# Patient Record
Sex: MALE | Race: WHITE | Employment: OTHER | URBAN - METROPOLITAN AREA
[De-identification: names, ages, dates, MRNs, and addresses within clinical notes are randomized per-mention and may not be internally consistent; named-entity substitution may affect disease eponyms.]

---

## 2017-01-04 ENCOUNTER — TELEPHONE (OUTPATIENT)
Dept: INTERNAL MEDICINE CLINIC | Facility: CLINIC | Age: 82
End: 2017-01-04

## 2017-01-04 NOTE — TELEPHONE ENCOUNTER
Maria M from [facility] is calling to inform Dr. Josee Jacob that the pt. Endy Fung yesterday, he said he was standing and all of a sudden he felt dizzy and fell backwards. He has some pain in his left rib cage, and he has a 1 inch gash on his left eyebrow.  Pt. Betty Hill

## 2017-01-04 NOTE — TELEPHONE ENCOUNTER
Pt states he is doing ok , he did not hit head, he fell in kitchen on L side, he states it feels like \"when you overdue muscles when playing baseball\", no contusions, no swelling, no dizziness or headache since the fall.  He still has constant arthritic p

## 2017-01-10 ENCOUNTER — APPOINTMENT (OUTPATIENT)
Dept: GENERAL RADIOLOGY | Facility: HOSPITAL | Age: 82
DRG: 480 | End: 2017-01-10
Attending: EMERGENCY MEDICINE
Payer: MEDICARE

## 2017-01-10 ENCOUNTER — APPOINTMENT (OUTPATIENT)
Dept: CT IMAGING | Facility: HOSPITAL | Age: 82
DRG: 480 | End: 2017-01-10
Attending: EMERGENCY MEDICINE
Payer: MEDICARE

## 2017-01-10 ENCOUNTER — TELEPHONE (OUTPATIENT)
Dept: INTERNAL MEDICINE CLINIC | Facility: CLINIC | Age: 82
End: 2017-01-10

## 2017-01-10 ENCOUNTER — HOSPITAL ENCOUNTER (INPATIENT)
Facility: HOSPITAL | Age: 82
LOS: 6 days | Discharge: NURSING, REHAB OR LONG-TERM CARE | DRG: 480 | End: 2017-01-16
Attending: EMERGENCY MEDICINE | Admitting: INTERNAL MEDICINE
Payer: MEDICARE

## 2017-01-10 DIAGNOSIS — I48.20 ATRIAL FIBRILLATION, CHRONIC (HCC): ICD-10-CM

## 2017-01-10 DIAGNOSIS — S72.002A HIP FRACTURE, LEFT, CLOSED, INITIAL ENCOUNTER (HCC): Primary | ICD-10-CM

## 2017-01-10 DIAGNOSIS — E53.8 VITAMIN B12 DEFICIENCY: ICD-10-CM

## 2017-01-10 LAB
ANION GAP SERPL CALC-SCNC: 8 MMOL/L (ref 0–18)
ANTIBODY SCREEN: NEGATIVE
BASOPHILS # BLD: 0 K/UL (ref 0–0.2)
BASOPHILS NFR BLD: 1 %
BILIRUB UR QL: NEGATIVE
BUN SERPL-MCNC: 10 MG/DL (ref 8–20)
BUN/CREAT SERPL: 16.9 (ref 10–20)
CALCIUM SERPL-MCNC: 8.4 MG/DL (ref 8.5–10.5)
CHLORIDE SERPL-SCNC: 103 MMOL/L (ref 95–110)
CLARITY UR: CLEAR
CO2 SERPL-SCNC: 26 MMOL/L (ref 22–32)
COLOR UR: YELLOW
CREAT SERPL-MCNC: 0.59 MG/DL (ref 0.5–1.5)
EOSINOPHIL # BLD: 0.1 K/UL (ref 0–0.7)
EOSINOPHIL NFR BLD: 1 %
ERYTHROCYTE [DISTWIDTH] IN BLOOD BY AUTOMATED COUNT: 16.6 % (ref 11–15)
GLUCOSE SERPL-MCNC: 102 MG/DL (ref 70–99)
GLUCOSE UR-MCNC: NEGATIVE MG/DL
HCT VFR BLD AUTO: 36.4 % (ref 41–52)
HGB BLD-MCNC: 11.6 G/DL (ref 13.5–17.5)
HGB UR QL STRIP.AUTO: NEGATIVE
INR BLD: 2.1 (ref 0.9–1.2)
KETONES UR-MCNC: NEGATIVE MG/DL
LEUKOCYTE ESTERASE UR QL STRIP.AUTO: NEGATIVE
LYMPHOCYTES # BLD: 1.4 K/UL (ref 1–4)
LYMPHOCYTES NFR BLD: 22 %
MCH RBC QN AUTO: 26.9 PG (ref 27–32)
MCHC RBC AUTO-ENTMCNC: 31.7 G/DL (ref 32–37)
MCV RBC AUTO: 84.7 FL (ref 80–100)
MONOCYTES # BLD: 0.6 K/UL (ref 0–1)
MONOCYTES NFR BLD: 10 %
MRSA NASAL: POSITIVE
NEUTROPHILS # BLD AUTO: 4 K/UL (ref 1.8–7.7)
NEUTROPHILS NFR BLD: 65 %
NITRITE UR QL STRIP.AUTO: NEGATIVE
OSMOLALITY UR CALC.SUM OF ELEC: 283 MOSM/KG (ref 275–295)
PH UR: 5 [PH] (ref 5–8)
PLATELET # BLD AUTO: 194 K/UL (ref 140–400)
PMV BLD AUTO: 10.2 FL (ref 7.4–10.3)
POTASSIUM SERPL-SCNC: 3.8 MMOL/L (ref 3.3–5.1)
PROT UR-MCNC: NEGATIVE MG/DL
PROTHROMBIN TIME: 23.7 SECONDS (ref 11.8–14.5)
RBC # BLD AUTO: 4.3 M/UL (ref 4.5–5.9)
RH BLOOD TYPE: POSITIVE
SODIUM SERPL-SCNC: 137 MMOL/L (ref 136–144)
STAPH A BY PCR: POSITIVE
TROPONIN I SERPL-MCNC: 0.01 NG/ML (ref ?–0.03)
UROBILINOGEN UR STRIP-ACNC: 2
VIT C UR-MCNC: NEGATIVE MG/DL
WBC # BLD AUTO: 6.1 K/UL (ref 4–11)

## 2017-01-10 PROCEDURE — 71010 XR CHEST AP PORTABLE  (CPT=71010): CPT

## 2017-01-10 PROCEDURE — 70450 CT HEAD/BRAIN W/O DYE: CPT

## 2017-01-10 PROCEDURE — 71260 CT THORAX DX C+: CPT

## 2017-01-10 PROCEDURE — 73502 X-RAY EXAM HIP UNI 2-3 VIEWS: CPT

## 2017-01-10 RX ORDER — CYANOCOBALAMIN 1000 UG/ML
1000 INJECTION INTRAMUSCULAR; SUBCUTANEOUS
Status: DISCONTINUED | OUTPATIENT
Start: 2017-01-10 | End: 2017-01-16

## 2017-01-10 RX ORDER — FUROSEMIDE 20 MG/1
20 TABLET ORAL DAILY
Status: DISCONTINUED | OUTPATIENT
Start: 2017-01-11 | End: 2017-01-11

## 2017-01-10 RX ORDER — DIAZEPAM 5 MG/ML
2.5 INJECTION, SOLUTION INTRAMUSCULAR; INTRAVENOUS ONCE
Status: COMPLETED | OUTPATIENT
Start: 2017-01-10 | End: 2017-01-10

## 2017-01-10 RX ORDER — HYDROMORPHONE HYDROCHLORIDE 1 MG/ML
0.4 INJECTION, SOLUTION INTRAMUSCULAR; INTRAVENOUS; SUBCUTANEOUS EVERY 2 HOUR PRN
Status: DISCONTINUED | OUTPATIENT
Start: 2017-01-10 | End: 2017-01-11

## 2017-01-10 RX ORDER — SODIUM CHLORIDE 9 MG/ML
INJECTION, SOLUTION INTRAVENOUS CONTINUOUS
Status: DISPENSED | OUTPATIENT
Start: 2017-01-10 | End: 2017-01-10

## 2017-01-10 RX ORDER — SERTRALINE HYDROCHLORIDE 25 MG/1
25 TABLET, FILM COATED ORAL DAILY
Status: DISCONTINUED | OUTPATIENT
Start: 2017-01-11 | End: 2017-01-16

## 2017-01-10 RX ORDER — HYDROMORPHONE HYDROCHLORIDE 1 MG/ML
1 INJECTION, SOLUTION INTRAMUSCULAR; INTRAVENOUS; SUBCUTANEOUS ONCE
Status: DISCONTINUED | OUTPATIENT
Start: 2017-01-10 | End: 2017-01-10

## 2017-01-10 RX ORDER — SODIUM CHLORIDE 0.9 % (FLUSH) 0.9 %
SYRINGE (ML) INJECTION
Status: DISPENSED
Start: 2017-01-10 | End: 2017-01-11

## 2017-01-10 RX ORDER — SODIUM CHLORIDE 9 MG/ML
INJECTION, SOLUTION INTRAVENOUS ONCE
Status: COMPLETED | OUTPATIENT
Start: 2017-01-10 | End: 2017-01-11

## 2017-01-10 RX ORDER — ACETAMINOPHEN 325 MG/1
650 TABLET ORAL EVERY 6 HOURS PRN
Status: DISCONTINUED | OUTPATIENT
Start: 2017-01-10 | End: 2017-01-16

## 2017-01-10 RX ORDER — HYDROMORPHONE HYDROCHLORIDE 1 MG/ML
1 INJECTION, SOLUTION INTRAMUSCULAR; INTRAVENOUS; SUBCUTANEOUS ONCE
Status: COMPLETED | OUTPATIENT
Start: 2017-01-10 | End: 2017-01-10

## 2017-01-10 RX ORDER — CLOTRIMAZOLE 1 %
CREAM (GRAM) TOPICAL 2 TIMES DAILY
Status: DISCONTINUED | OUTPATIENT
Start: 2017-01-10 | End: 2017-01-16

## 2017-01-10 RX ORDER — HYDROMORPHONE HYDROCHLORIDE 1 MG/ML
INJECTION, SOLUTION INTRAMUSCULAR; INTRAVENOUS; SUBCUTANEOUS
Status: COMPLETED
Start: 2017-01-10 | End: 2017-01-10

## 2017-01-10 NOTE — TELEPHONE ENCOUNTER
INR 2.2, 4 mg Coumadin Mon & Thur, 3 mg all other days. Please call Maria M / [facility] [phone] she has a confidential VM.    Tasked to coumadin high

## 2017-01-10 NOTE — HISTORICAL OFFICE NOTE
Nael Kendra  : 10/22/1931  ACCOUNT:  [id number]  [phone]  PCP: Dr. Johana Matias     TODAY'S DATE: 2016  DICTATED BY:  Rajiv Sandoval M.D. ]    CHIEF COMPLAINT: [Followup of .  CAD, of native vessels, Followup of Atrial fibrillation and currently surgery 2002, broken arm 2014 and bilateral cataract surgery    PAST CV HISTORY: atrial fibrillation, CAD, cardioversion 2007 and PTCA 20 Years ago    FAMILY HISTORY: Negative for premature CAD.   SOCIAL HISTORY: SMOKING: Former tobacco use. smokes cigars a with the APN in the next week to reevaluate off these medications. Check an EKG. 2. Coronary disease, history of percutaneous angioplasty 20 years ago. He has no chest pain. ASSESSMENT:  1. . CAD, of native vessels  2.  Atrial fibrillation, currently S

## 2017-01-10 NOTE — ED PROVIDER NOTES
Patient Seen in: [facility] AND [facility] Emergency Department    History   Patient presents with:  Dizziness (neurologic)    Stated Complaint: DIZZY    HPI    70-year-old male with multiple medical problems including history of atrial fibrillation and syncope (four) hours as needed for Shortness of Breath. SOTALOL HCL 80 MG Oral Tab,  TAKE 1 TABLET BY MOUTH TWICE DAILY   furosemide 20 MG Oral Tab,  Take 1 tablet (20 mg total) by mouth daily.    budesonide 0.5 MG/2ML Inhalation Suspension,  Take 2 mL (0.5 mg to Wt 64.411 kg  BMI 17.29 kg/m2  SpO2 98%        Physical Exam    Constitutional: Oriented to person, place, and time. Appears well-developed. No distress but does appear moderately uncomfortable. Daughter bedside. Head: Normocephalic and atraumatic.    E CBC W/ DIFFERENTIAL[[id number]]          Abnormal            Final result                 Please view results for these tests on the individual orders.    PROTHROMBIN TIME (PT)   RAINBOW DRAW BLUE   RAINBOW DRAW GOLD   RAINBOW DRAW LAVENDER (mfk=[id number])    1/10/2017  PROCEDURE: XR HIP W OR WO PELVIS 2 OR 3 VIEWS, LEFT (CPT=73502)  COMPARISON: [facility], [facility]. for [facility], X HIPS BILATERAL AND PELVIS, 11/03/2006, 9:56. INDICATIONS: Left hip pain post fall today.   TECHNIQUE: Three views

## 2017-01-10 NOTE — ED NOTES
Patient presents to ER via medics after becoming dizzy and passing out.  + LOC. Patient has left hip pain with rotation, shortening to left leg. Large abrasion to left elbow - irrigated and wrapped in gauze. Patient is awake and alert.   Denies chest she

## 2017-01-11 ENCOUNTER — TELEPHONE (OUTPATIENT)
Dept: INTERNAL MEDICINE CLINIC | Facility: CLINIC | Age: 82
End: 2017-01-11

## 2017-01-11 ENCOUNTER — ANESTHESIA (OUTPATIENT)
Dept: SURGERY | Facility: HOSPITAL | Age: 82
DRG: 480 | End: 2017-01-11
Payer: MEDICARE

## 2017-01-11 ENCOUNTER — APPOINTMENT (OUTPATIENT)
Dept: CV DIAGNOSTICS | Facility: HOSPITAL | Age: 82
DRG: 480 | End: 2017-01-11
Attending: INTERNAL MEDICINE
Payer: MEDICARE

## 2017-01-11 ENCOUNTER — APPOINTMENT (OUTPATIENT)
Dept: GENERAL RADIOLOGY | Facility: HOSPITAL | Age: 82
DRG: 480 | End: 2017-01-11
Attending: ORTHOPAEDIC SURGERY
Payer: MEDICARE

## 2017-01-11 ENCOUNTER — SURGERY (OUTPATIENT)
Age: 82
End: 2017-01-11

## 2017-01-11 ENCOUNTER — ANESTHESIA EVENT (OUTPATIENT)
Dept: SURGERY | Facility: HOSPITAL | Age: 82
DRG: 480 | End: 2017-01-11
Payer: MEDICARE

## 2017-01-11 PROBLEM — J69.0 ASPIRATION PNEUMONIA (HCC): Status: ACTIVE | Noted: 2017-01-11

## 2017-01-11 PROBLEM — D68.9 COAGULOPATHY (HCC): Status: ACTIVE | Noted: 2017-01-11

## 2017-01-11 LAB
ANION GAP SERPL CALC-SCNC: 9 MMOL/L (ref 0–18)
BASOPHILS # BLD: 0 K/UL (ref 0–0.2)
BASOPHILS NFR BLD: 0 %
BUN SERPL-MCNC: 8 MG/DL (ref 8–20)
BUN/CREAT SERPL: 11.1 (ref 10–20)
CALCIUM SERPL-MCNC: 8.4 MG/DL (ref 8.5–10.5)
CHLORIDE SERPL-SCNC: 105 MMOL/L (ref 95–110)
CO2 SERPL-SCNC: 25 MMOL/L (ref 22–32)
CREAT SERPL-MCNC: 0.72 MG/DL (ref 0.5–1.5)
EOSINOPHIL # BLD: 0 K/UL (ref 0–0.7)
EOSINOPHIL NFR BLD: 0 %
ERYTHROCYTE [DISTWIDTH] IN BLOOD BY AUTOMATED COUNT: 16.7 % (ref 11–15)
GLUCOSE SERPL-MCNC: 115 MG/DL (ref 70–99)
HCT VFR BLD AUTO: 36.2 % (ref 41–52)
HGB BLD-MCNC: 11.6 G/DL (ref 13.5–17.5)
INR BLD: 1.4 (ref 0.9–1.2)
INR BLD: 1.9 (ref 0.9–1.2)
INR BLD: 2.6 (ref 0.9–1.2)
LYMPHOCYTES # BLD: 0.6 K/UL (ref 1–4)
LYMPHOCYTES NFR BLD: 7 %
MCH RBC QN AUTO: 27.4 PG (ref 27–32)
MCHC RBC AUTO-ENTMCNC: 32.1 G/DL (ref 32–37)
MCV RBC AUTO: 85.2 FL (ref 80–100)
MONOCYTES # BLD: 0.7 K/UL (ref 0–1)
MONOCYTES NFR BLD: 8 %
NEUTROPHILS # BLD AUTO: 8 K/UL (ref 1.8–7.7)
NEUTROPHILS NFR BLD: 85 %
OSMOLALITY UR CALC.SUM OF ELEC: 287 MOSM/KG (ref 275–295)
PLATELET # BLD AUTO: 189 K/UL (ref 140–400)
PMV BLD AUTO: 10.2 FL (ref 7.4–10.3)
POTASSIUM SERPL-SCNC: 3.7 MMOL/L (ref 3.3–5.1)
PROTHROMBIN TIME: 17.4 SECONDS (ref 11.8–14.5)
PROTHROMBIN TIME: 21.4 SECONDS (ref 11.8–14.5)
PROTHROMBIN TIME: 28.1 SECONDS (ref 11.8–14.5)
RBC # BLD AUTO: 4.24 M/UL (ref 4.5–5.9)
SODIUM SERPL-SCNC: 139 MMOL/L (ref 136–144)
WBC # BLD AUTO: 9.4 K/UL (ref 4–11)

## 2017-01-11 PROCEDURE — 99223 1ST HOSP IP/OBS HIGH 75: CPT | Performed by: INTERNAL MEDICINE

## 2017-01-11 PROCEDURE — 76001 XR OR HIP (2 VIEWS) >60 C-ARM  (CPT=73502/76001): CPT

## 2017-01-11 PROCEDURE — 0QS736Z REPOSITION LEFT UPPER FEMUR WITH INTRAMEDULLARY INTERNAL FIXATION DEVICE, PERCUTANEOUS APPROACH: ICD-10-PCS | Performed by: ORTHOPAEDIC SURGERY

## 2017-01-11 PROCEDURE — 73502 X-RAY EXAM HIP UNI 2-3 VIEWS: CPT

## 2017-01-11 PROCEDURE — 30233K1 TRANSFUSION OF NONAUTOLOGOUS FROZEN PLASMA INTO PERIPHERAL VEIN, PERCUTANEOUS APPROACH: ICD-10-PCS | Performed by: INTERNAL MEDICINE

## 2017-01-11 DEVICE — SCREW CORT FA 5.0X37.5 Z NAIL: Type: IMPLANTABLE DEVICE | Status: FUNCTIONAL

## 2017-01-11 DEVICE — SCREW CORT FA 5.0X35 Z NAIL: Type: IMPLANTABLE DEVICE | Status: FUNCTIONAL

## 2017-01-11 DEVICE — ZNN CMN NAIL 13MMX21.5CM 125L
Type: IMPLANTABLE DEVICE | Status: FUNCTIONAL
Brand: ZIMMER® NATURAL NAIL® SYSTEM

## 2017-01-11 DEVICE — ZNN CMN LAG SCREW 10.5X110
Type: IMPLANTABLE DEVICE | Status: FUNCTIONAL
Brand: ZIMMER® NATURAL NAIL® SYSTEM

## 2017-01-11 RX ORDER — SODIUM CHLORIDE 9 MG/ML
INJECTION, SOLUTION INTRAVENOUS
Status: DISPENSED
Start: 2017-01-11 | End: 2017-01-12

## 2017-01-11 RX ORDER — SODIUM CHLORIDE, SODIUM LACTATE, POTASSIUM CHLORIDE, CALCIUM CHLORIDE 600; 310; 30; 20 MG/100ML; MG/100ML; MG/100ML; MG/100ML
INJECTION, SOLUTION INTRAVENOUS CONTINUOUS PRN
Status: DISCONTINUED | OUTPATIENT
Start: 2017-01-11 | End: 2017-01-11 | Stop reason: SURG

## 2017-01-11 RX ORDER — NALOXONE HYDROCHLORIDE 0.4 MG/ML
80 INJECTION, SOLUTION INTRAMUSCULAR; INTRAVENOUS; SUBCUTANEOUS AS NEEDED
Status: DISCONTINUED | OUTPATIENT
Start: 2017-01-11 | End: 2017-01-11 | Stop reason: HOSPADM

## 2017-01-11 RX ORDER — SODIUM CHLORIDE 0.9 % (FLUSH) 0.9 %
10 SYRINGE (ML) INJECTION AS NEEDED
Status: DISCONTINUED | OUTPATIENT
Start: 2017-01-11 | End: 2017-01-16

## 2017-01-11 RX ORDER — MORPHINE SULFATE 2 MG/ML
2 INJECTION, SOLUTION INTRAMUSCULAR; INTRAVENOUS EVERY 4 HOURS PRN
Status: DISCONTINUED | OUTPATIENT
Start: 2017-01-11 | End: 2017-01-16

## 2017-01-11 RX ORDER — POLYETHYLENE GLYCOL 3350 17 G/17G
17 POWDER, FOR SOLUTION ORAL DAILY PRN
Status: DISCONTINUED | OUTPATIENT
Start: 2017-01-11 | End: 2017-01-16

## 2017-01-11 RX ORDER — HALOPERIDOL 5 MG/ML
0.25 INJECTION INTRAMUSCULAR ONCE AS NEEDED
Status: DISCONTINUED | OUTPATIENT
Start: 2017-01-11 | End: 2017-01-11 | Stop reason: HOSPADM

## 2017-01-11 RX ORDER — SODIUM CHLORIDE 0.9 % (FLUSH) 0.9 %
SYRINGE (ML) INJECTION
Status: DISPENSED
Start: 2017-01-11 | End: 2017-01-12

## 2017-01-11 RX ORDER — MORPHINE SULFATE 10 MG/ML
6 INJECTION, SOLUTION INTRAMUSCULAR; INTRAVENOUS EVERY 10 MIN PRN
Status: DISCONTINUED | OUTPATIENT
Start: 2017-01-11 | End: 2017-01-11 | Stop reason: HOSPADM

## 2017-01-11 RX ORDER — HYDROCODONE BITARTRATE AND ACETAMINOPHEN 7.5; 325 MG/1; MG/1
1 TABLET ORAL EVERY 4 HOURS PRN
Status: DISCONTINUED | OUTPATIENT
Start: 2017-01-11 | End: 2017-01-16

## 2017-01-11 RX ORDER — SODIUM CHLORIDE, SODIUM LACTATE, POTASSIUM CHLORIDE, CALCIUM CHLORIDE 600; 310; 30; 20 MG/100ML; MG/100ML; MG/100ML; MG/100ML
INJECTION, SOLUTION INTRAVENOUS CONTINUOUS
Status: DISCONTINUED | OUTPATIENT
Start: 2017-01-11 | End: 2017-01-14

## 2017-01-11 RX ORDER — MORPHINE SULFATE 4 MG/ML
4 INJECTION, SOLUTION INTRAMUSCULAR; INTRAVENOUS EVERY 10 MIN PRN
Status: DISCONTINUED | OUTPATIENT
Start: 2017-01-11 | End: 2017-01-11 | Stop reason: HOSPADM

## 2017-01-11 RX ORDER — ONDANSETRON 2 MG/ML
4 INJECTION INTRAMUSCULAR; INTRAVENOUS ONCE AS NEEDED
Status: DISCONTINUED | OUTPATIENT
Start: 2017-01-11 | End: 2017-01-11 | Stop reason: HOSPADM

## 2017-01-11 RX ORDER — MORPHINE SULFATE 2 MG/ML
2 INJECTION, SOLUTION INTRAMUSCULAR; INTRAVENOUS EVERY 10 MIN PRN
Status: DISCONTINUED | OUTPATIENT
Start: 2017-01-11 | End: 2017-01-11 | Stop reason: HOSPADM

## 2017-01-11 RX ORDER — SUCCINYLCHOLINE CHLORIDE 20 MG/ML
INJECTION INTRAMUSCULAR; INTRAVENOUS AS NEEDED
Status: DISCONTINUED | OUTPATIENT
Start: 2017-01-11 | End: 2017-01-11 | Stop reason: SURG

## 2017-01-11 RX ORDER — HYDROMORPHONE HYDROCHLORIDE 1 MG/ML
0.2 INJECTION, SOLUTION INTRAMUSCULAR; INTRAVENOUS; SUBCUTANEOUS EVERY 5 MIN PRN
Status: DISCONTINUED | OUTPATIENT
Start: 2017-01-11 | End: 2017-01-11 | Stop reason: HOSPADM

## 2017-01-11 RX ORDER — BISACODYL 10 MG
10 SUPPOSITORY, RECTAL RECTAL
Status: DISCONTINUED | OUTPATIENT
Start: 2017-01-11 | End: 2017-01-16

## 2017-01-11 RX ORDER — HYDROCODONE BITARTRATE AND ACETAMINOPHEN 5; 325 MG/1; MG/1
2 TABLET ORAL AS NEEDED
Status: DISCONTINUED | OUTPATIENT
Start: 2017-01-11 | End: 2017-01-11 | Stop reason: HOSPADM

## 2017-01-11 RX ORDER — HYDROCODONE BITARTRATE AND ACETAMINOPHEN 5; 325 MG/1; MG/1
1 TABLET ORAL EVERY 4 HOURS PRN
Status: DISCONTINUED | OUTPATIENT
Start: 2017-01-11 | End: 2017-01-16

## 2017-01-11 RX ORDER — HYDROCODONE BITARTRATE AND ACETAMINOPHEN 5; 325 MG/1; MG/1
1 TABLET ORAL AS NEEDED
Status: DISCONTINUED | OUTPATIENT
Start: 2017-01-11 | End: 2017-01-11 | Stop reason: HOSPADM

## 2017-01-11 RX ORDER — DOCUSATE SODIUM 100 MG/1
100 CAPSULE, LIQUID FILLED ORAL 2 TIMES DAILY
Status: DISCONTINUED | OUTPATIENT
Start: 2017-01-11 | End: 2017-01-16

## 2017-01-11 RX ORDER — ENOXAPARIN SODIUM 100 MG/ML
40 INJECTION SUBCUTANEOUS DAILY
Status: DISCONTINUED | OUTPATIENT
Start: 2017-01-12 | End: 2017-01-16

## 2017-01-11 RX ORDER — ONDANSETRON 2 MG/ML
INJECTION INTRAMUSCULAR; INTRAVENOUS AS NEEDED
Status: DISCONTINUED | OUTPATIENT
Start: 2017-01-11 | End: 2017-01-11 | Stop reason: SURG

## 2017-01-11 RX ORDER — SOTALOL HYDROCHLORIDE 80 MG/1
80 TABLET ORAL EVERY 12 HOURS SCHEDULED
Status: DISCONTINUED | OUTPATIENT
Start: 2017-01-11 | End: 2017-01-16

## 2017-01-11 RX ORDER — MAGNESIUM HYDROXIDE 1200 MG/15ML
LIQUID ORAL CONTINUOUS PRN
Status: DISCONTINUED | OUTPATIENT
Start: 2017-01-11 | End: 2017-01-11

## 2017-01-11 RX ORDER — HYDROMORPHONE HYDROCHLORIDE 1 MG/ML
0.6 INJECTION, SOLUTION INTRAMUSCULAR; INTRAVENOUS; SUBCUTANEOUS EVERY 5 MIN PRN
Status: DISCONTINUED | OUTPATIENT
Start: 2017-01-11 | End: 2017-01-11 | Stop reason: HOSPADM

## 2017-01-11 RX ORDER — HYDROMORPHONE HYDROCHLORIDE 1 MG/ML
0.4 INJECTION, SOLUTION INTRAMUSCULAR; INTRAVENOUS; SUBCUTANEOUS EVERY 5 MIN PRN
Status: DISCONTINUED | OUTPATIENT
Start: 2017-01-11 | End: 2017-01-11 | Stop reason: HOSPADM

## 2017-01-11 RX ADMIN — SUCCINYLCHOLINE CHLORIDE 60 MG: 20 INJECTION INTRAMUSCULAR; INTRAVENOUS at 18:10:00

## 2017-01-11 RX ADMIN — SODIUM CHLORIDE, SODIUM LACTATE, POTASSIUM CHLORIDE, CALCIUM CHLORIDE: 600; 310; 30; 20 INJECTION, SOLUTION INTRAVENOUS at 18:10:00

## 2017-01-11 RX ADMIN — SODIUM CHLORIDE, SODIUM LACTATE, POTASSIUM CHLORIDE, CALCIUM CHLORIDE: 600; 310; 30; 20 INJECTION, SOLUTION INTRAVENOUS at 20:19:00

## 2017-01-11 RX ADMIN — SODIUM CHLORIDE, SODIUM LACTATE, POTASSIUM CHLORIDE, CALCIUM CHLORIDE: 600; 310; 30; 20 INJECTION, SOLUTION INTRAVENOUS at 18:50:00

## 2017-01-11 RX ADMIN — ONDANSETRON 4 MG: 2 INJECTION INTRAMUSCULAR; INTRAVENOUS at 18:10:00

## 2017-01-11 NOTE — PROGRESS NOTES
[facility] - [facility]    Cardiology Progress Note    Iker Frazier Patient Status:  Inpatient    10/22/1931 MRN [id number]   Location [facility] 1SW Attending Caleb Posadas MD   Hosp Day # 1 PCP Lidia Monsalve MD         Assessment a CO2 25 01/11/2017   * 01/11/2017   CA 8.4* 01/11/2017   ALB 2.3* 05/23/2016   BILT 0.9 05/23/2016   TP 6.7 05/23/2016   AST 13* 05/23/2016   ALT 11* 05/23/2016   INR 1.9* 01/11/2017   PT 41.8* 06/01/2016   TSH 1.24 02/12/2014   TROP 0.01 01/10/201 Postop changes in the abdomen and pelvis. 5. Atherosclerosis. Ekg 12-lead    1/10/2017  ECG Report  Interpretation  -------------------------- Undetermined rhythm -Old anterior infarct.  - Nonspecific T-abnormality.   Low voltage with rightward P-ax

## 2017-01-11 NOTE — H&P
[facility] Patient Status:  Inpatient    10/22/1931 MRN [id number]   Location [facility] 1SW Attending Vy Foss MD   Hosp Day # 1 PCP Stefanie Adamson MD     Date:  2017  Date History of vitamin B12 deficiency diagnosed in February of 2014 at  which time his vitamin B12 level was 128.  He was started on    monthly B12  injections. 5.  History of osteoarthritis of the hands.   6.  Peptic ulcer disease, status post Billroth kristi (peptic ulcer disease)    • [facility] 2013   • Fracture, radius 2014   • Vitamin B12 deficiency    • Lipid screening 10/04/2010   • Pneumonia      2016   • Coronary atherosclerosis    • Arrhythmia    • Cataract    • Esophageal reflux    • IBS (i smoking about 23 years ago. His smoking use included Cigarettes. He has a 60 pack-year smoking history. His smokeless tobacco use includes Chew. He reports that he drinks about 1.5 oz of alcohol per week. He reports that he does not use illicit drugs.     Erica Merino non-distended  Extremities: +edema; LLE externally rotated; currently in traction  Vascular: no carotid bruits; DP/PT 2/2  Musculoskeletal: Motor 5/5 upper and lower extremities  Neurological: cranial nerves II-XII intact; light touch and proprioception in dysmotility. 8. Generalized atherosclerosis including marked coronary artery calcification. Coronary stents. 10. Chronic mild T12 vertebral body compression fracture.          Xr Hip W Or Wo Pelvis 2 Or 3 Views, Left (cpt=73502)    1/10/2017  CONCLUSION: history of aspiration based on video swallow study from April 2016; swallowing had improved; he had been followed by speech therapist, [facility] [place]; on cardiac diet with thin liquids; he has been non-compliant with using thickener when drinkin plan care; daughter Rd Boyer [phone] is next available contact      Santo Bahena MD  1/11/2017  9:19 AM

## 2017-01-11 NOTE — CONSULTS
[facility] - [facility]    Report of Cardiology Consultation    Chica Aisha Patient Status:  Emergency    10/22/1931 MRN [id number]   Location [address] Attending Emile Dean MD   Hosp Day # 0 PCP Linden Mack 2006    CATARACT  2006    INTRAOCULAR LENS IMPLANT, SECONDARY  2006    OTHER SURGICAL HISTORY  2011    Comment dental implants    CHOLECYSTECTOMY         Family History  Family History   Problem Relation Age of Onset   • old age[other] Vicki Watkins Father 80 01/10/2017   CREATSERUM 0.59 01/10/2017   BUN 10 01/10/2017    01/10/2017   K 3.8 01/10/2017    01/10/2017   CO2 26 01/10/2017   * 01/10/2017   CA 8.4* 01/10/2017   ALB 2.3* 05/23/2016   TP 6.7 05/23/2016   AST 13* 05/23/2016   ALT 11* 0

## 2017-01-11 NOTE — PROGRESS NOTES
PROGRESS NOTE / ORTHO SURG;  CONSULT NOTE DICTATED. ASSESS: CLOSED , 4-PART INTERTROCHANTERIC FRACTURE OF THE LEFT HIP. THIS FRACTURE REQUIRES SURGERY WHICH IS SCHEDULED FOR THE MID-AFTERNOON OF 1/11/17. STANDARD PERIOPERATIVE VTE ORDERS ARE ORDERED.

## 2017-01-11 NOTE — DISCHARGE PLANNING
SUSANNE attempted to meet w/ pt to discuss discharge planning per MD order. Pt's RN was in pt's room at this time - pt had just got to room. SW to follow up when appropriate. 2:45PM: SUSANNE met w/ pt, pt's dtr and pt's son to discuss d/c plans.  Pt lives at home

## 2017-01-11 NOTE — CM/SW NOTE
MICHEAL spoke with the patient's daughter Claire Kemp [phone] who lives in [place] to explain and enroll Robin Devine in the [employer] program under  (L hip fx) s/p fall. Bravo Garcia agreed with phone f/u for 3 months post d/c from [employer].   Bravo Garcia s

## 2017-01-11 NOTE — PROGRESS NOTES
PT STATED TO RN THIS A.M. HIS WISHES ARE TO BE DNR, THIS RN SPOKE WITH DR. WANG ABOUT PATIENTS ADVANCE DIRECTIVES AND INFORMED OF WISHES.

## 2017-01-11 NOTE — CONSULTS
[facility]    PATIENT'S NAME: Jose Antonio Vicente, [de-identified] R   ATTENDING PHYSICIAN: Mile Palma. Tonya Bowen MD   CONSULTING PHYSICIAN: Roro Rubalcava.  MD Maricruz   PATIENT ACCOUNT#:   [de-identified]    LOCATION:  [facility]  MEDICAL RECORD #:   [id number]       DATE OF BIRTH: atherosclerosis with coronary artery calcification was identified. Calcified bilateral lower lobe pulmonary granulomas noted. Mild T12 compression deformity.       X-ray studies of the pelvis and left hip reveal a 4-part intertrochanteric fracture of the only slight tenderness over the mid lateral aspect on the left, with none on the right. Sternocleidomastoid and paraspinal muscles are without spasm or tenderness, and the posterior cervical midline is nontender.   Thoracic and lumbar spine reveal no local hammertoe deformities. The left hindfoot, midfoot, and forefoot are free of localized swelling, deformity, discoloration, or tenderness. Both legs show healed venous stasis ulcers. IMPRESSION:    1.    Acute four-part intertrochanteric fracture of the

## 2017-01-11 NOTE — PROGRESS NOTES
ORTHO SURG: AM LABS: INR = 2.6, WBC = 9,400, H/H = 11.6/ 36.2, PLATELETS = 982,927. PT. D/W DR. Link Sethi. TO RECEIVE 2 UNITS OF FFP THIS AM. TARGET INR FOR OR IS 1.5 OR LESS.

## 2017-01-12 ENCOUNTER — APPOINTMENT (OUTPATIENT)
Dept: CV DIAGNOSTICS | Facility: HOSPITAL | Age: 82
DRG: 480 | End: 2017-01-12
Attending: INTERNAL MEDICINE
Payer: MEDICARE

## 2017-01-12 LAB
ALBUMIN SERPL BCP-MCNC: 2.5 G/DL (ref 3.5–4.8)
ALBUMIN/GLOB SERPL: 0.7 {RATIO} (ref 1–2)
ALP SERPL-CCNC: 66 U/L (ref 32–100)
ALT SERPL-CCNC: 15 U/L (ref 17–63)
ANION GAP SERPL CALC-SCNC: 8 MMOL/L (ref 0–18)
AST SERPL-CCNC: 17 U/L (ref 15–41)
BASOPHILS # BLD: 0 K/UL (ref 0–0.2)
BASOPHILS NFR BLD: 0 %
BILIRUB SERPL-MCNC: 0.9 MG/DL (ref 0.3–1.2)
BLOOD TYPE BARCODE: 5100
BLOOD TYPE BARCODE: 5100
BUN SERPL-MCNC: 9 MG/DL (ref 8–20)
BUN/CREAT SERPL: 10.8 (ref 10–20)
CALCIUM SERPL-MCNC: 8 MG/DL (ref 8.5–10.5)
CHLORIDE SERPL-SCNC: 106 MMOL/L (ref 95–110)
CO2 SERPL-SCNC: 26 MMOL/L (ref 22–32)
CREAT SERPL-MCNC: 0.83 MG/DL (ref 0.5–1.5)
EOSINOPHIL # BLD: 0.1 K/UL (ref 0–0.7)
EOSINOPHIL NFR BLD: 1 %
ERYTHROCYTE [DISTWIDTH] IN BLOOD BY AUTOMATED COUNT: 17.2 % (ref 11–15)
GLOBULIN PLAS-MCNC: 3.4 G/DL (ref 2.5–3.7)
GLUCOSE SERPL-MCNC: 101 MG/DL (ref 70–99)
HCT VFR BLD AUTO: 26.8 % (ref 41–52)
HCT VFR BLD AUTO: 31.7 % (ref 41–52)
HGB BLD-MCNC: 8.6 G/DL (ref 13.5–17.5)
HGB BLD-MCNC: 9.7 G/DL (ref 13.5–17.5)
INR BLD: 1.2 (ref 0.9–1.2)
LYMPHOCYTES # BLD: 0.7 K/UL (ref 1–4)
LYMPHOCYTES NFR BLD: 7 %
MCH RBC QN AUTO: 27.5 PG (ref 27–32)
MCHC RBC AUTO-ENTMCNC: 30.6 G/DL (ref 32–37)
MCV RBC AUTO: 89.7 FL (ref 80–100)
MONOCYTES # BLD: 1.1 K/UL (ref 0–1)
MONOCYTES NFR BLD: 10 %
NEUTROPHILS # BLD AUTO: 8.7 K/UL (ref 1.8–7.7)
NEUTROPHILS NFR BLD: 83 %
OSMOLALITY UR CALC.SUM OF ELEC: 289 MOSM/KG (ref 275–295)
PLATELET # BLD AUTO: 164 K/UL (ref 140–400)
PMV BLD AUTO: 11.3 FL (ref 7.4–10.3)
POTASSIUM SERPL-SCNC: 3.4 MMOL/L (ref 3.3–5.1)
PROT SERPL-MCNC: 5.9 G/DL (ref 5.9–8.4)
PROTHROMBIN TIME: 15.2 SECONDS (ref 11.8–14.5)
RBC # BLD AUTO: 3.53 M/UL (ref 4.5–5.9)
SODIUM SERPL-SCNC: 140 MMOL/L (ref 136–144)
WBC # BLD AUTO: 10.6 K/UL (ref 4–11)

## 2017-01-12 PROCEDURE — 93306 TTE W/DOPPLER COMPLETE: CPT | Performed by: INTERNAL MEDICINE

## 2017-01-12 PROCEDURE — 99232 SBSQ HOSP IP/OBS MODERATE 35: CPT | Performed by: INTERNAL MEDICINE

## 2017-01-12 PROCEDURE — 93306 TTE W/DOPPLER COMPLETE: CPT

## 2017-01-12 RX ORDER — WARFARIN SODIUM 5 MG/1
5 TABLET ORAL
Status: COMPLETED | OUTPATIENT
Start: 2017-01-12 | End: 2017-01-12

## 2017-01-12 RX ORDER — SODIUM CHLORIDE 9 MG/ML
INJECTION, SOLUTION INTRAVENOUS
Status: COMPLETED
Start: 2017-01-12 | End: 2017-01-12

## 2017-01-12 NOTE — CM/SW NOTE
CTL rounds:  Prior to admission pt lived with his wife in his own home. Approx 4 stairs. Pt had been independent with ADL's. Today is POday #1 - per MD to start PT this pm.  Disposition pending clinical course.   Noted in record that pt has a past hx of

## 2017-01-12 NOTE — CM/SW NOTE
Per daughter's request, CTL left the [facility] letter and brochure at the bedside for the family. CTL's phone # left on [facility] letter for family to call if they have questions. Referral has been made to [facility] .   He is current w/ [facility] and will notify [facility] at dis

## 2017-01-12 NOTE — PROGRESS NOTES
[facility]    Cardiology Progress Note    Erika Lewis Patient Status:  Inpatient    10/22/1931 MRN [id number]   Location [facility] 4W/SW/SE Attending Asad Lou MD   Hosp Day # 2 PCP Kathryn Anguiano MD         Assessm BID   • enoxaparin  40 mg Subcutaneous Daily   • clotrimazole   Topical BID   • cyanocobalamin  1,000 mcg Intramuscular Q30 Days   • Sertraline HCl  25 mg Oral Daily   • mupirocin  1 Application Each Nare BID         Results:     Lab Results  Component Alysha right middle lobe. 6. A 12 mm right middle lobe perifissural pulmonary nodule-new relative to previous exam. A post inflammatory etiology is favored. Followup CT scan in 3 months is recommended.  7. Fluid in thoracic esophagus either secondary to reflux or

## 2017-01-12 NOTE — PHYSICAL THERAPY NOTE
PHYSICAL THERAPY HIP EVALUATION - INPATIENT     Room Number: 432/432-A  Evaluation Date: 1/12/2017  Type of Evaluation: Initial  Physician Order: PT Eval and Treat    Presenting Problem: IM nailing L hip  Reason for Therapy: Mobility Dysfunction and Discha prior to admission to the hospital. 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 SUBJECTIVE  \"It hurts so much\"    Patient self-stated goal is to go home.      OBJECTIVE  Precautions:  (IV Conde Heart Monitor)  Fall Risk: High fall risk    WEIGHT BEARING RESTRI End of Session: In bed;Needs met;Call light within reach;RN aware of session/findings; All patient questions and concerns addressed;SCDs in place; Alarm set    ASSESSMENT     Patient is a 80year old male admitted 1/10/2017 for IM nailing L hip.   Patient rec

## 2017-01-12 NOTE — PLAN OF CARE
Patient/Family Goals    • Patient/Family Long Term Goal Progressing    • Patient/Family Short Term Goal Progressing          Patient POD#1 . Nectar thick/mechanical soft diet. Wears upper and lower dentures. Speech consult ordered r/t aspiration risk.  Ritu Patel

## 2017-01-12 NOTE — SLP NOTE
ADULT SWALLOWING EVALUATION    ASSESSMENT & PLAN   ASSESSMENT    Pt alert assessed sitting in bed with HOB reclined dt/ Pt c/o of pain if raised d/t hip fx . Pt presented with pureed, nectar and thin liquid trials.  Bilabial seal adequate with no anterior l fibrillation) ([facility]    • PUD (peptic ulcer disease)    • Thrombocytosis ([facility] 2013   • Fracture, radius 2014   • Vitamin B12 deficiency    • Lipid screening 10/04/2010   • Pneumonia      2016   • Coronary atherosclerosis    • Arrhythmia    • Cataract    • liquids without overt signs or symptoms of aspiration with 100 % accuracy over 2-3 session(s).    Goal #2 The patient/family/caregiver will demonstrate understanding and implementation of aspiration precautions and swallow strategies independently over 2-3

## 2017-01-12 NOTE — ANESTHESIA PREPROCEDURE EVALUATION
Anesthesia PreOp Note    HPI:     Oscar Medina is a 80year old male who presents for preoperative consultation requested by: Martell Mora MD    Date of Surgery: 1/10/2017 - 1/11/2017    Procedure(s):   FEMUR IM NAIL  Indication: Closed intertrochanteri adenoma    • CAD (coronary artery disease)    • AF (atrial fibrillation) (HCC)    • PUD (peptic ulcer disease)    • Thrombocytosis ([facility] [facility] 2013   • Fracture, radius 2014   • Vitamin B12 deficiency    • Lipid screening 10/04/2010   • Pneumonia      2016   • C Ordered in Epic:  [MAR Hold] Normal Saline Flush 0.9 % injection 10 mL 10 mL Intravenous PRN MD [facility] Hold] Sotalol HCl (BETAPACE) tab 80 mg 80 mg Oral 2 times per day Delmy Izquierdo MD    sodium chloride 0.9 % infusion        sodium chl Years of Education: N/A  Number of Children: N/A     Occupational History  None on file     Social History Main Topics   Smoking status: Former Smoker  3.00 Packs/Day  For 20.00 Years     Types: Cigarettes    Quit date: 06/16/1993    Smokeless tobacco: Cur lower dentures    Pulmonary - negative ROS and normal exam   Cardiovascular - normal exam  Exercise tolerance: poor  (+) CAD,     Neuro/Psych      GI/Hepatic/Renal    (+) GERD,     Endo/Other - negative ROS   Abdominal  - normal exam             Anesthesia

## 2017-01-12 NOTE — PROGRESS NOTES
[facility]    Progress Note    Carry Dais Patient Status:  Inpatient    10/22/1931 MRN [id number]   Location [facility] 4W/SW/SE Attending Luna Ortiz MD   Hosp Day # 2 PCP Rubens Yang MD       SUBJECTIVE:  Sara Vick (vsn=[id number])    1/11/2017  CONCLUSION:  1. Internal fixation of left intertrochanteric proximal femur fracture with restoration of anatomic alignment. Xr Chest Ap Portable  (cpt=71010)    1/10/2017  CONCLUSION:  1. Cardiomegaly.  2. Atheroscler infusion  Intravenous Continuous   Normal Saline Flush 0.9 % injection 10 mL 10 mL Intravenous PRN   docusate sodium (COLACE) cap 100 mg 100 mg Oral BID   PEG 3350 (MIRALAX) powder packet 17 g 17 g Oral Daily PRN   magnesium hydroxide (MILK OF MAGNESIA) 40 using thickener when drinking coffee; cont Zosyn 3.375 gm IV q8hrs (day 2);  will need repeat Speech therapy consult and dietary restriction (mechanical soft diet with nectar thick liquids); he has not been using lower dentures as they are ill-fitting; non

## 2017-01-12 NOTE — SLP NOTE
Pt off-unit for tests. Will f/u later to complete BSE.      Terri Rasheed M.S. CCC/SLP  Speech-Language Pathologist  [facility]  #[id number]

## 2017-01-12 NOTE — DIETARY NOTE
ADULT NUTRITION INITIAL ASSESSMENT    Pt is at moderate nutrition risk. Pt meets malnutrition criteria.       CRITERIA FOR MALNUTRITION DIAGNOSIS:  Criteria for severe malnutrition diagnosis: chronic illness related to body fat severe depletion, related to Coronary atherosclerosis    • Arrhythmia    • Cataract    • Esophageal reflux    • IBS (irritable bowel syndrome)    • Depression      ANTHROPOMETRICS:  HT: 193 cm (6' 4\")  WT: 58.333 kg (128 lb 9.6 oz)   BMI: Body mass index is 15.66 kg/(m^2).   BMI CLASS

## 2017-01-12 NOTE — BRIEF OP NOTE
[facility] OPERATING ROOM  Brief Op Note     Negar Peterson Location: OR   CSN [id number] MRN [id number]   Admission Date 1/10/2017 Operation Date 1/11/2017   Attending Physician Jalyn Estevez MD Operating Physician Carlie Bernal MD       Pre-Oper

## 2017-01-12 NOTE — ANESTHESIA POSTPROCEDURE EVALUATION
Patient: Iker Frazier    Procedure Summary     Date Anesthesia Start Anesthesia Stop Room / Location    01/11/17 1806 2020 [facility] OR 06 / [facility] OR       Procedure Diagnosis Surgeon Responsible Provider    FEMUR IM NAIL (Left ) Closed intertrochanter

## 2017-01-12 NOTE — OPERATIVE REPORT
[facility]    PATIENT'S NAME: Piyush Holley ERVIN   ATTENDING PHYSICIAN: Rickey Baptiset. Hero Adams MD   OPERATING PHYSICIAN: Gertrudis Guillaume.  MD Maricruz   PATIENT ACCOUNT#:   [de-identified]    LOCATION:  [facility] [address] [place] [address] #:   [id number]       DATE OF KENNETH timeframe. A standard povidone iodine surgical scrub preparation of the left flank, pelvis, hip, thigh, knee and proximal leg was carried out in standard fashion. The area was then painted with povidone iodine solution.   Lateral draping about the lef The ball-tip guidepin was then placed through the cannulated opening at the top of the handle of the awl and delivered into the intramedullary canal.  The position was then confirmed with AP imaging and subsequently with lateral imaging once the awl was re previously described position on the AP view. The pin was delivered to just short of the subchondral bone. Depth gauge measurement showed the pin between 110 and 115 mm from the lateral cortex.   A two-part cannulated drill was then set to 110 mm, deliver closed the subcutaneous layer of each of the three surgical wounds and running 3-0 nylon closed the skin. There were no specimens obtained. No drains were placed. Estimated blood loss for the procedure was 100 mL.       Sterile petroleum gauze and

## 2017-01-12 NOTE — PROGRESS NOTES
ORTHO SURG:  PO#1  AFEB. AM LABS: INR = 1.2, WBC = 10,600, H/H = 9.7 / 31.7, PLATELETS = 115,333. PATIENT RATES PAIN AT \"5\". PE: ALERT, NAD, PHYSIOLOGIC POSITIONING OF THE LEFT LOWER LIMB. ASSESS: SATISFACTORY PO#1.  PLAN: START P.T. THIS PM, DRESSING KELLEN

## 2017-01-13 LAB
ANION GAP SERPL CALC-SCNC: 3 MMOL/L (ref 0–18)
BASOPHILS # BLD: 0.1 K/UL (ref 0–0.2)
BASOPHILS NFR BLD: 1 %
BUN SERPL-MCNC: 11 MG/DL (ref 8–20)
BUN/CREAT SERPL: 13.8 (ref 10–20)
CALCIUM SERPL-MCNC: 7.9 MG/DL (ref 8.5–10.5)
CHLORIDE SERPL-SCNC: 109 MMOL/L (ref 95–110)
CO2 SERPL-SCNC: 26 MMOL/L (ref 22–32)
CORTIS SERPL-MCNC: 24.2 MCG/DL
CREAT SERPL-MCNC: 0.8 MG/DL (ref 0.5–1.5)
EOSINOPHIL # BLD: 0.3 K/UL (ref 0–0.7)
EOSINOPHIL NFR BLD: 3 %
ERYTHROCYTE [DISTWIDTH] IN BLOOD BY AUTOMATED COUNT: 16.1 % (ref 11–15)
GLUCOSE SERPL-MCNC: 106 MG/DL (ref 70–99)
HCT VFR BLD AUTO: 28.7 % (ref 41–52)
HGB BLD-MCNC: 9.5 G/DL (ref 13.5–17.5)
INR BLD: 1.2 (ref 0.9–1.2)
LYMPHOCYTES # BLD: 1 K/UL (ref 1–4)
LYMPHOCYTES NFR BLD: 10 %
MCH RBC QN AUTO: 27.9 PG (ref 27–32)
MCHC RBC AUTO-ENTMCNC: 33.2 G/DL (ref 32–37)
MCV RBC AUTO: 84.2 FL (ref 80–100)
MONOCYTES # BLD: 1 K/UL (ref 0–1)
MONOCYTES NFR BLD: 9 %
NEUTROPHILS # BLD AUTO: 8.4 K/UL (ref 1.8–7.7)
NEUTROPHILS NFR BLD: 78 %
OSMOLALITY UR CALC.SUM OF ELEC: 286 MOSM/KG (ref 275–295)
PLATELET # BLD AUTO: 146 K/UL (ref 140–400)
PMV BLD AUTO: 10.4 FL (ref 7.4–10.3)
POTASSIUM SERPL-SCNC: 3.8 MMOL/L (ref 3.3–5.1)
PROTHROMBIN TIME: 14.5 SECONDS (ref 11.8–14.5)
RBC # BLD AUTO: 3.4 M/UL (ref 4.5–5.9)
SODIUM SERPL-SCNC: 138 MMOL/L (ref 136–144)
WBC # BLD AUTO: 10.8 K/UL (ref 4–11)

## 2017-01-13 PROCEDURE — 99232 SBSQ HOSP IP/OBS MODERATE 35: CPT | Performed by: INTERNAL MEDICINE

## 2017-01-13 PROCEDURE — 30233N1 TRANSFUSION OF NONAUTOLOGOUS RED BLOOD CELLS INTO PERIPHERAL VEIN, PERCUTANEOUS APPROACH: ICD-10-PCS | Performed by: INTERNAL MEDICINE

## 2017-01-13 PROCEDURE — 99222 1ST HOSP IP/OBS MODERATE 55: CPT | Performed by: INTERNAL MEDICINE

## 2017-01-13 RX ORDER — SODIUM CHLORIDE 0.9 % (FLUSH) 0.9 %
10 SYRINGE (ML) INJECTION AS NEEDED
Status: DISCONTINUED | OUTPATIENT
Start: 2017-01-13 | End: 2017-01-16

## 2017-01-13 RX ORDER — SODIUM CHLORIDE 9 MG/ML
INJECTION, SOLUTION INTRAVENOUS ONCE
Status: COMPLETED | OUTPATIENT
Start: 2017-01-13 | End: 2017-01-13

## 2017-01-13 RX ORDER — SODIUM CHLORIDE 9 MG/ML
INJECTION, SOLUTION INTRAVENOUS
Status: COMPLETED
Start: 2017-01-13 | End: 2017-01-13

## 2017-01-13 RX ORDER — WARFARIN SODIUM 3 MG/1
3 TABLET ORAL NIGHTLY
Status: DISCONTINUED | OUTPATIENT
Start: 2017-01-13 | End: 2017-01-14

## 2017-01-13 RX ORDER — POTASSIUM CHLORIDE 14.9 MG/ML
20 INJECTION INTRAVENOUS ONCE
Status: COMPLETED | OUTPATIENT
Start: 2017-01-13 | End: 2017-01-13

## 2017-01-13 RX ORDER — SODIUM CHLORIDE 0.9 % (FLUSH) 0.9 %
SYRINGE (ML) INJECTION
Status: COMPLETED
Start: 2017-01-13 | End: 2017-01-13

## 2017-01-13 NOTE — OCCUPATIONAL THERAPY NOTE
OCCUPATIONAL THERAPY EVALUATION - INPATIENT      Room Number: 432/432-A  Evaluation Date: 1/13/2017  Type of Evaluation: Initial  Presenting Problem:  (s/p Intramedullary fixation of intertrochanteric fx)    Physician Order: IP Consult to Occupational Ther Vitamin B12 deficiency    • Lipid screening 10/04/2010   • Pneumonia      2016   • Coronary atherosclerosis    • Arrhythmia    • Cataract    • Esophageal reflux    • IBS (irritable bowel syndrome)    • Depression    • White matter disease        Past Surgi does the patient currently need…  -   Putting on and taking off regular lower body clothing?: A Lot  -   Bathing (including washing, rinsing, drying)?: Total  -   Toileting, which includes using toilet, bedpan or urinal? : A Lot  -   Putting on and taking

## 2017-01-13 NOTE — PLAN OF CARE
DISCHARGE PLANNING    • Discharge to home or other facility with appropriate resources Progressing        HEMATOLOGIC - ADULT    • Maintains hematologic stability Progressing        PAIN - ADULT    • Verbalizes/displays adequate comfort level or patient's

## 2017-01-13 NOTE — PROGRESS NOTES
CALLED DR. Mauro Morales REGARDING PATIENT'S LOW BLOOD PRESSURE. PER DR. Mauro Morales, ORDERED STAT H/H AND 500ML BOLUS OF NACL 0.9.

## 2017-01-13 NOTE — CDS QUERY
Potential for Impaired Nutritional Status  DOCUMENTATION CLARIFICATION FORM  Dear Doctor:  Please see RD assessment based on ASPEN guidelines dated 01/12/17 in notes section of medical record. RD nutritional diagnosis is Severe Malnutrition.   For accurate

## 2017-01-13 NOTE — PROGRESS NOTES
[facility]    Progress Note    Candy Young Patient Status:  Inpatient    10/22/1931 MRN [id number]   Location [facility] 4W/SW/SE Attending Jerry Gao MD   Hosp Day # 3 PCP Dionne Fox MD         Assessment and P others).  on sotalol 80mg BID     6.   Pulmonary HTN - seen on ECHO with PA pressure 54 (previous PA pressure 44 inJune 2016); etiology?; EF normal; perhaps due to COPD as lungs hyperinflated on PCXR; will request pulmonary consultation (Dr Sherine Styles 01/13/17 0600   Gross per 24 hour   Intake   1955 ml   Output    650 ml   Net   1305 ml       Wt Readings from Last 3 Encounters:  01/10/17 : 128 lb 9.6 oz (58.333 kg)  11/28/16 : 142 lb (64.411 kg)  08/31/16 : 133 lb 6.4 oz (60.51 kg)        Constitutiona Topical BID   cyanocobalamin (VITAMIN B12) 1000 MCG/ML injection 1,000 mcg 1,000 mcg Intramuscular Q30 Days   Sertraline HCl (ZOLOFT) tab 25 mg 25 mg Oral Daily   acetaminophen (TYLENOL) tab 650 mg 650 mg Oral Q6H PRN   mupirocin (BACTROBAN) 2% nasal ointm 3. Secretions in left lower lobe bronchus suspicious for aspiration. 4. Chronic inspissated secretions and posterior basal right lower lobe with atelectasis pneumonia and/or scar suspicious for recurrent aspiration pneumonia.  5. Small airways inflammation

## 2017-01-13 NOTE — CONSULTS
Pulmonary/Critical Care Consult Note    HPI:   Mao Strauss is a 80year old male with Patient presents with:  Dizziness (neurologic)    Kesha Rush MD    Pt is a 79 yo with MMP including severe PCM, CVA, chronic aspiration, CAD, Afib, CHF, COPD, a HLA B 27 DISEASE ASSOCIATION  2006    CATARACT  2006    INTRAOCULAR LENS IMPLANT, SECONDARY  2006    OTHER SURGICAL HISTORY  2011    Comment dental implants    CHOLECYSTECTOMY           Medications (Active prior to today's visit):    No current outpatient Min PRN   [DISCONTINUED] HYDROmorphone HCl PF (DILAUDID) 1 MG/ML injection 0.2 mg 0.2 mg Intravenous Q5 Min PRN   [DISCONTINUED] HYDROmorphone HCl PF (DILAUDID) 1 MG/ML injection 0.4 mg 0.4 mg Intravenous Q5 Min PRN   [DISCONTINUED] HYDROmorphone HCl PF (D mg Intravenous Once   [COMPLETED] diazepam (VALIUM) injection 2.5 mg 2.5 mg Intravenous Once   [COMPLETED] 0.9%  NaCl infusion  Intravenous Once   [] 0.9%  NaCl infusion  Intravenous Continuous   [COMPLETED] iopamidol (ISOVUE-M) 76 % injection 100 m Problem Relation Age of Onset   • old age[other] [OTHER] Father 80     cause of death   • Other[other] [OTHER] Mother 80     cause of death   • Cancer Paternal Aunt    • Diabetes Paternal Aunt             PHYSICAL EXAM:   BP 94/52 mmHg  Pulse 76  Temp(Sr

## 2017-01-13 NOTE — PROGRESS NOTES
[facility] [place] [facility] Progress Note      Wiley Olivares Patient Status:  Inpatient    10/22/1931 MRN [id number]   Location [facility] 4W/SW/SE Attending Vy Foss MD   [facility] Day # 3 PCP Stefanie Adamson, effort  CV: Heart with regular rate and rhythm, Nl S1,S2 ,no S3 or murmur  Abd: Abdomen soft, nontender, nondistended, no organomegaly, bowel sounds present  Ext:  no clubbing, no cyanosis,no edema, left hip dressing intact  Neuro: no focal deficits  Skin: Electronically signed on 01/12/2017 at 11:34 by DOM Mclean  1/13/2017

## 2017-01-13 NOTE — PHYSICAL THERAPY NOTE
PHYSICAL THERAPY TREATMENT NOTE - INPATIENT    Room Number: 432/432-A       Presenting Problem: IM nailing L hip    Problem List  Principal Problem:    Hip fracture, left, closed, initial encounter  Active Problems:    CAD (coronary artery disease)    Atr blankets)?: A Lot   -   Sitting down on and standing up from a chair with arms (e.g., wheelchair, bedside commode, etc.): A Lot   -   Moving from lying on back to sitting on the side of the bed?: A Lot   How much help from another person does the patient c

## 2017-01-13 NOTE — PROGRESS NOTES
CALLED DR. Antonio Jose REGARDING PATIENT'S BP AND HGB LEVELS. SINCE PATIENT IS HAVING LOW BP COMPARED TO EARLIER, DR. Antonio Jose ORDERED 1 UNIT PRBC.

## 2017-01-14 PROBLEM — E46 MALNUTRITION (HCC): Status: ACTIVE | Noted: 2017-01-14

## 2017-01-14 LAB
ANION GAP SERPL CALC-SCNC: 7 MMOL/L (ref 0–18)
BASOPHILS # BLD: 0 K/UL (ref 0–0.2)
BASOPHILS NFR BLD: 0 %
BLOOD TYPE BARCODE: 5100
BUN SERPL-MCNC: 11 MG/DL (ref 8–20)
BUN/CREAT SERPL: 15.1 (ref 10–20)
CALCIUM SERPL-MCNC: 8 MG/DL (ref 8.5–10.5)
CHLORIDE SERPL-SCNC: 106 MMOL/L (ref 95–110)
CO2 SERPL-SCNC: 25 MMOL/L (ref 22–32)
CREAT SERPL-MCNC: 0.73 MG/DL (ref 0.5–1.5)
EOSINOPHIL # BLD: 0.1 K/UL (ref 0–0.7)
EOSINOPHIL NFR BLD: 2 %
ERYTHROCYTE [DISTWIDTH] IN BLOOD BY AUTOMATED COUNT: 16.2 % (ref 11–15)
GLUCOSE SERPL-MCNC: 97 MG/DL (ref 70–99)
HCT VFR BLD AUTO: 28.9 % (ref 41–52)
HGB BLD-MCNC: 9.4 G/DL (ref 13.5–17.5)
INR BLD: 1.2 (ref 0.9–1.2)
LYMPHOCYTES # BLD: 1.1 K/UL (ref 1–4)
LYMPHOCYTES NFR BLD: 13 %
MCH RBC QN AUTO: 27.5 PG (ref 27–32)
MCHC RBC AUTO-ENTMCNC: 32.5 G/DL (ref 32–37)
MCV RBC AUTO: 84.8 FL (ref 80–100)
MONOCYTES # BLD: 0.7 K/UL (ref 0–1)
MONOCYTES NFR BLD: 9 %
NEUTROPHILS # BLD AUTO: 6.1 K/UL (ref 1.8–7.7)
NEUTROPHILS NFR BLD: 76 %
OSMOLALITY UR CALC.SUM OF ELEC: 285 MOSM/KG (ref 275–295)
PLATELET # BLD AUTO: 158 K/UL (ref 140–400)
PMV BLD AUTO: 10.8 FL (ref 7.4–10.3)
POTASSIUM SERPL-SCNC: 3.7 MMOL/L (ref 3.3–5.1)
POTASSIUM SERPL-SCNC: 3.7 MMOL/L (ref 3.3–5.1)
PROTHROMBIN TIME: 15 SECONDS (ref 11.8–14.5)
RBC # BLD AUTO: 3.4 M/UL (ref 4.5–5.9)
SODIUM SERPL-SCNC: 138 MMOL/L (ref 136–144)
WBC # BLD AUTO: 8.1 K/UL (ref 4–11)

## 2017-01-14 PROCEDURE — 99232 SBSQ HOSP IP/OBS MODERATE 35: CPT | Performed by: INTERNAL MEDICINE

## 2017-01-14 RX ORDER — POTASSIUM CHLORIDE 20 MEQ/1
40 TABLET, EXTENDED RELEASE ORAL ONCE
Status: DISCONTINUED | OUTPATIENT
Start: 2017-01-14 | End: 2017-01-14

## 2017-01-14 RX ORDER — MELATONIN
325 2 TIMES DAILY WITH MEALS
Status: DISCONTINUED | OUTPATIENT
Start: 2017-01-14 | End: 2017-01-16

## 2017-01-14 RX ORDER — MELATONIN
DAILY
Status: DISCONTINUED | OUTPATIENT
Start: 2017-01-14 | End: 2017-01-16

## 2017-01-14 RX ORDER — SODIUM CHLORIDE 9 MG/ML
INJECTION, SOLUTION INTRAVENOUS CONTINUOUS
Status: DISCONTINUED | OUTPATIENT
Start: 2017-01-14 | End: 2017-01-16

## 2017-01-14 RX ORDER — POTASSIUM CHLORIDE 14.9 MG/ML
20 INJECTION INTRAVENOUS ONCE
Status: COMPLETED | OUTPATIENT
Start: 2017-01-14 | End: 2017-01-14

## 2017-01-14 RX ORDER — IPRATROPIUM BROMIDE AND ALBUTEROL SULFATE 2.5; .5 MG/3ML; MG/3ML
3 SOLUTION RESPIRATORY (INHALATION)
Status: DISCONTINUED | OUTPATIENT
Start: 2017-01-14 | End: 2017-01-15

## 2017-01-14 RX ORDER — SODIUM CHLORIDE 9 MG/ML
INJECTION, SOLUTION INTRAVENOUS
Status: COMPLETED
Start: 2017-01-14 | End: 2017-01-14

## 2017-01-14 RX ORDER — WARFARIN SODIUM 6 MG/1
6 TABLET ORAL
Status: COMPLETED | OUTPATIENT
Start: 2017-01-14 | End: 2017-01-14

## 2017-01-14 NOTE — PROGRESS NOTES
ORTHO SURG:  PO#3  Tmax = 98.7. AM LABS: INR = 1.2 WBC = 8,100, H/H 9.4 / 28.9, PLATELETS = 050,986. WALKED TODAY WITH PT ASSIST OF 3. PE: ALERT AT BEDREST, NAD, LEFT HIP AND THIGH DRESSING ARE CLEAN, DRY AND INTACT. ASSESS: SATISFACTORY PO#3.  PLAN: ANNA

## 2017-01-14 NOTE — PROGRESS NOTES
[facility]    Progress Note    Agatha Duong Patient Status:  Inpatient    10/22/1931 MRN [id number]   Location [facility] 4W/SW/SE Attending Maddison Pizano MD   Hosp Day # 4 PCP Arik Santamaria MD       SUBJECTIVE:  Stacy Nguyen E: [id number] Study date: Jan 12 2017 9:35AM                           Room: 432 Accession: [id number] HT:76in WT:127.7lb                                       BP: 92 / 43 -------------------------------------------------------------------------- subcostal acoustic windows. Study completion:  The patient tolerated the procedure well. There were no complications. Transthoracic echocardiography. M-mode, complete 2D, complete spectral Doppler, and color Doppler. Patient YOB: 1931.  P PLAX  47.2 mm     43-52 LV internal dimension, ES, chordal level, PLAX  34.2 mm     23-38 Fractional shortening, chordal level, PLAX       *28 %      >29 LV posterior wall thickness, ED                 7.73 mm     ------- IVS/LVPW ratio, ED (PNEUMOVAX) injection 0.5 mL 0.5 mL Subcutaneous Prior to discharge   Warfarin Sodium (COUMADIN) tab 3 mg 3 mg Oral Nightly   Piperacillin Sod-Tazobactam So (ZOSYN) 3.375 g in dextrose 5 % 100 mL IVPB 3.375 g Intravenous Q8H   Normal Saline Flush 0.9 % inj Lovenox 40 mg SQ qD until INR >2    3   Positive MRSA nares- on contact precautions; on Bactroban oint nares BID    4   Aspiration pneumonia- CT chest on 1/10/17 shows posterior basal left lower lobe pneumonia, secretions in left lower lobe bronchus suspic of 2015; exacerbated by hypoalbuminemia; pt has LE edema; had been on Lasix 20 mg po qD, and also using Tubigrips BLE; due to hypotension and risk of volume depletion, Lasix will need to be stopped  12.  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

## 2017-01-14 NOTE — PROGRESS NOTES
[facility]    Progress Note      Assessment and Plan:   1. Respiratory impairment–the patient has a substantial posterior paramediastinal infiltrate which was demonstrated on CT scan of the chest from January 10.   He clearly has aspiration INR 1.2 01/14/2017     Chest x-ray– mild posterior retrocardiac left basilar infiltrate    CT scan the chest– posterior left paramediastinal infiltrate with mucous plugging and a new 12 mm nodule in the right middle lobe.     Faustina Cockayne, MD  Medical

## 2017-01-14 NOTE — PHYSICAL THERAPY NOTE
PHYSICAL THERAPY TREATMENT NOTE - INPATIENT    Room Number: 432/432-A     Session: PT progress        Presenting Problem: IM nailing L hip    Problem List  Principal Problem:    Hip fracture, left, closed, initial encounter  Active Problems:    CAD (coron Monitor)    WEIGHT BEARING RESTRICTION  Weight Bearing Restriction: L lower extremity           L Lower Extremity: Weight Bearing as Tolerated    PAIN ASSESSMENT   Ratin  Location: L hip/ lower back  Management Techniques: Relaxation;Repositioning;Ivanna reach;RN aware of session/findings; All patient questions and concerns addressed    ASSESSMENT   RN cleared pt for therapy; pt in bed resting feeling fine att he beginning of the session; during therapy pt c/o ongoing pain around L hip and lower back; perfo

## 2017-01-14 NOTE — PROGRESS NOTES
ORTHO SURG:  PO#2  AFEB. AM LABS: INR = 1.2,  WBC = 10,800, H/H = 9.5 / 29.7, PLATELETS = 092,626. INCREASED MOBILITY TODAY. PE: ALERT, NAD, LEFT HIP AND THIGH WOUNDS ARE ALL CLEAN AND DRY WITHOUT ERYTHEMA OR INDURATION. ASSESS: SATISFACTORY PO#2.  PLAN:

## 2017-01-15 LAB
BASOPHILS # BLD: 0 K/UL (ref 0–0.2)
BASOPHILS NFR BLD: 1 %
EOSINOPHIL # BLD: 0.2 K/UL (ref 0–0.7)
EOSINOPHIL NFR BLD: 3 %
ERYTHROCYTE [DISTWIDTH] IN BLOOD BY AUTOMATED COUNT: 16.1 % (ref 11–15)
HCT VFR BLD AUTO: 29.7 % (ref 41–52)
HGB BLD-MCNC: 9.6 G/DL (ref 13.5–17.5)
INR BLD: 1.4 (ref 0.9–1.2)
LYMPHOCYTES # BLD: 1 K/UL (ref 1–4)
LYMPHOCYTES NFR BLD: 17 %
MCH RBC QN AUTO: 27.2 PG (ref 27–32)
MCHC RBC AUTO-ENTMCNC: 32.2 G/DL (ref 32–37)
MCV RBC AUTO: 84.3 FL (ref 80–100)
MONOCYTES # BLD: 0.6 K/UL (ref 0–1)
MONOCYTES NFR BLD: 9 %
NEUTROPHILS # BLD AUTO: 4.6 K/UL (ref 1.8–7.7)
NEUTROPHILS NFR BLD: 72 %
PLATELET # BLD AUTO: 175 K/UL (ref 140–400)
PMV BLD AUTO: 10.2 FL (ref 7.4–10.3)
POTASSIUM SERPL-SCNC: 3.8 MMOL/L (ref 3.3–5.1)
PROTHROMBIN TIME: 16.8 SECONDS (ref 11.8–14.5)
RBC # BLD AUTO: 3.52 M/UL (ref 4.5–5.9)
WBC # BLD AUTO: 6.4 K/UL (ref 4–11)

## 2017-01-15 PROCEDURE — 99232 SBSQ HOSP IP/OBS MODERATE 35: CPT | Performed by: INTERNAL MEDICINE

## 2017-01-15 RX ORDER — IPRATROPIUM BROMIDE AND ALBUTEROL SULFATE 2.5; .5 MG/3ML; MG/3ML
3 SOLUTION RESPIRATORY (INHALATION) EVERY 6 HOURS PRN
Status: DISCONTINUED | OUTPATIENT
Start: 2017-01-15 | End: 2017-01-16

## 2017-01-15 RX ORDER — IPRATROPIUM BROMIDE AND ALBUTEROL SULFATE 2.5; .5 MG/3ML; MG/3ML
3 SOLUTION RESPIRATORY (INHALATION)
Status: DISCONTINUED | OUTPATIENT
Start: 2017-01-15 | End: 2017-01-16

## 2017-01-15 RX ORDER — POTASSIUM CHLORIDE 14.9 MG/ML
20 INJECTION INTRAVENOUS ONCE
Status: COMPLETED | OUTPATIENT
Start: 2017-01-15 | End: 2017-01-16

## 2017-01-15 RX ORDER — WARFARIN SODIUM 5 MG/1
5 TABLET ORAL
Status: COMPLETED | OUTPATIENT
Start: 2017-01-15 | End: 2017-01-15

## 2017-01-15 NOTE — PHYSICAL THERAPY NOTE
PHYSICAL THERAPY TREATMENT NOTE - INPATIENT    Room Number: 432/432-A            Presenting Problem: IM nailing L hip    Problem List  Principal Problem:    Hip fracture, left, closed, initial encounter  Active Problems:    CAD (coronary artery disease) [facility] Heart Monitor)    WEIGHT BEARING RESTRICTION  Weight Bearing Restriction: L lower extremity           L Lower Extremity: Weight Bearing as Tolerated    PAIN ASSESSMENT   Ratin  Location: l hip  Management Techniques:  Activity promotion    BALANCE difficulty shifting weight to stand upright. Pt keeping knees flexed and required set up for proper foot placement prior to trial of transfers. Pt continues to require max A for all mobility. He was able to engage in supine there ex.     DISCHARGE RECOMMEND

## 2017-01-15 NOTE — PROGRESS NOTES
ORTHO SURG:  PO#4  Tmax = 99. AM LABS; INR = 1.4, WBC = 6,400, H/H = 9.6 / 29.7, PLATELETS = 675,796. NO PAIN ISSUES. C/O DIET RESTRICTIONS. NO REHAB VISIT YET TODAY.   PE: ALERT AT BEDREST, NAD, LEFT THIGH DRESSINGS ARE CLEAN, DRY AND INTACT, LEFT HIP DR

## 2017-01-15 NOTE — PROGRESS NOTES
[facility] HOSP - [facility]    Progress Note    Iker Frazier Patient Status:  Inpatient    10/22/1931 MRN [id number]   Location [facility] 4W/SW/SE Attending Caleb Posadas MD   Hosp Day # 5 PCP Lidia Monsalve MD       SUBJECTIVE:  Lucien Garrett ------------------------------------------------------------------------------- Indications:      Dizzy. ------------------------------------------------------------------------------- Ordering MD: Aislinn Matos MD  Interpreting physician:           Ankit Thomas spectral Doppler, and color Doppler. Patient YOB: 1931. Patient is 85yr old. Gender: male. BMI: 15.6kg/m^2. BSA: 1.74m^2. Patient status:  Inpatient. Study date:  Study date: January 12, 2017. Location:  Echo laboratory.  -------------- ED                 7.73 mm     ------- IVS/LVPW ratio, ED                             *1.32        <1.3 Ventricular septum Septal thickness, ED                            10.2 mm     ------- Left atrium Anterior-posterior dimension                      30 ferrous sulfate EC tab 325 mg 325 mg Oral BID with meals   Normal Saline Flush 0.9 % injection 10 mL 10 mL Intravenous PRN   Pneumococcal Vac Polyvalent (PNEUMOVAX) injection 0.5 mL 0.5 mL Subcutaneous Prior to discharge   Piperacillin Sod-Tazobactam So pneumonia-- seen on CT chest 1/10; the patient presented with bibasilar lung infiltrates in June 2016 and has known history of aspiration based on video swallow study from April 2016; swallowing had improved; he had been followed by speech therapist, Nic Deleon advised to see gastroenterologist, Dr. Gordon Mclean, as an outpatient for colonoscopy though he has declined repeat colonoscopy  14.  Anemia, acute blood loss- s/p 1 u PRBC's.  Hgb stable at 9.4.  Start ferrous sulfate 325mg BID  15.  Vitamin B12 deficiency- the

## 2017-01-16 ENCOUNTER — APPOINTMENT (OUTPATIENT)
Dept: PHYSICAL THERAPY | Facility: HOSPITAL | Age: 82
DRG: 480 | End: 2017-01-16
Attending: INTERNAL MEDICINE
Payer: MEDICARE

## 2017-01-16 VITALS
WEIGHT: 128.63 LBS | HEIGHT: 76 IN | HEART RATE: 64 BPM | TEMPERATURE: 99 F | BODY MASS INDEX: 15.66 KG/M2 | RESPIRATION RATE: 18 BRPM | OXYGEN SATURATION: 96 % | SYSTOLIC BLOOD PRESSURE: 107 MMHG | DIASTOLIC BLOOD PRESSURE: 60 MMHG

## 2017-01-16 LAB
BASOPHILS # BLD: 0 K/UL (ref 0–0.2)
BASOPHILS NFR BLD: 0 %
EOSINOPHIL # BLD: 0.2 K/UL (ref 0–0.7)
EOSINOPHIL NFR BLD: 3 %
ERYTHROCYTE [DISTWIDTH] IN BLOOD BY AUTOMATED COUNT: 16.1 % (ref 11–15)
HCT VFR BLD AUTO: 29.5 % (ref 41–52)
HGB BLD-MCNC: 9.5 G/DL (ref 13.5–17.5)
INR BLD: 2.1 (ref 0.9–1.2)
LYMPHOCYTES # BLD: 0.9 K/UL (ref 1–4)
LYMPHOCYTES NFR BLD: 14 %
MCH RBC QN AUTO: 27.3 PG (ref 27–32)
MCHC RBC AUTO-ENTMCNC: 32.3 G/DL (ref 32–37)
MCV RBC AUTO: 84.5 FL (ref 80–100)
MONOCYTES # BLD: 0.6 K/UL (ref 0–1)
MONOCYTES NFR BLD: 9 %
NEUTROPHILS # BLD AUTO: 4.8 K/UL (ref 1.8–7.7)
NEUTROPHILS NFR BLD: 74 %
PLATELET # BLD AUTO: 195 K/UL (ref 140–400)
PMV BLD AUTO: 10.1 FL (ref 7.4–10.3)
POTASSIUM SERPL-SCNC: 3.9 MMOL/L (ref 3.3–5.1)
PROTHROMBIN TIME: 23.3 SECONDS (ref 11.8–14.5)
RBC # BLD AUTO: 3.48 M/UL (ref 4.5–5.9)
WBC # BLD AUTO: 6.4 K/UL (ref 4–11)

## 2017-01-16 PROCEDURE — 99231 SBSQ HOSP IP/OBS SF/LOW 25: CPT | Performed by: INTERNAL MEDICINE

## 2017-01-16 PROCEDURE — 99239 HOSP IP/OBS DSCHRG MGMT >30: CPT | Performed by: INTERNAL MEDICINE

## 2017-01-16 RX ORDER — HYDROCODONE BITARTRATE AND ACETAMINOPHEN 7.5; 325 MG/1; MG/1
1 TABLET ORAL EVERY 4 HOURS PRN
Qty: 90 TABLET | Refills: 0 | Status: SHIPPED | OUTPATIENT
Start: 2017-01-16 | End: 2017-04-12

## 2017-01-16 RX ORDER — AMOXICILLIN AND CLAVULANATE POTASSIUM 875; 125 MG/1; MG/1
1 TABLET, FILM COATED ORAL EVERY 12 HOURS SCHEDULED
Qty: 14 TABLET | Refills: 0 | Status: SHIPPED | OUTPATIENT
Start: 2017-01-16 | End: 2017-04-20

## 2017-01-16 RX ORDER — PSEUDOEPHEDRINE HCL 30 MG
100 TABLET ORAL 2 TIMES DAILY
Qty: 60 CAPSULE | Refills: 1 | Status: SHIPPED | OUTPATIENT
Start: 2017-01-16 | End: 2018-04-05 | Stop reason: ALTCHOICE

## 2017-01-16 RX ORDER — SOTALOL HYDROCHLORIDE 80 MG/1
80 TABLET ORAL EVERY 12 HOURS SCHEDULED
Qty: 60 TABLET | Refills: 5 | Status: SHIPPED | OUTPATIENT
Start: 2017-01-16 | End: 2017-04-05

## 2017-01-16 RX ORDER — WARFARIN SODIUM 3 MG/1
TABLET ORAL
Qty: 30 TABLET | Refills: 5 | Status: SHIPPED | OUTPATIENT
Start: 2017-01-16 | End: 2017-06-01

## 2017-01-16 RX ORDER — WARFARIN SODIUM 4 MG/1
4 TABLET ORAL
Status: DISCONTINUED | OUTPATIENT
Start: 2017-01-16 | End: 2017-01-16

## 2017-01-16 RX ORDER — WARFARIN SODIUM 4 MG/1
TABLET ORAL
Qty: 30 TABLET | Refills: 3 | Status: SHIPPED | OUTPATIENT
Start: 2017-01-16 | End: 2017-10-25

## 2017-01-16 RX ORDER — IPRATROPIUM BROMIDE AND ALBUTEROL SULFATE 2.5; .5 MG/3ML; MG/3ML
3 SOLUTION RESPIRATORY (INHALATION)
Qty: 360 ML | Refills: 0 | Status: SHIPPED | OUTPATIENT
Start: 2017-01-16 | End: 2017-02-15

## 2017-01-16 RX ORDER — POLYETHYLENE GLYCOL 3350 17 G/17G
17 POWDER, FOR SOLUTION ORAL DAILY PRN
Qty: 7 EACH | Refills: 0 | Status: SHIPPED | OUTPATIENT
Start: 2017-01-16 | End: 2017-01-23

## 2017-01-16 RX ORDER — BISACODYL 10 MG
10 SUPPOSITORY, RECTAL RECTAL
Qty: 20 SUPPOSITORY | Refills: 0 | Status: SHIPPED | OUTPATIENT
Start: 2017-01-16 | End: 2017-04-20

## 2017-01-16 RX ORDER — MELATONIN
325 2 TIMES DAILY WITH MEALS
Qty: 60 TABLET | Refills: 5 | Status: SHIPPED | OUTPATIENT
Start: 2017-01-16 | End: 2017-04-20

## 2017-01-16 RX ORDER — WARFARIN SODIUM 3 MG/1
3 TABLET ORAL
Status: DISCONTINUED | OUTPATIENT
Start: 2017-01-17 | End: 2017-01-16

## 2017-01-16 RX ORDER — AMOXICILLIN AND CLAVULANATE POTASSIUM 875; 125 MG/1; MG/1
1 TABLET, FILM COATED ORAL EVERY 12 HOURS SCHEDULED
Status: DISCONTINUED | OUTPATIENT
Start: 2017-01-16 | End: 2017-01-16

## 2017-01-16 RX ORDER — MELATONIN
Qty: 454 G | Refills: 0 | Status: SHIPPED | OUTPATIENT
Start: 2017-01-16 | End: 2017-06-01

## 2017-01-16 NOTE — OCCUPATIONAL THERAPY NOTE
OCCUPATIONAL THERAPY TREATMENT NOTE - INPATIENT     Room Number: 432/432-A         Presenting Problem:  (s/p Intramedullary fixation of intertrochanteric fx)    Problem List  Principal Problem:    Hip fracture, left, closed, initial encounter  Active Probl Putting on and taking off regular lower body clothing?: A Lot  -   Bathing (including washing, rinsing, drying)?: A Lot  -   Toileting, which includes using toilet, bedpan or urinal? : A Lot  -   Putting on and taking off regular upper body clothing?: A Lo

## 2017-01-16 NOTE — DISCHARGE PLANNING
MD order received regarding discharge planning. SUSANNE met with the pt. To confirm his plan for rehab. The pt. Has a hx. Of [facility] and wants to rehab there again. Referral has been made and DON screen has been previously requested.   SUSANNE left a m

## 2017-01-16 NOTE — PROGRESS NOTES
ORTHO SURG: PO#5  Tmax = 99.7. AM LABS:  INR = 2.1, WBC = 6,400, H/H = 9.5/ 29.5, PLATELETS = 429,369. PE: ALERT, NAD, LEFT HIP AND THIGH WOUNDS ARE CLEAN, DRY AND WITHOUT ERYTHEMA OR INDURATION.  TEGADERM DRESSING TO EACH OF THE THREE WOUNDS. ASSESS: OK FO

## 2017-01-16 NOTE — SLP NOTE
SPEECH DAILY NOTE - INPATIENT    Evaluation Date: 01/16/2017    ASSESSMENT & PLAN   ASSESSMENT  Patient is grossly tolerating current diet. Recommend upgrade to mechanical soft.     Diet Recommendations- solids:  Mechanical soft  Diet Recommendations - Liq goal.     FOLLOW UP  Follow Up Needed: Yes  SLP Follow-up Date: 01/17/17  Number of Visits to Meet Established Goals: 3  Session: 1    If you have any questions, please contact Karime Torres MA/GOVIND-SLP  Speech Language Pathologist

## 2017-01-16 NOTE — PROGRESS NOTES
Pulmonary/Critical Care Follow Up Note    HPI:   Negar Peterson is a 80year old male with Patient presents with:  Dizziness (neurologic)      PCP Estuardo Mcqueen MD  Admission Attending Negro Roque 15 Day #6    No change in cough  No so per day  •  docusate sodium (COLACE) cap 100 mg, 100 mg, Oral, BID  •  PEG 3350 (MIRALAX) powder packet 17 g, 17 g, Oral, Daily PRN  •  magnesium hydroxide (MILK OF MAGNESIA) 400 MG/5ML suspension 30 mL, 30 mL, Oral, Daily PRN  •  bisacodyl (DULCOLAX) rect --              ASSESSMENT/PLAN:     Syncope  2/2 bradycardia  better  c/b L hip fracture    Plan tele    L hip Fx  S/p repair  Plan per ortho    PHTN    Worse by ECHO only  multifactorial including COPD, chronic aspiration/lung disease, CHF  All causes ar

## 2017-01-16 NOTE — PROGRESS NOTES
Pulmonary/Critical Care Follow Up Note    HPI:   Anna Fang is a 80year old male with Patient presents with:  Dizziness (neurologic)      PCP Rubens Yang MD  Admission Attending Negro Willis 15 Day #5    No change in cough  Pain (ZOSYN) 3.375 g in dextrose 5 % 100 mL IVPB, 3.375 g, Intravenous, Q8H  •  Normal Saline Flush 0.9 % injection 10 mL, 10 mL, Intravenous, PRN  •  Sotalol HCl (BETAPACE) tab 80 mg, 80 mg, Oral, 2 times per day  •  Normal Saline Flush 0.9 % injection 10 mL, 0601  01/15/17   0532   GLU  106*  97   --    BUN  11  11   --    CREATSERUM  0.80  0.73   --    GFRAA  >60  >60   --    GFRNAA  >60  >60   --    CA  7.9*  8.0*   --    NA  138  138   --    K  3.8  3.7  3.7  3.8   CL  109  106   --    CO2  26  25   --

## 2017-01-16 NOTE — DISCHARGE PLANNING
The pt. Is scheduled to discharge to [facility] today 1/16 at 430p, via ambulation due to maximum assist.  The pt. And his dtr. Are aware and agreeable.       Report [facility], [address] [place] [address]

## 2017-01-16 NOTE — PHYSICAL THERAPY NOTE
Pt is being seen today BID for therapy at 10 am/2:30pm. Pt is on track to dc to rehab once medically cleared.

## 2017-01-16 NOTE — PROGRESS NOTES
[facility]    Progress Note    Candy Young Patient Status:  Inpatient    10/22/1931 MRN [id number]   Location [facility] 4W/SW/SE Attending Jerry Gao MD   Hosp Day # 6 PCP Dionne Fox MD         Assessment and P dressing gel daily; Erasmo mattress in place    9.  Protein calorie malnutrition- advise maximal nutritional support; albumin 2.5; Boost shakes    10.  Coronary artery disease- the patient currently takes aspirin 81 mg daily.   11.  Depression-- on Zoloft 25 m erythema  Neck/Thyroid: neck supple; no thyromegaly  Cardiovascular: RRR, S1, S2, no S3 or murmur  Respiratory: lungs without crackles or wheezes  Abdomen: normoactive bowel sounds, soft, non-tender and non-distended  Extremities: no clubbing, cyanosis or INR 2.1 01/16/2017       Recent Labs   Lab  01/16/17   0548   WBC  6.4   HGB  9.5*   HCT  29.5*   MCV  84.5   MCH  27.3   MCHC  32.3   RDW  16.1*   PLT  195       No results for input(s): Judson Wood, [facility], JOZEF WATTS KETUR, [address] [facility]

## 2017-01-20 ENCOUNTER — SNF VISIT (OUTPATIENT)
Dept: INTERNAL MEDICINE CLINIC | Facility: SKILLED NURSING FACILITY | Age: 82
End: 2017-01-20

## 2017-01-20 DIAGNOSIS — Z87.81 S/P LEFT HIP FRACTURE: Primary | ICD-10-CM

## 2017-01-20 NOTE — PROGRESS NOTES
HPI: Bertrand Grossman  Is an 80year-old male admitted to [address] [place] [address] s/p fall sustaining a left hip fracture. He underwent IM fixation by Dr. Aarti Peña. Hospital course complicated by aspiration pneumonia for which he ws placed on Augmentin.  Pulmonary nodule identified LABS/Imaging: Reviewed     REVIEW OF SYSTEMS: Stable, reports adequate pain control, moved to a different room this am. ST notified me that he has not been compliant with nectar thick liquids and this has occurred in the past. I discussed the importance dose is 4 mg Mon, Thurs and 3 mg Sunday, Tuesday, Wednesday, Friday, Saturday, sotalol 80mg po bid  5. Pulmonary nodule, RLL -- new since CT 5/2015. To have repeat CT chest in 3 months (4/2017).   6. Sacral decubitus ulcer- uses Medihoney wound dressing gel

## 2017-01-25 ENCOUNTER — TELEPHONE (OUTPATIENT)
Dept: INTERNAL MEDICINE CLINIC | Facility: CLINIC | Age: 82
End: 2017-01-25

## 2017-01-27 ENCOUNTER — SNF VISIT (OUTPATIENT)
Dept: INTERNAL MEDICINE CLINIC | Facility: SKILLED NURSING FACILITY | Age: 82
End: 2017-01-27

## 2017-01-27 DIAGNOSIS — R19.7 DIARRHEA, UNSPECIFIED TYPE: Primary | ICD-10-CM

## 2017-01-27 DIAGNOSIS — Z87.81 S/P LEFT HIP FRACTURE: ICD-10-CM

## 2017-01-27 DIAGNOSIS — J69.0 ASPIRATION PNEUMONIA, UNSPECIFIED ASPIRATION PNEUMONIA TYPE, UNSPECIFIED LATERALITY, UNSPECIFIED PART OF LUNG (HCC): ICD-10-CM

## 2017-01-27 NOTE — PROGRESS NOTES
HPI: Bill Francis Is an 80year-old male admitted to [facility] s/p fall sustaining a left hip fracture. He underwent IM fixation by Dr. Abdulaziz Castro. Hospital course complicated by aspiration pneumonia for which he ws placed on Augmentin.  Pulmonary nodule identified 1/23. Culture for cdiff pending. Stool softener changed to prn. Denies fevers/chills. No n/v or abdominal pain. No sob/chest pain/palpitations/cough. No urinary issues.   Left hip sutures removed 1/25    PHYSICAL EXAM:  GENERAL HEALTH: NAD  HEENT: atraumat calorie malnutrition- dietician consult  8. Coronary artery disease, stable, continue present management  9. Diarrhea. s/p recent antibx course for pneumonia.  Now resolved  stool for cdiff pending  increase po hydration, no immodium until negative culture

## 2017-02-01 ENCOUNTER — SNF VISIT (OUTPATIENT)
Dept: INTERNAL MEDICINE CLINIC | Facility: SKILLED NURSING FACILITY | Age: 82
End: 2017-02-01

## 2017-02-01 DIAGNOSIS — S72.002A HIP FRACTURE, LEFT, CLOSED, INITIAL ENCOUNTER (HCC): Primary | ICD-10-CM

## 2017-02-01 NOTE — PROGRESS NOTES
HPI: Benito López Is an 80year-old male admitted to [address] [place] [address] s/p fall sustaining a left hip fracture. He underwent IM fixation by Dr. Jackson Ureña. Hospital course complicated by aspiration pneumonia for which he ws placed on Augmentin.  Pulmonary nodule identified imodium in the mornings and prn. Last BM 1.5 days ago. Discussed holding off on the imodium if he does not have a BM for 2 days or more but otherwise to take it if there are loose stools. Breathing normal, no cough, no sob. No chest pain/palpitations.  F/u anemia: stable. Monitor cbc, hgb 10 1/19  4. Atrial fibrillation. Stable. On coumadin -home dose is 4 mg Mon, Thurs and 3 mg Sunday, Tuesday, Wednesday, Friday, Saturday, cpm pt/inr monitoring. cpm sotalol 80mg po bid  5.  Pulmonary nodule, RLL -- new since

## 2017-02-12 NOTE — DISCHARGE SUMMARY
[facility]    PATIENT'S NAME: Dionne Fraga, [de-identified] R   ATTENDING PHYSICIAN: Tom Mehta.  El Sosa MD   PATIENT ACCOUNT#:   [id number]    LOCATION:  [id number] [address] RECORD #:   [id number]       YOB: 1931  ADMISSION DATE:       01/10/ started on nasal Bactroban b.i.d. for 5 days. The patient had been taking Coumadin, though Coumadin was held for a planned procedure. Patient's INR was 2.1 on presentation. He initially got vitamin K 2.5 mg orally. The INR remained elevated at 2.6.   Ladi Roberts shows interval change, then PET scanning would be advised for further evaluation. The patient was seen by Speech Therapy who recommended continuing pureed diet with nectar-thick liquids.   The patient's antibiotic therapy was transitioned to Augmentin 875

## 2017-02-15 ENCOUNTER — SNF VISIT (OUTPATIENT)
Dept: INTERNAL MEDICINE CLINIC | Facility: SKILLED NURSING FACILITY | Age: 82
End: 2017-02-15

## 2017-02-15 NOTE — PROGRESS NOTES
HPI: Francheska Dorman Is an 80year-old male admitted to [address] [place] [address] s/p fall sustaining a left hip fracture. He underwent IM fixation by Dr. Rosalina Katz. Hospital course complicated by aspiration pneumonia for which he was placed on Augmentin.  Pulmonary nodule identified hiring a live-in caregiver. I encouraged this given his weakness and hx falls. He has a f/u CXR today, seeing the pulmonologist tomorrow. He saw the in house eye doctor this morning, he will need to purchase another pair of readers.  Reports SBP 140s today, coumadin -home dose is 4 mg Mon, Thurs and 3 mg Sunday, Tuesday, Wednesday, Friday, Saturday, cpm pt/inr monitoring. cpm sotalol 80mg po bid  5. Pulmonary nodule, RLL -- new since CT 5/2015. To have repeat CT chest in 3 months (4/2017).   6. Hx Sacral decub

## 2017-02-22 ENCOUNTER — HOSPITAL ENCOUNTER (OUTPATIENT)
Dept: GENERAL RADIOLOGY | Age: 82
Discharge: HOME | End: 2017-02-22
Attending: ORTHOPAEDIC SURGERY
Payer: COMMERCIAL

## 2017-02-22 DIAGNOSIS — Z09 FOLLOW-UP SURGERY CARE: ICD-10-CM

## 2017-02-22 PROCEDURE — 73502 X-RAY EXAM HIP UNI 2-3 VIEWS: CPT

## 2017-02-24 ENCOUNTER — SNF VISIT (OUTPATIENT)
Dept: INTERNAL MEDICINE CLINIC | Facility: SKILLED NURSING FACILITY | Age: 82
End: 2017-02-24

## 2017-02-24 NOTE — PROGRESS NOTES
PI: Puneet Sauceda Is an 80year-old male admitted to [facility] s/p fall sustaining a left hip fracture. He underwent IM fixation by Dr. Clifford Cole. Hospital course complicated by aspiration pneumonia for which he was placed on Augmentin.  Pulmonary nodule identified hips and neck. Relieved with norco. DC extended for 1-2 weeks, his wife is in the facility and they will be discharged together. He will be hiring a 24 hour caregiver for home.  Seen by pulmonology yesterday, started on augmentin again for PNA, suspected as following- cpm nectar thick liquids, repeat swallow evaluation  3 anemia: stable. Monitor cbc   4. Atrial fibrillation. Stable. On coumadin -home dose is 4 mg Mon, Thurs and 3 mg Sunday, Tuesday, Wednesday, Friday, Saturday, cpm pt/inr monitoring.  cpm sota

## 2017-03-09 ENCOUNTER — LAB REQUISITION (OUTPATIENT)
Dept: LAB | Facility: HOSPITAL | Age: 82
End: 2017-03-09
Payer: MEDICARE

## 2017-03-09 DIAGNOSIS — I10 ESSENTIAL (PRIMARY) HYPERTENSION: ICD-10-CM

## 2017-03-09 LAB
ALBUMIN SERPL BCP-MCNC: 2.4 G/DL (ref 3.5–4.8)
ALBUMIN/GLOB SERPL: 0.6 {RATIO} (ref 1–2)
ALP SERPL-CCNC: 95 U/L (ref 32–100)
ALT SERPL-CCNC: 20 U/L (ref 17–63)
ANION GAP SERPL CALC-SCNC: 10 MMOL/L (ref 0–18)
AST SERPL-CCNC: 20 U/L (ref 15–41)
BASOPHILS # BLD: 0 K/UL (ref 0–0.2)
BASOPHILS NFR BLD: 0 %
BILIRUB SERPL-MCNC: 0.7 MG/DL (ref 0.3–1.2)
BUN SERPL-MCNC: 10 MG/DL (ref 8–20)
BUN/CREAT SERPL: 13.9 (ref 10–20)
CALCIUM SERPL-MCNC: 8.5 MG/DL (ref 8.5–10.5)
CHLORIDE SERPL-SCNC: 106 MMOL/L (ref 95–110)
CO2 SERPL-SCNC: 25 MMOL/L (ref 22–32)
CREAT SERPL-MCNC: 0.72 MG/DL (ref 0.5–1.5)
EOSINOPHIL # BLD: 0.1 K/UL (ref 0–0.7)
EOSINOPHIL NFR BLD: 1 %
ERYTHROCYTE [DISTWIDTH] IN BLOOD BY AUTOMATED COUNT: 18.1 % (ref 11–15)
GLOBULIN PLAS-MCNC: 3.8 G/DL (ref 2.5–3.7)
GLUCOSE SERPL-MCNC: 86 MG/DL (ref 70–99)
HCT VFR BLD AUTO: 36 % (ref 41–52)
HGB BLD-MCNC: 11.4 G/DL (ref 13.5–17.5)
LYMPHOCYTES # BLD: 1.5 K/UL (ref 1–4)
LYMPHOCYTES NFR BLD: 22 %
MCH RBC QN AUTO: 26.7 PG (ref 27–32)
MCHC RBC AUTO-ENTMCNC: 31.6 G/DL (ref 32–37)
MCV RBC AUTO: 84.5 FL (ref 80–100)
MONOCYTES # BLD: 0.9 K/UL (ref 0–1)
MONOCYTES NFR BLD: 13 %
NEUTROPHILS # BLD AUTO: 4.5 K/UL (ref 1.8–7.7)
NEUTROPHILS NFR BLD: 64 %
OSMOLALITY UR CALC.SUM OF ELEC: 290 MOSM/KG (ref 275–295)
PLATELET # BLD AUTO: 228 K/UL (ref 140–400)
PMV BLD AUTO: 9.9 FL (ref 7.4–10.3)
POTASSIUM SERPL-SCNC: 3.8 MMOL/L (ref 3.3–5.1)
PROT SERPL-MCNC: 6.2 G/DL (ref 5.9–8.4)
RBC # BLD AUTO: 4.26 M/UL (ref 4.5–5.9)
SODIUM SERPL-SCNC: 141 MMOL/L (ref 136–144)
WBC # BLD AUTO: 7 K/UL (ref 4–11)

## 2017-03-09 PROCEDURE — 80053 COMPREHEN METABOLIC PANEL: CPT | Performed by: INTERNAL MEDICINE

## 2017-03-09 PROCEDURE — 85025 COMPLETE CBC W/AUTO DIFF WBC: CPT | Performed by: INTERNAL MEDICINE

## 2017-03-10 ENCOUNTER — SNF VISIT (OUTPATIENT)
Dept: INTERNAL MEDICINE CLINIC | Facility: SKILLED NURSING FACILITY | Age: 82
End: 2017-03-10

## 2017-03-10 NOTE — PROGRESS NOTES
HPI: Anna Fang Is an 80year-old male admitted to [address] [place] [address] s/p fall sustaining a left hip fracture. He underwent IM fixation by Dr. Sana Castellano. Hospital course complicated by aspiration pneumonia for which he was placed on Augmentin.  Pulmonary nodule identified edema.    REVIEW OF SYSTEMS: He had an espisode of low BP Wednesday afternoon during bowel movement. Orthostatic BPs yesterday did reveal a drop in BP. BP stable today. He admits that he has not been drinking water due to nectar thick restrictions.  However noncompliance with nectar thick, has been educated, s/p levaquin course completed 3/9  cpm nebs, f/u CXR 3-15, in house pulmonologist following, ST following- cpm nectar thick liquids   3 anemia: stable. Monitor cbc   4. Atrial fibrillation. Stable.  cpm co

## 2017-03-17 ENCOUNTER — SNF VISIT (OUTPATIENT)
Dept: INTERNAL MEDICINE CLINIC | Facility: SKILLED NURSING FACILITY | Age: 82
End: 2017-03-17

## 2017-03-17 NOTE — PROGRESS NOTES
HPI: Heide Welsh Is an 80year-old male admitted to [address] [place] [address] s/p fall sustaining a left hip fracture. He underwent IM fixation by Dr. Shantal Fox. Hospital course complicated by aspiration pneumonia for which he was placed on Augmentin.  Pulmonary nodule identified stable, no cough or sob. No n/v/d. No fevers/chills. Vitals stable.       PHYSICAL EXAM:  GENERAL HEALTH: NAD  HEENT: atraumatic/normocephalic, PERRLA, EOMI, sclera anicteric, conjunctiva normal. Neck supple, no JVD  RESPIRATORY diminished lung sounds at ba on CT 1/10/17 -- new. To have repeat CT chest in 3 months (4/2017). The patient was advised for repeat CT scan of the chest at 3-month interval to re-evaluate 12 mm right middle lobe pulmonary nodule.  If repeat CT scan in April 2017 shows interval change,

## 2017-03-21 ENCOUNTER — TELEPHONE (OUTPATIENT)
Dept: INTERNAL MEDICINE CLINIC | Facility: CLINIC | Age: 82
End: 2017-03-21

## 2017-03-21 ENCOUNTER — PATIENT OUTREACH (OUTPATIENT)
Dept: INTERNAL MEDICINE CLINIC | Facility: CLINIC | Age: 82
End: 2017-03-21

## 2017-03-21 RX ORDER — IPRATROPIUM BROMIDE AND ALBUTEROL SULFATE 2.5; .5 MG/3ML; MG/3ML
3 SOLUTION RESPIRATORY (INHALATION) 4 TIMES DAILY
COMMUNITY
End: 2017-04-20

## 2017-03-21 RX ORDER — BUDESONIDE 0.5 MG/2ML
0.5 INHALANT ORAL DAILY
COMMUNITY
End: 2017-04-20

## 2017-03-21 NOTE — TELEPHONE ENCOUNTER
[facility] w/ [facility]  Evaluated today. Upon standing pt had ortho static hypotension.  Pt states he had that before  To clinical

## 2017-03-21 NOTE — TELEPHONE ENCOUNTER
I called pt for his TCM assessment and he is wondering if you want him to have an oral B12 and a Iron prescription as he has neither.  Thanks

## 2017-03-21 NOTE — PROGRESS NOTES
Initial Post Discharge Follow Up   Discharge Date: 1/16/17  Contact Date: 3/21/2017    Consent Verification:  Assessment Completed With: Patient  HIPAA Verified? Yes    1.  Tell me why you were in the hospital?  Broke my left hip requiring surgery, went to clotrimazole 1 % External Cream ATAA legs BID Disp: 60 g Rfl: 2       4. What questions do you have about your medications? Asking if he should take iron and oral B12 as he use to get shots for B12    5. How often do you forget to take your medicine?  No

## 2017-03-22 ENCOUNTER — TELEPHONE (OUTPATIENT)
Dept: INTERNAL MEDICINE CLINIC | Facility: CLINIC | Age: 82
End: 2017-03-22

## 2017-03-22 LAB — INR: 1.8 (ref 0.8–1.2)

## 2017-03-22 NOTE — TELEPHONE ENCOUNTER
FROM RESIDENTIAL    PT IS BACK AT [facility] 115 SIGNING HOME CARE ORDERS?     MARYA'S BLOOD PRESSURE LOWER SIDE   98/56     TOLD TO DRINK A LITTLE MORE FLUIDS PT IS ONLY DRINKING OE BOTTLE A DAY OF WATER     WAS TOLD TO MAKE AN

## 2017-03-22 NOTE — TELEPHONE ENCOUNTER
As FYI to DR. OTTO - called patient and relayed message - he has nadine 4/5 F/U and wants to get first B 12 injection then .  He states they will have a physician that comes to the home then - he will discuss at appointment

## 2017-03-22 NOTE — TELEPHONE ENCOUNTER
Imp- AF, orthostatic hypotension; on sotalol 80mg BID; update noted; no change in medical therapy at this time; BP 90/52 supine, BP 80/60 upright; OT Lesley called; advised increasing oral intake and stay hydrated

## 2017-03-22 NOTE — TELEPHONE ENCOUNTER
OK to hold off on oral Vitamin B12 and oral iron therapy; he still should be receiving Vitamin  B12 injections 1000 mcg IM qMonth; please call pt

## 2017-03-23 ENCOUNTER — TELEPHONE (OUTPATIENT)
Dept: INTERNAL MEDICINE CLINIC | Facility: CLINIC | Age: 82
End: 2017-03-23

## 2017-03-23 NOTE — TELEPHONE ENCOUNTER
Called Myrna to give her numbers for two organizations that provide home physician care. Dr. Shelley Fofana does not know these doctors, so he can't necessarily recommend them. [email or web address]  [phone] and [facility] [phone].  No answer/unable to le

## 2017-03-23 NOTE — TELEPHONE ENCOUNTER
Yes, I will sign home care order; please call Home health; I do not have home physician that I can recommend; for low blood pressure, advise increasing oral intake and stay hydrated

## 2017-03-23 NOTE — TELEPHONE ENCOUNTER
Please call pt daughter Leonie Russell at [phone] regarding home physician visits for father and mother  Asked that Dr Shabnam Leyva please call to advise on this  Tasked to  nursing

## 2017-03-23 NOTE — TELEPHONE ENCOUNTER
[facility] RN notified that Dr. Trent Laws will sign home care order. Also advised increasing oral intake and hydration for low blood pressure.

## 2017-03-29 ENCOUNTER — TELEPHONE (OUTPATIENT)
Dept: INTERNAL MEDICINE CLINIC | Facility: CLINIC | Age: 82
End: 2017-03-29

## 2017-03-29 LAB — INR: 1.8 (ref 0.8–1.2)

## 2017-03-29 NOTE — TELEPHONE ENCOUNTER
Maria M contacted and notified that patient is to take 4mg Coumadin all days except Monday take 2mg and repeat INR in 1 week per . AC template updated.

## 2017-03-29 NOTE — TELEPHONE ENCOUNTER
INR 1.8 - (same reading that he had on 3/22)    on 2 mg coumadin  mon & thurs;  4 mg all other days    Please call Maria M [phone]

## 2017-03-31 ENCOUNTER — HOSPITAL ENCOUNTER (EMERGENCY)
Facility: HOSPITAL | Age: 82
Discharge: HOME | End: 2017-03-31
Attending: EMERGENCY MEDICINE
Payer: MEDICARE

## 2017-03-31 ENCOUNTER — TELEPHONE (OUTPATIENT)
Dept: INTERNAL MEDICINE CLINIC | Facility: CLINIC | Age: 82
End: 2017-03-31

## 2017-03-31 VITALS
TEMPERATURE: 98 F | OXYGEN SATURATION: 99 % | RESPIRATION RATE: 22 BRPM | BODY MASS INDEX: 15.22 KG/M2 | WEIGHT: 125 LBS | DIASTOLIC BLOOD PRESSURE: 88 MMHG | SYSTOLIC BLOOD PRESSURE: 123 MMHG | HEART RATE: 101 BPM | HEIGHT: 76 IN

## 2017-03-31 DIAGNOSIS — I95.89 OTHER SPECIFIED HYPOTENSION: ICD-10-CM

## 2017-03-31 DIAGNOSIS — R55 SYNCOPE, NEAR: Primary | ICD-10-CM

## 2017-03-31 DIAGNOSIS — T50.905A MEDICATION REACTION, INITIAL ENCOUNTER: ICD-10-CM

## 2017-03-31 PROCEDURE — 93005 ELECTROCARDIOGRAM TRACING: CPT

## 2017-03-31 PROCEDURE — 96361 HYDRATE IV INFUSION ADD-ON: CPT

## 2017-03-31 PROCEDURE — 93010 ELECTROCARDIOGRAM REPORT: CPT | Performed by: EMERGENCY MEDICINE

## 2017-03-31 PROCEDURE — 96360 HYDRATION IV INFUSION INIT: CPT

## 2017-03-31 PROCEDURE — 80048 BASIC METABOLIC PNL TOTAL CA: CPT | Performed by: EMERGENCY MEDICINE

## 2017-03-31 PROCEDURE — 99285 EMERGENCY DEPT VISIT HI MDM: CPT

## 2017-03-31 PROCEDURE — 84484 ASSAY OF TROPONIN QUANT: CPT | Performed by: EMERGENCY MEDICINE

## 2017-03-31 PROCEDURE — 85610 PROTHROMBIN TIME: CPT | Performed by: EMERGENCY MEDICINE

## 2017-03-31 PROCEDURE — 80076 HEPATIC FUNCTION PANEL: CPT | Performed by: EMERGENCY MEDICINE

## 2017-03-31 PROCEDURE — 85025 COMPLETE CBC W/AUTO DIFF WBC: CPT | Performed by: EMERGENCY MEDICINE

## 2017-03-31 RX ORDER — SODIUM CHLORIDE 9 MG/ML
INJECTION, SOLUTION INTRAVENOUS ONCE
Status: COMPLETED | OUTPATIENT
Start: 2017-03-31 | End: 2017-03-31

## 2017-03-31 NOTE — TELEPHONE ENCOUNTER
Syed from [facility]. Is calling to inform Dr. Bonnie Hartman that pt.  Passed out twice this morning for half a minute, didn't fall   /82  Pulse 100  Respiration 18  Oxygen in blood 98%   Ph. # [phone]    He is aware that Dr. Bonnie Hartman is off & wanted a call ba

## 2017-03-31 NOTE — ED PROVIDER NOTES
Patient Seen in: [facility] Emergency Department    History   Patient presents with:  Dizziness    Stated Complaint: dizzy, near syncope    HPI    Patient presents to the emergency department after near passing out twice today.   Both times when he B [employer]  2006    CATARACT  2006    INTRAOCULAR LENS IMPLANT, SECONDARY  2006    OTHER SURGICAL HISTORY  2011    Comment dental implants    CHOLECYSTECTOMY         Medications :   ipratropium-albuterol (DUONEB) 0.5-2.5 (3) MG/3ML Inhalation beer per week       Comment: every  night one beer      Review of Systems  Constitutional: no fever, in general feels fine  HEENT: No cough, no congestion  Cardiovascular: no chest pain  Respiratory: no shortness of breath  Gastrointestinal: no abdominal p blood pressure up to 128 without any treatment orthostatic showed no significant change to slight decrease in pressure with slight increase in heart rate.   I did discuss with Dr. Pedro Velazquez we discussed the patient's medications he recommended to stop the sotalo near  (primary encounter diagnosis)  Other specified hypotension  Medication reaction, initial encounter    Disposition:  There is no disposition on file for this visit.     Follow-up:  Lane Ozuna MD  [address] [facility] [address] [place] [id number]

## 2017-03-31 NOTE — ED INITIAL ASSESSMENT (HPI)
Patient brought to er via ems for near syncopal episodes, patient denies chest pain/jian at this time, complains of dizziness, a&ox4

## 2017-03-31 NOTE — HISTORICAL OFFICE NOTE
Tamra Zacarias  : 10/22/1931  ACCOUNT:  [id number]  [phone]  PCP: Dr. Jimenez Meals     TODAY'S DATE: 2016  DICTATED BY:  J Luis Borja M.D. ]    CHIEF COMPLAINT: [Followup of .  CAD, of native vessels, Followup of Atrial fibrillation and currently surgery 2002, broken arm 2014 and bilateral cataract surgery    PAST CV HISTORY: atrial fibrillation, CAD, cardioversion 2007 and PTCA 20 Years ago    FAMILY HISTORY: Negative for premature CAD.   SOCIAL HISTORY: SMOKING: Former tobacco use. smokes cigars a with the APN in the next week to reevaluate off these medications. Check an EKG. 2. Coronary disease, history of percutaneous angioplasty 20 years ago. He has no chest pain. ASSESSMENT:  1. . CAD, of native vessels  2.  Atrial fibrillation, currently S fibrillation, and concerned for syncope, so he wishes to resume the sotalol, which has done been. He has also been cautiously put back on Coumadin. Dr. Kierra Fraga is his primary and will manage the INRs.   We need to continually assess his risk-to-benefit o Tetrofosmin intravenously  @ 12 05 PM, Amara  STRESS DOSE: 25.9 mCi Tc-99m Tetrofosmin intravenously @ 2 00 PM, by LT  Regadenoson Lot #: [id number]    Medications: Aspirin, CeleBREX, Doxycycline Hyclate, Ibuprofen.     Per Ordering Physician, Medications

## 2017-03-31 NOTE — ED NOTES
Pt to ER via EMS with c/o near syncopal episode x2 today. Pt lives at home with a 24 hour caregiver. Pt denies fever or cough at home. Pt is alert and oriented x3. Son at bedside. Pt denies chest pain or sob.  Bilateral lungs diminished at bases 98% on room

## 2017-03-31 NOTE — TELEPHONE ENCOUNTER
Syed with [facility] notified that that patient needs to go to ER for evaluation per . I have also contacted the patient and let him know, verbalized understanding and states he will let his son know to take him to the ER.

## 2017-04-03 ENCOUNTER — TELEPHONE (OUTPATIENT)
Dept: INTERNAL MEDICINE CLINIC | Facility: CLINIC | Age: 82
End: 2017-04-03

## 2017-04-03 NOTE — TELEPHONE ENCOUNTER
Derrick faxed Physician order -Nebulizer/Inhalation Drugs Form which    requires completion for:  Budesonide 0.5MG/2ML Vials 30x2ML 5%  Ipratropi/ALB 0.5/3MG INH SL 60x3ML 3MG  Form placed in purple folder  Tasked to [employer]

## 2017-04-03 NOTE — TELEPHONE ENCOUNTER
Son called stating he had two missed calls from  I don't see any message not sure if its for mom or dad.  Please advise

## 2017-04-03 NOTE — TELEPHONE ENCOUNTER
Pt reported that he is feeling well. Plans to attend follow up appointment with Dr. Tavo Rodriguez on 4/12 as well as follow up appointment with his cardiologist in a few weeks. Nothing further at this time. Routed to Dr. Tavo Rodriguez as Yael Feliciano.

## 2017-04-05 ENCOUNTER — TELEPHONE (OUTPATIENT)
Dept: INTERNAL MEDICINE CLINIC | Facility: CLINIC | Age: 82
End: 2017-04-05

## 2017-04-06 ENCOUNTER — TELEPHONE (OUTPATIENT)
Dept: INTERNAL MEDICINE CLINIC | Facility: CLINIC | Age: 82
End: 2017-04-06

## 2017-04-06 DIAGNOSIS — I48.20 CHRONIC ATRIAL FIBRILLATION (HCC): Primary | ICD-10-CM

## 2017-04-06 NOTE — TELEPHONE ENCOUNTER
Pt has an opening on right buttocks, very superficial  Will order petroleum guaze with foam dressing, will keep an eye on that  Tasked to nursing

## 2017-04-06 NOTE — TELEPHONE ENCOUNTER
Pt seen in ER for near syncope on 4/2/17; ER consulted [facility], and sotalol was discontinued; order FAXed to [facility]

## 2017-04-07 ENCOUNTER — TELEPHONE (OUTPATIENT)
Dept: INTERNAL MEDICINE CLINIC | Facility: CLINIC | Age: 82
End: 2017-04-07

## 2017-04-07 RX ORDER — CASTOR OIL AND BALSAM, PERU 788; 87 MG/G; MG/G
OINTMENT TOPICAL
Qty: 60 G | Refills: 3 | Status: SHIPPED | OUTPATIENT
Start: 2017-04-07 | End: 2018-02-20

## 2017-04-07 NOTE — TELEPHONE ENCOUNTER
Pt is requesting a refill on Venolex   Pt. Is out of meds & wants to get Rx today  Please advise  Ph. # [phone]     Derrick ph.  # [phone]   Routed to Rx

## 2017-04-07 NOTE — TELEPHONE ENCOUNTER
To  to please advise on patients request as I do not see Venelex in med module, Vasolex Rx from March 2016 discontinued when patient was hospitalized

## 2017-04-13 RX ORDER — HYDROCODONE BITARTRATE AND ACETAMINOPHEN 7.5; 325 MG/1; MG/1
1 TABLET ORAL EVERY 4 HOURS PRN
Qty: 90 TABLET | Refills: 0 | Status: SHIPPED | OUTPATIENT
Start: 2017-04-13 | End: 2017-04-20

## 2017-04-14 ENCOUNTER — TELEPHONE (OUTPATIENT)
Dept: INTERNAL MEDICINE CLINIC | Facility: CLINIC | Age: 82
End: 2017-04-14

## 2017-04-14 ENCOUNTER — PRIOR ORIGINAL RECORDS (OUTPATIENT)
Dept: OTHER | Age: 82
End: 2017-04-14

## 2017-04-14 DIAGNOSIS — I48.20 CHRONIC ATRIAL FIBRILLATION (HCC): Primary | ICD-10-CM

## 2017-04-14 NOTE — TELEPHONE ENCOUNTER
Kourtney states that daughter had set up a home visiting physician as stated below. Dr. Eliza Canchola stopped pt's lasix as BP has been running low. Lowest was 80/50. Today BP 98/60. Pt fell last week with physical therapy.  Pt's heart rate 120, suggesting a.fib

## 2017-04-14 NOTE — TELEPHONE ENCOUNTER
Patient wants to come in for an office visit. He has decline in his walking ability. Dr. Naren Peterson saw patient at home (doctor on wheels). Daughter found this doctor. Viraj Marie was taken off Lasix - running 90's / 60's.   Today 98/60    Patient fears

## 2017-04-18 ENCOUNTER — TELEPHONE (OUTPATIENT)
Dept: INTERNAL MEDICINE CLINIC | Facility: CLINIC | Age: 82
End: 2017-04-18

## 2017-04-18 NOTE — TELEPHONE ENCOUNTER
Syed / Parag would like to extend PT & OT,   Week one once daily, Week 2 & 3 Twice a week for 3 weeks for PT starting this week,   Twice a week for 2 Weeks for OT starting the week of 4/23.    Tasked to nursing

## 2017-04-20 ENCOUNTER — OFFICE VISIT (OUTPATIENT)
Dept: INTERNAL MEDICINE CLINIC | Facility: CLINIC | Age: 82
End: 2017-04-20

## 2017-04-20 ENCOUNTER — TELEPHONE (OUTPATIENT)
Dept: INTERNAL MEDICINE CLINIC | Facility: CLINIC | Age: 82
End: 2017-04-20

## 2017-04-20 VITALS
BODY MASS INDEX: 16.04 KG/M2 | HEART RATE: 60 BPM | SYSTOLIC BLOOD PRESSURE: 92 MMHG | TEMPERATURE: 98 F | DIASTOLIC BLOOD PRESSURE: 72 MMHG | HEIGHT: 75 IN | WEIGHT: 129 LBS

## 2017-04-20 DIAGNOSIS — E53.8 VITAMIN B12 DEFICIENCY: ICD-10-CM

## 2017-04-20 DIAGNOSIS — I87.2 VENOUS INSUFFICIENCY: ICD-10-CM

## 2017-04-20 DIAGNOSIS — M15.9 PRIMARY OSTEOARTHRITIS INVOLVING MULTIPLE JOINTS: ICD-10-CM

## 2017-04-20 DIAGNOSIS — E46 PROTEIN CALORIE MALNUTRITION (HCC): ICD-10-CM

## 2017-04-20 DIAGNOSIS — S72.002A HIP FRACTURE, LEFT, CLOSED, INITIAL ENCOUNTER (HCC): ICD-10-CM

## 2017-04-20 DIAGNOSIS — L21.9 SEBORRHEIC DERMATITIS: ICD-10-CM

## 2017-04-20 DIAGNOSIS — I95.9 HYPOTENSION, UNSPECIFIED HYPOTENSION TYPE: Primary | ICD-10-CM

## 2017-04-20 DIAGNOSIS — I25.10 CORONARY ARTERY DISEASE INVOLVING NATIVE CORONARY ARTERY OF NATIVE HEART WITHOUT ANGINA PECTORIS: ICD-10-CM

## 2017-04-20 DIAGNOSIS — I48.20 CHRONIC ATRIAL FIBRILLATION (HCC): ICD-10-CM

## 2017-04-20 PROCEDURE — G0463 HOSPITAL OUTPT CLINIC VISIT: HCPCS | Performed by: INTERNAL MEDICINE

## 2017-04-20 PROCEDURE — 99214 OFFICE O/P EST MOD 30 MIN: CPT | Performed by: INTERNAL MEDICINE

## 2017-04-20 PROCEDURE — 96372 THER/PROPH/DIAG INJ SC/IM: CPT | Performed by: INTERNAL MEDICINE

## 2017-04-20 RX ORDER — CLOBETASOL PROPIONATE 0.46 MG/ML
SOLUTION TOPICAL
Qty: 50 ML | Refills: 5 | Status: SHIPPED | OUTPATIENT
Start: 2017-04-20 | End: 2017-08-22

## 2017-04-20 RX ORDER — HYDROCODONE BITARTRATE AND ACETAMINOPHEN 7.5; 325 MG/1; MG/1
1 TABLET ORAL EVERY 4 HOURS PRN
Qty: 90 TABLET | Refills: 0 | Status: SHIPPED | OUTPATIENT
Start: 2017-05-13 | End: 2017-06-01

## 2017-04-20 RX ORDER — SELENIUM SULFIDE 22.5 MG/ML
1 SHAMPOO TOPICAL DAILY
Qty: 180 ML | Refills: 2 | Status: SHIPPED | OUTPATIENT
Start: 2017-04-20 | End: 2017-08-10

## 2017-04-20 RX ADMIN — CYANOCOBALAMIN 1000 MCG: 1000 INJECTION INTRAMUSCULAR; SUBCUTANEOUS at 10:40:00

## 2017-04-20 NOTE — PROGRESS NOTES
Arya William is a 80year old male. HPI:   Patient presents with:  Hospital F/U: Pt was hospitalized for fractured leg and pneumonia. He spent 6 weeks at [facility]. He has been home for one month with full-time caregiver. His wife is hospice. mmHg.  The pulmonary hypertension was suspected to be due in part to chronic obstructive pulmonary disease as lungs hyperinflated on chest x-ray.  The patient was seen in consultation by pulmonologist, Dr. Trisha Borrego The pulmonary hypertension is susp • CVA (cerebral vascular accident) [facility])    • Malnutrition ([address]      severe    • Chronic pulmonary aspiration      ARF 6/16 BiPAP   • Dysphagia    • Pulmonary hypertension (HCC)    • CHF (congestive heart failure) (HCC)    • COPD (chronic obstructive pu hydrocodone-acetaminophen 7.5-325 MG Oral Tab Take 1 tablet by mouth every 4 (four) hours as needed.  Disp: 90 tablet Rfl: 0   Warfarin Sodium 2 MG Oral Tab TK 1 AND 1/2 TO 2 TS PO D UTD BY COUMADIN CLINIC Disp:  Rfl: 5   balsam peru-castor oil External Oin and atrial fibrillation; clinically stable;  Echo with EF 70%; cortisol level appropriate (24.2).      2.  Left hip fracture-  s/p IM fixation in Jan 2017.  on  Norco 7.5 mg po q4hrs prn    3.  Atrial fibrillation-  ECHO  with LVEF 70% and moderate pulmonar Orders This Visit:  No orders of the defined types were placed in this encounter.        Meds This Visit:    Signed Prescriptions Disp Refills    Clobetasol Propionate 0.05 % External Solution 50 mL 5      Sig: ATAA scalp BID      Selenium Sulf-Pyrithione

## 2017-04-20 NOTE — TELEPHONE ENCOUNTER
[facility] nurse will see pt next week, possibly twice. T: [phone].         Tasked high to [employer]

## 2017-04-21 ENCOUNTER — TELEPHONE (OUTPATIENT)
Dept: INTERNAL MEDICINE CLINIC | Facility: CLINIC | Age: 82
End: 2017-04-21

## 2017-04-21 RX ORDER — DIGOXIN 125 MCG
125 TABLET ORAL DAILY
Qty: 30 TABLET | Refills: 5 | Status: SHIPPED | OUTPATIENT
Start: 2017-04-21 | End: 2017-11-08

## 2017-04-21 NOTE — TELEPHONE ENCOUNTER
[phone] Maria M with Res   Will send a Rn this Saturday to check on pt  Pt was told to track how much liquid he is taking in versus how much he is getting out. No difficulty breathing. Pt is off Lasix.  Pt was instructed to go to ER if he has any shor

## 2017-04-21 NOTE — TELEPHONE ENCOUNTER
Imp- AF with RVR; ; Rec- resume digoxin 0.125 mg po qD; ERx sent; pt called; Tai Lambert notified; d/w pt that leg edema cannot be treated again with Lasix as it caused dehydration in the past

## 2017-04-21 NOTE — TELEPHONE ENCOUNTER
Please advise desirae mcqueen from [facility] PT who states patients BP is low 92/50 but HR is 136 at rest. Patient has problem urinating , maybe not emptying bladder , but has no pressure in lower abdomen - to DR. OTTO

## 2017-04-22 ENCOUNTER — TELEPHONE (OUTPATIENT)
Dept: INTERNAL MEDICINE CLINIC | Facility: CLINIC | Age: 82
End: 2017-04-22

## 2017-04-22 NOTE — TELEPHONE ENCOUNTER
On-call telephone call received from visiting nurse who is seen patient today. At the time of her visit patient appeared stable and had no complaints. Apparently does have back pain when he stands up due to spinal stenosis.   Over the patient had no compl

## 2017-04-28 ENCOUNTER — TELEPHONE (OUTPATIENT)
Dept: INTERNAL MEDICINE CLINIC | Facility: CLINIC | Age: 82
End: 2017-04-28

## 2017-04-28 DIAGNOSIS — I48.20 CHRONIC ATRIAL FIBRILLATION (HCC): Primary | ICD-10-CM

## 2017-04-28 NOTE — TELEPHONE ENCOUNTER
Marvel Dior 82 ([phone])  Reporting to Dr Cooper Lucas as [facility]  Pt heart rate is 128.  Pt is fluctuating between 128 and 136  Pt taking Digoxin  0.125  No symptoms/adema mild  No call back necessary just informing Dr Cooper Lucas,   Tasked to Dr Cooper Lucas

## 2017-05-01 ENCOUNTER — TELEPHONE (OUTPATIENT)
Dept: INTERNAL MEDICINE CLINIC | Facility: CLINIC | Age: 82
End: 2017-05-01

## 2017-05-01 NOTE — TELEPHONE ENCOUNTER
Syed from [facility] Is calling to inform Dr. Aung Lao that he would like to extend PT services twice a week for one week & second week once starting on 05/08   Ph.  # [phone]    Routed to clinical

## 2017-05-01 NOTE — TELEPHONE ENCOUNTER
Syed with [facility] notified that Stephany David is okay with extending patients PT services as requested.

## 2017-05-04 ENCOUNTER — TELEPHONE (OUTPATIENT)
Dept: INTERNAL MEDICINE CLINIC | Facility: CLINIC | Age: 82
End: 2017-05-04

## 2017-05-04 RX ORDER — DIGOXIN 125 UG/1
TABLET ORAL
Qty: 30 TABLET | Refills: 0 | OUTPATIENT
Start: 2017-05-04

## 2017-05-04 NOTE — TELEPHONE ENCOUNTER
Diaz Barbosa called to order 2 additional visits for next week & to recertify for occupational therapy  Please call Diaz Barbosa w/verbal ok [phone]

## 2017-05-04 NOTE — TELEPHONE ENCOUNTER
Maria M / Parag calling to let Dr Terra Castillo know they are nearing the end of certification they will recertify the week of 6/18, [phone]

## 2017-05-05 RX ORDER — DIGOXIN 125 UG/1
TABLET ORAL
Qty: 30 TABLET | Refills: 0 | OUTPATIENT
Start: 2017-05-05

## 2017-05-05 NOTE — TELEPHONE ENCOUNTER
Pt. Is asking if his Digoxin can be refilled ASAP as he has to pay someone to  his meds for him and that person can go this morning.

## 2017-05-08 ENCOUNTER — TELEPHONE (OUTPATIENT)
Dept: INTERNAL MEDICINE CLINIC | Facility: CLINIC | Age: 82
End: 2017-05-08

## 2017-05-08 ENCOUNTER — ANTI-COAG VISIT (OUTPATIENT)
Dept: INTERNAL MEDICINE CLINIC | Facility: CLINIC | Age: 82
End: 2017-05-08

## 2017-05-08 DIAGNOSIS — I48.20 CHRONIC ATRIAL FIBRILLATION (HCC): Primary | ICD-10-CM

## 2017-05-08 NOTE — TELEPHONE ENCOUNTER
[phone] Jessi w/ [facility]  INR 1.7  Pt thinks he forgot to take it on Sat when his wife passed away  Pt takes 4 mg all day  To DR O high

## 2017-05-08 NOTE — TELEPHONE ENCOUNTER
Called Maria M from [facility] and relayed DR. CLARITA cobian and she verbalized understanding. DR Terra Castillo updated Ac module

## 2017-05-15 ENCOUNTER — ANTI-COAG VISIT (OUTPATIENT)
Dept: INTERNAL MEDICINE CLINIC | Facility: CLINIC | Age: 82
End: 2017-05-15

## 2017-05-15 ENCOUNTER — TELEPHONE (OUTPATIENT)
Dept: INTERNAL MEDICINE CLINIC | Facility: CLINIC | Age: 82
End: 2017-05-15

## 2017-05-15 DIAGNOSIS — I48.20 CHRONIC ATRIAL FIBRILLATION (HCC): Primary | ICD-10-CM

## 2017-05-15 NOTE — TELEPHONE ENCOUNTER
Called Bert and relayed Dr Marissa Singh message on voicemail. Instructed to call back with any questions.

## 2017-05-15 NOTE — TELEPHONE ENCOUNTER
INR 1.7  Pt refused visit lest week pt takes 4mg coumadin every night. Maria M like to extend 6 more weeks of visits. Pt still have physical therapy and occupational therapy coming.

## 2017-05-18 ENCOUNTER — TELEPHONE (OUTPATIENT)
Dept: INTERNAL MEDICINE CLINIC | Facility: CLINIC | Age: 82
End: 2017-05-18

## 2017-05-22 ENCOUNTER — TELEPHONE (OUTPATIENT)
Dept: INTERNAL MEDICINE CLINIC | Facility: CLINIC | Age: 82
End: 2017-05-22

## 2017-05-22 ENCOUNTER — ANTI-COAG VISIT (OUTPATIENT)
Dept: INTERNAL MEDICINE CLINIC | Facility: CLINIC | Age: 82
End: 2017-05-22

## 2017-05-22 DIAGNOSIS — I48.20 CHRONIC ATRIAL FIBRILLATION (HCC): Primary | ICD-10-CM

## 2017-06-01 ENCOUNTER — OFFICE VISIT (OUTPATIENT)
Dept: INTERNAL MEDICINE CLINIC | Facility: CLINIC | Age: 82
End: 2017-06-01

## 2017-06-01 VITALS
HEIGHT: 75 IN | TEMPERATURE: 98 F | HEART RATE: 73 BPM | DIASTOLIC BLOOD PRESSURE: 60 MMHG | SYSTOLIC BLOOD PRESSURE: 90 MMHG | OXYGEN SATURATION: 95 %

## 2017-06-01 DIAGNOSIS — R91.1 LUNG NODULE: ICD-10-CM

## 2017-06-01 DIAGNOSIS — I87.2 VENOUS INSUFFICIENCY: ICD-10-CM

## 2017-06-01 DIAGNOSIS — E46 PROTEIN CALORIE MALNUTRITION (HCC): ICD-10-CM

## 2017-06-01 DIAGNOSIS — I48.20 CHRONIC ATRIAL FIBRILLATION (HCC): Primary | ICD-10-CM

## 2017-06-01 DIAGNOSIS — E53.8 VITAMIN B12 DEFICIENCY: ICD-10-CM

## 2017-06-01 DIAGNOSIS — M15.9 PRIMARY OSTEOARTHRITIS INVOLVING MULTIPLE JOINTS: ICD-10-CM

## 2017-06-01 DIAGNOSIS — L21.9 SEBORRHEIC DERMATITIS: ICD-10-CM

## 2017-06-01 PROCEDURE — G0463 HOSPITAL OUTPT CLINIC VISIT: HCPCS | Performed by: INTERNAL MEDICINE

## 2017-06-01 PROCEDURE — 99214 OFFICE O/P EST MOD 30 MIN: CPT | Performed by: INTERNAL MEDICINE

## 2017-06-01 PROCEDURE — 96372 THER/PROPH/DIAG INJ SC/IM: CPT | Performed by: INTERNAL MEDICINE

## 2017-06-01 RX ORDER — HYDROCODONE BITARTRATE AND ACETAMINOPHEN 7.5; 325 MG/1; MG/1
1 TABLET ORAL EVERY 4 HOURS PRN
Qty: 90 TABLET | Refills: 0 | Status: SHIPPED | OUTPATIENT
Start: 2017-06-13 | End: 2017-07-20

## 2017-06-01 RX ADMIN — CYANOCOBALAMIN 1000 MCG: 1000 INJECTION INTRAMUSCULAR; SUBCUTANEOUS at 08:50:00

## 2017-06-01 NOTE — PROGRESS NOTES
Trev Johnson is a 80year old male. HPI:   Patient presents with:   Follow - Up: 6 weeks F/U , also here for B 12 injection  Medication Request: pending      79 y/o M with OA here for F/U; using Norco 7.5 mg po TID prn; had CT chest in Jan 2017 showing 96     cause of death   • Cancer Paternal Aunt    • Diabetes Paternal Aunt       Social History:   Smoking Status: Former Smoker                   Packs/Day: 3.00  Years: 21        Types: Cigarettes      Quit date: 06/16/1993    Smokeless Status: Current U Blood pressure 90/60, pulse 73, temperature 97.7 °F (36.5 °C), temperature source Oral, height 6' 3\" (1.905 m), SpO2 95 %.   Constitutional: alert and oriented x3 in no acute distress  HEENT- EOMI, PERRL  Nose/Mouth/Throat: pharynx without erythema; no o had been receiving vitamin B12 1,000 mcg IM q. Month; dose today  13.  Seborrheic dermatitis- on clobetasol scalp solution 0.05% ATAA BID, and selenium sulfide shampoo 2.25% 2x/week  14.  History of aspiration pneumonia-- now tolerating general diet with th

## 2017-06-05 ENCOUNTER — TELEPHONE (OUTPATIENT)
Dept: INTERNAL MEDICINE CLINIC | Facility: CLINIC | Age: 82
End: 2017-06-05

## 2017-06-05 DIAGNOSIS — I48.20 CHRONIC ATRIAL FIBRILLATION (HCC): Primary | ICD-10-CM

## 2017-06-05 NOTE — TELEPHONE ENCOUNTER
FROM [facility]     PHYSICAL THERAPIST     WANTS TO EXTEND PT FOR TWICE A WEEK FOR THREE WEEKS     STARTING THE WEEK OF 6/12/17

## 2017-06-05 NOTE — TELEPHONE ENCOUNTER
Please call patient and ask where he had his INR done. I do not see it in hospital lab report. Also please confirm current Coumadin dose. Last Coumadin dose and treat May 22 indicated Coumadin 4 mg a day with 6 mg on Monday.   Please ask patient what anelz

## 2017-06-05 NOTE — TELEPHONE ENCOUNTER
Please advise -  took his INR . He has 2 mg tablets at home and is currently taking 4mg nightly - to DR. TERRAZAS

## 2017-06-05 NOTE — TELEPHONE ENCOUNTER
Please advise patient to take 3 tablets of his 2 mg tablets Coumadin to equal 6 mg today. Then starting tomorrow to continue with his 2 tablets or 4 mg nightly. Ask him to do INR Thursday. I will forward this on to Jennifer Vizcarra to see if she agrees.

## 2017-06-05 NOTE — TELEPHONE ENCOUNTER
I spoke to [facility] and relayed to her 's orders for patient to take 6 mg Coumadin today and starting tomorrow 4 mg Coumadin nightly, INR Thursday.  Maria M asked if ok to do INR on Friday instead of Thursday since she is off Thursday and would

## 2017-06-09 ENCOUNTER — TELEPHONE (OUTPATIENT)
Dept: INTERNAL MEDICINE CLINIC | Facility: CLINIC | Age: 82
End: 2017-06-09

## 2017-06-09 DIAGNOSIS — I48.20 CHRONIC ATRIAL FIBRILLATION (HCC): Primary | ICD-10-CM

## 2017-06-09 NOTE — TELEPHONE ENCOUNTER
INR 2.0  Dosage 6MG Warfarin on 6/5  4MG every day after that  Has not missed any doses, no changes to diet  Ok to leave message, has confidential voice mail  Tasked to Coumadin

## 2017-06-16 ENCOUNTER — TELEPHONE (OUTPATIENT)
Dept: INTERNAL MEDICINE CLINIC | Facility: CLINIC | Age: 82
End: 2017-06-16

## 2017-06-16 DIAGNOSIS — I48.20 CHRONIC ATRIAL FIBRILLATION (HCC): Primary | ICD-10-CM

## 2017-06-16 NOTE — TELEPHONE ENCOUNTER
PT 1.8, Coumadin on 6/6 was 6 mg, all days since 4 mg, no missed dosage no dietary changes    Tasked to coumadin high

## 2017-06-23 ENCOUNTER — TELEPHONE (OUTPATIENT)
Dept: INTERNAL MEDICINE CLINIC | Facility: CLINIC | Age: 82
End: 2017-06-23

## 2017-06-23 DIAGNOSIS — I48.20 CHRONIC ATRIAL FIBRILLATION (HCC): Primary | ICD-10-CM

## 2017-06-23 NOTE — TELEPHONE ENCOUNTER
Maria M [facility] Is calling to report results: INR 2.3   Pt. Is taking 6 mg on Friday & 4 mg the rest of the week.  They are with him until 07/16   Ph. #[phone]  Routed to anti coag

## 2017-06-23 NOTE — TELEPHONE ENCOUNTER
Pt. Had dizziness yesterday while sitting on the toilet, & a very bad headache that lasted 15 mins or so,  night sweats, they are changing his shirts at night and they are soaked, no fever. They   Weekly for the next two weeks ph.  # [phone]

## 2017-06-28 ENCOUNTER — HOSPITAL ENCOUNTER (OUTPATIENT)
Dept: CT IMAGING | Facility: HOSPITAL | Age: 82
Discharge: HOME | End: 2017-06-28
Attending: INTERNAL MEDICINE
Payer: MEDICARE

## 2017-06-28 DIAGNOSIS — R91.1 LUNG NODULE: ICD-10-CM

## 2017-06-28 PROCEDURE — 71250 CT THORAX DX C-: CPT | Performed by: INTERNAL MEDICINE

## 2017-06-28 NOTE — TELEPHONE ENCOUNTER
Syed from [facility] called looking for orders to extend physical therapy for another 2 weeks. Please order therapy for 2 x a week for 2 weeks starting the week of 7-3-17. Syed can be reached at [phone].

## 2017-06-30 ENCOUNTER — ANTI-COAG VISIT (OUTPATIENT)
Dept: INTERNAL MEDICINE CLINIC | Facility: CLINIC | Age: 82
End: 2017-06-30

## 2017-06-30 ENCOUNTER — TELEPHONE (OUTPATIENT)
Dept: INTERNAL MEDICINE CLINIC | Facility: CLINIC | Age: 82
End: 2017-06-30

## 2017-06-30 DIAGNOSIS — I48.91 ATRIAL FIBRILLATION, UNSPECIFIED TYPE (HCC): ICD-10-CM

## 2017-06-30 DIAGNOSIS — I48.20 CHRONIC ATRIAL FIBRILLATION (HCC): ICD-10-CM

## 2017-06-30 NOTE — TELEPHONE ENCOUNTER
Called Maria M from [facility] and relayed DR. TERRAZAS message - verbalized understanding .  AC template updated

## 2017-06-30 NOTE — TELEPHONE ENCOUNTER
INR 2.3; advise coumadin 6 mg Mon, Fri and 4 mg all other days; CPM; recheck in 2 weeks; please call home health

## 2017-06-30 NOTE — TELEPHONE ENCOUNTER
From residential   INR 2.3    Dose of coumadin - has 2mg tabs     Taking 3 tabs on Monday   Taking 4mg all other days (2 tabs)     Was suppose to do 3tabs on Friday but only did 2      Will discharge pt next week for nursing

## 2017-07-11 ENCOUNTER — TELEPHONE (OUTPATIENT)
Dept: INTERNAL MEDICINE CLINIC | Facility: CLINIC | Age: 82
End: 2017-07-11

## 2017-07-11 DIAGNOSIS — I48.91 ATRIAL FIBRILLATION, UNSPECIFIED TYPE (HCC): Primary | ICD-10-CM

## 2017-07-11 NOTE — TELEPHONE ENCOUNTER
Stalin Bremen / [facility] [phone] calling for verbal order to discharge pt from home care & PT on 7/14.    Tasked to nursing

## 2017-07-11 NOTE — TELEPHONE ENCOUNTER
Maria M / [facility] pt was discharged from skilled nursing as of 7/7, pt is due for INR on 7/13 pt is saying he can't get into the office to have his INR done. Is there a recommendation for pt to have INR done.   please call [phone] ok to lvm  Tasked

## 2017-07-12 NOTE — TELEPHONE ENCOUNTER
Syed with [facility]  notified that ok to discharge patient from [facility] per Cody Oewns.

## 2017-07-12 NOTE — TELEPHONE ENCOUNTER
Spoke to pt--- he is due for next INR end of the month;   I will call him then to find out how he is doing with therapy

## 2017-07-13 ENCOUNTER — TELEPHONE (OUTPATIENT)
Dept: INTERNAL MEDICINE CLINIC | Facility: CLINIC | Age: 82
End: 2017-07-13

## 2017-07-13 NOTE — TELEPHONE ENCOUNTER
Kourtney/Home Health nurse called to notify that pt discharged for nursing from [facility], cannot justify any further visits  Pt notified he would have to go to office for coumadin clinic  Kourtney asked that Damion Acevedo please call pt to discuss this  Home kit

## 2017-07-13 NOTE — TELEPHONE ENCOUNTER
Imp- Pulmonary nodule, RLL -- 12 mm in size by CT chest in Jan 2017; was new since CT 5/2015. repeat CT chest in June 2017 shows decrease in size of erich-fissural RLL nodule; +aspiration evident into trachea; pt non-compliant with restricted diet; risk of

## 2017-07-18 ENCOUNTER — TELEPHONE (OUTPATIENT)
Dept: INTERNAL MEDICINE CLINIC | Facility: CLINIC | Age: 82
End: 2017-07-18

## 2017-07-18 DIAGNOSIS — H53.8 BLURRY VISION: Primary | ICD-10-CM

## 2017-07-18 NOTE — TELEPHONE ENCOUNTER
Patient has an appointment with Dr. Valentino Jumbo on 7/26. He needs a note stating that the exam is being recommended by Dr. Dante William so that Medicare picks up part of the cost.  He is seeing Dr. Valentino Jumbo for a cornea problem.     Call patient once it is ready for

## 2017-07-20 RX ORDER — HYDROCODONE BITARTRATE AND ACETAMINOPHEN 7.5; 325 MG/1; MG/1
1 TABLET ORAL EVERY 4 HOURS PRN
Qty: 90 TABLET | Refills: 0 | Status: SHIPPED | OUTPATIENT
Start: 2017-07-20 | End: 2017-08-28

## 2017-07-20 NOTE — TELEPHONE ENCOUNTER
Referral for ophtho Dr Kraig Vargas completed per pt request and left at window for pick-up; please notify pt

## 2017-07-20 NOTE — TELEPHONE ENCOUNTER
Patient notified. Also asking that his Norco Rx for next month be placed at window for p/u. To Dr. Izzy Rodriguez. Please advise.

## 2017-07-20 NOTE — TELEPHONE ENCOUNTER
Pt is checking on the status of his note for Dr Holger Tiwari to have his Retina's checked so medicare will pay for this, pt as an nadine on 7/26, please call when ready [phone]    Tasked to nursing

## 2017-08-01 NOTE — TELEPHONE ENCOUNTER
Pt is done with therapy and will be able to get out on weekends with family members  INR ordered entered for pt to get in the next week  To DR. OTTO if you needed any additional labs (then task back to me because he wants me to mail orders)

## 2017-08-10 ENCOUNTER — ANTI-COAG VISIT (OUTPATIENT)
Dept: INTERNAL MEDICINE CLINIC | Facility: CLINIC | Age: 82
End: 2017-08-10

## 2017-08-10 ENCOUNTER — HOSPITAL ENCOUNTER (OUTPATIENT)
Dept: GENERAL RADIOLOGY | Age: 82
Discharge: HOME | End: 2017-08-10
Attending: INTERNAL MEDICINE | Admitting: INTERNAL MEDICINE
Payer: MEDICARE

## 2017-08-10 ENCOUNTER — OFFICE VISIT (OUTPATIENT)
Dept: INTERNAL MEDICINE CLINIC | Facility: CLINIC | Age: 82
End: 2017-08-10

## 2017-08-10 VITALS
DIASTOLIC BLOOD PRESSURE: 64 MMHG | HEIGHT: 76 IN | HEART RATE: 68 BPM | WEIGHT: 139 LBS | SYSTOLIC BLOOD PRESSURE: 118 MMHG | BODY MASS INDEX: 16.93 KG/M2 | TEMPERATURE: 98 F

## 2017-08-10 DIAGNOSIS — I25.10 CORONARY ARTERY DISEASE INVOLVING NATIVE CORONARY ARTERY OF NATIVE HEART WITHOUT ANGINA PECTORIS: ICD-10-CM

## 2017-08-10 DIAGNOSIS — I48.20 CHRONIC ATRIAL FIBRILLATION (HCC): ICD-10-CM

## 2017-08-10 DIAGNOSIS — M25.511 ACUTE PAIN OF BOTH SHOULDERS: Primary | ICD-10-CM

## 2017-08-10 DIAGNOSIS — M15.9 PRIMARY OSTEOARTHRITIS INVOLVING MULTIPLE JOINTS: ICD-10-CM

## 2017-08-10 DIAGNOSIS — I48.91 ATRIAL FIBRILLATION, UNSPECIFIED TYPE (HCC): ICD-10-CM

## 2017-08-10 DIAGNOSIS — M25.512 ACUTE PAIN OF BOTH SHOULDERS: Primary | ICD-10-CM

## 2017-08-10 DIAGNOSIS — M25.512 ACUTE PAIN OF BOTH SHOULDERS: ICD-10-CM

## 2017-08-10 DIAGNOSIS — M25.511 ACUTE PAIN OF BOTH SHOULDERS: ICD-10-CM

## 2017-08-10 LAB
INR: 2 (ref 0.8–1.2)
TEST STRIP EXPIRATION DATE: ABNORMAL DATE

## 2017-08-10 PROCEDURE — G0463 HOSPITAL OUTPT CLINIC VISIT: HCPCS | Performed by: INTERNAL MEDICINE

## 2017-08-10 PROCEDURE — 85610 PROTHROMBIN TIME: CPT | Performed by: INTERNAL MEDICINE

## 2017-08-10 PROCEDURE — 99214 OFFICE O/P EST MOD 30 MIN: CPT | Performed by: INTERNAL MEDICINE

## 2017-08-10 PROCEDURE — 73030 X-RAY EXAM OF SHOULDER: CPT | Performed by: INTERNAL MEDICINE

## 2017-08-10 PROCEDURE — 96372 THER/PROPH/DIAG INJ SC/IM: CPT | Performed by: INTERNAL MEDICINE

## 2017-08-10 PROCEDURE — 36416 COLLJ CAPILLARY BLOOD SPEC: CPT | Performed by: INTERNAL MEDICINE

## 2017-08-10 RX ORDER — PREDNISONE 10 MG/1
TABLET ORAL
Qty: 24 TABLET | Refills: 0 | Status: SHIPPED | OUTPATIENT
Start: 2017-08-10 | End: 2017-09-14 | Stop reason: ALTCHOICE

## 2017-08-10 RX ADMIN — CYANOCOBALAMIN 1000 MCG: 1000 INJECTION INTRAMUSCULAR; SUBCUTANEOUS at 10:54:00

## 2017-08-10 NOTE — PROGRESS NOTES
Elin Ny is a 80year old male.     HPI:   Patient presents with:  Checkup: left arm pain for 2 weeks      80 y/ M with 2 week h/o bilateral shoulder pain; no trauma; unable to lift arms overhead;  no CP; no SOB; no headaches; no palpitation        HI Packs/day: 3.00      Years: 20.00        Types: Cigarettes     Quit date: 6/16/1993  Smokeless tobacco: Current User                       Types: Chew  Comment: has one cigar occ., uses chewing tobacco  Alcohol use: pharynx without erythema; no oral lesions  Neck/Thyroid: neck supple; no thyromegaly  Cardiovascular: RRR, S1, S2, no S3 or murmur  Respiratory: lungs without crackles or wheezes  Abdomen: normoactive bowel sounds, soft, non-tender and non-distended  Extre colonoscopy  12.  Vitamin B12 deficiency- the patient had been receiving vitamin B12 1,000 mcg IM q. Month; dose today  13.  Seborrheic dermatitis- on clobetasol scalp solution 0.05% ATAA BID, and selenium sulfide shampoo 2.25% 2x/week  14.  History of aspi

## 2017-08-25 RX ORDER — CLOBETASOL PROPIONATE 0.46 MG/ML
SOLUTION TOPICAL
Qty: 50 ML | Refills: 3 | Status: SHIPPED | OUTPATIENT
Start: 2017-08-25 | End: 2018-01-11

## 2017-08-28 ENCOUNTER — TELEPHONE (OUTPATIENT)
Dept: INTERNAL MEDICINE CLINIC | Facility: CLINIC | Age: 82
End: 2017-08-28

## 2017-08-28 RX ORDER — HYDROCODONE BITARTRATE AND ACETAMINOPHEN 7.5; 325 MG/1; MG/1
1 TABLET ORAL EVERY 4 HOURS PRN
Qty: 90 TABLET | Refills: 0 | Status: SHIPPED | OUTPATIENT
Start: 2017-08-28 | End: 2017-10-02

## 2017-08-28 NOTE — TELEPHONE ENCOUNTER
Pt needs a refill on Norco 7.5-325 mg, QTY-90. Pt will have someone pick the prescription up. Tasked to rx.

## 2017-08-28 NOTE — TELEPHONE ENCOUNTER
To MD:  The above refill request is for a controlled substance. Please indicate yes or no to refill 30 days supply plus one refill. If more refills are appropriate, please indicate quantity  Pending - to DR. OTTO

## 2017-08-28 NOTE — TELEPHONE ENCOUNTER
Imp - OA; rec- refilled Norco 7.5 mg po q6hrs prn, #90, no RF; Rx at window for pick-up; please call pt

## 2017-08-31 RX ORDER — WARFARIN SODIUM 2 MG/1
TABLET ORAL
Qty: 75 TABLET | Refills: 0 | Status: SHIPPED | OUTPATIENT
Start: 2017-08-31 | End: 2017-10-23

## 2017-08-31 NOTE — TELEPHONE ENCOUNTER
Pt called, requesting refill  Friend will be picking up other prescriptions for him around 12:00, needs this one as well.   Tasked to [employer]

## 2017-09-05 ENCOUNTER — TELEPHONE (OUTPATIENT)
Dept: INTERNAL MEDICINE CLINIC | Facility: CLINIC | Age: 82
End: 2017-09-05

## 2017-09-05 NOTE — TELEPHONE ENCOUNTER
Patient wants to know if he can get a refill on his Prednisone as it helped with his pain. He finished it last week. Wants to discuss with Dr. Leidy Foss.

## 2017-09-07 RX ORDER — PREDNISONE 1 MG/1
15 TABLET ORAL DAILY
Qty: 45 TABLET | Refills: 0 | Status: SHIPPED | OUTPATIENT
Start: 2017-09-07 | End: 2017-09-14

## 2017-09-07 NOTE — TELEPHONE ENCOUNTER
Imp- Bilateral shoulder pain; possible PMR; has responded to prednisone; rec- will give prednisone 5 mg three tabs (=15 mg) po qD, #45 tabs, no RF; pt will C/B in one month with update; pt called; Erx sent

## 2017-09-12 ENCOUNTER — TELEPHONE (OUTPATIENT)
Dept: INTERNAL MEDICINE CLINIC | Facility: CLINIC | Age: 82
End: 2017-09-12

## 2017-09-12 NOTE — TELEPHONE ENCOUNTER
Malcolm Mendenhall / Parag requesting last face to face please fax #[phone]   Tasked to nursing

## 2017-09-14 ENCOUNTER — OFFICE VISIT (OUTPATIENT)
Dept: INTERNAL MEDICINE CLINIC | Facility: CLINIC | Age: 82
End: 2017-09-14

## 2017-09-14 ENCOUNTER — TELEPHONE (OUTPATIENT)
Dept: INTERNAL MEDICINE CLINIC | Facility: CLINIC | Age: 82
End: 2017-09-14

## 2017-09-14 ENCOUNTER — ANTI-COAG VISIT (OUTPATIENT)
Dept: INTERNAL MEDICINE CLINIC | Facility: CLINIC | Age: 82
End: 2017-09-14

## 2017-09-14 VITALS
SYSTOLIC BLOOD PRESSURE: 128 MMHG | DIASTOLIC BLOOD PRESSURE: 70 MMHG | BODY MASS INDEX: 18.19 KG/M2 | TEMPERATURE: 98 F | OXYGEN SATURATION: 94 % | HEART RATE: 84 BPM | HEIGHT: 76 IN | WEIGHT: 149.38 LBS

## 2017-09-14 DIAGNOSIS — R91.1 LUNG NODULE: ICD-10-CM

## 2017-09-14 DIAGNOSIS — I87.2 VENOUS INSUFFICIENCY: ICD-10-CM

## 2017-09-14 DIAGNOSIS — M25.512 ACUTE PAIN OF BOTH SHOULDERS: Primary | ICD-10-CM

## 2017-09-14 DIAGNOSIS — M15.9 PRIMARY OSTEOARTHRITIS INVOLVING MULTIPLE JOINTS: ICD-10-CM

## 2017-09-14 DIAGNOSIS — I27.20 PULMONARY HTN (HCC): ICD-10-CM

## 2017-09-14 DIAGNOSIS — I48.20 CHRONIC ATRIAL FIBRILLATION (HCC): ICD-10-CM

## 2017-09-14 DIAGNOSIS — F32.A DEPRESSION, UNSPECIFIED DEPRESSION TYPE: ICD-10-CM

## 2017-09-14 DIAGNOSIS — I25.10 CORONARY ARTERY DISEASE INVOLVING NATIVE CORONARY ARTERY OF NATIVE HEART WITHOUT ANGINA PECTORIS: ICD-10-CM

## 2017-09-14 DIAGNOSIS — M25.511 ACUTE PAIN OF BOTH SHOULDERS: Primary | ICD-10-CM

## 2017-09-14 DIAGNOSIS — E53.8 VITAMIN B12 DEFICIENCY: ICD-10-CM

## 2017-09-14 LAB
INR: 4 (ref 0.8–1.2)
TEST STRIP EXPIRATION DATE: ABNORMAL DATE

## 2017-09-14 PROCEDURE — 36416 COLLJ CAPILLARY BLOOD SPEC: CPT

## 2017-09-14 PROCEDURE — 96372 THER/PROPH/DIAG INJ SC/IM: CPT | Performed by: INTERNAL MEDICINE

## 2017-09-14 PROCEDURE — 85610 PROTHROMBIN TIME: CPT

## 2017-09-14 PROCEDURE — G0463 HOSPITAL OUTPT CLINIC VISIT: HCPCS | Performed by: INTERNAL MEDICINE

## 2017-09-14 PROCEDURE — 99215 OFFICE O/P EST HI 40 MIN: CPT | Performed by: INTERNAL MEDICINE

## 2017-09-14 RX ORDER — PREDNISONE 1 MG/1
15 TABLET ORAL DAILY
Qty: 45 TABLET | Refills: 2 | Status: SHIPPED | OUTPATIENT
Start: 2017-09-14 | End: 2017-09-14

## 2017-09-14 RX ORDER — PREDNISONE 1 MG/1
15 TABLET ORAL DAILY
Qty: 45 TABLET | Refills: 2 | Status: SHIPPED | OUTPATIENT
Start: 2017-09-14 | End: 2017-11-06

## 2017-09-14 RX ORDER — CLOTRIMAZOLE 1 %
CREAM (GRAM) TOPICAL
Qty: 30 G | Refills: 0 | Status: SHIPPED | OUTPATIENT
Start: 2017-09-14 | End: 2018-06-29 | Stop reason: ALTCHOICE

## 2017-09-14 RX ADMIN — CYANOCOBALAMIN 1000 MCG: 1000 INJECTION INTRAMUSCULAR; SUBCUTANEOUS at 10:39:00

## 2017-09-14 NOTE — PROGRESS NOTES
Anna Fang is a 80year old male. HPI:   Patient presents with:  Bleeding (hematologic): needs IN checked by stefano Marr Problem: Red round spot to left lateral rib area. c/o itching. Onset: a couple weeks.   Medication Follow-Up: Taking prednisone Comment: dental implants   Family History   Problem Relation Age of Onset   • old age Daniel Mcginnis Father 80     cause of death   • Other [OTHER] Mother 80     cause of death   • Cancer Paternal Aunt    • Diabetes Paternal Aunt       Social History: Smoking sta drainage, hearing loss and nasal drainage  Eyes:  Negative for eye discharge and vision loss  Cardiovascular:  Negative for chest pain; negative palpitations  Respiratory:  Negative for cough, dyspnea and wheezing  Endocrine:  Negative for abnormal sleep p HTN with PA pressure 54 mm Hg; on coumadin 4 mg po qHS, except 6 mg Mon, Fri;  off sotalol 80mg BID for now, though may re-start if HR fails to normalize whens topping Duonebs; INR today per [facility] coumadin clinic     Pulmonary HTN  seen on ECHO with PA press thin liquids     RTC 4 months         This is a 45 minute visit and greater than 50% of the time was spent counseling the patient and/or coordinating care. Orders This Visit:  No orders of the defined types were placed in this encounter.       Med

## 2017-09-14 NOTE — TELEPHONE ENCOUNTER
Pt saw Dr Black Smith today when he got home he saw the office called, no message and was not charted. Pt assumed Joce Lima called.   Tasked to coumadin

## 2017-09-15 ENCOUNTER — TELEPHONE (OUTPATIENT)
Dept: INTERNAL MEDICINE CLINIC | Facility: CLINIC | Age: 82
End: 2017-09-15

## 2017-09-15 NOTE — TELEPHONE ENCOUNTER
Following up on form that was faxed 9/12 for home care physical therapy for pt's  shoulders - please send back to Fax# [phone]

## 2017-09-15 NOTE — TELEPHONE ENCOUNTER
Malcolm Mendenhall from [facility] asking if the face to face can be faxed over to day and if can anyone call her when done.

## 2017-09-15 NOTE — TELEPHONE ENCOUNTER
Mary Kay Barton from [facility] and relayed DR. OTTO message - left on VM

## 2017-09-18 NOTE — TELEPHONE ENCOUNTER
[phone] Romario Keller @ [facility]  Confirming plan of care for pt with DR O  To clinical

## 2017-09-18 NOTE — TELEPHONE ENCOUNTER
Jayce Heath from [facility] notified  To continue [facility] / DR Adolph Joinerpool

## 2017-09-28 ENCOUNTER — ANTI-COAG VISIT (OUTPATIENT)
Dept: INTERNAL MEDICINE CLINIC | Facility: CLINIC | Age: 82
End: 2017-09-28

## 2017-09-28 DIAGNOSIS — I48.20 CHRONIC ATRIAL FIBRILLATION (HCC): ICD-10-CM

## 2017-09-28 LAB
INR: 2 (ref 0.8–1.2)
TEST STRIP EXPIRATION DATE: ABNORMAL DATE
TEST STRIP LOT #: ABNORMAL NUMERIC

## 2017-09-28 PROCEDURE — G0463 HOSPITAL OUTPT CLINIC VISIT: HCPCS

## 2017-09-28 PROCEDURE — 36416 COLLJ CAPILLARY BLOOD SPEC: CPT

## 2017-09-28 PROCEDURE — 85610 PROTHROMBIN TIME: CPT

## 2017-10-02 ENCOUNTER — TELEPHONE (OUTPATIENT)
Dept: INTERNAL MEDICINE CLINIC | Facility: CLINIC | Age: 82
End: 2017-10-02

## 2017-10-02 RX ORDER — HYDROCODONE BITARTRATE AND ACETAMINOPHEN 7.5; 325 MG/1; MG/1
1 TABLET ORAL EVERY 6 HOURS PRN
Qty: 90 TABLET | Refills: 0 | Status: SHIPPED | OUTPATIENT
Start: 2017-10-02 | End: 2017-11-09

## 2017-10-05 ENCOUNTER — TELEPHONE (OUTPATIENT)
Dept: INTERNAL MEDICINE CLINIC | Facility: CLINIC | Age: 82
End: 2017-10-05

## 2017-10-05 NOTE — TELEPHONE ENCOUNTER
Tracy Munoz from [facility]. Is calling to inform Dr. Ziggy Olson that pt. Will be discharged from [address] [place] [address] next week ph.  # [phone]   Routed to clinical

## 2017-10-12 ENCOUNTER — OFFICE VISIT (OUTPATIENT)
Dept: INTERNAL MEDICINE CLINIC | Facility: CLINIC | Age: 82
End: 2017-10-12

## 2017-10-12 ENCOUNTER — ANTI-COAG VISIT (OUTPATIENT)
Dept: INTERNAL MEDICINE CLINIC | Facility: CLINIC | Age: 82
End: 2017-10-12

## 2017-10-12 VITALS
WEIGHT: 150.81 LBS | DIASTOLIC BLOOD PRESSURE: 70 MMHG | BODY MASS INDEX: 18.37 KG/M2 | HEART RATE: 80 BPM | HEIGHT: 76 IN | SYSTOLIC BLOOD PRESSURE: 122 MMHG | TEMPERATURE: 98 F

## 2017-10-12 DIAGNOSIS — F32.A DEPRESSION, UNSPECIFIED DEPRESSION TYPE: ICD-10-CM

## 2017-10-12 DIAGNOSIS — R91.1 LUNG NODULE: ICD-10-CM

## 2017-10-12 DIAGNOSIS — I48.20 CHRONIC ATRIAL FIBRILLATION (HCC): ICD-10-CM

## 2017-10-12 DIAGNOSIS — J69.0 ASPIRATION PNEUMONIA OF LOWER LOBE, UNSPECIFIED ASPIRATION PNEUMONIA TYPE, UNSPECIFIED LATERALITY (HCC): ICD-10-CM

## 2017-10-12 DIAGNOSIS — I87.2 VENOUS INSUFFICIENCY: ICD-10-CM

## 2017-10-12 DIAGNOSIS — I27.20 PULMONARY HTN (HCC): ICD-10-CM

## 2017-10-12 DIAGNOSIS — B35.4 TINEA CORPORIS: ICD-10-CM

## 2017-10-12 DIAGNOSIS — M25.512 ACUTE PAIN OF BOTH SHOULDERS: Primary | ICD-10-CM

## 2017-10-12 DIAGNOSIS — M25.511 ACUTE PAIN OF BOTH SHOULDERS: Primary | ICD-10-CM

## 2017-10-12 DIAGNOSIS — I25.10 CORONARY ARTERY DISEASE INVOLVING NATIVE CORONARY ARTERY OF NATIVE HEART WITHOUT ANGINA PECTORIS: ICD-10-CM

## 2017-10-12 PROCEDURE — G0463 HOSPITAL OUTPT CLINIC VISIT: HCPCS | Performed by: INTERNAL MEDICINE

## 2017-10-12 PROCEDURE — G0008 ADMIN INFLUENZA VIRUS VAC: HCPCS | Performed by: INTERNAL MEDICINE

## 2017-10-12 PROCEDURE — 85610 PROTHROMBIN TIME: CPT

## 2017-10-12 PROCEDURE — 99215 OFFICE O/P EST HI 40 MIN: CPT | Performed by: INTERNAL MEDICINE

## 2017-10-12 PROCEDURE — 36416 COLLJ CAPILLARY BLOOD SPEC: CPT

## 2017-10-12 PROCEDURE — 90653 IIV ADJUVANT VACCINE IM: CPT | Performed by: INTERNAL MEDICINE

## 2017-10-12 NOTE — PROGRESS NOTES
Abdulaziz Latham is a 80year old male. HPI:   Patient presents with:  Checkup: 4 month. Sore on right foot. c/o tenderness. Seen podiatrist a week ago. Has instrcustion to dress foot every day.        81 y/o M with AF, PMR, CAD here for F/u; pt taking pre 96     cause of death   • Cancer Paternal Aunt    • Diabetes Paternal Aunt       Social History: Smoking status: Former Smoker                                                              Packs/day: 3.00      Years: 20.00        Types: Cigarettes     Quit loss; no fatigue  ENMT:  Negative for ear drainage, hearing loss and nasal drainage  Eyes:  Negative for eye discharge and vision loss  Cardiovascular:  Negative for chest pain; negative palpitations  Respiratory:  Negative for cough, dyspnea and wheezing Care     Atrial fibrillation  ECHO with LVEF 70% and moderate pulmonary HTN with PA pressure 54 mm Hg; on coumadin 4 mg po qHS, except 6 mg Mon, Fri;  off sotalol 80mg BID for now, though may re-start if HR fails to normalize whens topping Duonebs; INR tod 2x/week  14. History of aspiration pneumonia-- now tolerating general diet with thin liquids  15.   Skin plaque, right foot- trial econazole 1% cream ATAA BID     RTC 4 months         This is a 45 minute visit and greater than 50% of the time was spent coun

## 2017-10-25 RX ORDER — WARFARIN SODIUM 2 MG/1
TABLET ORAL
Qty: 180 TABLET | Refills: 1 | Status: ON HOLD | OUTPATIENT
Start: 2017-10-25 | End: 2018-07-05

## 2017-11-07 RX ORDER — PREDNISONE 1 MG/1
TABLET ORAL
Qty: 45 TABLET | Refills: 11 | Status: ON HOLD | OUTPATIENT
Start: 2017-11-07 | End: 2018-04-01

## 2017-11-07 NOTE — TELEPHONE ENCOUNTER
Refill request is for a maintenance medication and has met the criteria specified in the Ambulatory Medication Refill Standing Order for eligibility, visits, laboratory, alerts and was sent to the requested pharmacy.   Per  [address] note October

## 2017-11-08 ENCOUNTER — TELEPHONE (OUTPATIENT)
Dept: INTERNAL MEDICINE CLINIC | Facility: CLINIC | Age: 82
End: 2017-11-08

## 2017-11-09 ENCOUNTER — ANTI-COAG VISIT (OUTPATIENT)
Dept: INTERNAL MEDICINE CLINIC | Facility: CLINIC | Age: 82
End: 2017-11-09

## 2017-11-09 DIAGNOSIS — I48.20 CHRONIC ATRIAL FIBRILLATION (HCC): ICD-10-CM

## 2017-11-09 PROCEDURE — G0009 ADMIN PNEUMOCOCCAL VACCINE: HCPCS | Performed by: INTERNAL MEDICINE

## 2017-11-09 PROCEDURE — 85610 PROTHROMBIN TIME: CPT

## 2017-11-09 PROCEDURE — 36416 COLLJ CAPILLARY BLOOD SPEC: CPT

## 2017-11-09 PROCEDURE — G0463 HOSPITAL OUTPT CLINIC VISIT: HCPCS

## 2017-11-09 PROCEDURE — 90670 PCV13 VACCINE IM: CPT | Performed by: INTERNAL MEDICINE

## 2017-11-09 RX ORDER — HYDROCODONE BITARTRATE AND ACETAMINOPHEN 7.5; 325 MG/1; MG/1
1 TABLET ORAL EVERY 6 HOURS PRN
Qty: 90 TABLET | Refills: 0 | Status: SHIPPED | OUTPATIENT
Start: 2017-11-09 | End: 2017-12-11

## 2017-11-09 RX ORDER — DIGOXIN 125 UG/1
TABLET ORAL
Qty: 90 TABLET | Refills: 3 | Status: SHIPPED | OUTPATIENT
Start: 2017-11-09 | End: 2018-11-26

## 2017-11-17 ENCOUNTER — TELEPHONE (OUTPATIENT)
Dept: INTERNAL MEDICINE CLINIC | Facility: CLINIC | Age: 82
End: 2017-11-17

## 2017-11-17 NOTE — TELEPHONE ENCOUNTER
Called blanche from [facility] and relayed DR. OTTO message - verbalized understanding

## 2017-11-17 NOTE — TELEPHONE ENCOUNTER
Pt was admitted to [facility] today. Nurse needs orders:  - to check O2 sat at every visit;  - to add PT. Nurse heard crackles in his both lower lungs. Pt has wound on Right 2nd toe - wound cleanser, Silvadene cream, bandaid; to be changed daily.     Tas

## 2017-11-29 ENCOUNTER — TELEPHONE (OUTPATIENT)
Dept: INTERNAL MEDICINE CLINIC | Facility: CLINIC | Age: 82
End: 2017-11-29

## 2017-11-29 NOTE — TELEPHONE ENCOUNTER
A hematoma was found on patient's left lower leg 1\"x2\". Patient bumped it in his car yesterday. Patient claims it was smaller today. Patient told to monitor it.

## 2017-11-29 NOTE — TELEPHONE ENCOUNTER
To Dr. Caron Cai: see [facility] message for patient.  He is on coumadin    (FYI there is also another TT from [facility] today)

## 2017-11-29 NOTE — TELEPHONE ENCOUNTER
FYI - patient does not think he needs to be seen twice a week, so home health will be going there only once this week on Friday.

## 2017-12-01 ENCOUNTER — TELEPHONE (OUTPATIENT)
Dept: INTERNAL MEDICINE CLINIC | Facility: CLINIC | Age: 82
End: 2017-12-01

## 2017-12-01 DIAGNOSIS — I48.20 CHRONIC ATRIAL FIBRILLATION (HCC): ICD-10-CM

## 2017-12-04 ENCOUNTER — TELEPHONE (OUTPATIENT)
Dept: INTERNAL MEDICINE CLINIC | Facility: CLINIC | Age: 82
End: 2017-12-04

## 2017-12-04 NOTE — TELEPHONE ENCOUNTER
[facility]  Unable to ck pt INR today.  She will ck this Wed 12/6  Pt taking 1 mg tonight and 1 mg tomorrow  To anti coag

## 2017-12-06 ENCOUNTER — TELEPHONE (OUTPATIENT)
Dept: INTERNAL MEDICINE CLINIC | Facility: CLINIC | Age: 82
End: 2017-12-06

## 2017-12-06 ENCOUNTER — ANTI-COAG VISIT (OUTPATIENT)
Dept: INTERNAL MEDICINE CLINIC | Facility: CLINIC | Age: 82
End: 2017-12-06

## 2017-12-06 DIAGNOSIS — I48.19 PERSISTENT ATRIAL FIBRILLATION (HCC): ICD-10-CM

## 2017-12-06 DIAGNOSIS — I48.20 CHRONIC ATRIAL FIBRILLATION (HCC): ICD-10-CM

## 2017-12-06 NOTE — TELEPHONE ENCOUNTER
Carlos Taveras from [facility].. Is calling with results:  PT 19.4    INR 1.6    Pt.  Is taking 2 mg of coumadin daily  Ph. # [phone]    Routed to high to Dr. Terrence Herrera

## 2017-12-08 ENCOUNTER — TELEPHONE (OUTPATIENT)
Dept: INTERNAL MEDICINE CLINIC | Facility: CLINIC | Age: 82
End: 2017-12-08

## 2017-12-08 RX ORDER — CEFADROXIL 500 MG/1
500 CAPSULE ORAL 2 TIMES DAILY
Qty: 14 CAPSULE | Refills: 0 | Status: ON HOLD | OUTPATIENT
Start: 2017-12-08 | End: 2018-04-01

## 2017-12-08 NOTE — TELEPHONE ENCOUNTER
RN called; pt had hit left leg on car door one week ago; wound wrapped with Kerlix; taking Norco prn pain; +pre-tibial erythema; temp 98.6; will give cefadroxil 500 mg po BID x7d; ERx sent

## 2017-12-08 NOTE — TELEPHONE ENCOUNTER
[phone]  Please call Yoan Dodd back from [facility].  She is with pt now  To clinical high

## 2017-12-08 NOTE — TELEPHONE ENCOUNTER
Please advise - called Avelino Watkins from [facility] who is with patient now- Patient has hematoma on left leg- th ebruise is flatter, but now the leg is warmer and pitting edema from mid shin to ankle . Also painful - is taking norco for it.  Purple from bruise has spr

## 2017-12-08 NOTE — TELEPHONE ENCOUNTER
From residential     Hematoma on left leg is more painful and left leg is more swollen     Hurt leg on car door - was improving but started to get worse since Wednesday     Pt is asking for someone to come and see him today     She is asking to get orders

## 2017-12-11 ENCOUNTER — TELEPHONE (OUTPATIENT)
Dept: INTERNAL MEDICINE CLINIC | Facility: CLINIC | Age: 82
End: 2017-12-11

## 2017-12-11 RX ORDER — HYDROCODONE BITARTRATE AND ACETAMINOPHEN 7.5; 325 MG/1; MG/1
1 TABLET ORAL EVERY 6 HOURS PRN
Qty: 90 TABLET | Refills: 0 | Status: SHIPPED | OUTPATIENT
Start: 2017-12-11 | End: 2018-01-30

## 2017-12-11 NOTE — TELEPHONE ENCOUNTER
To MD:  The above refill request is for a controlled substance. Please indicate yes or no to refill 30 days supply plus one refill. If more refills are appropriate, please indicate quantity  To DR. OTTO

## 2017-12-11 NOTE — TELEPHONE ENCOUNTER
Patient called to request his Chris Anne which he would like to  tomorrow - please call pt to advise when ready [phone]

## 2017-12-12 NOTE — TELEPHONE ENCOUNTER
Called patient and notified him that script for Haylee Chaveze ready for  at . Patient verbalized understanding.

## 2017-12-20 ENCOUNTER — TELEPHONE (OUTPATIENT)
Dept: INTERNAL MEDICINE CLINIC | Facility: CLINIC | Age: 82
End: 2017-12-20

## 2017-12-20 NOTE — TELEPHONE ENCOUNTER
INR 3.5, PT 41.8, Coumadin 1 mg Monday & Friday all other days 2 mg.    Also requesting additional visits 1 visit per week for 2 weeks  Tasked to Dr Jimena nieves

## 2017-12-21 ENCOUNTER — TELEPHONE (OUTPATIENT)
Dept: INTERNAL MEDICINE CLINIC | Facility: CLINIC | Age: 82
End: 2017-12-21

## 2017-12-21 NOTE — TELEPHONE ENCOUNTER
Frank Cui from [facility] called to request approval to extend physical therapy - 2 x a week for 3 weeks   Also to advise Dr Bryce Delacruz that pt's hematoma opened up today - caregiver redressed it    Call Frank Cui to advise [phone]

## 2017-12-21 NOTE — TELEPHONE ENCOUNTER
Spoke with RN. Hold coumadin today and tomorrow. Change to Coumadin 1mg on Sun, Mon, Fri and 2mg other days.   Repeat INR on 12/26

## 2017-12-21 NOTE — TELEPHONE ENCOUNTER
Elin Herron from [facility] called   Needs orders for 2 additional nursing visits and would like to re certify patient for another episode of home health due to hematoma of his left leg      [phone] ok to leave message on Lenore's voice mail

## 2017-12-22 ENCOUNTER — TELEPHONE (OUTPATIENT)
Dept: INTERNAL MEDICINE CLINIC | Facility: CLINIC | Age: 82
End: 2017-12-22

## 2017-12-22 NOTE — TELEPHONE ENCOUNTER
Home Health would like to add a PRN visit for tomorrow. Nurse can not get there today. The left leg wound has opened up & is draining dark bloody substance. Lump has gone down. P.T sent her pictures but she wants to see it herself.

## 2017-12-22 NOTE — TELEPHONE ENCOUNTER
Called BODØ from [facility] and relayed DR. CLARITA cobian - verbalized understanding

## 2017-12-27 ENCOUNTER — TELEPHONE (OUTPATIENT)
Dept: INTERNAL MEDICINE CLINIC | Facility: CLINIC | Age: 82
End: 2017-12-27

## 2017-12-27 ENCOUNTER — ANTI-COAG VISIT (OUTPATIENT)
Dept: INTERNAL MEDICINE CLINIC | Facility: CLINIC | Age: 82
End: 2017-12-27

## 2017-12-27 DIAGNOSIS — I48.20 CHRONIC ATRIAL FIBRILLATION (HCC): ICD-10-CM

## 2017-12-27 DIAGNOSIS — I48.19 PERSISTENT ATRIAL FIBRILLATION (HCC): ICD-10-CM

## 2017-12-27 NOTE — TELEPHONE ENCOUNTER
Myrtle Bojorquez from [facility]. Is calling with results:  PT 18.9   INR 1.6   Pt. held his coumadin on 12/20 & 12/21. Pt.  Took 1 mg of coumadin on 12/22, 2 mg of coumadin on 12/23, 1 mg of coumadin 12/24 & 12/25, and 2 mg of coumadin on 12/26  Ph. # [phone]

## 2017-12-27 NOTE — TELEPHONE ENCOUNTER
INR 1.6; was taking 2 mg qHS; (further clarification per RN); advise coumadin 2 mg Mon, Wed, Fri, and 4 mg po qHS on Tues, Thurs, Sat, Sun; re-check 1 week;  RN/HH called

## 2018-01-03 ENCOUNTER — TELEPHONE (OUTPATIENT)
Dept: INTERNAL MEDICINE CLINIC | Facility: CLINIC | Age: 83
End: 2018-01-03

## 2018-01-03 DIAGNOSIS — I48.20 CHRONIC ATRIAL FIBRILLATION (HCC): ICD-10-CM

## 2018-01-03 LAB — INR: 2.6 (ref 0.8–1.2)

## 2018-01-03 NOTE — TELEPHONE ENCOUNTER
[facility] nurse calling with INR results:  PT 27.6  INR 2.6  Dosage 4MG M-W-Fri, other days 8MG   Tasked to Dr Neri Parker

## 2018-01-09 NOTE — TELEPHONE ENCOUNTER
Pt has question about Prednisone; He is taking 15 mg daily; He has noticed wt has increased 5# (mostly in abdomen) and his sleep is interrupted most nights;    Would like to trial 10 mg Prednisone    To

## 2018-01-10 ENCOUNTER — TELEPHONE (OUTPATIENT)
Dept: INTERNAL MEDICINE CLINIC | Facility: CLINIC | Age: 83
End: 2018-01-10

## 2018-01-10 ENCOUNTER — ANTI-COAG VISIT (OUTPATIENT)
Dept: INTERNAL MEDICINE CLINIC | Facility: CLINIC | Age: 83
End: 2018-01-10

## 2018-01-10 DIAGNOSIS — I48.91 ATRIAL FIBRILLATION, UNSPECIFIED TYPE (HCC): ICD-10-CM

## 2018-01-10 DIAGNOSIS — I48.20 CHRONIC ATRIAL FIBRILLATION (HCC): ICD-10-CM

## 2018-01-10 LAB — INR: 3.6 (ref 0.8–1.2)

## 2018-01-10 RX ORDER — AZITHROMYCIN 250 MG/1
TABLET, FILM COATED ORAL
Qty: 6 TABLET | Refills: 0 | Status: ON HOLD | OUTPATIENT
Start: 2018-01-10 | End: 2018-04-01

## 2018-01-10 NOTE — TELEPHONE ENCOUNTER
HH like to re certify pt for [facility] nursing and [facility] physical therapy.

## 2018-01-10 NOTE — TELEPHONE ENCOUNTER
Imp- URI/ bronchitis;  Rec- Zithromax (Z-bladimir) as directed x5d; please call [place] RN and please call pt; I have sent ERx to Derrick in [facility] on [address]

## 2018-01-10 NOTE — TELEPHONE ENCOUNTER
Advise hold coumadin x 2 days, then resume warfarin 2 mg po qHS Mon, Wed, Fri, and 4 mg po qHS on Tues, Thurs, Sat, Sun; next PT/INR on Mon, 1/22/18; please call RN

## 2018-01-10 NOTE — TELEPHONE ENCOUNTER
HH pt have a cough is phlegm changed from white to green and think. His  Lungs are  Crackling and have  some wheezing pt don't have a fever.

## 2018-01-10 NOTE — TELEPHONE ENCOUNTER
Called [address] and left voicemail on confidential mailbox relaying Dr Villatoro's message below. Asked if she could please call back to confirm she received instructions about next INR draw.   Also called Collette Melo and relayed Dr York Never message and

## 2018-01-10 NOTE — TELEPHONE ENCOUNTER
Called Ad[facility] Garay and gave Ishan Zendejas to re certify patient for [facility] nursing and [facility] physical therapy. Left voicemail on confidential mailbox.

## 2018-01-10 NOTE — TELEPHONE ENCOUNTER
Mailbox full on home phone. Called patient on cell and relayed DR. OTTO message and he verbalized understanding

## 2018-01-10 NOTE — TELEPHONE ENCOUNTER
Per Avelino Watkins, [facility] nurse, when she saw the pt today he reported feeling \"very tired. Reported productive cough with thick yellow-green phlegm. She heard crackles and wheezing on auscultation. No fever, denies other symptoms.  Per Avelino Watkins, the patient's symp

## 2018-01-11 NOTE — TELEPHONE ENCOUNTER
Called and Relayed MD's message to patient---verbalized understanding. Patient has prednisone 5mg tabs. I instructed him to take 2 tabs of the 5mg (10mg) once daily, he verbalized understanding.     Med module updated

## 2018-01-11 NOTE — TELEPHONE ENCOUNTER
Pt taking prednisone 15 mg po qD for suspected polymyalgia rheumatica; advise decrease prednisone to 10 mg po qD; please call pt

## 2018-01-12 RX ORDER — CLOBETASOL PROPIONATE 0.46 MG/ML
SOLUTION TOPICAL
Qty: 50 ML | Refills: 3 | Status: SHIPPED | OUTPATIENT
Start: 2018-01-12 | End: 2018-10-22

## 2018-01-22 ENCOUNTER — TELEPHONE (OUTPATIENT)
Dept: INTERNAL MEDICINE CLINIC | Facility: CLINIC | Age: 83
End: 2018-01-22

## 2018-01-22 DIAGNOSIS — I48.20 CHRONIC ATRIAL FIBRILLATION (HCC): ICD-10-CM

## 2018-01-22 LAB — INR: 2.7 (ref 0.8–1.2)

## 2018-01-29 ENCOUNTER — TELEPHONE (OUTPATIENT)
Dept: INTERNAL MEDICINE CLINIC | Facility: CLINIC | Age: 83
End: 2018-01-29

## 2018-01-29 NOTE — TELEPHONE ENCOUNTER
Jennifer Ponce from [facility]; [facility]. Is calling to inform Dr. Terra Castillo that pt. Has been sweating na lot at night. It stared a few months ago and has gotten worse. Pt. Is waking up drenched and wet. Pt. Has a rash on his back & side it's pink and raised. Pt.  Thinks it's

## 2018-01-30 ENCOUNTER — TELEPHONE (OUTPATIENT)
Dept: INTERNAL MEDICINE CLINIC | Facility: CLINIC | Age: 83
End: 2018-01-30

## 2018-02-01 RX ORDER — HYDROCODONE BITARTRATE AND ACETAMINOPHEN 7.5; 325 MG/1; MG/1
1 TABLET ORAL EVERY 6 HOURS PRN
Qty: 90 TABLET | Refills: 0 | Status: SHIPPED | OUTPATIENT
Start: 2018-02-01 | End: 2018-04-05

## 2018-02-05 ENCOUNTER — TELEPHONE (OUTPATIENT)
Dept: INTERNAL MEDICINE CLINIC | Facility: CLINIC | Age: 83
End: 2018-02-05

## 2018-02-05 DIAGNOSIS — I48.20 CHRONIC ATRIAL FIBRILLATION (HCC): ICD-10-CM

## 2018-02-05 LAB — INR: 3.4 (ref 0.8–1.2)

## 2018-02-05 NOTE — TELEPHONE ENCOUNTER
Protime:   40.8    INR:            3.4      Patient recently had his Prednisone decreased. Ever since he has had excessive night sweats. He also has an itchy rash on his back, probably related. Patient is using Venelex for rash.

## 2018-02-05 NOTE — TELEPHONE ENCOUNTER
[facility] stated INR 3.4 in the note its stating 3.9 - please advise

## 2018-02-06 NOTE — TELEPHONE ENCOUNTER
Pamela Sanchez with [facility] called back to get Coumadin instructions. Relayed Kristen's instructions to patient per [facility] module. She verbalized understanding.

## 2018-02-12 ENCOUNTER — TELEPHONE (OUTPATIENT)
Dept: INTERNAL MEDICINE CLINIC | Facility: CLINIC | Age: 83
End: 2018-02-12

## 2018-02-12 DIAGNOSIS — I48.20 CHRONIC ATRIAL FIBRILLATION (HCC): ICD-10-CM

## 2018-02-12 LAB — INR: 2.4 (ref 0.8–1.2)

## 2018-02-12 NOTE — TELEPHONE ENCOUNTER
PT 28.6    INR 2.4    -Forgot to take Saturdays dose     Call back to BODØ at [facility] [phone]

## 2018-02-19 ENCOUNTER — TELEPHONE (OUTPATIENT)
Dept: INTERNAL MEDICINE CLINIC | Facility: CLINIC | Age: 83
End: 2018-02-19

## 2018-02-22 RX ORDER — CASTOR OIL AND BALSAM, PERU 788; 87 MG/G; MG/G
OINTMENT TOPICAL
Qty: 60 G | Refills: 0 | Status: ON HOLD | OUTPATIENT
Start: 2018-02-22 | End: 2018-03-29

## 2018-02-26 ENCOUNTER — TELEPHONE (OUTPATIENT)
Dept: INTERNAL MEDICINE CLINIC | Facility: CLINIC | Age: 83
End: 2018-02-26

## 2018-02-26 DIAGNOSIS — I48.20 CHRONIC ATRIAL FIBRILLATION (HCC): ICD-10-CM

## 2018-02-26 LAB — INR: 2.7 (ref 0.8–1.2)

## 2018-02-28 ENCOUNTER — TELEPHONE (OUTPATIENT)
Dept: INTERNAL MEDICINE CLINIC | Facility: CLINIC | Age: 83
End: 2018-02-28

## 2018-02-28 NOTE — TELEPHONE ENCOUNTER
Lacie Scheuermann from [facility] called for an order to re-certify the pt. For another episode of care for nursing and physical therapy services. If a message is left on Lenore's phone, please be sure to include your name. Lacie Scheuermann can be reached at [phone].

## 2018-03-01 NOTE — TELEPHONE ENCOUNTER
Spoke to Vaibhav in [facility] and informed him of MD's recommendation.   He verbalized understanding

## 2018-03-02 NOTE — TELEPHONE ENCOUNTER
See note from Ritika Bello also requesting verbal order to re-certify for nursing care   Please call Ritika Bello at [phone]   Tasked to nursing

## 2018-03-02 NOTE — TELEPHONE ENCOUNTER
PÅLÄNG from [facility] and relayed DR. CLARITA cobian- left on VM

## 2018-03-05 ENCOUNTER — TELEPHONE (OUTPATIENT)
Dept: INTERNAL MEDICINE CLINIC | Facility: CLINIC | Age: 83
End: 2018-03-05

## 2018-03-05 DIAGNOSIS — I48.20 CHRONIC ATRIAL FIBRILLATION (HCC): ICD-10-CM

## 2018-03-05 LAB — INR: 3 (ref 0.8–1.2)

## 2018-03-05 NOTE — TELEPHONE ENCOUNTER
Right anticubical skin tear (no flap) from a dog scratch. Wound was cleaned, oil emoltion dressing, Telfa & rapped with gauze. Change every 3 days. She needs a verbal order to recheck on Wednesday.

## 2018-03-07 NOTE — TELEPHONE ENCOUNTER
Daisy Gomez is calling back with an update, the right anti cubital skin tear from a dog scratch is more tender there is swelling around the wound and the redness is a little bit more.  The drainage is old bloody, but there is more drainage she will apply neosp

## 2018-03-19 ENCOUNTER — TELEPHONE (OUTPATIENT)
Dept: INTERNAL MEDICINE CLINIC | Facility: CLINIC | Age: 83
End: 2018-03-19

## 2018-03-19 DIAGNOSIS — I48.20 CHRONIC ATRIAL FIBRILLATION (HCC): ICD-10-CM

## 2018-03-19 LAB — INR: 1.3 (ref 0.8–1.2)

## 2018-03-19 NOTE — TELEPHONE ENCOUNTER
See [facility] note    [3/19/2018 3:34 PM] Stefanie Adamson MD:   the message sounded vague; would not do stool study unless diarrhea is persistent    LM for Verle Javad to give more detail about stool or duration

## 2018-03-19 NOTE — TELEPHONE ENCOUNTER
INR      1.3    PT         15.5      Patient was ACTUALLY takinmg 3 times a week (Saturday was one of the 3 times, but couldn't remember other 2 days)        4mg the rest of the 5 days per patient even thought home health nurse questions that.

## 2018-03-26 ENCOUNTER — TELEPHONE (OUTPATIENT)
Dept: INTERNAL MEDICINE CLINIC | Facility: CLINIC | Age: 83
End: 2018-03-26

## 2018-03-26 DIAGNOSIS — I48.20 CHRONIC ATRIAL FIBRILLATION (HCC): ICD-10-CM

## 2018-03-26 LAB — INR: 2.3 (ref 0.8–1.2)

## 2018-03-26 NOTE — TELEPHONE ENCOUNTER
Office Visit    Assessment AND Plan     anxiety, not able to tolerate Prozac. Will change it to Paxil 10 g daily.  Symptoms of weight loss and diarrhea, check TSH level  , Further evaluation will be needed if symptoms don't improve in a week or 2.  CHIEF COMPLAINT    MEDICATION ISSUE (fluoxetine, weight loss and diarrhea)        History of present illness    She is here today for follow-up.  Has anxiety, symptoms were controlled with Prozac however 2 weeks ago she tried to go off it completely because of diarrhea and weight loss, when she did that she had significant withdrawal symptoms and as when she started taking it every other day which has helped with the diarrhea only slightly however her anxiety have worsened.  Denying feeling depressed.  The diarrhea is not associate any blood in the stools, there is no nausea vomiting or abdominal pain.       I have reviewed the past medical, family and social history sections including the medications and allergies listed in the above medical record as well as the nursing notes.     Review of systems    No chest pain or dyspnea      Physical Exam    Vital Signs:  Blood pressure (!) 88/68, pulse 64, resp. rate 12, height 5' 1\" (1.549 m), weight 49.4 kg, last menstrual period 03/12/2018.  Constitutional:  No acute distress.  Integument:  Warm.  Dry.  No erythema.  No rash.    HENT:  Normocephalic.  Atraumatic.  Bilateral external ears normal.  Oropharynx moist.  No oral exudates.  Nose normal.   Neck:  Normal range of motion.  No tenderness.  Supple.  No stridor.    Eyes:  PERRL (Pupils equal, round, reactive to light), EOMI (extraocular movements intact).  Conjunctivae normal.  No discharge.    Cardiovascular:  Normal heart rate.  Normal rhythm.  No murmurs.  No rubs.  No gallops.    Respiratory:  Normal breath sounds.  No respiratory distress.  No wheezing.  No chest tenderness.    Gastrointestinal:  Bowel sounds normal.  Soft.  No tenderness.  No masses.  No pulsatile  corrected masses.    Neurologic:  Alert and oriented x3.  Normal gait.  No focal deficits noted.    Lymphatic:  No cervical or supraclavicular lymphadenopathy.  Extremities:  No edema.  Palpable pedal pulses bilaterally.    The patient indicates understanding of these issues and agrees with the plan.

## 2018-03-26 NOTE — TELEPHONE ENCOUNTER
NA - attempted several times; Will try again tomorrow  If [facility] calls back;   Therapeutic, 4 mg TuFr and 2 mg all others  and recheck 10 days

## 2018-03-27 NOTE — TELEPHONE ENCOUNTER
Spoke to Des - we documented wrong phone number [phone])  See [facility] note and gave instructions as below

## 2018-03-29 ENCOUNTER — APPOINTMENT (OUTPATIENT)
Dept: GENERAL RADIOLOGY | Facility: HOSPITAL | Age: 83
DRG: 872 | End: 2018-03-29
Attending: EMERGENCY MEDICINE
Payer: MEDICARE

## 2018-03-29 ENCOUNTER — TELEPHONE (OUTPATIENT)
Dept: INTERNAL MEDICINE CLINIC | Facility: CLINIC | Age: 83
End: 2018-03-29

## 2018-03-29 ENCOUNTER — APPOINTMENT (OUTPATIENT)
Dept: ULTRASOUND IMAGING | Facility: HOSPITAL | Age: 83
DRG: 872 | End: 2018-03-29
Attending: EMERGENCY MEDICINE
Payer: MEDICARE

## 2018-03-29 ENCOUNTER — HOSPITAL ENCOUNTER (INPATIENT)
Facility: HOSPITAL | Age: 83
LOS: 3 days | Discharge: HOME HEALTH | DRG: 872 | End: 2018-04-01
Attending: EMERGENCY MEDICINE | Admitting: INTERNAL MEDICINE
Payer: MEDICARE

## 2018-03-29 ENCOUNTER — APPOINTMENT (OUTPATIENT)
Dept: CT IMAGING | Facility: HOSPITAL | Age: 83
DRG: 872 | End: 2018-03-29
Attending: EMERGENCY MEDICINE
Payer: MEDICARE

## 2018-03-29 DIAGNOSIS — A41.9 SEPSIS, DUE TO UNSPECIFIED ORGANISM: Primary | ICD-10-CM

## 2018-03-29 PROBLEM — B99.9 BLOOD INFECTION: Status: ACTIVE | Noted: 2018-03-29

## 2018-03-29 PROCEDURE — 71046 X-RAY EXAM CHEST 2 VIEWS: CPT | Performed by: EMERGENCY MEDICINE

## 2018-03-29 PROCEDURE — 74177 CT ABD & PELVIS W/CONTRAST: CPT | Performed by: EMERGENCY MEDICINE

## 2018-03-29 PROCEDURE — 76705 ECHO EXAM OF ABDOMEN: CPT | Performed by: EMERGENCY MEDICINE

## 2018-03-29 RX ORDER — DILTIAZEM HYDROCHLORIDE 5 MG/ML
10 INJECTION INTRAVENOUS
Status: DISCONTINUED | OUTPATIENT
Start: 2018-03-29 | End: 2018-04-01

## 2018-03-29 RX ORDER — WARFARIN SODIUM 2 MG/1
2 TABLET ORAL NIGHTLY
Status: DISCONTINUED | OUTPATIENT
Start: 2018-03-29 | End: 2018-04-01

## 2018-03-29 RX ORDER — DIGOXIN 125 MCG
125 TABLET ORAL
Status: DISCONTINUED | OUTPATIENT
Start: 2018-03-29 | End: 2018-03-30

## 2018-03-29 RX ORDER — DILTIAZEM HYDROCHLORIDE 5 MG/ML
INJECTION INTRAVENOUS
Status: DISPENSED
Start: 2018-03-29 | End: 2018-03-30

## 2018-03-29 RX ORDER — DILTIAZEM HYDROCHLORIDE 60 MG/1
60 TABLET, FILM COATED ORAL AS NEEDED
Status: DISCONTINUED | OUTPATIENT
Start: 2018-03-29 | End: 2018-03-30

## 2018-03-29 RX ORDER — SODIUM CHLORIDE 9 MG/ML
INJECTION, SOLUTION INTRAVENOUS CONTINUOUS
Status: DISCONTINUED | OUTPATIENT
Start: 2018-03-29 | End: 2018-03-30

## 2018-03-29 RX ORDER — SODIUM CHLORIDE 9 MG/ML
125 INJECTION, SOLUTION INTRAVENOUS CONTINUOUS
Status: DISCONTINUED | OUTPATIENT
Start: 2018-03-29 | End: 2018-03-29

## 2018-03-29 NOTE — ED INITIAL ASSESSMENT (HPI)
Sent by pcp for weakness, fast heart rate, dizziness, vomiting x1, near syncope, upper abd pain. Denies fever. Denies cp.

## 2018-03-29 NOTE — TELEPHONE ENCOUNTER
Noted. Please call Leeanna Smith, home health nurse, and give her the okay to have an extra visit to see the patient today as requested. I will route this to nursing.   Thank you!!

## 2018-03-29 NOTE — TELEPHONE ENCOUNTER
Spoke with [facility] RN) and relayed Dr. Noah Luna message. She verbalized an understanding and will call patient's caregiver to go to ER right away.

## 2018-03-29 NOTE — ED NOTES
MD aware of vitals. Pt denies pain. Drinking contrast at this time.  Pt is verbal, oriented and in no distress at this time

## 2018-03-29 NOTE — TELEPHONE ENCOUNTER
Spoke with [facility] ACUTE [facility] RN) and she states that patient has had one episode of emesis today. No appetite, barely drinking fluids. He is very weak. Chest congestion- crackles noted on assessment. Stools are typically loose and last BM was yesterday.  Milo

## 2018-03-29 NOTE — HISTORICAL OFFICE NOTE
Jennifer Kika  : 10/22/1931  ACCOUNT:  [id number]  [phone]  PCP: Dr. Marino Sharp     TODAY'S DATE: 2016  DICTATED BY:  Stefanie Enrique M.D. ]    CHIEF COMPLAINT: [Followup of .  CAD, of native vessels, Followup of Atrial fibrillation and currently surgery 2002, broken arm 2014 and bilateral cataract surgery    PAST CV HISTORY: atrial fibrillation, CAD, cardioversion 2007 and PTCA 20 Years ago    FAMILY HISTORY: Negative for premature CAD.   SOCIAL HISTORY: SMOKING: Former tobacco use. smokes cigars a with the APN in the next week to reevaluate off these medications. Check an EKG. 2. Coronary disease, history of percutaneous angioplasty 20 years ago. He has no chest pain. ASSESSMENT:  1. . CAD, of native vessels  2.  Atrial fibrillation, currently S

## 2018-03-29 NOTE — ED NOTES
Pt verbalizes he will provide a urine sample for us, does not want to be catheterized. Report to [facility]. CT complete and pt is ready to head up to ICU.  Stable at this time, no complaints

## 2018-03-29 NOTE — TELEPHONE ENCOUNTER
Please have patient report to the emergency room. He should have somebody take him there. I am concerned about his pulse rate of 136. This is too high even if he has atrial fibrillation. Also blood pressure on the low side.   Also with the vomiting ther

## 2018-03-29 NOTE — TELEPHONE ENCOUNTER
Attempt made to call [facility]. Phone line kept ringing, unable to leave a message. Nursing to f/u.

## 2018-03-29 NOTE — TELEPHONE ENCOUNTER
Eugenia Parker from [employer]. Is calling to get an order for an extra visit today she got a call from the nurse that pt.  Hasn't been feeling good and has been vomiting and shaking  Please advise  Ph. # [phone]    Routed high to clinical

## 2018-03-29 NOTE — TELEPHONE ENCOUNTER
Heather Carrasquillo RN states caregiver reported below sx that started today, so they will like to add an additional visit for today. To DR. REES: can you assist in DR. OTTO absence?

## 2018-03-29 NOTE — CONSULTS
[facility]  [facility] Cardiology Consult Note    Elin Ny Patient Status:  Emergency    10/22/1931 MRN [id number]   Location [address] Attending Nesha Lucas MD   Hosp Day # 0 PCP Hattie Black MD     73 pulmonary disease) ([facility] [address]    • Coronary atherosclerosis    • CVA (cerebral vascular accident) [facility])    • Depression    • DNR (do not resuscitate)    • Dysphagia    • Esophageal reflux    • Fracture, radius 2014   • Hip fracture, left [facility]) Jan 2017    s/p I Exam:     General: Alert and oriented x 3. No apparent distress. No respiratory or constitutional distress. HEENT: Normocephalic, anicteric sclera, neck supple. Neck: No JVD, carotids 2+, no bruits. Cardiac: Regular rate and rhythm.  S1, S2 normal. No mu

## 2018-03-30 ENCOUNTER — TELEPHONE (OUTPATIENT)
Dept: INTERNAL MEDICINE CLINIC | Facility: CLINIC | Age: 83
End: 2018-03-30

## 2018-03-30 PROCEDURE — 99223 1ST HOSP IP/OBS HIGH 75: CPT | Performed by: INTERNAL MEDICINE

## 2018-03-30 RX ORDER — MAGNESIUM OXIDE 400 MG (241.3 MG MAGNESIUM) TABLET
400 TABLET ONCE
Status: COMPLETED | OUTPATIENT
Start: 2018-03-30 | End: 2018-03-30

## 2018-03-30 RX ORDER — SODIUM CHLORIDE 9 MG/ML
INJECTION, SOLUTION INTRAVENOUS CONTINUOUS
Status: DISCONTINUED | OUTPATIENT
Start: 2018-03-30 | End: 2018-04-01

## 2018-03-30 RX ORDER — POTASSIUM CHLORIDE 20 MEQ/1
40 TABLET, EXTENDED RELEASE ORAL EVERY 4 HOURS
Status: COMPLETED | OUTPATIENT
Start: 2018-03-30 | End: 2018-03-30

## 2018-03-30 NOTE — TELEPHONE ENCOUNTER
Sherryle Park PT from [facility] called, requesting approval for wheelchair for pt  Verbal order  is ok  Tasked to nursing

## 2018-03-30 NOTE — SLP NOTE
ADULT SWALLOWING EVALUATION    ASSESSMENT    ASSESSMENT/OVERALL IMPRESSION:    Pt well-known to this dept with several VFSS completed during previous admission with nectar-thick liquids recommended.  Pt reported drinking nectar for breakfast and thin liquid thick      Compensatory Strategies Recommended: No straws  Aspiration Precautions: Upright position; Slow rate;Small bites and sips; No straw  Medication Administration Recommendations: Whole in puree  Treatment Plan/Recommendations: Videofluoroscopic swallo thin with MD)  Dysphagia History: Several VFSS in past admission. VFSS 6/06/16 recommended solid/nectar-thick liquids. Imaging Results:     CXR 3/29/18    CONCLUSION:   1. Suboptimal inspiration.  Persistent left base consolidation which was noted on pre

## 2018-03-30 NOTE — PROGRESS NOTES
[address] [facility] dosing service    Initial Pharmacokinetic Consult for Vancomycin Dosing     Francheska Dorman is a 80year old male admitted on 3/29 who is being treated for pneumonia. Pharmacy has been asked to dose Vancomycin by Dr. Iona Aase.     He is allergic to Pharmacy will need BUN/Scr daily while on Vancomycin to assess renal function. 4.  Pharmacy will follow and monitor renal function changes, toxicity and efficacy. Pharmacy will continue to follow him.   We appreciate the opportunity to assist in his c

## 2018-03-30 NOTE — TELEPHONE ENCOUNTER
Spoke with India Gamez at [facility]. Informed him Dr. Ajay Baez is out of office until Monday. He verbalized understanding and states okay to wait. They will be sending over order to be signed for standard WC.  Patient previously had a small WC, but has gained some

## 2018-03-30 NOTE — CM/SW NOTE
SUSANNE met with the pt for initial assessment. Pt confirmed address as listed on the facesheet. Pt lives alone in a 2 story house. Pt states his wife  last year, \"She wouldn't quit smoking\". Pt states he is doing ok with the changes.  Pt has hired lizzy

## 2018-03-30 NOTE — H&P
[facility]    History and Physical    Navin Ayoub Patient Status:  Inpatient    10/22/1931 MRN [id number]   Location [facility] 2W/SW Attending Kelsea Aguilar, [address] [facility] Day # 1 PCP Huy Saxena MD     Date:  3/30/2018  Date • Depression    • DNR (do not resuscitate)    • Dysphagia    • Esophageal reflux    • Fracture, radius 2014   • Hip fracture, left [facility]) Jan 2017    s/p IM fixation 1/11/17; Ortho Dr Alcantara   • IBS (irritable bowel syndrome)    • Lipid screening 10/04/201 needed. CLOBETASOL PROPIONATE 0.05 % External Solution APPLY TO THE AFFECTED AREA ON SCALP TWICE DAILY   Cefadroxil 500 MG Oral Cap Take 1 capsule (500 mg total) by mouth 2 (two) times daily.    DIGOX 125 MCG Oral Tab TAKE 1 TABLET BY MOUTH EVERY DAY   GA Thyromegaly present. Cardiovascular: An irregularly irregular rhythm present. Edema not present. Carotid bruit not present. Pulmonary/Chest: No respiratory distress. Wet cough, cough noted with drinking thin liquids. L sided rhonchi.     Abdominal: S resection with ileocolonic anastomosis. No obstruction or inflammation at the surgical site. 6.  Prior left femur ORIF. 7.  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 8.  Coronary calcifications.    Dictated by (CST): Maki PA pressure 54 (previous PA pressure 44 inJune 2016); thought to be multifactorial -- COPD      Pulmonary nodule, RLL   12 mm in size by CT chest in Jan 2017; new since CT 5/2015.  CT chest in June 2017 showed chronic scarring and consolidation involving t

## 2018-03-30 NOTE — PLAN OF CARE
Problem: Patient/Family Goals  Goal: Patient/Family Long Term Goal  Patient's Long Term Goal: stay home    Interventions:  - follow md plan of care  Follow up with md appts  Stay on nectar thick liquid   - See additional Care Plan goals for specific interv hygiene including cough, deep breathe, Incentive Spirometry  - Assess the need for suctioning and perform as needed  - Assess and instruct to report SOB or any respiratory difficulty  - Respiratory Therapy support as indicated  - Manage/alleviate anxiety Encourage toileting schedule   Outcome: Progressing  transferred out of ccu, continue iv antibiotics, on nectar thick liquids

## 2018-03-30 NOTE — PROGRESS NOTES
[facility]    Cardiology Progress Note  [facility]    Rubi Owens Patient Status:  Inpatient    10/22/1931 MRN [id number]   Location [facility] 2W/SW Attending Michi Navarro, [address] [facility] Day # 1 PCP Alba Araujo Date   WBC 9.3 03/30/2018   HGB 12.5 (L) 03/30/2018   HCT 37.0 (L) 03/30/2018    03/30/2018   CREATSERUM 0.89 03/30/2018   BUN 6 (L) 03/30/2018    03/30/2018   K 3.4 03/30/2018    03/30/2018   CO2 25 03/30/2018   GLU 89 03/30/2018   CA 7 multiform ectopic ventricular beats -Inferior infarct -probably not recent -Poor R-wave progression -nonspecific -consider old anterior infarct.  -Nonspecific ST depression -Nondiagnostic.  ABNORMAL When compared with ECG of 03/31/2017 12:22:35 Atrial fibri

## 2018-03-30 NOTE — PLAN OF CARE
CARDIOVASCULAR - ADULT    • Maintains optimal cardiac output and hemodynamic stability  afib rate less then yesterday on dig and cardizem po Progressing    • Absence of cardiac arrhythmias or at baseline  afib Progressing        METABOLIC/FLUID AND ELECTRO

## 2018-03-30 NOTE — CM/SW NOTE
Care Management:    BPCI:  Met with patient at bedside to explain the BPCI/Medicare program. Patient  Does not want a phone f/u for 3 months from [facility] after discharge from [facility]. However, pt was enrolled under DRG  872 .  BPCI/Medicare Letter and B

## 2018-03-31 PROCEDURE — 99232 SBSQ HOSP IP/OBS MODERATE 35: CPT | Performed by: INTERNAL MEDICINE

## 2018-03-31 RX ORDER — 0.9 % SODIUM CHLORIDE 0.9 %
VIAL (ML) INJECTION
Status: COMPLETED
Start: 2018-03-31 | End: 2018-03-31

## 2018-03-31 RX ORDER — PREDNISONE 1 MG/1
7.5 TABLET ORAL DAILY
Status: DISCONTINUED | OUTPATIENT
Start: 2018-03-31 | End: 2018-04-01

## 2018-03-31 NOTE — PROGRESS NOTES
[facility] [place] [facility] [facility]    Cardiology Progress Note  [facility]    Navin Ayoub Patient Status:  Inpatient    10/22/1931 MRN [id number]   Location [facility] 3W/SW Attending Kelsea Aguilar, [address] [facility] Day # 2 PCP Katerin Magallon TSH 1.24 02/12/2014   LIP <10 (L) 03/29/2018   MG 1.7 (L) 03/31/2018   TROP 0.03 03/29/2018       Xr Chest Pa + Lat Chest (cpt=71046)    Result Date: 3/29/2018  CONCLUSION:  1. Suboptimal inspiration.  Persistent left base consolidation which was noted on

## 2018-03-31 NOTE — OCCUPATIONAL THERAPY NOTE
OCCUPATIONAL THERAPY EVALUATION - INPATIENT     Room Number: 324/324-A  Evaluation Date: 3/31/2018  Type of Evaluation: Initial  Presenting Problem: General malaise with nausea    Physician Order: IP Consult to Occupational Therapy  Reason for Therapy: ADL 6/16 BiPAP   • COPD (chronic obstructive pulmonary disease) (HCC)    • Coronary atherosclerosis    • CVA (cerebral vascular accident) ([facility] [address]    • Depression    • DNR (do not resuscitate)    • Dysphagia    • Esophageal reflux    • Fracture, radius 2014   • H THERAPY EXAMINATION      OBJECTIVE     Fall Risk: High fall risk    PAIN ASSESSMENT  Ratin          ACTIVITY TOLERANCE  Room air    COGNITION  Overall Cognitive Status:  WFL - within functional limits    VISION  Current Vision: no visual deficits    PE safety with ADLs and functional transfer  Patient End of Session: Up in chair;Call light within reach;RN aware of session/findings    OT Goals  Patients self stated goal is: to return home     Patient will complete functional transfer with modified indepen

## 2018-03-31 NOTE — PHYSICAL THERAPY NOTE
PHYSICAL THERAPY EVALUATION - INPATIENT     Room Number: 324/324-A  Evaluation Date: 3/31/2018  Type of Evaluation: Initial   Physician Order: PT Eval and Treat    Presenting Problem: Aspiration PNA  Reason for Therapy: Mobility Dysfunction and Discharge elevated and he was advised to come to the ER for further evaluation. In ED, he was noted to be in afib with RVR and hypotensive. He was given IVF boluses with improvement in BP.     Problem List  Principal Problem:    Sepsis, due to unspecified organi SITUATION  Type of Home: House   Home Layout: Multi-level  Stairs to Enter : 1  Railing: No  Stairs to Bedroom: 0       Lives With: Caregiver part-time  Drives: No  Patient Owned Equipment: Rolling walker       Prior Level of [place]: Stated that he i walker  Pattern: Shuffle  Stoop/Curb Assistance: Not tested       Bed Mobility: Mod I    Transfers:  Mod I/SBA    Exercise/Education Provided:  Bed mobility  Functional activity tolerated  Gait training  Strengthening  Transfer training    Patient End of Se

## 2018-03-31 NOTE — PROGRESS NOTES
[facility]    Progress Note    Elin Ny Patient Status:  Inpatient    10/22/1931 MRN [id number]   Location [facility] 3W/SW Attending Sirisha Pineda,    Hosp Day # 2 PCP Hattie Black MD       SUBJECTIVE:  States he is Date: 3/29/2018  CONCLUSION:  1. Suboptimal inspiration. Persistent left base consolidation which was noted on previous multiple CT scans. Correlate clinically and with possible followup CT scan and/or bronchoscopy.  Endobronchial lesion with resultant atel shoulders neg for DJD; DDx includes frozen shoulder vs PMR;  s/p home physical therapy for adhesive capsulitis with [facility]  Decrease prednisone to 7.5mg daily (consider tapering)     Pulmonary HTN  seen on ECHO with PA pressure 54 (previous

## 2018-04-01 VITALS
BODY MASS INDEX: 21 KG/M2 | WEIGHT: 172.88 LBS | TEMPERATURE: 99 F | OXYGEN SATURATION: 95 % | SYSTOLIC BLOOD PRESSURE: 122 MMHG | HEART RATE: 87 BPM | RESPIRATION RATE: 18 BRPM | DIASTOLIC BLOOD PRESSURE: 64 MMHG

## 2018-04-01 PROCEDURE — 99239 HOSP IP/OBS DSCHRG MGMT >30: CPT | Performed by: INTERNAL MEDICINE

## 2018-04-01 RX ORDER — PREDNISONE 1 MG/1
7.5 TABLET ORAL DAILY
Qty: 45 TABLET | Refills: 0 | Status: SHIPPED | OUTPATIENT
Start: 2018-04-01 | End: 2018-05-11

## 2018-04-01 RX ORDER — DILTIAZEM HYDROCHLORIDE 180 MG/1
180 CAPSULE, COATED, EXTENDED RELEASE ORAL DAILY
Status: DISCONTINUED | OUTPATIENT
Start: 2018-04-01 | End: 2018-04-01

## 2018-04-01 RX ORDER — DILTIAZEM HYDROCHLORIDE 180 MG/1
180 CAPSULE, COATED, EXTENDED RELEASE ORAL DAILY
Qty: 90 CAPSULE | Refills: 3 | Status: SHIPPED | OUTPATIENT
Start: 2018-04-01 | End: 2019-03-26

## 2018-04-01 RX ORDER — AMOXICILLIN AND CLAVULANATE POTASSIUM 875; 125 MG/1; MG/1
1 TABLET, FILM COATED ORAL 2 TIMES DAILY
Qty: 12 TABLET | Refills: 0 | Status: SHIPPED | OUTPATIENT
Start: 2018-04-01 | End: 2018-04-11

## 2018-04-01 NOTE — OCCUPATIONAL THERAPY NOTE
Attempted OT treatment this morning. Patient declining stating he is discharging today at 8. He has caregiver assist at home. No concerns for return home.

## 2018-04-01 NOTE — PROGRESS NOTES
[facility]    Cardiology Progress Note  [facility]    Francheska Dorman Patient Status:  Inpatient    10/22/1931 MRN [id number]   Location [facility] 3W/SW Attending Charley Brittle, [address] [facility] Day # 3 PCP Sabrina Bernard 03/31/2018   CA 8.1 (L) 03/31/2018   ALB 2.4 (L) 03/30/2018   ALKPHO 39 03/30/2018   BILT 1.2 03/30/2018   TP 5.0 (L) 03/30/2018   AST 13 (L) 03/30/2018   ALT 9 (L) 03/30/2018   INR 2.5 (H) 03/31/2018   PT 41.8 (H) 06/01/2016   TSH 1.24 02/12/2014   LIP <1

## 2018-04-01 NOTE — CM/SW NOTE
Tai 78 orders obtained and provided to [facility]/Sonam [id number] to alert of dc for today. ALEX Mott [id number]

## 2018-04-02 ENCOUNTER — PATIENT OUTREACH (OUTPATIENT)
Dept: CASE MANAGEMENT | Age: 83
End: 2018-04-02

## 2018-04-02 ENCOUNTER — TELEPHONE (OUTPATIENT)
Dept: CARDIOLOGY UNIT | Facility: HOSPITAL | Age: 83
End: 2018-04-02

## 2018-04-02 NOTE — TRANSITION NOTE
hpi  80year old male, consulted for afib with RVR  · pAF on coumadin and digoxin  · Echo Jan 2017 EF 70%, mild MR, mod pHTN  · CAD s/p POBA 20 years ago     cardiologsit- luis       Presents with abdominal pain, nausea and vomiting episode yesterday 3     docusate sodium 100 MG Caps  Commonly known as:  COLACE      Take 100 mg by mouth 2 (two) times daily.    Quantity:  60 capsule  Refills:  1     Econazole Nitrate 1 % Crea  Commonly known as:  SPECTAZOLE      ATAA BID   Quantity:  85 g  Refills:  1

## 2018-04-02 NOTE — DISCHARGE SUMMARY
[facility]    PATIENT'S NAME: Desire Shaw   ATTENDING PHYSICIAN: Elton Conde DO   PATIENT ACCOUNT#:   [de-identified]    LOCATION:  [address] [place] [address] #:   [id number]       YOB: 1931  ADMISSION DATE:       03/29/2018 given IV vancomycin and Zosyn. 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 CT abdomen and pelvis was done in the emergency room which was unrevealing for any infectious etiology. UA was within normal limits.   His chest x-ray showed a persistent left basilar con

## 2018-04-02 NOTE — PROGRESS NOTES
Initial Post Discharge Follow Up   Discharge Date: 4/1/18  Contact Date: 4/2/2018    Consent Verification:  Assessment Completed With: Patient  HIPAA Verified?   Yes    Discharge Dx:   Sepsis, due to unspecified organism       Medications:     Current Out to make sure we are not missing anything? yes  • Are there any reasons that keep you from taking your medication as prescribed? No      General:   • How have you been since your discharge from the hospital? I feel great, slept great. All symptoms gone.   • D

## 2018-04-03 ENCOUNTER — TELEPHONE (OUTPATIENT)
Dept: INTERNAL MEDICINE CLINIC | Facility: CLINIC | Age: 83
End: 2018-04-03

## 2018-04-03 DIAGNOSIS — I48.20 CHRONIC ATRIAL FIBRILLATION (HCC): ICD-10-CM

## 2018-04-03 NOTE — TELEPHONE ENCOUNTER
INR 2.8, PT 33.5, Coumadin 4 mg Mon Wed & Friday, 2 mg all other days   Pt was just released from the hospital and is on prednisone & antibiotic   Tasked to coumadin high

## 2018-04-04 NOTE — PROGRESS NOTES
[facility] Patient Status:  Outpatient    10/22/1931 MRN [id number]   Location MD Cassandra Contreras MD       Puneet Sauceda is a 80year old male who presents to clinic f without cp or mills.      Review of Systems:  Constitutional: negative for chills or fever  Respiratory:  negative for  hemoptysis and wheezing  Cardiovascular: negative for chest pain, exertional chest pressure/discomfort, near-syncope, orthopnea and palpita extremities normal,kaya LE darkened red skin, no ulcers or blisters , + 1 LLE edema mid tibial down.  Trace RLE edema,   Pulses: 2+ and symmetric  Neurologic: Grossly normal    Cardiographics:  EC.5.18 atrial fibrillation 111 bpm, nonspecific ST depressi stable, K 3.5, kdur 20 meq po given, mag 1.8No JVD, few coarse crackles left base, irreg/irreg RR- -111 at rest. Chronic LE edema + 1 RLE edema mid tibial and trace RLE edema, at baseline.  Persistent/chronic  afib,  rate fairly well controlled on dil with the specialty care clinic as needed or as directed    Follow-up with Dr. Trevor Waldron on April 13 as scheduled. Current Outpatient Prescriptions:   •  Multiple Vitamins-Minerals (ICAPS AREDS FORMULA) Oral Tab, Take 1 tablet by mouth daily. , Disp:

## 2018-04-05 ENCOUNTER — TELEPHONE (OUTPATIENT)
Dept: INTERNAL MEDICINE CLINIC | Facility: CLINIC | Age: 83
End: 2018-04-05

## 2018-04-05 ENCOUNTER — OFFICE VISIT (OUTPATIENT)
Dept: CARDIOLOGY CLINIC | Facility: HOSPITAL | Age: 83
End: 2018-04-05
Attending: INTERNAL MEDICINE
Payer: MEDICARE

## 2018-04-05 VITALS
SYSTOLIC BLOOD PRESSURE: 111 MMHG | OXYGEN SATURATION: 100 % | BODY MASS INDEX: 21 KG/M2 | HEART RATE: 104 BPM | DIASTOLIC BLOOD PRESSURE: 55 MMHG | WEIGHT: 173.63 LBS

## 2018-04-05 DIAGNOSIS — I50.9 HEART FAILURE, UNSPECIFIED (HCC): ICD-10-CM

## 2018-04-05 DIAGNOSIS — I48.19 ATRIAL FIBRILLATION, PERSISTENT (HCC): Primary | ICD-10-CM

## 2018-04-05 DIAGNOSIS — J18.9 PNEUMONIA OF LEFT LOWER LOBE DUE TO INFECTIOUS ORGANISM: ICD-10-CM

## 2018-04-05 DIAGNOSIS — J69.0 ASPIRATION PNEUMONIA, UNSPECIFIED ASPIRATION PNEUMONIA TYPE, UNSPECIFIED LATERALITY, UNSPECIFIED PART OF LUNG (HCC): ICD-10-CM

## 2018-04-05 DIAGNOSIS — I48.91 ATRIAL FIBRILLATION (HCC): ICD-10-CM

## 2018-04-05 DIAGNOSIS — R13.10 DYSPHAGIA, UNSPECIFIED TYPE: ICD-10-CM

## 2018-04-05 DIAGNOSIS — R60.0 BILATERAL LOWER EXTREMITY EDEMA: ICD-10-CM

## 2018-04-05 PROCEDURE — 99212 OFFICE O/P EST SF 10 MIN: CPT | Performed by: NURSE PRACTITIONER

## 2018-04-05 PROCEDURE — 36415 COLL VENOUS BLD VENIPUNCTURE: CPT | Performed by: NURSE PRACTITIONER

## 2018-04-05 PROCEDURE — 85025 COMPLETE CBC W/AUTO DIFF WBC: CPT | Performed by: NURSE PRACTITIONER

## 2018-04-05 PROCEDURE — 93005 ELECTROCARDIOGRAM TRACING: CPT

## 2018-04-05 PROCEDURE — 93010 ELECTROCARDIOGRAM REPORT: CPT | Performed by: NURSE PRACTITIONER

## 2018-04-05 PROCEDURE — 99214 OFFICE O/P EST MOD 30 MIN: CPT | Performed by: NURSE PRACTITIONER

## 2018-04-05 PROCEDURE — 80048 BASIC METABOLIC PNL TOTAL CA: CPT | Performed by: NURSE PRACTITIONER

## 2018-04-05 PROCEDURE — 83735 ASSAY OF MAGNESIUM: CPT | Performed by: NURSE PRACTITIONER

## 2018-04-05 RX ORDER — POTASSIUM CHLORIDE 20 MEQ/1
TABLET, EXTENDED RELEASE ORAL
Status: COMPLETED
Start: 2018-04-05 | End: 2018-04-05

## 2018-04-05 RX ORDER — VIT A/VIT C/VIT E/ZINC/COPPER 7160-113
1 TABLET, DELAYED RELEASE (ENTERIC COATED) ORAL DAILY
COMMUNITY

## 2018-04-05 RX ORDER — LOPERAMIDE HYDROCHLORIDE 2 MG/1
2 CAPSULE ORAL 4 TIMES DAILY PRN
COMMUNITY

## 2018-04-05 RX ORDER — HYDROCODONE BITARTRATE AND ACETAMINOPHEN 7.5; 325 MG/1; MG/1
1 TABLET ORAL EVERY 6 HOURS PRN
Qty: 90 TABLET | Refills: 0 | Status: SHIPPED | OUTPATIENT
Start: 2018-04-05 | End: 2018-05-17

## 2018-04-05 RX ADMIN — POTASSIUM CHLORIDE 20 MEQ: 20 TABLET, EXTENDED RELEASE ORAL at 09:55:00

## 2018-04-05 NOTE — PATIENT INSTRUCTIONS
Drink nectar thick liquids, do not drink regular watery liquids that could put you at risk for aspiration and pneumonia    Continue all your same medications    Call if having any dizziness, lightheadedness, heart racing, palpitations, chest pain, shortnes

## 2018-04-05 NOTE — TELEPHONE ENCOUNTER
Pt requesting refill for:  Norco  Pt would like to  Friday at 12:00 as he will be in the area with someone who is driving him  Pt is out of medication  Please call pt to confirm that RX is ready  Tasked to [employer]

## 2018-04-06 ENCOUNTER — TELEPHONE (OUTPATIENT)
Dept: INTERNAL MEDICINE CLINIC | Facility: CLINIC | Age: 83
End: 2018-04-06

## 2018-04-06 NOTE — TELEPHONE ENCOUNTER
INR will be checked on Monday instead of Tuesday, which is her day off. If this is a problem let her know.

## 2018-04-06 NOTE — TELEPHONE ENCOUNTER
Please notify [facility] [place] for INR change for Monday

## 2018-04-06 NOTE — TELEPHONE ENCOUNTER
She will put in an order for speech therapy to see the patient due to aspiration pneumonia.     Call her if you do not want her to do this>

## 2018-04-09 ENCOUNTER — TELEPHONE (OUTPATIENT)
Dept: INTERNAL MEDICINE CLINIC | Facility: CLINIC | Age: 83
End: 2018-04-09

## 2018-04-09 DIAGNOSIS — I48.20 CHRONIC ATRIAL FIBRILLATION (HCC): ICD-10-CM

## 2018-04-09 NOTE — TELEPHONE ENCOUNTER
Roseanna Lazo from [facility]  INR 2.5  PT 30.2    Dosage 2mg everyday except Fridays takes 4mg.   Roseanna Lazo [phone]  Routed high to [facility]

## 2018-04-11 NOTE — TELEPHONE ENCOUNTER
Martha Lynn from [facility] is calling to check status of paperwork for pt. s wheel chair that was faxed over on 03/31  Ph. # [phone]   Fax.  # [phone]   Routed to clinical   She would like a follow up call

## 2018-04-12 ENCOUNTER — TELEPHONE (OUTPATIENT)
Dept: INTERNAL MEDICINE CLINIC | Facility: CLINIC | Age: 83
End: 2018-04-12

## 2018-04-12 NOTE — TELEPHONE ENCOUNTER
[address] [facility] pt requested his evaluation be completed on Monday April 16th. Need conformation to move this.

## 2018-04-13 ENCOUNTER — OFFICE VISIT (OUTPATIENT)
Dept: INTERNAL MEDICINE CLINIC | Facility: CLINIC | Age: 83
End: 2018-04-13

## 2018-04-13 VITALS
HEART RATE: 72 BPM | HEIGHT: 75 IN | DIASTOLIC BLOOD PRESSURE: 56 MMHG | WEIGHT: 171 LBS | BODY MASS INDEX: 21.26 KG/M2 | RESPIRATION RATE: 12 BRPM | TEMPERATURE: 97 F | SYSTOLIC BLOOD PRESSURE: 104 MMHG

## 2018-04-13 DIAGNOSIS — E53.8 VITAMIN B12 DEFICIENCY: ICD-10-CM

## 2018-04-13 DIAGNOSIS — R91.1 LUNG NODULE: ICD-10-CM

## 2018-04-13 DIAGNOSIS — M25.511 ACUTE PAIN OF BOTH SHOULDERS: ICD-10-CM

## 2018-04-13 DIAGNOSIS — I25.10 CORONARY ARTERY DISEASE INVOLVING NATIVE CORONARY ARTERY OF NATIVE HEART WITHOUT ANGINA PECTORIS: ICD-10-CM

## 2018-04-13 DIAGNOSIS — I27.20 PULMONARY HTN (HCC): ICD-10-CM

## 2018-04-13 DIAGNOSIS — A41.9 SEPSIS DUE TO PNEUMONIA (HCC): Primary | ICD-10-CM

## 2018-04-13 DIAGNOSIS — I48.91 ATRIAL FIBRILLATION, UNSPECIFIED TYPE (HCC): ICD-10-CM

## 2018-04-13 DIAGNOSIS — J18.9 SEPSIS DUE TO PNEUMONIA (HCC): Primary | ICD-10-CM

## 2018-04-13 DIAGNOSIS — J69.0 ASPIRATION PNEUMONIA OF BOTH LOWER LOBES, UNSPECIFIED ASPIRATION PNEUMONIA TYPE (HCC): ICD-10-CM

## 2018-04-13 DIAGNOSIS — M25.512 ACUTE PAIN OF BOTH SHOULDERS: ICD-10-CM

## 2018-04-13 DIAGNOSIS — Z11.1 TUBERCULOSIS SCREENING: ICD-10-CM

## 2018-04-13 PROCEDURE — 99495 TRANSJ CARE MGMT MOD F2F 14D: CPT | Performed by: INTERNAL MEDICINE

## 2018-04-13 PROCEDURE — 1111F DSCHRG MED/CURRENT MED MERGE: CPT | Performed by: INTERNAL MEDICINE

## 2018-04-13 NOTE — PROGRESS NOTES
HPI:    Wiley Olivares is a 80year old male here today for hospital follow up.    He was discharged from Inpatient hospital, [facility]  to Home   Admission Date: 3/29/18   Discharge Date: 4/1/18  Hospital Discharge Diagnoses (since 3/14/2018)  seps hypotensive. He was given IV fluid boluses with improvement in his blood pressure. His heart rates were variable, and he was started on a Cardizem drip which was eventually changed to oral Cardizem.   His lactic acid was initially 2.8, which resolved with Take 1 tablet by mouth every 6 (six) hours as needed. DilTIAZem HCl ER Coated Beads 180 MG Oral Capsule SR 24 Hr Take 1 capsule (180 mg total) by mouth daily. predniSONE 5 MG Oral Tab Take 1.5 tablets (7.5 mg total) by mouth daily.    CLOBETASOL PROPION Qualitative (12/10); lexiscan nuc stress tst, cardio (dmg) (9/11); cardioversion (2007); other surgical history (1970); hla b 27 disease association (2006); cataract (2006);  Intraocular lens implant, secondary (2006); other surgical history (2011); cholecy normocephalic, ears and throat are clear  EYES: PERRLA, EOMI, conjunctiva are clear  NECK: supple, no adenopathy, no bruits  CHEST: no chest tenderness  LUNGS: clear to auscultation  CARDIO: RRR without murmur  GI: good BS's, no masses, HSM or tenderness consolidation involving the bilateral posteromedial lung bases, with superimposed aspiration pneumonia favored in the remainder of the left lower lobe given secretions present in the trachea; decreased size of perifissural nodule from previous study, possi within 14 days from date of discharge.      Sharla Torres MD, 4/13/2018

## 2018-04-16 ENCOUNTER — TELEPHONE (OUTPATIENT)
Dept: INTERNAL MEDICINE CLINIC | Facility: CLINIC | Age: 83
End: 2018-04-16

## 2018-04-16 DIAGNOSIS — I48.20 CHRONIC ATRIAL FIBRILLATION (HCC): ICD-10-CM

## 2018-04-16 NOTE — TELEPHONE ENCOUNTER
Sharmin Sanchez from [employer] Is calling with results:  PT 20.1    INR 1.7  Pt. Has not skipped any dose.   Ph. # [phone] ok to leave v-mail and she can write the order  Routed high to [facility]

## 2018-04-17 ENCOUNTER — TELEPHONE (OUTPATIENT)
Dept: INTERNAL MEDICINE CLINIC | Facility: CLINIC | Age: 83
End: 2018-04-17

## 2018-04-17 NOTE — TELEPHONE ENCOUNTER
Pt. Going to [facility] on Friday for a cat Scan. Daughter asking if Dr. Madeleine Brunner. Will order him a TB test to be done at the hospital.  The pt. Will be moving into [facility] and requires a TB test.  Daughter Shari  can be reached at [phone].    milagros

## 2018-04-19 ENCOUNTER — PRIOR ORIGINAL RECORDS (OUTPATIENT)
Dept: OTHER | Age: 83
End: 2018-04-19

## 2018-04-19 ENCOUNTER — MYAURORA ACCOUNT LINK (OUTPATIENT)
Dept: OTHER | Age: 83
End: 2018-04-19

## 2018-04-19 RX ORDER — CASTOR OIL AND BALSAM, PERU 788; 87 MG/G; MG/G
OINTMENT TOPICAL
Qty: 60 G | Refills: 11 | Status: SHIPPED | OUTPATIENT
Start: 2018-04-19 | End: 2018-06-29 | Stop reason: ALTCHOICE

## 2018-04-19 NOTE — TELEPHONE ENCOUNTER
TB test usually done in office, and then read 2-3 days afterwards; however, since having CT chest, he may not need TB test as CT chest should be adequate screening; please call dgtr

## 2018-04-19 NOTE — TELEPHONE ENCOUNTER
Spoke with patient's daughter, Nadine Brandt, to clarify. CXR is not ordered, chest CT order is in place. Myrna states, \"Yea, CT or whatever will work for the TB test.\" States the CT order must states \"read for TB. \" Daughter to call back with Amara fax number

## 2018-04-19 NOTE — TELEPHONE ENCOUNTER
Spoke with Dr. Darien Christiansen to proceed with original CT order and Dr. Saira Lozoya will notate on results that no active TB infection is present for [facility] paperwork. LMTCB for Myrna so she is up to date on plan.

## 2018-04-19 NOTE — TELEPHONE ENCOUNTER
Pt's daughter Gene Oviedo called - x-ray order needs to specifically say \"read for TB\", and they will accept it.      To Nursing

## 2018-04-19 NOTE — TELEPHONE ENCOUNTER
Daughter notified that pt's CT of the chest should be adequate for this. She will ask tomorrow at [facility].  Otherwise pt needs to come in for TB test.

## 2018-04-23 ENCOUNTER — HOSPITAL ENCOUNTER (OUTPATIENT)
Dept: CT IMAGING | Facility: HOSPITAL | Age: 83
Discharge: HOME | End: 2018-04-23
Attending: INTERNAL MEDICINE
Payer: MEDICARE

## 2018-04-23 ENCOUNTER — TELEPHONE (OUTPATIENT)
Dept: INTERNAL MEDICINE CLINIC | Facility: CLINIC | Age: 83
End: 2018-04-23

## 2018-04-23 DIAGNOSIS — J69.0 ASPIRATION PNEUMONIA OF BOTH LOWER LOBES, UNSPECIFIED ASPIRATION PNEUMONIA TYPE (HCC): ICD-10-CM

## 2018-04-23 DIAGNOSIS — R91.1 LUNG NODULE: ICD-10-CM

## 2018-04-23 DIAGNOSIS — I48.20 CHRONIC ATRIAL FIBRILLATION (HCC): ICD-10-CM

## 2018-04-23 PROCEDURE — 82565 ASSAY OF CREATININE: CPT

## 2018-04-23 PROCEDURE — 71260 CT THORAX DX C+: CPT | Performed by: INTERNAL MEDICINE

## 2018-04-23 NOTE — TELEPHONE ENCOUNTER
Tari Templeton from [facility] called - she received the form; pg.1 part 1 needs to be completed. She also needs a form from the radiologist that specifically says CT is \"read for TB\".     Tasked to Nursing

## 2018-04-23 NOTE — TELEPHONE ENCOUNTER
To Dr OTTO----page 1 part 1 still needs to be completed?  Also asking for form that specifically says that CT is \"read for TB\"

## 2018-04-23 NOTE — TELEPHONE ENCOUNTER
Patient had a catscan done today & wants the results to be faxed to [facility] at [phone].

## 2018-04-23 NOTE — TELEPHONE ENCOUNTER
Palomo Rausch from [facility] is calling to see if Dr. Sha Joyner completed pt.'s certification forms ph. # [phone]   Routed to clinical

## 2018-04-23 NOTE — TELEPHONE ENCOUNTER
Forms completed, copied and FAXed; please notify Orville Mejia at [facility]

## 2018-04-25 ENCOUNTER — TELEPHONE (OUTPATIENT)
Dept: INTERNAL MEDICINE CLINIC | Facility: CLINIC | Age: 83
End: 2018-04-25

## 2018-04-25 NOTE — TELEPHONE ENCOUNTER
Trini Roladn calling to verify meds with Dr. Ziggy Olson for this pt. He is a new admit. She can be reached at [phone].

## 2018-04-25 NOTE — TELEPHONE ENCOUNTER
Lance Lunsofrd from [facility] is calling to inform Dr. Bonnie Hartman that pt. Was just admitted from home and pt.'s medication needs to be verified and signed off on today she will fax [employer] now ph.  # [phone]   Routed to clinical

## 2018-05-03 ENCOUNTER — TELEPHONE (OUTPATIENT)
Dept: INTERNAL MEDICINE CLINIC | Facility: CLINIC | Age: 83
End: 2018-05-03

## 2018-05-03 NOTE — TELEPHONE ENCOUNTER
Vaibhav from [facility] called to extend pt's physical therapy   2 X a week for 1 week  Will also need re certification  2/25     [phone]

## 2018-05-03 NOTE — TELEPHONE ENCOUNTER
Norma Alegria from [facility] and relayed DR. TERRAZAS message - verbalized understanding

## 2018-05-07 ENCOUNTER — TELEPHONE (OUTPATIENT)
Dept: INTERNAL MEDICINE CLINIC | Facility: CLINIC | Age: 83
End: 2018-05-07

## 2018-05-07 DIAGNOSIS — I48.20 CHRONIC ATRIAL FIBRILLATION (HCC): ICD-10-CM

## 2018-05-07 RX ORDER — ACETAMINOPHEN 500 MG
500 TABLET ORAL EVERY 6 HOURS PRN
Qty: 30 TABLET | Refills: 0 | Status: SHIPPED
Start: 2018-05-07 | End: 2021-11-27

## 2018-05-07 NOTE — TELEPHONE ENCOUNTER
Relayed MD message to [facility], who verbalized understanding. Brittanie Gabriel request order to be faxed to [phone]. Order faxed.

## 2018-05-07 NOTE — TELEPHONE ENCOUNTER
Advise Tylenol 500 mg po q6hrs prn wrist pain for now; if pain persists, will need to evaluate in office; please call Tai Lambert

## 2018-05-07 NOTE — TELEPHONE ENCOUNTER
Sherryle Park from [facility] called to let Dr Roxana Coyle know   Pt has right wrist pain & swelling, no recent injury, is wearing a support brace

## 2018-05-07 NOTE — TELEPHONE ENCOUNTER
Cecy Carter is going to do recerification for home healthcare. Medications, Pain Management etc..  To keep patient out of hospital.  Dr. Ila Elaine only needs to call her back if he has questions or does not want recertification.   Cecy Carter home healthcare [phone]

## 2018-05-11 RX ORDER — PREDNISONE 1 MG/1
5 TABLET ORAL DAILY
Qty: 30 TABLET | Refills: 0 | Status: SHIPPED | OUTPATIENT
Start: 2018-05-11 | End: 2018-07-25

## 2018-05-11 NOTE — TELEPHONE ENCOUNTER
To Dr Yumiko Brandt to please advise on refill for prednisone    OV note 4/13/18: on prednisone to 7.5mg daily; will decerase to prednisone 5 mg po qD starting 4/16/18; may wean to off in 6 weeks if stable as he has gained a lot of weight with prednisone    Last eRX s

## 2018-05-14 ENCOUNTER — TELEPHONE (OUTPATIENT)
Dept: INTERNAL MEDICINE CLINIC | Facility: CLINIC | Age: 83
End: 2018-05-14

## 2018-05-14 NOTE — TELEPHONE ENCOUNTER
Cornell Grier from [employer]. Is calling to inform Dr. Jose E Johnson that pt. Has a new open sore on his second right toe. She will put some neosporin or sulfa and a band aid. It will be changed daily and cleaned with soap & water. Pt. Is having wrist pain and pt.  Is

## 2018-05-17 ENCOUNTER — OFFICE VISIT (OUTPATIENT)
Dept: INTERNAL MEDICINE CLINIC | Facility: CLINIC | Age: 83
End: 2018-05-17

## 2018-05-17 VITALS
DIASTOLIC BLOOD PRESSURE: 62 MMHG | BODY MASS INDEX: 20.76 KG/M2 | TEMPERATURE: 98 F | SYSTOLIC BLOOD PRESSURE: 118 MMHG | HEIGHT: 75 IN | HEART RATE: 68 BPM | OXYGEN SATURATION: 97 % | WEIGHT: 167 LBS

## 2018-05-17 DIAGNOSIS — M47.812 SPONDYLOSIS OF CERVICAL REGION WITHOUT MYELOPATHY OR RADICULOPATHY: ICD-10-CM

## 2018-05-17 DIAGNOSIS — I48.20 CHRONIC ATRIAL FIBRILLATION (HCC): ICD-10-CM

## 2018-05-17 DIAGNOSIS — I25.10 CORONARY ARTERY DISEASE INVOLVING NATIVE CORONARY ARTERY OF NATIVE HEART WITHOUT ANGINA PECTORIS: ICD-10-CM

## 2018-05-17 DIAGNOSIS — I27.20 PULMONARY HTN (HCC): ICD-10-CM

## 2018-05-17 DIAGNOSIS — M25.531 RIGHT WRIST PAIN: Primary | ICD-10-CM

## 2018-05-17 PROCEDURE — 99214 OFFICE O/P EST MOD 30 MIN: CPT | Performed by: INTERNAL MEDICINE

## 2018-05-17 PROCEDURE — G0463 HOSPITAL OUTPT CLINIC VISIT: HCPCS | Performed by: INTERNAL MEDICINE

## 2018-05-17 RX ORDER — PREDNISONE 10 MG/1
TABLET ORAL
Qty: 18 TABLET | Refills: 0 | Status: SHIPPED | OUTPATIENT
Start: 2018-05-17 | End: 2018-06-29

## 2018-05-17 RX ORDER — PREDNISONE 10 MG/1
TABLET ORAL
Qty: 18 TABLET | Refills: 0 | Status: SHIPPED | OUTPATIENT
Start: 2018-05-17 | End: 2018-05-17

## 2018-05-17 RX ORDER — HYDROCODONE BITARTRATE AND ACETAMINOPHEN 7.5; 325 MG/1; MG/1
1 TABLET ORAL EVERY 6 HOURS PRN
Qty: 90 TABLET | Refills: 0 | Status: SHIPPED | OUTPATIENT
Start: 2018-05-17 | End: 2018-07-20

## 2018-05-17 RX ORDER — PREDNISONE 1 MG/1
TABLET ORAL
Qty: 30 TABLET | Refills: 0 | OUTPATIENT
Start: 2018-05-17

## 2018-05-17 NOTE — PROGRESS NOTES
Marissa Hutchinson is a 80year old male. HPI:   Patient presents with:  Hand Pain: RT Hand Pain [Onset 1 week] Pt reports taking Norco q6 hours for pain.   Forms Completion      79 y/o M with 2 d h/o right hand pain; localized to right wrist; no trauma;  no Paternal Aunt    • Diabetes Paternal Aunt       Social History: Smoking status: Former Smoker                                                              Packs/day: 3.00      Years: 20.00        Types: Cigarettes     Quit date: 6/16/1993  Smokeless tobacc g Rfl: 0   clotrimazole (CLOTRIMAZOLE AF) 1 % External Cream ATAA BID Disp: 30 g Rfl: 0   silver sulfADIAZINE 1 % External Cream APPLY A 1/16 INCH(1.5MM) THICK LAYER TO ENTIRE BURN AREA TOPICALLY TWICE DAILY AS NEEDED Disp: 85 g Rfl: 3   predniSONE 10 MG O continue oral cardizem 180mg daily. Continue coumadin 4mg tues/fri, 2mg all other days.  INR therapeutic   Last echo with LVEF 70% and moderate pulmonary HTN with PA pressure 54 mm Hg     Left shoulder pain  XR both shoulders neg for DJD; DDx includes froze for colonoscopy though he has declined repeat colonoscopy  12.  Vitamin B12 deficiency- the patient had been receiving vitamin B12 1,000 mcg IM q.  Month  13.  Seborrheic dermatitis- on clobetasol scalp solution 0.05% ATAA BID, and selenium sulfide shampoo

## 2018-05-21 ENCOUNTER — TELEPHONE (OUTPATIENT)
Dept: INTERNAL MEDICINE CLINIC | Facility: CLINIC | Age: 83
End: 2018-05-21

## 2018-05-21 DIAGNOSIS — I48.20 CHRONIC ATRIAL FIBRILLATION (HCC): ICD-10-CM

## 2018-05-21 NOTE — TELEPHONE ENCOUNTER
Avelino Watkins from [facility] is calling to report results:   PT 30.8    INR 2.6  Ph. # [phone]   Routed high to [facility]

## 2018-05-23 ENCOUNTER — TELEPHONE (OUTPATIENT)
Dept: INTERNAL MEDICINE CLINIC | Facility: CLINIC | Age: 83
End: 2018-05-23

## 2018-05-23 NOTE — TELEPHONE ENCOUNTER
Spoke with Maribel Esteban from [facility] [place] and relayed Dr. Nicola Snyder message. Maribel Esteban verbalized understanding.

## 2018-05-30 ENCOUNTER — TELEPHONE (OUTPATIENT)
Dept: INTERNAL MEDICINE CLINIC | Facility: CLINIC | Age: 83
End: 2018-05-30

## 2018-05-30 RX ORDER — PREDNISONE 10 MG/1
TABLET ORAL
Qty: 30 TABLET | Refills: 2 | Status: SHIPPED | OUTPATIENT
Start: 2018-05-30 | End: 2018-06-29

## 2018-05-30 NOTE — TELEPHONE ENCOUNTER
D/w RN; possible that hypotension may be exacerbated by adrenal insufficieny, or developing aspiration pneumonia; pt was advised for ER evaluation--> pt refuses; advise prednisone 10 mg po qD x7d, then 5 mg po qD thereafter; RN called; ERx sent

## 2018-05-30 NOTE — TELEPHONE ENCOUNTER
[facility] calling pt has fever, chills, tired & doesn't feel well.     Tasked to nursing high

## 2018-05-30 NOTE — TELEPHONE ENCOUNTER
Please advise - called Sharmin Sanchez from [facility] , also spoke with patient - patien alberto chills all morning , temp 99, feels fatigued , gained 6 lbs in 8-9 days, lungs do not sound more crackly, non productive cough.  BP left arm 88/50 and R arm 90/52 - patient does

## 2018-06-05 ENCOUNTER — TELEPHONE (OUTPATIENT)
Dept: INTERNAL MEDICINE CLINIC | Facility: CLINIC | Age: 83
End: 2018-06-05

## 2018-06-05 NOTE — TELEPHONE ENCOUNTER
Physical therapist, Orestes Sanders, called to follow up on the OT evaluation approval. Call back number is [phone]. Tasked to nursing.

## 2018-06-05 NOTE — TELEPHONE ENCOUNTER
VO given to [place] @ [facility] on 05/23/2018. KEVIN for [facility] for OT evaluation, advised to call back with any questions.

## 2018-06-07 ENCOUNTER — TELEPHONE (OUTPATIENT)
Dept: INTERNAL MEDICINE CLINIC | Facility: CLINIC | Age: 83
End: 2018-06-07

## 2018-06-07 NOTE — TELEPHONE ENCOUNTER
Marti Bustillo from [facility] need in order faxed over to [facility] for Quoco fax# [phone] att: Wellness RN.

## 2018-06-07 NOTE — TELEPHONE ENCOUNTER
Spoke with Adán Garay from [facility] to inform him that Dr. Terra Castillo is ok with this. Adán Garay will send over the order via fax for MD to sign off on.

## 2018-06-14 ENCOUNTER — TELEPHONE (OUTPATIENT)
Dept: INTERNAL MEDICINE CLINIC | Facility: CLINIC | Age: 83
End: 2018-06-14

## 2018-06-14 DIAGNOSIS — I48.20 CHRONIC ATRIAL FIBRILLATION (HCC): ICD-10-CM

## 2018-06-14 NOTE — TELEPHONE ENCOUNTER
Cornell Grier from [facility] called to give pt's INR results:  PT - 43.2  INR - 3.6  Best number to reach Cornell Grier is [phone].

## 2018-06-21 NOTE — TELEPHONE ENCOUNTER
[facility] called again today, to give more INR results. INR 3.3  PT 39.6  Lenore doesn't think pt is taking what Alfonzo Aquino prescribed. Best call back is [phone].

## 2018-06-26 ENCOUNTER — TELEPHONE (OUTPATIENT)
Dept: INTERNAL MEDICINE CLINIC | Facility: CLINIC | Age: 83
End: 2018-06-26

## 2018-06-26 NOTE — TELEPHONE ENCOUNTER
[facility] faxed over a referral for the pt to have a commode. Please sign and fax back to [facility]. Placed in Dr Dannielle Jane.

## 2018-06-28 ENCOUNTER — TELEPHONE (OUTPATIENT)
Dept: INTERNAL MEDICINE CLINIC | Facility: CLINIC | Age: 83
End: 2018-06-28

## 2018-06-28 DIAGNOSIS — I48.20 CHRONIC ATRIAL FIBRILLATION (HCC): ICD-10-CM

## 2018-06-28 NOTE — TELEPHONE ENCOUNTER
Orestes [facility] / [facility] requesting extension for PT 2 times a week for 1 week and to recertify.    Tasked to nursing

## 2018-06-28 NOTE — TELEPHONE ENCOUNTER
Left detailed message regarding approval of extension for PT 2 times a week for 1 week and to recertify. Office phone number provided if any additional questions or requests. No further action required at this time.

## 2018-06-28 NOTE — TELEPHONE ENCOUNTER
Avelino [facility]  INR 4.2, PT 50.4, he is taking Coumadin as prescribed last week, 5 mg Prednisone daily, has not changed diet, diarrhea on and off.     Tasked to coumadin high

## 2018-06-29 ENCOUNTER — TELEPHONE (OUTPATIENT)
Dept: INTERNAL MEDICINE CLINIC | Facility: CLINIC | Age: 83
End: 2018-06-29

## 2018-06-29 ENCOUNTER — APPOINTMENT (OUTPATIENT)
Dept: CT IMAGING | Facility: HOSPITAL | Age: 83
DRG: 872 | End: 2018-06-29
Attending: EMERGENCY MEDICINE
Payer: MEDICARE

## 2018-06-29 ENCOUNTER — APPOINTMENT (OUTPATIENT)
Dept: GENERAL RADIOLOGY | Facility: HOSPITAL | Age: 83
DRG: 872 | End: 2018-06-29
Attending: EMERGENCY MEDICINE
Payer: MEDICARE

## 2018-06-29 ENCOUNTER — HOSPITAL ENCOUNTER (INPATIENT)
Facility: HOSPITAL | Age: 83
LOS: 6 days | Discharge: NURSING, REHAB OR LONG-TERM CARE | DRG: 872 | End: 2018-07-05
Attending: EMERGENCY MEDICINE | Admitting: INTERNAL MEDICINE
Payer: MEDICARE

## 2018-06-29 DIAGNOSIS — A41.9 SEVERE SEPSIS (HCC): Primary | ICD-10-CM

## 2018-06-29 DIAGNOSIS — J69.0 ASPIRATION PNEUMONIA OF RIGHT LUNG, UNSPECIFIED ASPIRATION PNEUMONIA TYPE, UNSPECIFIED PART OF LUNG (HCC): ICD-10-CM

## 2018-06-29 DIAGNOSIS — N30.00 ACUTE CYSTITIS WITHOUT HEMATURIA: ICD-10-CM

## 2018-06-29 DIAGNOSIS — N20.1 URETEROLITHIASIS: ICD-10-CM

## 2018-06-29 DIAGNOSIS — R65.20 SEVERE SEPSIS (HCC): Primary | ICD-10-CM

## 2018-06-29 DIAGNOSIS — I48.20 CHRONIC ATRIAL FIBRILLATION (HCC): ICD-10-CM

## 2018-06-29 LAB
ALBUMIN SERPL BCP-MCNC: 3.1 G/DL (ref 3.5–4.8)
ALP SERPL-CCNC: 68 U/L (ref 32–100)
ALT SERPL-CCNC: 8 U/L (ref 17–63)
ANION GAP SERPL CALC-SCNC: 11 MMOL/L (ref 0–18)
ANTIBODY SCREEN: NEGATIVE
AST SERPL-CCNC: 16 U/L (ref 15–41)
BASOPHILS # BLD: 0.1 K/UL (ref 0–0.2)
BASOPHILS NFR BLD: 0 %
BILIRUB DIRECT SERPL-MCNC: 0.3 MG/DL (ref 0–0.2)
BILIRUB SERPL-MCNC: 1 MG/DL (ref 0.3–1.2)
BILIRUB UR QL: NEGATIVE
BUN SERPL-MCNC: 11 MG/DL (ref 8–20)
BUN/CREAT SERPL: 8.6 (ref 10–20)
CALCIUM SERPL-MCNC: 8.7 MG/DL (ref 8.5–10.5)
CHLORIDE SERPL-SCNC: 103 MMOL/L (ref 95–110)
CO2 SERPL-SCNC: 24 MMOL/L (ref 22–32)
COLOR UR: YELLOW
CREAT SERPL-MCNC: 1.28 MG/DL (ref 0.5–1.5)
DIGOXIN SERPL-MCNC: 1 NG/ML (ref 0.8–2.1)
EOSINOPHIL # BLD: 0 K/UL (ref 0–0.7)
EOSINOPHIL NFR BLD: 0 %
ERYTHROCYTE [DISTWIDTH] IN BLOOD BY AUTOMATED COUNT: 15.8 % (ref 11–15)
GLUCOSE SERPL-MCNC: 97 MG/DL (ref 70–99)
GLUCOSE UR-MCNC: NEGATIVE MG/DL
HCT VFR BLD AUTO: 42.5 % (ref 41–52)
HGB BLD-MCNC: 14 G/DL (ref 13.5–17.5)
INR BLD: 2.7 (ref 0.9–1.2)
KETONES UR-MCNC: NEGATIVE MG/DL
LACTATE SERPL-SCNC: 1.7 MMOL/L (ref 0.5–2.2)
LIPASE SERPL-CCNC: 18 U/L (ref 22–51)
LYMPHOCYTES # BLD: 0.8 K/UL (ref 1–4)
LYMPHOCYTES NFR BLD: 5 %
MAGNESIUM SERPL-MCNC: 1.6 MG/DL (ref 1.8–2.5)
MCH RBC QN AUTO: 28.1 PG (ref 27–32)
MCHC RBC AUTO-ENTMCNC: 32.9 G/DL (ref 32–37)
MCV RBC AUTO: 85.3 FL (ref 80–100)
MONOCYTES # BLD: 1.9 K/UL (ref 0–1)
MONOCYTES NFR BLD: 12 %
NEUTROPHILS # BLD AUTO: 12.8 K/UL (ref 1.8–7.7)
NEUTROPHILS NFR BLD: 82 %
NITRITE UR QL STRIP.AUTO: NEGATIVE
OSMOLALITY UR CALC.SUM OF ELEC: 285 MOSM/KG (ref 275–295)
PH UR: 5 [PH] (ref 5–8)
PLATELET # BLD AUTO: 256 K/UL (ref 140–400)
PMV BLD AUTO: 10.1 FL (ref 7.4–10.3)
POTASSIUM SERPL-SCNC: 3.7 MMOL/L (ref 3.3–5.1)
PROT SERPL-MCNC: 6.9 G/DL (ref 5.9–8.4)
PROT UR-MCNC: 100 MG/DL
PROTHROMBIN TIME: 27.9 SECONDS (ref 11.8–14.5)
RBC # BLD AUTO: 4.98 M/UL (ref 4.5–5.9)
RBC #/AREA URNS AUTO: 301 /HPF
RH BLOOD TYPE: POSITIVE
SODIUM SERPL-SCNC: 138 MMOL/L (ref 136–144)
SP GR UR STRIP: 1.04 (ref 1–1.03)
UROBILINOGEN UR STRIP-ACNC: 2
VIT C UR-MCNC: NEGATIVE MG/DL
WBC # BLD AUTO: 15.6 K/UL (ref 4–11)
WBC #/AREA URNS AUTO: ABNORMAL /HPF

## 2018-06-29 PROCEDURE — 74177 CT ABD & PELVIS W/CONTRAST: CPT | Performed by: EMERGENCY MEDICINE

## 2018-06-29 PROCEDURE — 30233R1 TRANSFUSION OF NONAUTOLOGOUS PLATELETS INTO PERIPHERAL VEIN, PERCUTANEOUS APPROACH: ICD-10-PCS | Performed by: INTERNAL MEDICINE

## 2018-06-29 PROCEDURE — 99222 1ST HOSP IP/OBS MODERATE 55: CPT | Performed by: UROLOGY

## 2018-06-29 PROCEDURE — 71045 X-RAY EXAM CHEST 1 VIEW: CPT | Performed by: EMERGENCY MEDICINE

## 2018-06-29 PROCEDURE — 99222 1ST HOSP IP/OBS MODERATE 55: CPT | Performed by: INTERNAL MEDICINE

## 2018-06-29 RX ORDER — SODIUM CHLORIDE 9 MG/ML
INJECTION, SOLUTION INTRAVENOUS ONCE
Status: DISCONTINUED | OUTPATIENT
Start: 2018-06-29 | End: 2018-07-01

## 2018-06-29 RX ORDER — ARIPIPRAZOLE 15 MG/1
40 TABLET ORAL ONCE
Status: COMPLETED | OUTPATIENT
Start: 2018-06-29 | End: 2018-06-29

## 2018-06-29 RX ORDER — SODIUM CHLORIDE 9 MG/ML
INJECTION, SOLUTION INTRAVENOUS CONTINUOUS
Status: DISCONTINUED | OUTPATIENT
Start: 2018-06-29 | End: 2018-07-04

## 2018-06-29 RX ORDER — SODIUM CHLORIDE 0.9 % (FLUSH) 0.9 %
10 SYRINGE (ML) INJECTION AS NEEDED
Status: DISCONTINUED | OUTPATIENT
Start: 2018-06-29 | End: 2018-07-01

## 2018-06-29 RX ORDER — MORPHINE SULFATE 4 MG/ML
4 INJECTION, SOLUTION INTRAMUSCULAR; INTRAVENOUS ONCE
Status: COMPLETED | OUTPATIENT
Start: 2018-06-29 | End: 2018-06-29

## 2018-06-29 RX ORDER — MAGNESIUM SULFATE HEPTAHYDRATE 40 MG/ML
2 INJECTION, SOLUTION INTRAVENOUS ONCE
Status: COMPLETED | OUTPATIENT
Start: 2018-06-29 | End: 2018-06-29

## 2018-06-29 RX ORDER — HYDROCODONE BITARTRATE AND ACETAMINOPHEN 7.5; 325 MG/1; MG/1
1 TABLET ORAL EVERY 6 HOURS PRN
Status: DISCONTINUED | OUTPATIENT
Start: 2018-06-29 | End: 2018-07-01

## 2018-06-29 RX ORDER — HYDROCODONE BITARTRATE AND ACETAMINOPHEN 5; 325 MG/1; MG/1
1 TABLET ORAL EVERY 6 HOURS PRN
Status: DISCONTINUED | OUTPATIENT
Start: 2018-06-29 | End: 2018-06-29

## 2018-06-29 NOTE — CONSULTS
[facility]    Report of Consultation    rTev Johnson Patient Status:  Inpatient    10/22/1931 MRN [id number]   Location [facility] 2W/SW Attending Graeme Boxer, [address] [facility] Day # 0 PCP Kevin Cardona MD     Date of Admiss he feels well overall and appears to be alert and oriented ×3 and in good spirits.     Past Medical History  Past Medical History:   Diagnosis Date   • AF (atrial fibrillation) (HCC)    • Arrhythmia    • Cataract    • Chronic pulmonary aspiration     ARF 6/ Comment:coffee: 4 cups/daily, soda: 3 cup/daily    Social History Narrative    Work as print  in past    Lives with wife    Wife has dementia                Current Medications:    Current Facility-Administered Medications:  Piperacillin Sod-Tazoba cooperative  Chest wall: no tenderness  Abdominal: soft, non-tender; bowel sounds normal; no masses,  no organomegaly  Male genitalia: normal  Extremities: extremities normal, atraumatic, no cyanosis or edema  Skin: Skin color, texture, turgor normal. No r loss of height of lower thoracic vertebral bodies.      Dictated by (CST): Maxime Da Silva MD on 6/29/2018 at 11:52     Approved by (CST): Maxime Da Silva MD on 6/29/2018 at 12:04               Impression:     Severe sepsis [facility])  Continue on broad-spectrum a

## 2018-06-29 NOTE — ED INITIAL ASSESSMENT (HPI)
Pt from [facility], from EMS \"states lower back pain started at 2AM this morning, without relief from norco\" denies any recent falls

## 2018-06-29 NOTE — ED PROVIDER NOTES
Patient Seen in: [facility] AND [facility] Emergency Department    History   Patient presents with:  Back Pain (musculoskeletal)    Stated Complaint: Back pain    HPI    60-year-old male presents from assisted living for complaint of lower back pain.   Patient re unspecified site     hands   • Pneumonia    • Pneumonia due to organism 2016   • PUD (peptic ulcer disease)    • Pulmonary hypertension ([facility] [address] [facility]    • Thrombocytosis ([facility] 2013   • Villous adenoma    • Vitamin B12 deficiency    • White matter disease        Pa Resp (!) 27   Ht 193 cm (6' 4\")   Wt 71.7 kg   SpO2 97%   BMI 19.23 kg/m²         Physical Exam   Constitutional: He is oriented to person, place, and time. He appears well-developed and well-nourished. HENT:   Head: Normocephalic and atraumatic.    Neck 100  (*)     Blood Urine Moderate (*)     Urobilinogen Urine 2.0 (*)     Leukocyte Esterase Urine Moderate (*)     WBC Urine 15,502 (*)     RBC URINE 301 (*)     Bacteria Urine Few (*)     All other components within normal limits   PROTHROMBIN TIME (PT) - stent placement tomorrow after his INR is corrected. Patient will be admitted to the PCU for severe sepsis. Pulmonology has also been consulted.     Imaging:   Xr Chest Ap Portable  (cpt=71045)    Result Date: 6/29/2018  PROCEDURE: XR CHEST AP PORTABLE (C mm nonobstructing right lower pole renal calculus. Multiple simple right renal cysts with largest measuring 2.6 cm in the lower pole. ADRENALS: Normal. LIVER: Normal.  SPLEEN: Normal.  PANCREAS: Normal.  BILIARY: No evidence for biliary dilatation.  Postcho thoracic vertebral bodies.      Dictated by (CST): Rasheeda Champion MD on 6/29/2018 at 11:52     Approved by (CST): Rasheeda Champion MD on 6/29/2018 at 12:04              Rhythm Strip: Rate 88 sinus    Total Critical Care Time: 45 minutes including time spent e

## 2018-06-29 NOTE — SEPSIS REASSESSMENT
[facility]    Sepsis Reassessment Note    BP 95/52   Pulse 116   Temp 98.5 °F (36.9 °C) (Temporal)   Resp 16   Ht 193 cm (6' 4\")   Wt 71.7 kg   SpO2 96%   BMI 19.23 kg/m²      2:17 PM    Cardiac:  Regularity: Regular  Rate: Tachycardic

## 2018-06-29 NOTE — CONSULTS
[facility]    Report of Consultation    Agatha Duong Patient Status:  Emergency    10/22/1931 MRN [id number]   Location [address] Attending Zak Woodall [facility] Day # 0 PCP Arik Santamaria MD [facility]) 2013   • Villous adenoma    • Vitamin B12 deficiency    • White matter disease        Past Surgical History  Past Surgical History:  2007: CARDIOVERSION  2006: CATARACT  1990: CATH PERCUTANEOUS  TRANSLUMINAL CORONARY ANGIO*  No date: CHOLECYSTECTOMY depression, anxiety  Hematologic: denies bruising        Physical Exam:   Blood pressure 107/62, pulse (!) 123, temperature 98.5 °F (36.9 °C), temperature source Temporal, resp. rate 22, height 6' 4\" (1.93 m), weight 158 lb (71.7 kg), SpO2 96 %.     Consti suggesting chronic or recurrent aspiration pneumonia. 4. Generalized atherosclerotic vascular calcification including coronary artery calcification. 5. Colonic diverticulosis. Post right hemicolectomy. 6. Postcholecystectomy.  7. Prior granulomatous disease intervention. Hold Coumadin today. -Reviewed vitals, labs and imaging.     Senthil Schumacher, DO  Pulmonary [facility]  6/29/2018  2:49 PM

## 2018-06-29 NOTE — ED NOTES
Per Dr Jose Carlos Camarena, only 2L normal saline for patient. Dr Jose Carlos Camarena aware of second liter infusing.

## 2018-06-29 NOTE — PROGRESS NOTES
06/29/18 1046   Clinical Encounter Type   Visited With Patient and family together   Routine Visit Introduction   Continue Visiting Yes   Crisis Visit ED   Caodaism Encounters   Spiritual Requests During Visit / Hospitalization Communion   Sacramental

## 2018-06-30 ENCOUNTER — ANESTHESIA (OUTPATIENT)
Dept: SURGERY | Facility: HOSPITAL | Age: 83
DRG: 872 | End: 2018-06-30
Payer: MEDICARE

## 2018-06-30 ENCOUNTER — APPOINTMENT (OUTPATIENT)
Dept: GENERAL RADIOLOGY | Facility: HOSPITAL | Age: 83
DRG: 872 | End: 2018-06-30
Attending: UROLOGY
Payer: MEDICARE

## 2018-06-30 ENCOUNTER — ANESTHESIA EVENT (OUTPATIENT)
Dept: SURGERY | Facility: HOSPITAL | Age: 83
DRG: 872 | End: 2018-06-30
Payer: MEDICARE

## 2018-06-30 LAB
ALBUMIN SERPL BCP-MCNC: 2.6 G/DL (ref 3.5–4.8)
ALBUMIN/GLOB SERPL: 0.8 {RATIO} (ref 1–2)
ALP SERPL-CCNC: 62 U/L (ref 32–100)
ALT SERPL-CCNC: 13 U/L (ref 17–63)
ANION GAP SERPL CALC-SCNC: 9 MMOL/L (ref 0–18)
AST SERPL-CCNC: 16 U/L (ref 15–41)
BASOPHILS # BLD: 0.1 K/UL (ref 0–0.2)
BASOPHILS NFR BLD: 0 %
BILIRUB SERPL-MCNC: 1.3 MG/DL (ref 0.3–1.2)
BUN SERPL-MCNC: 13 MG/DL (ref 8–20)
BUN/CREAT SERPL: 9.2 (ref 10–20)
CALCIUM SERPL-MCNC: 8.4 MG/DL (ref 8.5–10.5)
CHLORIDE SERPL-SCNC: 108 MMOL/L (ref 95–110)
CO2 SERPL-SCNC: 22 MMOL/L (ref 22–32)
CREAT SERPL-MCNC: 1.41 MG/DL (ref 0.5–1.5)
EOSINOPHIL # BLD: 0 K/UL (ref 0–0.7)
EOSINOPHIL NFR BLD: 0 %
ERYTHROCYTE [DISTWIDTH] IN BLOOD BY AUTOMATED COUNT: 16.3 % (ref 11–15)
GLOBULIN PLAS-MCNC: 3.4 G/DL (ref 2.5–3.7)
GLUCOSE SERPL-MCNC: 107 MG/DL (ref 70–99)
HCT VFR BLD AUTO: 40.7 % (ref 41–52)
HGB BLD-MCNC: 13.2 G/DL (ref 13.5–17.5)
INR BLD: 1.4 (ref 0.9–1.2)
INR BLD: 1.6 (ref 0.9–1.2)
LYMPHOCYTES # BLD: 1.1 K/UL (ref 1–4)
LYMPHOCYTES NFR BLD: 6 %
MCH RBC QN AUTO: 28 PG (ref 27–32)
MCHC RBC AUTO-ENTMCNC: 32.4 G/DL (ref 32–37)
MCV RBC AUTO: 86.5 FL (ref 80–100)
MONOCYTES # BLD: 1.6 K/UL (ref 0–1)
MONOCYTES NFR BLD: 8 %
MRSA DNA SPEC QL NAA+PROBE: NEGATIVE
NEUTROPHILS # BLD AUTO: 18 K/UL (ref 1.8–7.7)
NEUTROPHILS NFR BLD: 86 %
OSMOLALITY UR CALC.SUM OF ELEC: 289 MOSM/KG (ref 275–295)
PLATELET # BLD AUTO: 204 K/UL (ref 140–400)
PMV BLD AUTO: 9.8 FL (ref 7.4–10.3)
POTASSIUM SERPL-SCNC: 4.4 MMOL/L (ref 3.3–5.1)
POTASSIUM SERPL-SCNC: 4.4 MMOL/L (ref 3.3–5.1)
PROT SERPL-MCNC: 6 G/DL (ref 5.9–8.4)
PROTHROMBIN TIME: 17 SECONDS (ref 11.8–14.5)
PROTHROMBIN TIME: 18.4 SECONDS (ref 11.8–14.5)
RBC # BLD AUTO: 4.71 M/UL (ref 4.5–5.9)
SODIUM SERPL-SCNC: 139 MMOL/L (ref 136–144)
WBC # BLD AUTO: 20.9 K/UL (ref 4–11)

## 2018-06-30 PROCEDURE — 0T778DZ DILATION OF LEFT URETER WITH INTRALUMINAL DEVICE, VIA NATURAL OR ARTIFICIAL OPENING ENDOSCOPIC: ICD-10-PCS | Performed by: UROLOGY

## 2018-06-30 PROCEDURE — 99232 SBSQ HOSP IP/OBS MODERATE 35: CPT | Performed by: UROLOGY

## 2018-06-30 PROCEDURE — 99233 SBSQ HOSP IP/OBS HIGH 50: CPT | Performed by: INTERNAL MEDICINE

## 2018-06-30 PROCEDURE — 99223 1ST HOSP IP/OBS HIGH 75: CPT | Performed by: INTERNAL MEDICINE

## 2018-06-30 PROCEDURE — 74420 UROGRAPHY RTRGR +-KUB: CPT | Performed by: UROLOGY

## 2018-06-30 PROCEDURE — BT1F1ZZ FLUOROSCOPY OF LEFT KIDNEY, URETER AND BLADDER USING LOW OSMOLAR CONTRAST: ICD-10-PCS | Performed by: UROLOGY

## 2018-06-30 DEVICE — INLAY OPTIMA STENT 6F 30CM: Type: IMPLANTABLE DEVICE | Site: URETER | Status: FUNCTIONAL

## 2018-06-30 RX ORDER — HYDROMORPHONE HYDROCHLORIDE 1 MG/ML
0.4 INJECTION, SOLUTION INTRAMUSCULAR; INTRAVENOUS; SUBCUTANEOUS EVERY 5 MIN PRN
Status: DISCONTINUED | OUTPATIENT
Start: 2018-06-30 | End: 2018-07-01

## 2018-06-30 RX ORDER — SODIUM CHLORIDE 9 MG/ML
INJECTION, SOLUTION INTRAVENOUS
Status: COMPLETED
Start: 2018-06-30 | End: 2018-06-30

## 2018-06-30 RX ORDER — SODIUM CHLORIDE 0.9 % (FLUSH) 0.9 %
10 SYRINGE (ML) INJECTION AS NEEDED
Status: DISCONTINUED | OUTPATIENT
Start: 2018-06-30 | End: 2018-07-01

## 2018-06-30 RX ORDER — NALOXONE HYDROCHLORIDE 0.4 MG/ML
80 INJECTION, SOLUTION INTRAMUSCULAR; INTRAVENOUS; SUBCUTANEOUS AS NEEDED
Status: ACTIVE | OUTPATIENT
Start: 2018-06-30 | End: 2018-06-30

## 2018-06-30 RX ORDER — HALOPERIDOL 5 MG/ML
0.25 INJECTION INTRAMUSCULAR ONCE AS NEEDED
Status: ACTIVE | OUTPATIENT
Start: 2018-06-30 | End: 2018-06-30

## 2018-06-30 RX ORDER — LIDOCAINE HYDROCHLORIDE 20 MG/ML
JELLY TOPICAL AS NEEDED
Status: DISCONTINUED | OUTPATIENT
Start: 2018-06-30 | End: 2018-06-30 | Stop reason: HOSPADM

## 2018-06-30 RX ORDER — SODIUM CHLORIDE, SODIUM LACTATE, POTASSIUM CHLORIDE, CALCIUM CHLORIDE 600; 310; 30; 20 MG/100ML; MG/100ML; MG/100ML; MG/100ML
INJECTION, SOLUTION INTRAVENOUS CONTINUOUS
Status: DISCONTINUED | OUTPATIENT
Start: 2018-06-30 | End: 2018-07-01

## 2018-06-30 RX ORDER — ONDANSETRON 2 MG/ML
4 INJECTION INTRAMUSCULAR; INTRAVENOUS ONCE AS NEEDED
Status: ACTIVE | OUTPATIENT
Start: 2018-06-30 | End: 2018-06-30

## 2018-06-30 RX ORDER — HYDROCODONE BITARTRATE AND ACETAMINOPHEN 5; 325 MG/1; MG/1
1 TABLET ORAL AS NEEDED
Status: DISCONTINUED | OUTPATIENT
Start: 2018-06-30 | End: 2018-07-01

## 2018-06-30 RX ORDER — LIDOCAINE HYDROCHLORIDE 10 MG/ML
INJECTION, SOLUTION EPIDURAL; INFILTRATION; INTRACAUDAL; PERINEURAL AS NEEDED
Status: DISCONTINUED | OUTPATIENT
Start: 2018-06-30 | End: 2018-06-30 | Stop reason: SURG

## 2018-06-30 RX ORDER — HYDROCODONE BITARTRATE AND ACETAMINOPHEN 5; 325 MG/1; MG/1
2 TABLET ORAL AS NEEDED
Status: DISCONTINUED | OUTPATIENT
Start: 2018-06-30 | End: 2018-07-01

## 2018-06-30 RX ORDER — HYDROMORPHONE HYDROCHLORIDE 1 MG/ML
0.3 INJECTION, SOLUTION INTRAMUSCULAR; INTRAVENOUS; SUBCUTANEOUS EVERY 2 HOUR PRN
Status: DISCONTINUED | OUTPATIENT
Start: 2018-06-30 | End: 2018-07-01

## 2018-06-30 RX ORDER — DIPHENHYDRAMINE HYDROCHLORIDE 50 MG/ML
25 INJECTION INTRAMUSCULAR; INTRAVENOUS EVERY 4 HOURS PRN
Status: DISCONTINUED | OUTPATIENT
Start: 2018-06-30 | End: 2018-07-05

## 2018-06-30 RX ORDER — HYDROMORPHONE HYDROCHLORIDE 1 MG/ML
0.6 INJECTION, SOLUTION INTRAMUSCULAR; INTRAVENOUS; SUBCUTANEOUS EVERY 5 MIN PRN
Status: DISCONTINUED | OUTPATIENT
Start: 2018-06-30 | End: 2018-07-01

## 2018-06-30 RX ORDER — MAGNESIUM SULFATE HEPTAHYDRATE 40 MG/ML
2 INJECTION, SOLUTION INTRAVENOUS ONCE
Status: COMPLETED | OUTPATIENT
Start: 2018-06-30 | End: 2018-06-30

## 2018-06-30 RX ORDER — DIGOXIN 0.25 MG/ML
125 INJECTION INTRAMUSCULAR; INTRAVENOUS DAILY
Status: DISCONTINUED | OUTPATIENT
Start: 2018-06-30 | End: 2018-07-01

## 2018-06-30 RX ORDER — SODIUM CHLORIDE 9 MG/ML
INJECTION, SOLUTION INTRAVENOUS ONCE
Status: COMPLETED | OUTPATIENT
Start: 2018-06-30 | End: 2018-06-30

## 2018-06-30 RX ORDER — HYDROMORPHONE HYDROCHLORIDE 1 MG/ML
0.2 INJECTION, SOLUTION INTRAMUSCULAR; INTRAVENOUS; SUBCUTANEOUS EVERY 5 MIN PRN
Status: DISCONTINUED | OUTPATIENT
Start: 2018-06-30 | End: 2018-07-01

## 2018-06-30 RX ADMIN — SODIUM CHLORIDE: 9 INJECTION, SOLUTION INTRAVENOUS at 14:00:00

## 2018-06-30 RX ADMIN — LIDOCAINE HYDROCHLORIDE 50 MG: 10 INJECTION, SOLUTION EPIDURAL; INFILTRATION; INTRACAUDAL; PERINEURAL at 13:12:00

## 2018-06-30 RX ADMIN — SODIUM CHLORIDE: 9 INJECTION, SOLUTION INTRAVENOUS at 13:05:00

## 2018-06-30 NOTE — PROGRESS NOTES
[facility]    Progress Note    Puneet Sauceda Patient Status:  Inpatient    10/22/1931 MRN [id number]   Location [facility] 2W/SW Attending Bronwyn Jones, [address] Day # 1 PCP Katherin Rhodes MD       Subjective:   Christine Duvall 6/29/2018 at 11:08     Approved by (CST): Kathy Dodson MD on 6/29/2018 at 11:09          Ct Abdomen Pelvis Iv Contrast, No Oral (er)    Result Date: 6/29/2018  CONCLUSION:  1. A. 4 x 2 mm obstructing left ureterovesical junction calculus with urothelial

## 2018-06-30 NOTE — BRIEF OP NOTE
Pre-Operative Diagnosis: left ureteral calculi, urosepsis     Post-Operative Diagnosis: left ureteral calculi, urosepsis      Procedure Performed:   Procedure(s):  CYSTOSCOPY, LEFT RETROGRADE PYELOGRAM, INSERTION OF LEFT URETERAL STENT    Surgeon(s) and Jennifer

## 2018-06-30 NOTE — PROGRESS NOTES
Upon return from procedure, this RN noted a large hematoma to the right forearm where an IV was attempted and blown this morning. Placed manual pressure on the area for 2 minutes, hematoma decreased in size. MD aware. Will continue to monitor.

## 2018-06-30 NOTE — ANESTHESIA POSTPROCEDURE EVALUATION
Patient: Benito Pac    Procedure Summary     Date:  06/30/18 Room / Location:  [address] [place] [address] MAIN OR 14 / [address] [place] [address] MAIN OR    Anesthesia Start:  1540 Anesthesia Stop:      Procedure:  CYSTOSCOPY STENT INSERTION (Left Bladder) Diagnosis:       Ureterolithiasis      (l

## 2018-06-30 NOTE — PROGRESS NOTES
Pulmonary/Critical Care Follow Up Note    HPI:   Rubi wOens is a 80year old male with Patient presents with:  Back Pain (musculoskeletal)      PCP Sun Alcantara MD  Admission Attending [address] Day #1    No pain  denies sob (SUBLIMAZE) 0.05 MG/ML injection 25 mcg, 25 mcg, Intravenous, Q5 Min PRN  •  fentaNYL citrate (SUBLIMAZE) 0.05 MG/ML injection 50 mcg, 50 mcg, Intravenous, Q5 Min PRN  •  HYDROmorphone HCl (DILAUDID) 1 MG/ML injection 0.2 mg, 0.2 mg, Intravenous, Q5 Min SC 204 06/30/2018   CREATSERUM 1.41 06/30/2018   BUN 13 06/30/2018    06/30/2018   K 4.4 06/30/2018   K 4.4 06/30/2018    06/30/2018   CO2 22 06/30/2018    06/30/2018   CA 8.4 06/30/2018   ALB 2.6 06/30/2018   ALKPHO 62 06/30/2018   BILT 1.

## 2018-06-30 NOTE — H&P
[facility] Patient Status:  Inpatient    10/22/1931 MRN [id number]   [facility] 2W/SW Attending Marion Montenegro, [address] [facility] Day # 1 PCP Jessica Ambriz MD     Date:  2018 • IBS (irritable bowel syndrome)    • Lipid screening 10/04/2010   • Malabsorption    • Malnutrition (HCC)     severe    • Osteoarthrosis, unspecified whether generalized or localized, unspecified site     hands   • Pneumonia    • Pneumonia due to [facility] Take 1 tablet by mouth daily. PredniSONE (Tab) 5 MG Oral Take 0.5 tablet (2.5 mg total) by mouth daily.       Warfarin Sodium (Tab) 2 MG  TAKE 1 AND 1/2 TO 2 TABLETS BY MOUTH DAILY AS DIRECTED BY COUMADIN CLINIC       SOCIAL HISTORY   reports that he q erythema  Neck/Thyroid: neck supple; no thyromegaly  Lymphatics: no lymphadenopathy of neck or groin  Cardiovascular: Irr, Irr, S1, S2, no S3 or murmur  Respiratory: lungs without crackles or wheezes  Abdomen: normoactive bowel sounds, soft, non-tender and lobe suggesting chronic or recurrent aspiration pneumonia. 4. Generalized atherosclerotic vascular calcification including coronary artery calcification. 5. Colonic diverticulosis. Post right hemicolectomy. 6. Postcholecystectomy.  7. Prior granulomatous di L NC; on Zosyn as above; has been tolerating general diet with thin liquids; advise incentive spirometry    Left shoulder pain  XR both shoulders neg for DJD; DDx includes frozen shoulder vs PMR;  s/p home physical therapy for adhesive capsulitis with [facility]

## 2018-06-30 NOTE — ANESTHESIA PREPROCEDURE EVALUATION
Anesthesia PreOp Note    HPI:     Bertrand Grossman is a 80year old male who presents for preoperative consultation requested by:  Debra oG MD    Date of Surgery: 6/29/2018 - 6/30/2018    Procedure(s):  CYSTOSCOPY STENT INSERTION  Indication: Carmen Villela 03/02/2016      Trigger finger, right middle finger         Date Noted: 02/03/2016      Partial hamstring tear         Date Noted: 08/05/2015      Bilateral lower extremity edema         Date Noted: 08/05/2015      Knee pain, chronic         Date Noted: 07 FRACTURE SURGERY      Comment: Left femur 2016  01/11/2017: HIP SURGERY      Comment: INTERMEDULLARY FIXATION OF LEFT HIP FRACTURE  2006: HLA B 27 DISEASE [employer]  2006: INTRAOCULAR LENS IMPLANT, SECONDARY  9/11: LEXISCAN NUC STRESS TST, CARDIO (DMG) AS DIRECTED BY COUMADIN CLINIC Disp: 180 tablet Rfl: 1 Past Week at Unknown time       Current Facility-Administered Medications Ordered in Epic:  [MAR Hold] DiphenhydrAMINE HCl (BENADRYL) injection 25 mg 25 mg Intravenous Q4H PRN Vernon Altamirano M, DO 25 Occupational History  None on file     Social History Main Topics   Smoking status: Former Smoker  3.00 Packs/day  For 20.00 Years     Types: Cigarettes    Quit date: 6/16/1993    Smokeless tobacco: Former User    Types: [address] [place] [address] date: 9/1/2015 FB  Neck ROM: full  Dental    (+) upper dentures and lower dentures    Pulmonary - normal exam   (+) COPD,     ROS comment: Chronic pulm aspiration  Cardiovascular   (+) CAD, dysrhythmias (afib), CHF,     ECG reviewed  Rhythm: irregular  ROS comment: Ekg:A

## 2018-07-01 LAB
ADENOVIRUS F 40/41 PCR: NEGATIVE
ANION GAP SERPL CALC-SCNC: 6 MMOL/L (ref 0–18)
ASTROVIRUS PCR: NEGATIVE
BASOPHILS # BLD: 0.1 K/UL (ref 0–0.2)
BASOPHILS NFR BLD: 1 %
BLOOD TYPE BARCODE: 5100
BLOOD TYPE BARCODE: 5100
BUN SERPL-MCNC: 16 MG/DL (ref 8–20)
BUN/CREAT SERPL: 11.2 (ref 10–20)
C CAYETANENSIS DNA SPEC QL NAA+PROBE: NEGATIVE
C. DIFFICILE TOXIN A/B PCR: NEGATIVE
CALCIUM SERPL-MCNC: 8.4 MG/DL (ref 8.5–10.5)
CAMPY SP DNA.DIARRHEA STL QL NAA+PROBE: NEGATIVE
CHLORIDE SERPL-SCNC: 110 MMOL/L (ref 95–110)
CO2 SERPL-SCNC: 24 MMOL/L (ref 22–32)
CREAT SERPL-MCNC: 1.43 MG/DL (ref 0.5–1.5)
CRYPTOSP DNA SPEC QL NAA+PROBE: NEGATIVE
EAEC PAA PLAS AGGR+AATA ST NAA+NON-PRB: NEGATIVE
EC STX1+STX2 + H7 FLIC SPEC NAA+PROBE: NEGATIVE
ENTAMOEBA HISTOLYTICA PCR: NEGATIVE
EOSINOPHIL # BLD: 0 K/UL (ref 0–0.7)
EOSINOPHIL NFR BLD: 0 %
EPEC EAE GENE STL QL NAA+NON-PROBE: NEGATIVE
ERYTHROCYTE [DISTWIDTH] IN BLOOD BY AUTOMATED COUNT: 16.4 % (ref 11–15)
ETEC LTA+ST1A+ST1B TOX ST NAA+NON-PROBE: NEGATIVE
GIARDIA LAMBLIA PCR: NEGATIVE
GLUCOSE SERPL-MCNC: 109 MG/DL (ref 70–99)
HCT VFR BLD AUTO: 37.5 % (ref 41–52)
HGB BLD-MCNC: 12 G/DL (ref 13.5–17.5)
INR BLD: 1.2 (ref 0.9–1.2)
LYMPHOCYTES # BLD: 1 K/UL (ref 1–4)
LYMPHOCYTES NFR BLD: 6 %
MAGNESIUM SERPL-MCNC: 2.3 MG/DL (ref 1.8–2.5)
MCH RBC QN AUTO: 27.9 PG (ref 27–32)
MCHC RBC AUTO-ENTMCNC: 31.9 G/DL (ref 32–37)
MCV RBC AUTO: 87.5 FL (ref 80–100)
MONOCYTES # BLD: 1.1 K/UL (ref 0–1)
MONOCYTES NFR BLD: 7 %
NEUTROPHILS # BLD AUTO: 14.6 K/UL (ref 1.8–7.7)
NEUTROPHILS NFR BLD: 86 %
NOROVIRUS GI/GII PCR: NEGATIVE
OSMOLALITY UR CALC.SUM OF ELEC: 292 MOSM/KG (ref 275–295)
P SHIGELLOIDES DNA STL QL NAA+PROBE: NEGATIVE
PLATELET # BLD AUTO: 176 K/UL (ref 140–400)
PMV BLD AUTO: 10.1 FL (ref 7.4–10.3)
POTASSIUM SERPL-SCNC: 3.9 MMOL/L (ref 3.3–5.1)
PROTHROMBIN TIME: 14.8 SECONDS (ref 11.8–14.5)
RBC # BLD AUTO: 4.29 M/UL (ref 4.5–5.9)
ROTAVIRUS A PCR: NEGATIVE
SALMONELLA DNA SPEC QL NAA+PROBE: NEGATIVE
SAPOVIRUS PCR: NEGATIVE
SHIGELLA SP+EIEC IPAH ST NAA+NON-PROBE: NEGATIVE
SODIUM SERPL-SCNC: 140 MMOL/L (ref 136–144)
V CHOLERAE DNA SPEC QL NAA+PROBE: NEGATIVE
VIBRIO DNA SPEC NAA+PROBE: NEGATIVE
WBC # BLD AUTO: 17 K/UL (ref 4–11)
YERSINIA DNA SPEC NAA+PROBE: NEGATIVE

## 2018-07-01 PROCEDURE — 52332 CYSTOSCOPY AND TREATMENT: CPT | Performed by: UROLOGY

## 2018-07-01 PROCEDURE — 99232 SBSQ HOSP IP/OBS MODERATE 35: CPT | Performed by: UROLOGY

## 2018-07-01 PROCEDURE — 99233 SBSQ HOSP IP/OBS HIGH 50: CPT | Performed by: INTERNAL MEDICINE

## 2018-07-01 RX ORDER — DIGOXIN 0.25 MG/ML
125 INJECTION INTRAMUSCULAR; INTRAVENOUS DAILY
Status: DISCONTINUED | OUTPATIENT
Start: 2018-07-01 | End: 2018-07-02

## 2018-07-01 RX ORDER — CASTOR OIL AND BALSAM, PERU 788; 87 MG/G; MG/G
OINTMENT TOPICAL 3 TIMES DAILY PRN
Status: DISCONTINUED | OUTPATIENT
Start: 2018-07-01 | End: 2018-07-05

## 2018-07-01 RX ORDER — WARFARIN SODIUM 5 MG/1
5 TABLET ORAL
Status: COMPLETED | OUTPATIENT
Start: 2018-07-01 | End: 2018-07-01

## 2018-07-01 RX ORDER — SODIUM CHLORIDE 9 MG/ML
INJECTION, SOLUTION INTRAVENOUS
Status: COMPLETED
Start: 2018-07-01 | End: 2018-07-01

## 2018-07-01 RX ORDER — HYDROCODONE BITARTRATE AND ACETAMINOPHEN 7.5; 325 MG/1; MG/1
1 TABLET ORAL EVERY 6 HOURS PRN
Status: DISCONTINUED | OUTPATIENT
Start: 2018-07-01 | End: 2018-07-05

## 2018-07-01 RX ORDER — ENOXAPARIN SODIUM 100 MG/ML
30 INJECTION SUBCUTANEOUS EVERY 12 HOURS SCHEDULED
Status: DISCONTINUED | OUTPATIENT
Start: 2018-07-01 | End: 2018-07-05

## 2018-07-01 NOTE — OPERATIVE REPORT
[facility]    Operative Note     Prateek Dowling Location: OR   [facility] [id number] MRN [id number]   Admission Date 6/29/2018 Operation Date 6/30/2018   Attending Physician Andres Monroe DO Operating Physician MD Harry Barron

## 2018-07-01 NOTE — SLP NOTE
ADULT SWALLOWING EVALUATION    ASSESSMENT    ASSESSMENT/OVERALL IMPRESSION:  SLP orders received and acknowledged.  A swallow evaluation was warranted as pt with hx of dysphagia and admitted to [address] [place] [address] with fever and fatigue with further assessment indicating a Plan/Recommendations: Dysphagia therapy  Discharge Recommendations/Plan: Undetermined    HISTORY   MEDICAL HISTORY  Reason for Referral: R/O aspiration; Altered diet consistency    Problem List  Principal Problem:    Severe sepsis ([address]  Active Problems: both lower lobes. CONCLUSION:   1. Atelectasis and/or pneumonia in both lung bases. 2. Suboptimal inspiration. 3. Atherosclerotic calcification aorta. OBJECTIVE   ORAL MOTOR EXAMINATION  Dentition: Upper dentures; Lower dentures  Symmetry: Within Fu

## 2018-07-01 NOTE — PROGRESS NOTES
[facility]    Progress Note    Candy Young Patient Status:  Inpatient    10/22/1931 MRN [id number]   Location [facility] 2W/SW Attending Alisson Gonsales, [address] [facility] Day # 2 PCP Dionne Fox MD       Subjective:   Alissa Goldstein Kathryn Galdamez MD on 6/30/2018 at 15:00     Approved by (CST):  Blanca Clayton MD on 6/30/2018 at 15:05                Juancarlos Saldivar MD  7/1/2018

## 2018-07-01 NOTE — PROGRESS NOTES
[facility]    Progress Note      Assessment and Plan:   1. Klebsiella urinary tract infection with sepsis\status post ureteric stenting–the patient is much improved clinically.     Recommendations: Ongoing Zosyn, Taper pressors, small fluid

## 2018-07-01 NOTE — PROGRESS NOTES
[facility]    Progress Note    Benito López Patient Status:  Inpatient    10/22/1931 MRN [id number]   Location [facility] 2W/SW Attending Brennan Grace, DO   Hosp Day # 2 PCP Liberty Mistry MD         Assessment and Pl daily; as noted above,  will give modest stress-dose steroid supplementation with hydrocortisone 50 mg IV q8hrs     Pulmonary HTN  seen on ECHO in Jan 2017 with PA pressure 54 (previous PA pressure 44 in June 2016); thought to be multifactorial -- COPD    kg)        Constitutional: alert and oriented x3 in no acute distress  HEENT- EOMI, PERRL  Nose/Mouth/Throat: pharynx without erythema  Neck/Thyroid: neck supple; no thyromegaly  Cardiovascular: RRR, S1, S2, no S3 or murmur  Respiratory: lungs without crac Lab  06/29/18   1156   BLDSMR  Positive Blood Culture  Gram Negative Rods         Imaging:  Xr Retrograde Pyelogram (cpt=74420)    Result Date: 6/30/2018  CONCLUSION:   FLUOROSCOPY TIME:   0.6 min # OF FLUOROGRAPHIC IMAGES:   13   * Fluoroscopy and 13 C-

## 2018-07-02 ENCOUNTER — APPOINTMENT (OUTPATIENT)
Dept: GENERAL RADIOLOGY | Facility: HOSPITAL | Age: 83
DRG: 872 | End: 2018-07-02
Attending: INTERNAL MEDICINE
Payer: MEDICARE

## 2018-07-02 LAB
ANION GAP SERPL CALC-SCNC: 6 MMOL/L (ref 0–18)
BASOPHILS # BLD: 0 K/UL (ref 0–0.2)
BASOPHILS NFR BLD: 0 %
BUN SERPL-MCNC: 14 MG/DL (ref 8–20)
BUN/CREAT SERPL: 12.7 (ref 10–20)
CALCIUM SERPL-MCNC: 8.3 MG/DL (ref 8.5–10.5)
CHLORIDE SERPL-SCNC: 113 MMOL/L (ref 95–110)
CO2 SERPL-SCNC: 23 MMOL/L (ref 22–32)
CREAT SERPL-MCNC: 1.1 MG/DL (ref 0.5–1.5)
EOSINOPHIL # BLD: 0 K/UL (ref 0–0.7)
EOSINOPHIL NFR BLD: 0 %
ERYTHROCYTE [DISTWIDTH] IN BLOOD BY AUTOMATED COUNT: 16 % (ref 11–15)
GLUCOSE SERPL-MCNC: 118 MG/DL (ref 70–99)
HCT VFR BLD AUTO: 36.5 % (ref 41–52)
HGB BLD-MCNC: 11.8 G/DL (ref 13.5–17.5)
INR BLD: 1.1 (ref 0.9–1.2)
LYMPHOCYTES # BLD: 0.5 K/UL (ref 1–4)
LYMPHOCYTES NFR BLD: 4 %
MCH RBC QN AUTO: 27.9 PG (ref 27–32)
MCHC RBC AUTO-ENTMCNC: 32.4 G/DL (ref 32–37)
MCV RBC AUTO: 86.2 FL (ref 80–100)
MONOCYTES # BLD: 0.6 K/UL (ref 0–1)
MONOCYTES NFR BLD: 5 %
NEUTROPHILS # BLD AUTO: 9.9 K/UL (ref 1.8–7.7)
NEUTROPHILS NFR BLD: 90 %
OSMOLALITY UR CALC.SUM OF ELEC: 296 MOSM/KG (ref 275–295)
PLATELET # BLD AUTO: 187 K/UL (ref 140–400)
PMV BLD AUTO: 10.5 FL (ref 7.4–10.3)
POTASSIUM SERPL-SCNC: 3.7 MMOL/L (ref 3.3–5.1)
PROTHROMBIN TIME: 13.6 SECONDS (ref 11.8–14.5)
RBC # BLD AUTO: 4.23 M/UL (ref 4.5–5.9)
SODIUM SERPL-SCNC: 142 MMOL/L (ref 136–144)
WBC # BLD AUTO: 10.9 K/UL (ref 4–11)

## 2018-07-02 PROCEDURE — 99233 SBSQ HOSP IP/OBS HIGH 50: CPT | Performed by: INTERNAL MEDICINE

## 2018-07-02 PROCEDURE — 74230 X-RAY XM SWLNG FUNCJ C+: CPT | Performed by: INTERNAL MEDICINE

## 2018-07-02 PROCEDURE — 71045 X-RAY EXAM CHEST 1 VIEW: CPT | Performed by: INTERNAL MEDICINE

## 2018-07-02 PROCEDURE — F00ZJWZ INSTRUMENTAL SWALLOWING AND ORAL FUNCTION ASSESSMENT USING SWALLOWING EQUIPMENT: ICD-10-PCS | Performed by: RADIOLOGY

## 2018-07-02 RX ORDER — LOPERAMIDE HYDROCHLORIDE 2 MG/1
2 CAPSULE ORAL 4 TIMES DAILY PRN
Status: DISCONTINUED | OUTPATIENT
Start: 2018-07-02 | End: 2018-07-05

## 2018-07-02 RX ORDER — 0.9 % SODIUM CHLORIDE 0.9 %
VIAL (ML) INJECTION
Status: COMPLETED
Start: 2018-07-02 | End: 2018-07-02

## 2018-07-02 RX ORDER — POTASSIUM CHLORIDE 20 MEQ/1
40 TABLET, EXTENDED RELEASE ORAL ONCE
Status: COMPLETED | OUTPATIENT
Start: 2018-07-02 | End: 2018-07-02

## 2018-07-02 RX ORDER — DIGOXIN 125 MCG
125 TABLET ORAL DAILY
Status: DISCONTINUED | OUTPATIENT
Start: 2018-07-03 | End: 2018-07-05

## 2018-07-02 RX ORDER — SODIUM CHLORIDE 9 MG/ML
INJECTION, SOLUTION INTRAVENOUS
Status: COMPLETED
Start: 2018-07-02 | End: 2018-07-02

## 2018-07-02 RX ORDER — WARFARIN SODIUM 5 MG/1
5 TABLET ORAL
Status: COMPLETED | OUTPATIENT
Start: 2018-07-02 | End: 2018-07-02

## 2018-07-02 RX ORDER — 0.9 % SODIUM CHLORIDE 0.9 %
VIAL (ML) INJECTION
Status: DISPENSED
Start: 2018-07-02 | End: 2018-07-03

## 2018-07-02 RX ORDER — DILTIAZEM HYDROCHLORIDE 180 MG/1
180 CAPSULE, COATED, EXTENDED RELEASE ORAL DAILY
Status: DISCONTINUED | OUTPATIENT
Start: 2018-07-02 | End: 2018-07-03

## 2018-07-02 NOTE — PLAN OF CARE
Problem: Patient/Family Goals  Goal: Patient/Family Long Term Goal  Patient's Long Term Goal: to return to [facility] independent living    Interventions:  - PT/OT working with patient.   - See additional Care Plan goals for specific interventions   Outcome: P Provide assistive devices as appropriate  - Consider OT/PT consult to assist with strengthening/mobility  - Encourage toileting schedule   Outcome: Progressing      Problem: DISCHARGE PLANNING  Goal: Discharge to home or other facility with appropriate res

## 2018-07-02 NOTE — PHYSICAL THERAPY NOTE
PHYSICAL THERAPY EVALUATION - INPATIENT     Room Number: 347/347-A  Evaluation Date: 7/2/2018  Type of Evaluation: Initial   Physician Order: PT Eval and Treat    Presenting Problem: severe sepsis  Reason for Therapy: Mobility Dysfunction and Discharge Pl [facility])  Active Problems:    Vitamin B12 deficiency    CAD (coronary artery disease)    Protein calorie malnutrition (HCC)    Coagulopathy (HCC)    Atrial fibrillation, persistent (HCC)    Acute cystitis without hematuria    Aspiration pneumonia of right marilee Layout: One level  Stairs to Enter : 0  Railing: No  Stairs to Bedroom: 0  Railing: No    Lives With: Staff 24 hours  Drives: No  Patient Owned Equipment: Rolling walker  Patient Regularly Uses: Glasses; Reading glasses    Prior Level of [place]: Ryanne Limits  Stoop/Curb Assistance: Not tested  Comment : Patient with flexed posture and shoes donned    Bed Mobility: CGA    Transfers: CGA    Exercise/Education Provided:  Bed mobility  Body mechanics  Gait training  Transfer training    Patient End of Sessi

## 2018-07-02 NOTE — PROGRESS NOTES
[facility]    Progress Note      Assessment and Plan:   1. Klebsiella urinary tract infection with sepsis\status post ureteric stenting–the patient is much improved clinically. Now off pressors. Feeling well.     Recommendations: Ongoing [phone]

## 2018-07-02 NOTE — DIETARY NOTE
ADULT NUTRITION INITIAL ASSESSMENT    Pt is at moderate nutrition risk. Pt meets non-severe malnutrition criteria.       CRITERIA FOR MALNUTRITION DIAGNOSIS:  Criteria for non-severe malnutrition diagnosis: chronic illness related to body fat mild depletio Mighty shake. Rational/use of oral supplements discussed. - Vitamin and mineral supplements: none    - Feeding assistance: independent    - Nutrition education: Discussed low Na+ diet and increased khari/prot intake using ONS or nutrient dense snacks. Wt Readings from Last 10 Encounters:  07/02/18 : 77.4 kg (170 lb 9.6 oz)  05/17/18 : 75.8 kg (167 lb)  04/13/18 : 77.6 kg (171 lb)  04/05/18 : 78.7 kg (173 lb 9.6 oz)  04/01/18 : 78.4 kg (172 lb 14.4 oz)  10/12/17 : 68.4 kg (150 lb 12.8 oz)  09/14/17 : exam.    - Fluid Accumulation: none per visual exam    - Skin Integrity: intact. NO documented wounds. [id number]  - Kyle score 17    NUTRITION PRESCRIPTION:  Diet: Cardiac, Chopped and Nectar Thick Liquids  Oral Supplements: Magic Cup and Mighty shake.

## 2018-07-02 NOTE — SLP NOTE
ADULT VIDEOFLUOROSCOPIC SWALLOWING STUDY    Admission Date: 6/29/2018  Evaluation Date: 07/02/18  Radiologist: Umesh Niño    RECOMMENDATIONS   Diet Recommendations - Solids: Mechanical soft chopped  Diet Recommendations - Liquid: Nectar thick    Further Fol deficiency    • White matter disease        Current Diet Consistency: Mechanical soft chopped; Nectar thick liquids  Prior Level of Function: Assistance/Support for ADL's  Prior Living Situation: Assisted living  History of Recent: Pneumonia  Precautions: A Severity, Location: Mild;Valleculae  Cleared/Reduced with: Secondary swallow; Liquid assist  Laryngeal Penetration: None  Tracheal Aspiration: None  Strategy(ies) Implemented (Puree): Slow rate; Alternate consistencies;Small bites and sips;Multple swallows will demonstrate understanding and implementation of aspiration precautions and swallow strategies independently over 2 session(s).     In Progress   Goal #3 The patient will tolerate trial upgrade of solid consistency and thin liquids without overt signs o

## 2018-07-02 NOTE — BH PROGRESS NOTE
Behavioral Health Note  CHIEF COMPLAINT  Sepsis  REASON FOR ADMISSION  Sepsis    SOCIAL HISTORY  The patient is a retired male [facility] [address] [facility] [facility] The patient has a h/o smoking, but quit 60 years ago . The patient does not use drugs.  He drinks beer

## 2018-07-02 NOTE — PROGRESS NOTES
[facility]    Progress Note    Negar Peterson Patient Status:  Inpatient    10/22/1931 MRN [id number]   Location [facility] 2W/SW Attending Anjel Lazcano,    Hosp Day # 3 PCP Estuardo Mcqueen MD         Assessment and Pl  s/p home physical therapy for adhesive capsulitis with [facility]; on prednisone to 2.5 mg daily; as noted above,  will give modest stress-dose steroid supplementation with hydrocortisone 50 mg IV q12hrs     Pulmonary HTN  seen on ECHO in Jan 2 Last 3 Encounters:  07/02/18 : 170 lb 9.6 oz (77.4 kg)  05/17/18 : 167 lb (75.8 kg)  04/13/18 : 171 lb (77.6 kg)        Constitutional: alert and oriented x3 in no acute distress  HEENT- EOMI, PERRL  Nose/Mouth/Throat: pharynx without erythema  Neck/Thyroi RDW  16.0*   PLT  187       Recent Labs   Lab  06/29/18   1252   COLORUR  Yellow   CLARITY  Cloudy*   SPECGRAVITY  1.036*   GLUUR  Negative   BILUR  Negative   KETUR  Negative   BLOODURINE  Moderate*   PHURINE  5.0   PROUR  100 *   UROBILINOGEN  2.0*   N generalized mural thickening involving the urinary bladder suggesting cystitis. 3. Bibasilar atelectasis and scarring. Occluded basilar segmental bronchi in right lower lobe suggesting chronic or recurrent aspiration pneumonia.  4. Generalized atherosclerot

## 2018-07-02 NOTE — PLAN OF CARE
Problem: PAIN - ADULT  Goal: Verbalizes/displays adequate comfort level or patient's stated pain goal  INTERVENTIONS:  - Encourage pt to monitor pain and request assistance  - Assess pain using appropriate pain scale  - Administer analgesics based on type ADULT  Goal: Absence of cardiac arrhythmias or at baseline  INTERVENTIONS:  - Continuous cardiac monitoring, monitor vital signs, obtain 12 lead EKG if indicated  - Evaluate effectiveness of antiarrhythmic and heart rate control medications as ordered  - I

## 2018-07-03 ENCOUNTER — APPOINTMENT (OUTPATIENT)
Dept: GENERAL RADIOLOGY | Facility: HOSPITAL | Age: 83
DRG: 872 | End: 2018-07-03
Attending: CLINICAL NURSE SPECIALIST
Payer: MEDICARE

## 2018-07-03 ENCOUNTER — TELEPHONE (OUTPATIENT)
Dept: INTERNAL MEDICINE CLINIC | Facility: CLINIC | Age: 83
End: 2018-07-03

## 2018-07-03 LAB
ANION GAP SERPL CALC-SCNC: 5 MMOL/L (ref 0–18)
BASOPHILS # BLD: 0 K/UL (ref 0–0.2)
BASOPHILS NFR BLD: 0 %
BUN SERPL-MCNC: 14 MG/DL (ref 8–20)
BUN/CREAT SERPL: 15.6 (ref 10–20)
CALCIUM SERPL-MCNC: 8.4 MG/DL (ref 8.5–10.5)
CHLORIDE SERPL-SCNC: 114 MMOL/L (ref 95–110)
CO2 SERPL-SCNC: 22 MMOL/L (ref 22–32)
CREAT SERPL-MCNC: 0.9 MG/DL (ref 0.5–1.5)
EOSINOPHIL # BLD: 0 K/UL (ref 0–0.7)
EOSINOPHIL NFR BLD: 0 %
ERYTHROCYTE [DISTWIDTH] IN BLOOD BY AUTOMATED COUNT: 16 % (ref 11–15)
GLUCOSE SERPL-MCNC: 118 MG/DL (ref 70–99)
HCT VFR BLD AUTO: 35.4 % (ref 41–52)
HGB BLD-MCNC: 11.7 G/DL (ref 13.5–17.5)
INR BLD: 1.2 (ref 0.9–1.2)
LYMPHOCYTES # BLD: 0.6 K/UL (ref 1–4)
LYMPHOCYTES NFR BLD: 9 %
MCH RBC QN AUTO: 28.1 PG (ref 27–32)
MCHC RBC AUTO-ENTMCNC: 33.1 G/DL (ref 32–37)
MCV RBC AUTO: 84.8 FL (ref 80–100)
MONOCYTES # BLD: 0.6 K/UL (ref 0–1)
MONOCYTES NFR BLD: 9 %
NEUTROPHILS # BLD AUTO: 5.9 K/UL (ref 1.8–7.7)
NEUTROPHILS NFR BLD: 83 %
OSMOLALITY UR CALC.SUM OF ELEC: 294 MOSM/KG (ref 275–295)
PLATELET # BLD AUTO: 188 K/UL (ref 140–400)
PMV BLD AUTO: 10 FL (ref 7.4–10.3)
POTASSIUM SERPL-SCNC: 3.7 MMOL/L (ref 3.3–5.1)
POTASSIUM SERPL-SCNC: 3.7 MMOL/L (ref 3.3–5.1)
PROTHROMBIN TIME: 14.9 SECONDS (ref 11.8–14.5)
RBC # BLD AUTO: 4.17 M/UL (ref 4.5–5.9)
SODIUM SERPL-SCNC: 141 MMOL/L (ref 136–144)
WBC # BLD AUTO: 7.1 K/UL (ref 4–11)

## 2018-07-03 PROCEDURE — 99232 SBSQ HOSP IP/OBS MODERATE 35: CPT | Performed by: INTERNAL MEDICINE

## 2018-07-03 PROCEDURE — 71045 X-RAY EXAM CHEST 1 VIEW: CPT | Performed by: CLINICAL NURSE SPECIALIST

## 2018-07-03 RX ORDER — 0.9 % SODIUM CHLORIDE 0.9 %
VIAL (ML) INJECTION
Status: COMPLETED
Start: 2018-07-03 | End: 2018-07-03

## 2018-07-03 RX ORDER — WARFARIN SODIUM 6 MG/1
6 TABLET ORAL
Status: COMPLETED | OUTPATIENT
Start: 2018-07-03 | End: 2018-07-03

## 2018-07-03 RX ORDER — POTASSIUM CHLORIDE 20 MEQ/1
40 TABLET, EXTENDED RELEASE ORAL ONCE
Status: COMPLETED | OUTPATIENT
Start: 2018-07-03 | End: 2018-07-03

## 2018-07-03 RX ORDER — DILTIAZEM HYDROCHLORIDE 240 MG/1
240 CAPSULE, COATED, EXTENDED RELEASE ORAL DAILY
Status: DISCONTINUED | OUTPATIENT
Start: 2018-07-04 | End: 2018-07-04

## 2018-07-03 NOTE — PROGRESS NOTES
MARYA ESPINOZA  : 10/22/1931  ACCOUNT:  [id number]  [phone]  PCP: Dr. Jose Carlos Handy     TODAY'S DATE: 2018  DICTATED BY:  Silvia Cheadle, APN]      CHIEF COMPLAINT: [edema and Followup of Atrial fibrillation.]    HPI:    [On 2018, Mikey Valles smokes cigars advised to quit and 1 x day. CAFFEINE: 4 cups coffee daily. ALCOHOL: denies drinking. EXERCISE: no regular exercise. DIET: low sodium diet. MARITAL STATUS: . EDUCATION: high school graduate and college graduate.  OCCUPATION: Sales and [employer]               125MCG    1 daily                                  04/19/18 DilTIAZem HCl ER      180MG     1 daily                                  04/19/18 Hydrocodone-Acetaminop7.5-325M  as needed                                04/19/18 ICaps ARED normal fortunately and his EF is normal. He has no heart failure. He has history of coronary disease, percutaneous angioplasty more than 20 years ago. REVIEW OF SYSTEMS:    CONS: recent weight loss. EYES: denies significant visual changes.  ENMT: denies PVC and ST, T wave changes. DECISION MAKIN. Atrial flutter, paroxysmal atrial flutter, had persistence in May 2015 when he was in for pneumonia. He converted on low-dose sotalol.  We continued on this and sometime in the last year, had been put on

## 2018-07-03 NOTE — PROGRESS NOTES
[facility]    Progress Note      Assessment and Plan:   1. Klebsiella urinary tract infection with sepsis\status post ureteric stenting–the patient is much improved clinically. Doing well on floor.   Generally weak and complains of poor jose Director, [employer]  Pager: [phone]

## 2018-07-03 NOTE — PROGRESS NOTES
[facility]    Progress Note    Akilah Diallo Patient Status:  Inpatient    10/22/1931 MRN [id number]   Location [facility] 3W/SW Attending Thony Andersen # 4 PCP Radha Benítez MD       SUBJECTIVE:  Ady Nolan subsegmental atelectasis and left retrocardiac opacification.      Dictated by (CST): Altagracia King MD on 7/03/2018 at 7:48     Approved by (CST): Frederick Camp MD on 7/03/2018 at 7:50          Xr Chest Ap Portable  (cpt=71045)    Result hydrocortisone 50 mg IV q8hrs-->q12hrs; will decrease to 25mg IV q12hrs; repeat BCx 7/1 NG after 2 days     Nephrolithiasis with moderate left hydronephrosis  CT abdomen/ pelvis 6/29 revealed moderate left hydroureteronephrosis related to a 4 x 2 mm left u patient currently takes aspirin 81 mg daily.     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 6 hours p.r.n.  Pain     History of villous adenoma   the patient reports occasional loos

## 2018-07-03 NOTE — CONSULTS
[facility] AND [facility]  [facility] Cardiology Consult Note    Abdulaziz Purchase Patient Status:  Inpatient    10/22/1931 MRN [id number]   Location [facility] 3W/SW Attending Ling Jensen DO   Hosp Day # 4 PCP Steffi Mcdaniels MD     80year old male • Dysphagia    • Esophageal reflux    • Fracture, radius 2014   • Hip fracture, left [facility]) Jan 2017    s/p IM fixation 1/11/17; Ortho Dr Alcantara   • IBS (irritable bowel syndrome)    • Lipid screening 10/04/2010   • Malabsorption    • Malnutrition ([facility] [address] 1966 ml       Physical Exam:     General: Alert and oriented x 3. No apparent distress. No respiratory or constitutional distress. HEENT: Normocephalic, anicteric sclera, neck supple. Neck: No JVD, carotids 2+, no bruits.   Cardiac: Regular rate and rhyth

## 2018-07-03 NOTE — PROGRESS NOTES
07/03/18 1411   Clinical Encounter Type   Visited With Patient   Routine Visit Introduction   Continue Visiting Yes   Patient's Supportive Strategies/Resources lyn and family   Patient Spiritual Encounters   Spiritual Assessment Completed Yes  (Pt mimi

## 2018-07-03 NOTE — TELEPHONE ENCOUNTER
Lenore/ Parag scales to recertify pt for another round of Home Care, pt is doing so well she wants to keep him on track  Tasked to nursing

## 2018-07-03 NOTE — PHYSICAL THERAPY NOTE
PHYSICAL THERAPY TREATMENT NOTE - INPATIENT     Room Number: 347/347-A       Presenting Problem: severe sepsis    Problem List  Principal Problem:    Severe sepsis ([address]  Active Problems:    Vitamin B12 deficiency    CAD (coronary artery disease)    Beckyi 24 hour care/supervision;Home with home health PT     PLAN  PT Treatment Plan: Balance training;Transfer training;Gait training    SUBJECTIVE  It's to early for me to do therapy.      OBJECTIVE  Precautions: None    WEIGHT BEARING RESTRICTION  Weight Bearin by: 7/20/18  Patient Goal Patient's self-stated goal is: to go home    Goal #1 Patient is able to demonstrate supine - sit EOB @ level: supervision     Goal #1   Current Status Min A   Goal #2 Patient is able to demonstrate transfers Sit to/from Stand at a

## 2018-07-03 NOTE — SLP NOTE
SPEECH DAILY NOTE - INPATIENT    ASSESSMENT & PLAN   ASSESSMENT      Pt seen upright in bed with current diet of fffff for monitoring of diet tolerance. Swallowing precautions/strategies discussed and demonstrated with Pt.  Minimal to mild cues required for clearing and clear vocal quality) during this first session. GOAL MET     Goal #2 The patient/family/caregiver will demonstrate understanding and implementation of aspiration precautions and swallow strategies independently over 2 session(s).     Review

## 2018-07-04 LAB
ALBUMIN SERPL BCP-MCNC: 2.1 G/DL (ref 3.5–4.8)
ANION GAP SERPL CALC-SCNC: 5 MMOL/L (ref 0–18)
BUN SERPL-MCNC: 10 MG/DL (ref 8–20)
BUN/CREAT SERPL: 11.5 (ref 10–20)
CALCIUM SERPL-MCNC: 8.3 MG/DL (ref 8.5–10.5)
CHLORIDE SERPL-SCNC: 115 MMOL/L (ref 95–110)
CO2 SERPL-SCNC: 22 MMOL/L (ref 22–32)
CREAT SERPL-MCNC: 0.87 MG/DL (ref 0.5–1.5)
GLUCOSE SERPL-MCNC: 97 MG/DL (ref 70–99)
INR BLD: 1.6 (ref 0.9–1.2)
MAGNESIUM SERPL-MCNC: 1.6 MG/DL (ref 1.8–2.5)
OSMOLALITY UR CALC.SUM OF ELEC: 293 MOSM/KG (ref 275–295)
PHOSPHATE SERPL-MCNC: 2.5 MG/DL (ref 2.4–4.7)
POTASSIUM SERPL-SCNC: 3.5 MMOL/L (ref 3.3–5.1)
POTASSIUM SERPL-SCNC: 3.5 MMOL/L (ref 3.3–5.1)
POTASSIUM SERPL-SCNC: 3.7 MMOL/L (ref 3.3–5.1)
PROTHROMBIN TIME: 18.7 SECONDS (ref 11.8–14.5)
SODIUM SERPL-SCNC: 142 MMOL/L (ref 136–144)

## 2018-07-04 PROCEDURE — 99232 SBSQ HOSP IP/OBS MODERATE 35: CPT | Performed by: INTERNAL MEDICINE

## 2018-07-04 RX ORDER — DILTIAZEM HYDROCHLORIDE 180 MG/1
180 CAPSULE, COATED, EXTENDED RELEASE ORAL DAILY
Status: COMPLETED | OUTPATIENT
Start: 2018-07-04 | End: 2018-07-04

## 2018-07-04 RX ORDER — 0.9 % SODIUM CHLORIDE 0.9 %
VIAL (ML) INJECTION
Status: COMPLETED
Start: 2018-07-04 | End: 2018-07-04

## 2018-07-04 RX ORDER — ARIPIPRAZOLE 15 MG/1
40 TABLET ORAL EVERY 4 HOURS
Status: DISPENSED | OUTPATIENT
Start: 2018-07-04 | End: 2018-07-04

## 2018-07-04 RX ORDER — DILTIAZEM HYDROCHLORIDE 180 MG/1
180 CAPSULE, COATED, EXTENDED RELEASE ORAL DAILY
Status: DISCONTINUED | OUTPATIENT
Start: 2018-07-05 | End: 2018-07-05

## 2018-07-04 RX ORDER — WARFARIN SODIUM 4 MG/1
4 TABLET ORAL
Status: COMPLETED | OUTPATIENT
Start: 2018-07-04 | End: 2018-07-05

## 2018-07-04 RX ORDER — MAGNESIUM SULFATE HEPTAHYDRATE 40 MG/ML
2 INJECTION, SOLUTION INTRAVENOUS ONCE
Status: COMPLETED | OUTPATIENT
Start: 2018-07-04 | End: 2018-07-04

## 2018-07-04 NOTE — PROGRESS NOTES
[facility]    Progress Note    Benito Pac Patient Status:  Inpatient    10/22/1931 MRN [id number]   Location [facility] 3W/SW Attending Brennan Grace, DO   Hosp Day # 5 PCP Liberty Mistry MD       SUBJECTIVE:  States 103  108   < >  113*  114*  115*   CO2  24  22   < >  23  22  22   ALKPHO  68  62   --    --    --    --    AST  16  16   --    --    --    --    ALT  8*  13*   --    --    --    --    BILT  1.0  1.3*   --    --    --    --    TP  6.9  6.0   --    --    - in consultation with urologist Dr Elen King; POD #4 s/p cystoscopy, left retrograde pyelogram, and insertion of left ureteral stent by Dr Calvin Diaz possible, remove melendez catheter today    Coagulopathy  On warfarin for Afib; INR 1.6 today; s/p Vitamin K 2.5 p.r.n.  Pain     History of villous adenoma   the patient reports occasional loose stools.  The patient has been advised to see gastroenterologist, Dr. Chavo Ervin, as an outpatient for colonoscopy though he has declined repeat colonoscopy     Vitamin B12 deficie

## 2018-07-04 NOTE — PLAN OF CARE
Problem: Diabetes/Glucose Control  Goal: Glucose maintained within prescribed range  INTERVENTIONS:  - Monitor Blood Glucose as ordered  - Assess for signs and symptoms of hyperglycemia and hypoglycemia  - Administer ordered medications to maintain glucose Progressing      Problem: SAFETY ADULT - FALL  Goal: Free from fall injury  INTERVENTIONS:  - Assess pt frequently for physical needs  - Identify cognitive and physical deficits and behaviors that affect risk of falls.   - Weld fall precautions as demi Progressing

## 2018-07-04 NOTE — PROGRESS NOTES
[facility]    Progress Note      Assessment and Plan:   1. Klebsiella urinary tract infection with sepsis\status post ureteric stenting– the patient is feeling much better.     Recommendations: Ongoing Zosyn, PT/OT, small fluid bolus, will

## 2018-07-04 NOTE — SLP NOTE
Received call from RN reporting that patient is experiencing increased shortness of breath and she felt it became worse with the upgrade from thick liquids to thin liquids.   Although aspiration was not observed during VFSS, the patient is at risk due to ph

## 2018-07-04 NOTE — PROGRESS NOTES
[facility]    Cardiology Progress Note    Fani Quintanilla Patient Status:  Inpatient    10/22/1931 MRN [id number]   [facility] 3W/SW Attending Marion Montenegro, [address] [facility] Day # 5 PCP Jessica Ambriz MD     2018  Vanesa Salinas 06/29/18   1025  06/30/18   0429  07/01/18   0458  07/02/18   0522  07/03/18   0456  07/04/18   0523   NA  138  139  140  142  141  142   K  3.7  4.4  4.4  3.9  3.7  3.7  3.7  3.5  3.5   CL  103  108  110  113*  114*  115*   CO2  24  22  24  23  22  22   B (osteoarthritis)     Anemia     Thumb pain     TIA (transient ischemic attack)     Knee pain, chronic     Partial hamstring tear     Bilateral lower extremity edema     Trigger finger, right middle finger     Venous insufficiency     Protein calorie malnut

## 2018-07-05 ENCOUNTER — TELEPHONE (OUTPATIENT)
Dept: INTERNAL MEDICINE CLINIC | Facility: CLINIC | Age: 83
End: 2018-07-05

## 2018-07-05 ENCOUNTER — PRIOR ORIGINAL RECORDS (OUTPATIENT)
Dept: OTHER | Age: 83
End: 2018-07-05

## 2018-07-05 VITALS
RESPIRATION RATE: 18 BRPM | HEIGHT: 76 IN | SYSTOLIC BLOOD PRESSURE: 110 MMHG | TEMPERATURE: 98 F | BODY MASS INDEX: 21.69 KG/M2 | WEIGHT: 178.13 LBS | OXYGEN SATURATION: 95 % | DIASTOLIC BLOOD PRESSURE: 88 MMHG | HEART RATE: 95 BPM

## 2018-07-05 LAB
ANION GAP SERPL CALC-SCNC: 4 MMOL/L (ref 0–18)
BASOPHILS # BLD: 0 K/UL (ref 0–0.2)
BASOPHILS NFR BLD: 0 %
BUN SERPL-MCNC: 8 MG/DL (ref 8–20)
BUN/CREAT SERPL: 9.8 (ref 10–20)
CALCIUM SERPL-MCNC: 8.4 MG/DL (ref 8.5–10.5)
CHLORIDE SERPL-SCNC: 112 MMOL/L (ref 95–110)
CO2 SERPL-SCNC: 25 MMOL/L (ref 22–32)
CREAT SERPL-MCNC: 0.82 MG/DL (ref 0.5–1.5)
EOSINOPHIL # BLD: 0 K/UL (ref 0–0.7)
EOSINOPHIL NFR BLD: 0 %
ERYTHROCYTE [DISTWIDTH] IN BLOOD BY AUTOMATED COUNT: 15.8 % (ref 11–15)
GLUCOSE SERPL-MCNC: 90 MG/DL (ref 70–99)
HCT VFR BLD AUTO: 36.3 % (ref 41–52)
HGB BLD-MCNC: 11.8 G/DL (ref 13.5–17.5)
INR BLD: 1.9 (ref 0.9–1.2)
LYMPHOCYTES # BLD: 1.5 K/UL (ref 1–4)
LYMPHOCYTES NFR BLD: 17 %
MAGNESIUM SERPL-MCNC: 1.8 MG/DL (ref 1.8–2.5)
MCH RBC QN AUTO: 27.8 PG (ref 27–32)
MCHC RBC AUTO-ENTMCNC: 32.6 G/DL (ref 32–37)
MCV RBC AUTO: 85.2 FL (ref 80–100)
METAMYELOCYTES # BLD MANUAL: 0.26 K/UL
METAMYELOCYTES NFR BLD: 3 %
MONOCYTES # BLD: 0.1 K/UL (ref 0–1)
MONOCYTES NFR BLD: 1 %
NEUTROPHILS # BLD AUTO: 6.7 K/UL (ref 1.8–7.7)
NEUTROPHILS NFR BLD: 73 %
NEUTS BAND NFR BLD: 6 %
OSMOLALITY UR CALC.SUM OF ELEC: 290 MOSM/KG (ref 275–295)
PLATELET # BLD AUTO: 227 K/UL (ref 140–400)
PMV BLD AUTO: 10.3 FL (ref 7.4–10.3)
POTASSIUM SERPL-SCNC: 3.5 MMOL/L (ref 3.3–5.1)
PROTHROMBIN TIME: 21 SECONDS (ref 11.8–14.5)
RBC # BLD AUTO: 4.26 M/UL (ref 4.5–5.9)
SODIUM SERPL-SCNC: 141 MMOL/L (ref 136–144)
WBC # BLD AUTO: 8.5 K/UL (ref 4–11)

## 2018-07-05 PROCEDURE — 99232 SBSQ HOSP IP/OBS MODERATE 35: CPT | Performed by: INTERNAL MEDICINE

## 2018-07-05 PROCEDURE — 99232 SBSQ HOSP IP/OBS MODERATE 35: CPT | Performed by: UROLOGY

## 2018-07-05 PROCEDURE — 99239 HOSP IP/OBS DSCHRG MGMT >30: CPT | Performed by: INTERNAL MEDICINE

## 2018-07-05 RX ORDER — POTASSIUM CHLORIDE 20 MEQ/1
40 TABLET, EXTENDED RELEASE ORAL EVERY 4 HOURS
Status: COMPLETED | OUTPATIENT
Start: 2018-07-05 | End: 2018-07-05

## 2018-07-05 RX ORDER — MAGNESIUM OXIDE 400 MG (241.3 MG MAGNESIUM) TABLET
400 TABLET ONCE
Status: COMPLETED | OUTPATIENT
Start: 2018-07-05 | End: 2018-07-05

## 2018-07-05 RX ORDER — CEPHALEXIN 500 MG/1
500 CAPSULE ORAL 2 TIMES DAILY
Qty: 16 CAPSULE | Refills: 0 | Status: SHIPPED | OUTPATIENT
Start: 2018-07-05 | End: 2018-07-10

## 2018-07-05 RX ORDER — WARFARIN SODIUM 2 MG/1
TABLET ORAL
Qty: 180 TABLET | Refills: 1 | Status: SHIPPED | COMMUNITY
Start: 2018-07-05 | End: 2019-06-10

## 2018-07-05 NOTE — PHYSICAL THERAPY NOTE
PHYSICAL THERAPY TREATMENT NOTE - INPATIENT     Room Number: 347/347-A       Presenting Problem: severe sepsis    Problem List  Principal Problem:    Severe sepsis ([address]  Active Problems:    Vitamin B12 deficiency    CAD (coronary artery disease)    Beckyi Static Sitting: Good  Dynamic Sitting: Good           Static Standing: Poor  Dynamic Standing: Poor -    ACTIVITY TOLERANCE  Room air  Heart Rate: 160 with exertion attempting to s walker - rolling at assistance level: supervision   Goal #3   Current Status 3 ft with RW with Min A x2   Goal #4    Goal #4   Current Status    Goal #5 Patient to demonstrate independence with home activity/exercise instructions provided to patient in pre

## 2018-07-05 NOTE — PROGRESS NOTES
[facility]    Progress Note    Arya William Patient Status:  Inpatient    10/22/1931 MRN [id number]   Location [facility] 3W/SW Attending Devorah Gama, [address] [facility] Day # 6 PCP Hudson Downing MD       Subjective:   Jenifer Landers

## 2018-07-05 NOTE — SLP NOTE
SPEECH DAILY NOTE - INPATIENT    ASSESSMENT & PLAN   ASSESSMENT    * The following note was written by SLP working on 7/04/18:    Received call from RN reporting that patient is experiencing increased shortness of breath and she felt it became worse with t rate;Small bites and sips; No straw  Medication Administration Recommendations: Crushed in puree    Patient Experiencing Pain: No         GOALS:      Goal #1 The patient will tolerate mechanical soft consistency and nectar liquids without overt signs or sym precautions    Interdisciplinary Communication: Discussed with RN  [facility] Nelsy CAMACHO Score: 5   FCM Impression: Abnormal       FOLLOW UP  Follow Up Needed: Yes  SLP Follow-up Date: 07/05/18  Number of Visits to Meet Established Goals: 3    Session: 2    If you have any

## 2018-07-05 NOTE — TRANSITION NOTE
For Cardiology Office:    Consult:    Assessment and Plan:    · Afib, tachy sylwia- RVR due to klebsiella UTI, nephrolithiasis. Tele predominately RVR, no pauses >6 seconds.   ? Increase dilt to 240 for now, believe rate control with improve as infection co Tabs  Generic drug:  digoxin      TAKE 1 TABLET BY MOUTH EVERY DAY   Quantity:  90 tablet  Refills:  3     DilTIAZem HCl ER Coated Beads 180 MG Cp24  Commonly known as:  CARDIZEM CD      Take 1 capsule (180 mg total) by mouth daily.    Quantity:  90 capsule

## 2018-07-05 NOTE — CM/SW NOTE
7/5CM-The Patient's RN informed this Writer that the Patient will be medically stable today (7/5) to return to [facility].  This Writer informed Trini Roland at [facility] ([phone]) of the above, she is requesting the Patient's RN to call report pr

## 2018-07-05 NOTE — TELEPHONE ENCOUNTER
Cristina Found from [facility] received a prescription for Vancomycin 50 mg oral solution. It only comes as [place], and [employer] does not cover it. Can pt take capsules?     To Nursing high

## 2018-07-05 NOTE — PLAN OF CARE
Problem: Patient/Family Goals  Goal: Patient/Family Long Term Goal  Patient's Long Term Goal: to return to [facility] independent living    Interventions:  - PT/OT working with patient.   - See additional Care Plan goals for specific interventions    Outcome: injury  - Provide assistive devices as appropriate  - Consider OT/PT consult to assist with strengthening/mobility  - Encourage toileting schedule   Outcome: Progressing      Problem: DISCHARGE PLANNING  Goal: Discharge to home or other facility with appro

## 2018-07-05 NOTE — TELEPHONE ENCOUNTER
PÅLÄNG from [facility] and relayed DR. OTTO message -verbalized understanding

## 2018-07-05 NOTE — PROGRESS NOTES
[facility]    Progress Note    Reanna Fuentes Patient Status:  Inpatient    10/22/1931 MRN [id number]   Location [facility] 3W/SW Attending Olimpia Drake DO   Hosp Day # 6 PCP Sumaya Hermosillo MD       Assessment and Plan: mg Oral Daily   • hydrocortisone Na succinate PF  25 mg Intravenous Q12H   • digoxin  125 mcg Oral Daily   • Vancomycin HCl  125 mg Oral BID   • enoxaparin  30 mg Subcutaneous 2 times per day   • piperacillin-tazobactam  3.375 g Intravenous Q8H       Resul

## 2018-07-05 NOTE — PROGRESS NOTES
[facility]    Progress Note      Assessment and Plan:   1. Klebsiella urinary tract infection with sepsis\status post ureteric stenting– the patient is feeling much better.     Recommendations: Okay to discharge to rehabilitation [facility] [place]  Pager: [phone]

## 2018-07-05 NOTE — PROGRESS NOTES
[facility]    Progress Note    Negar Peterson Patient Status:  Inpatient    10/22/1931 MRN [id number]   Location [facility] 3W/SW Attending Anjel Lazcano,    Hosp Day # 6 PCP Estuardo Mcqueen MD       SUBJECTIVE:  Devang Maxwell hydrocodone-acetaminophen (NORCO) 7.5-325 MG per tab 1 tablet 1 tablet Oral Q6H PRN   Enoxaparin Sodium (LOVENOX) 30 MG/0.3ML injection 30 mg 30 mg Subcutaneous 2 times per day   balsam peru-castor oil (VENELEX) ointment  Topical TID PRN   [MAR Hold] Dip nights ago, decreased back to 180mg. HR up to 160's again briefly; asymptomatic. Appreciate [facility] recs; pt sees Dr. Italia Coronado as outpt. Pt to sched f/u with AF clinic.      Bibasilar infiltrates  Chronic bibasilar infiltrates on CXR.  VFSE neg for aspiration.  D therapeutic -- can d/c at discharge today. Home today with Tai Lambert.                                          Christiana Tolbert MD  7/5/2018  10:37 AM

## 2018-07-05 NOTE — PLAN OF CARE
CARDIOVASCULAR - ADULT    • Absence of cardiac arrhythmias or at baseline Adequate for discharge        Diabetes/Glucose Control    • Glucose maintained within prescribed range Adequate for discharge        DISCHARGE PLANNING    • Discharge to home or othe

## 2018-07-05 NOTE — TELEPHONE ENCOUNTER
Osiel Dixon from [facility] is calling to speak with Dr. Brittany Garcia to confirm pt.'s medications ph.  # [phone] routed to clinical

## 2018-07-05 NOTE — PLAN OF CARE
Attempted to call report to [facility] assisted living, no answer. Per answering machine, no voicemail option. Will call back later. 1549: Attempted to call report again to [facility], no answer. No voicemail option available. 1615:  Attempted to call Sun

## 2018-07-06 ENCOUNTER — TELEPHONE (OUTPATIENT)
Dept: INTERNAL MEDICINE CLINIC | Facility: CLINIC | Age: 83
End: 2018-07-06

## 2018-07-06 ENCOUNTER — TELEPHONE (OUTPATIENT)
Dept: CARDIOLOGY UNIT | Facility: HOSPITAL | Age: 83
End: 2018-07-06

## 2018-07-06 ENCOUNTER — PATIENT OUTREACH (OUTPATIENT)
Dept: CASE MANAGEMENT | Age: 83
End: 2018-07-06

## 2018-07-06 DIAGNOSIS — I48.20 CHRONIC ATRIAL FIBRILLATION (HCC): ICD-10-CM

## 2018-07-06 RX ORDER — CEPHALEXIN 500 MG/1
500 CAPSULE ORAL 4 TIMES DAILY
Qty: 16 CAPSULE | Refills: 0 | Status: SHIPPED | OUTPATIENT
Start: 2018-07-06 | End: 2018-10-22

## 2018-07-06 NOTE — TELEPHONE ENCOUNTER
Protime      32.4    INR             2.7    4mg last night    Is on Vancomycin & Keflex      Is patient suppose to take Keflex 50mg 4 times daily?

## 2018-07-06 NOTE — PROGRESS NOTES
Called home number for Hospital follow up. That is the main number to [facility] which is where the patient resides. I was transferred to his room where  I left a message to return call to [phone].     [facility] BB date -7/19

## 2018-07-06 NOTE — TELEPHONE ENCOUNTER
Advise continue Keflex until Friday, 7/13/18; please call [facility] [place]

## 2018-07-06 NOTE — TELEPHONE ENCOUNTER
Called Magdy back at number listed. S/w Genoveva. She states they need to know when to stop keflex.  It was started in the hospital. She will fax over the med list that they have for pt for Dr. Stevenson Rater to review and fax back any changes and a stop date for the K

## 2018-07-06 NOTE — TELEPHONE ENCOUNTER
Relayed MD's message to Genoveva---verbalized understanding  She states she faxed med list over recently and will need MD signature on it.  I notified Charly Pressley, working with Dr. Kassie Gutierrez today

## 2018-07-09 LAB — INR: 2.7 (ref 0.8–1.2)

## 2018-07-09 NOTE — PROGRESS NOTES
[address] [place] [address] [phone] for post hospital follow up, [facility] contact information provided. TCM book by date 7/19/2018.

## 2018-07-10 ENCOUNTER — TELEPHONE (OUTPATIENT)
Dept: INTERNAL MEDICINE CLINIC | Facility: CLINIC | Age: 83
End: 2018-07-10

## 2018-07-10 DIAGNOSIS — R60.9 EDEMA, UNSPECIFIED TYPE: Primary | ICD-10-CM

## 2018-07-10 RX ORDER — FUROSEMIDE 20 MG/1
20 TABLET ORAL DAILY
Qty: 3 TABLET | Refills: 0 | Status: SHIPPED | OUTPATIENT
Start: 2018-07-10 | End: 2018-07-11

## 2018-07-10 RX ORDER — POTASSIUM CHLORIDE 750 MG/1
10 TABLET, FILM COATED, EXTENDED RELEASE ORAL DAILY
Qty: 3 TABLET | Refills: 0 | Status: SHIPPED | OUTPATIENT
Start: 2018-07-10 | End: 2018-07-11

## 2018-07-10 NOTE — TELEPHONE ENCOUNTER
Pt's legs are very swollen   Nurse from [facility] says its from the antibiotics   Patient requests call back from Dr Tita Dutta or Dr Leta Izquierdo today     [phone]

## 2018-07-10 NOTE — TELEPHONE ENCOUNTER
Unclear etiology of the leg swelling; not likely due to Keflex. Pt's INR therapeutic (2.7) yesterday so DVT's not likely. Rec trial of Lasix 20mg qday x 3 days along with KDur 10meq/day x 3 days. Check CMP on Thursday 7/12.

## 2018-07-10 NOTE — TELEPHONE ENCOUNTER
To Dr. Rg Almaguer - See below - onset of swelling: yesterday - \"now they are really huge\". Pt has had legs elevated all day, no decrease in swelling. Denies redness, tenderness, warmth. Has shoes and socks off. PT and OT saw pt today.   [facility] will

## 2018-07-11 RX ORDER — POTASSIUM CHLORIDE 750 MG/1
10 TABLET, FILM COATED, EXTENDED RELEASE ORAL DAILY
Qty: 30 TABLET | Refills: 2 | Status: SHIPPED | OUTPATIENT
Start: 2018-07-11

## 2018-07-11 RX ORDER — FUROSEMIDE 20 MG/1
20 TABLET ORAL DAILY
Qty: 30 TABLET | Refills: 2 | Status: SHIPPED | OUTPATIENT
Start: 2018-07-11

## 2018-07-11 NOTE — TELEPHONE ENCOUNTER
Pt calling back today, pt called pharmacy and pharmacy states they never got the prescription from Dr Frederick Mcmanus. Pt would like it sent to over the [facility] in [place], but would like to talk to a nurse before doing so. Best call back is [phone].  Tasked to [employer]

## 2018-07-11 NOTE — PROGRESS NOTES
TCM OUTREACH    Call made to attempt to reach patient for TCM follow up. No answer, Voicemail left requesting call back at [phone].     Book by date: 7/19/18    (Triage Team if pt returns call please transfer to ext [phone])

## 2018-07-11 NOTE — TELEPHONE ENCOUNTER
Please advise - called patient who states his swelling went down a lot in his legs. He wants to know if he still needs the lasix and potassium - if so it needs to be sent to [facility] in [place] . RN called [address] to cancel RX - to DR. OTTO

## 2018-07-11 NOTE — TELEPHONE ENCOUNTER
Pia Lopes from [facility] called - clarified medications - Lasix 20 mg PRN and Potassium 10 MEQ only when taking Lasix , also will draw labs on Friday - verbalized understanding

## 2018-07-11 NOTE — TELEPHONE ENCOUNTER
Spoke with patient and relayed Dr. El Sosa' message. Patient verbalized an understanding to all instructions. eRx for Lasix and KDur sent to Yoan Medina RN and left message on voicemail to have CMP drawn on Thursday 7/12 (per pa

## 2018-07-11 NOTE — TELEPHONE ENCOUNTER
May take Lasix 20 mg po qD prn leg swelling; advise takes Klor-con 10 mEQ po qD (only when taking furosemide) ; ERx sent to [facility] as requested; please call pt

## 2018-07-13 ENCOUNTER — TELEPHONE (OUTPATIENT)
Dept: INTERNAL MEDICINE CLINIC | Facility: CLINIC | Age: 83
End: 2018-07-13

## 2018-07-13 ENCOUNTER — TELEPHONE (OUTPATIENT)
Dept: SURGERY | Facility: CLINIC | Age: 83
End: 2018-07-13

## 2018-07-13 DIAGNOSIS — R29.6 FREQUENT FALLS: ICD-10-CM

## 2018-07-13 DIAGNOSIS — I48.20 CHRONIC ATRIAL FIBRILLATION (HCC): ICD-10-CM

## 2018-07-13 DIAGNOSIS — I48.91 ATRIAL FIBRILLATION, UNSPECIFIED TYPE (HCC): ICD-10-CM

## 2018-07-13 DIAGNOSIS — R26.9 GAIT ABNORMALITY: Primary | ICD-10-CM

## 2018-07-13 LAB — INR: 1.7 (ref 0.8–1.2)

## 2018-07-13 NOTE — TELEPHONE ENCOUNTER
Please call pt, requesting status on order sent from [facility] for a commode  Pt said the commode would be most helpful because of his height  Tasked to nursing

## 2018-07-13 NOTE — TELEPHONE ENCOUNTER
INR 1.7 on coumadin 1 MWF, 2 mg TTSS; same RX re-check 1 week    May take Lasix 20 mg po qD prn leg swelling; RN called

## 2018-07-13 NOTE — TELEPHONE ENCOUNTER
PT 20.9  INR 1.7  Dosage 1MG Mon, Wed and Friday  2MG all other days    Pt has increased edema, a bit of crackle in lungs. Ok if pt takes Lasix tomorrow morning so pt can sleep?     Please call [facility] nurse to advise at [phone]

## 2018-07-13 NOTE — TELEPHONE ENCOUNTER
call residential [address] and left a voice message (confidential) notifying her that order for commode has been placed. LMTCB if referral needs to be faxed. Called patient, no answer. LMTCB to notify that order for commode has been placed.

## 2018-07-13 NOTE — TELEPHONE ENCOUNTER
Dr Forbes Heads, please see pended DME referral for Commode per patient request. Please advise and place diagnosis if agree.

## 2018-07-13 NOTE — TELEPHONE ENCOUNTER
Radha Sanders called back and asked that we fax order for commode to Fax [phone]. Order faxed.  Confirmation received

## 2018-07-17 NOTE — TELEPHONE ENCOUNTER
Phoned pt's daughter back and spoke with her.  Informed her that  is out of clinic this week, and unfortunately, there currently are no openings to accommodate this request. Informed her will need permission from [facility] as to where pt can be fit in

## 2018-07-18 NOTE — TELEPHONE ENCOUNTER
Continue the patient on Friday the 27th in the afternoon. Check with Yessenia Guo to make sure to have any surgery scheduled but it is okay to schedule patients until 230 or 3:00 to accommodate some urgent appointments.

## 2018-07-18 NOTE — TELEPHONE ENCOUNTER
Reviewed your scheduled with Jay Salas. It is not likely you would  be able to accommodate this pt on that Friday. Would you be willing to come at 8 am or 8:15 on Thursday morning 7/26/18 ??

## 2018-07-19 ENCOUNTER — TELEPHONE (OUTPATIENT)
Dept: INTERNAL MEDICINE CLINIC | Facility: CLINIC | Age: 83
End: 2018-07-19

## 2018-07-19 LAB
BUN: 8 MG/DL
CALCIUM: 8.4 MG/DL
CHLORIDE: 112 MEQ/L
CREATININE, SERUM: 0.82 MG/DL
GLUCOSE: 90 MG/DL
HEMATOCRIT: 36.3 %
HEMOGLOBIN: 11.8 G/DL
MAGNESIUM: 1.8 MG/DL
PLATELETS: 227 K/UL
POTASSIUM, SERUM: 3.5 MEQ/L
RED BLOOD COUNT: 4.26 X 10-6/U
SODIUM: 141 MEQ/L
WHITE BLOOD COUNT: 8.5 X 10-3/U

## 2018-07-19 NOTE — TELEPHONE ENCOUNTER
[place] / Residential call to let Dr Adolph Osborne know that pt doesn't wish to set up speech evaluation at this time  Pt feels he has a lot of appointments at this time he will set it up later   Tasked to nursing

## 2018-07-20 ENCOUNTER — TELEPHONE (OUTPATIENT)
Dept: INTERNAL MEDICINE CLINIC | Facility: CLINIC | Age: 83
End: 2018-07-20

## 2018-07-20 DIAGNOSIS — I48.20 CHRONIC ATRIAL FIBRILLATION (HCC): ICD-10-CM

## 2018-07-20 DIAGNOSIS — I48.91 ATRIAL FIBRILLATION, UNSPECIFIED TYPE (HCC): Primary | ICD-10-CM

## 2018-07-20 DIAGNOSIS — I48.91 ATRIAL FIBRILLATION, UNSPECIFIED TYPE (HCC): ICD-10-CM

## 2018-07-20 LAB — INR: 1.3 (ref 0.8–1.2)

## 2018-07-20 RX ORDER — HYDROCODONE BITARTRATE AND ACETAMINOPHEN 7.5; 325 MG/1; MG/1
1 TABLET ORAL EVERY 6 HOURS PRN
Qty: 90 TABLET | Refills: 0 | Status: SHIPPED | OUTPATIENT
Start: 2018-07-20 | End: 2018-09-20

## 2018-07-20 NOTE — TELEPHONE ENCOUNTER
Meghan Cortes @ [facility] leaving a message for Dr Madeleine Brunner:  Pt is missing his nursing visit this week, he has an appointment at [facility] clinic. Pt is up on his PT INR and we need to send an order over to the Afib clinic to get it done.   Afib Clinic in [place].   Best call kecia

## 2018-07-20 NOTE — TELEPHONE ENCOUNTER
PT 16  INR 1.3  Dosage 1MG every Mon. Wed.  Fri  2MG all other days    Has not missed doses  Finished antibiotic    Tasked to Dr Bryce Delacruz

## 2018-07-20 NOTE — TELEPHONE ENCOUNTER
PÅLÄNG from [facility] and relayed DR. CLARITA cobian - left on VM , alsoLMTCB with questions

## 2018-07-20 NOTE — TELEPHONE ENCOUNTER
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 6 hours p.r.n.  Pain ,#90; Rx left at window for pick-up

## 2018-07-23 NOTE — TELEPHONE ENCOUNTER
Thank you. Phoned daughter, [facility], and spoke with her. Offered this appt. She accepted and appt arranged in epic. She is thankful.

## 2018-07-25 ENCOUNTER — TELEPHONE (OUTPATIENT)
Dept: INTERNAL MEDICINE CLINIC | Facility: CLINIC | Age: 83
End: 2018-07-25

## 2018-07-25 RX ORDER — PREDNISONE 1 MG/1
5 TABLET ORAL DAILY
Qty: 30 TABLET | Refills: 5 | Status: SHIPPED | OUTPATIENT
Start: 2018-07-25 | End: 2019-01-27

## 2018-07-25 RX ORDER — HYDROCODONE BITARTRATE AND ACETAMINOPHEN 7.5; 325 MG/1; MG/1
1 TABLET ORAL EVERY 6 HOURS PRN
Qty: 90 TABLET | Refills: 0 | Status: CANCELLED | OUTPATIENT
Start: 2018-07-25

## 2018-07-25 NOTE — TELEPHONE ENCOUNTER
Pt is calling to request refills for his prescriptions of Norco and predniSONE 5 MG Oral Tab. Pt is almost out of the medication, has enough for today and tomorrow. Best call back is [phone]. Tasked to nursing.

## 2018-07-25 NOTE — TELEPHONE ENCOUNTER
To MD:  The above refill request is for a controlled substance. Please indicate yes or no to refill 30 days supply plus one refill. If more refills are appropriate, please indicate quantity  To DR. OTTO - patient would also like refill on prednisone

## 2018-07-26 ENCOUNTER — TELEPHONE (OUTPATIENT)
Dept: INTERNAL MEDICINE CLINIC | Facility: CLINIC | Age: 83
End: 2018-07-26

## 2018-07-26 DIAGNOSIS — I48.20 CHRONIC ATRIAL FIBRILLATION (HCC): ICD-10-CM

## 2018-07-26 NOTE — TELEPHONE ENCOUNTER
Just reviewed lab scan again. Result of 1.3 is from 7/20/18. So sorry, Dr. Lopez Heath! Will await repeat INR. Pt to continue regimen as stated by Dr. Hattie Smart on 7/20/18.

## 2018-07-26 NOTE — TELEPHONE ENCOUNTER
INR 1.3 on 7/24/18. Per Dr. Juan Hernandez in last [facility] note on 7/20/18, \"INR 1.3; change to 1 mg M,Th, and 2 mg all others; re-check 1 week. \"    To Dr. Marcelino Ask to advise.

## 2018-07-26 NOTE — TELEPHONE ENCOUNTER
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 6 hours p.r.n.  Pain ,#90; Rx left at window for pick-up on 7/20/18; please call pt    Refilled prednisone 5 mg po qD #30, 5RF; ERx sent

## 2018-07-26 NOTE — TELEPHONE ENCOUNTER
VM left notifying patient that [address] script is ready for pickup and that prednisone was eRxed.

## 2018-07-27 ENCOUNTER — OFFICE VISIT (OUTPATIENT)
Dept: SURGERY | Facility: CLINIC | Age: 83
End: 2018-07-27
Payer: MEDICARE

## 2018-07-27 VITALS
SYSTOLIC BLOOD PRESSURE: 108 MMHG | DIASTOLIC BLOOD PRESSURE: 73 MMHG | TEMPERATURE: 98 F | WEIGHT: 158 LBS | HEIGHT: 76 IN | BODY MASS INDEX: 19.24 KG/M2 | HEART RATE: 90 BPM

## 2018-07-27 DIAGNOSIS — N20.0 KIDNEY STONES: Primary | ICD-10-CM

## 2018-07-27 PROCEDURE — 99213 OFFICE O/P EST LOW 20 MIN: CPT | Performed by: UROLOGY

## 2018-07-27 PROCEDURE — G0463 HOSPITAL OUTPT CLINIC VISIT: HCPCS | Performed by: UROLOGY

## 2018-07-27 NOTE — PROGRESS NOTES
Bill Francis is a 80year old male. HPI:   Patient presents with: Follow - Up: Patient is a 80year old male here for ER follow up. Patient is S/P, CYSTOSCOPY, LEFT RETROGRADE PYELOGRAM, INSERTION OF LEFT URETERAL STENT. DOS 7/1/18 by JACK.    Kidney P (peptic ulcer disease)    • Pulmonary hypertension ([facility]    • Thrombocytosis ([facility] 2013   • Villous adenoma    • Vitamin B12 deficiency    • White matter disease       Past Surgical History:  2007: CARDIOVERSION  2006: CATARACT  1990: CATH PERCUTANEOUS  TR Take 1 capsule (500 mg total) by mouth 4 (four) times daily.  Disp: 16 capsule Rfl: 0   Warfarin Sodium 2 MG Oral Tab Take 4mg tonite (7/5/18), then further recs pending next INR on 7/6 Disp: 180 tablet Rfl: 1   acetaminophen (TYLENOL EXTRA STRENGTH) 500 MG defined types were placed in this encounter.       Meds This Visit:    No prescriptions requested or ordered in this encounter    Imaging & Referrals:  None     7/27/2018  Ana Dawson MD

## 2018-07-30 ENCOUNTER — TELEPHONE (OUTPATIENT)
Dept: INTERNAL MEDICINE CLINIC | Facility: CLINIC | Age: 83
End: 2018-07-30

## 2018-07-30 DIAGNOSIS — I48.20 CHRONIC ATRIAL FIBRILLATION (HCC): ICD-10-CM

## 2018-07-30 LAB — INR: 1.5 (ref 0.8–1.2)

## 2018-07-30 NOTE — TELEPHONE ENCOUNTER
Jeannine Canales from [facility]. ..  Is calling to speak with a nurse regarding Shoaib's PT/INR she his with him now  Ph. # [phone]   Routed high to anti [facility] [place] [facility]

## 2018-07-30 NOTE — TELEPHONE ENCOUNTER
Called Sharmila from [facility] and relayed DR. ALEJANDRO lane e- verbalized understanding AC template updated

## 2018-07-30 NOTE — TELEPHONE ENCOUNTER
Please advise - [address] [facility] called - patients INR today 1.5mg PT 18>3  Orders were 2mg  SA DOE - to DR. ALLEN

## 2018-07-30 NOTE — TELEPHONE ENCOUNTER
Nursing please call home health I recommend he takes 2 mg nightly all this week, and has his INR rechecked on Friday morning.

## 2018-08-03 LAB — INR: 1.5 (ref 0.8–1.2)

## 2018-08-06 NOTE — TELEPHONE ENCOUNTER
Per last anticoag note on 7/30 - pt was net due for INR recheck on 8/3. It does not appear that these results have been received yet. Spoke with Sharmila with [facility]. She will check on this and call the office back. Awaiting return call.

## 2018-08-10 ENCOUNTER — TELEPHONE (OUTPATIENT)
Dept: INTERNAL MEDICINE CLINIC | Facility: CLINIC | Age: 83
End: 2018-08-10

## 2018-08-10 DIAGNOSIS — I48.20 CHRONIC ATRIAL FIBRILLATION (HCC): ICD-10-CM

## 2018-08-10 LAB — INR: 2 (ref 0.8–1.2)

## 2018-08-10 NOTE — TELEPHONE ENCOUNTER
Brittanie Gabriel PT @ [facility] leaving a message for Dr Sigrid Noland:  Request to extened PT for twice a week for three weeks.    Best call back is [phone]

## 2018-08-10 NOTE — TELEPHONE ENCOUNTER
Kena Casanova from [facility]. [facility] Is calling with results:  PT 2.0   INR 23.4   Pt. Currently takes 4mg on Mon. & 2 mg Wed. & thurs.   Ph. # [phone]   Routed high to [facility]

## 2018-08-10 NOTE — TELEPHONE ENCOUNTER
Lianne Guillen from [facility] and relayed DR. OTTO message - verbalized understanding

## 2018-08-13 ENCOUNTER — TELEPHONE (OUTPATIENT)
Dept: INTERNAL MEDICINE CLINIC | Facility: CLINIC | Age: 83
End: 2018-08-13

## 2018-08-13 DIAGNOSIS — I48.20 CHRONIC ATRIAL FIBRILLATION (HCC): ICD-10-CM

## 2018-08-13 DIAGNOSIS — I48.91 ATRIAL FIBRILLATION, UNSPECIFIED TYPE (HCC): ICD-10-CM

## 2018-08-13 RX ORDER — WARFARIN SODIUM 2 MG/1
TABLET ORAL
Qty: 180 TABLET | Refills: 0 | Status: SHIPPED | OUTPATIENT
Start: 2018-08-13 | End: 2018-12-09

## 2018-08-13 NOTE — TELEPHONE ENCOUNTER
Received call from [facility] living facility; pt having brown - discoloration to urine; no abdominal pain; no F/C; pt had cystoscopy and ureteral stent placement on 7/1/18 by Dr Angelito Casey; advise check UA C&S; advise pt/RN contact Dr Arnel Gray office with result

## 2018-08-14 ENCOUNTER — TELEPHONE (OUTPATIENT)
Dept: SURGERY | Facility: CLINIC | Age: 83
End: 2018-08-14

## 2018-08-14 NOTE — TELEPHONE ENCOUNTER
Daughter calling to report that [facility] assisted living order urine labs for patient. Daughter states patient has ureter stent for kidney stone, recently patient c/o of brown urine x 1. Patient's daughter states patient states now urine is clear yellow.  D

## 2018-08-14 NOTE — TELEPHONE ENCOUNTER
pts daughter, Alberto Goodwin called. pt is in assisted living. pt has a stent. Last night his urine was dark brown. Labs were done waiting on results. This was a one time incident, urine looks fine now. pt feels fine.    Assisted living RNs advise she call an

## 2018-08-17 ENCOUNTER — TELEPHONE (OUTPATIENT)
Dept: INTERNAL MEDICINE CLINIC | Facility: CLINIC | Age: 83
End: 2018-08-17

## 2018-08-17 DIAGNOSIS — I48.20 CHRONIC ATRIAL FIBRILLATION (HCC): ICD-10-CM

## 2018-08-17 LAB — INR: 2 (ref 0.8–1.2)

## 2018-08-17 NOTE — TELEPHONE ENCOUNTER
Reta Bumpers from [facility] Is calling to inform Dr. Josee Jacob that pt. has a new healing sore on his left great toe, she put bacitracin on it and a bandaid they will monitor it ph.  #  [phone]  Routed to clinical

## 2018-08-17 NOTE — TELEPHONE ENCOUNTER
Dayanara Juarez from [facility] Is calling with results  PT 24.5   INR 2.0  Pt. Takes 3 mg Mon, Fri & 2mg the rest of the days ph.  # [phone]   Routed high to anti coag

## 2018-08-20 NOTE — DISCHARGE SUMMARY
[facility] AND [facility]  Discharge Summary    Elin Ny Patient Status:  Inpatient    10/22/1931 MRN [id number]   Location [facility] 3W/SW Attending No att. providers found   Hosp Day # 6 PCP Hattie Black MD     Date of Admission: 2018 Temporal, resp. rate (!) 32, height 6' 4\" (1.93 m), weight 158 lb (71.7 kg), SpO2 99 %.   Constitutional: alert and oriented x3 in no acute distress  HEENT- EOMI, PERRL  Nose/Mouth/Throat: pharynx without erythema  Neck/Thyroid: neck supple; no thyromegaly Klebsiella UTI  Due to urine septicemia; BCx and UCx showing growing Klebsiella pneumoniae, sensitive to pip/ tazo; on Zosyn 3.375 gm IV q8hrs, d#6; afebrile x >72 hrs. BP improved; WBC normalized; pt had been taking prednisone 2.5 mg qD as outpatient, so q4hrs prn.  GI panel negative.      Left shoulder pain  XR both shoulders neg for DJD; DDx includes frozen shoulder vs PMR;  s/p home physical therapy for adhesive capsulitis with [facility]; on prednisone to 2.5 mg daily; as noted above, on mod capsule, R-0      CONTINUE these medications which have CHANGED    Warfarin Sodium 2 MG Oral Tab  Take 4mg tonite (7/5/18), then further recs pending next INR on 7/6, Historical, Disp-180 tablet, R-1      CONTINUE these medications which have NOT CHANGED

## 2018-08-21 ENCOUNTER — TELEPHONE (OUTPATIENT)
Dept: INTERNAL MEDICINE CLINIC | Facility: CLINIC | Age: 83
End: 2018-08-21

## 2018-08-21 DIAGNOSIS — I48.20 CHRONIC ATRIAL FIBRILLATION (HCC): ICD-10-CM

## 2018-08-22 NOTE — TELEPHONE ENCOUNTER
Received a fax on my desk. States INR 2 (?)  Unusual to not have a decimel after the 2 -- 2.0? Please confirm (form in my outbox).   Fyi to Dr. Farfan Ralph also

## 2018-08-22 NOTE — TELEPHONE ENCOUNTER
See TT from 8/17 - was adressed by [employer] - called Kathleen Ernst with [phone] per [employer] continue same dosage and repeat INR  in 2 weeks

## 2018-08-22 NOTE — TELEPHONE ENCOUNTER
Spoke with Alethea Farnsworth RN with [facility] . Per Alethea Farnsworth, INR on 8/17 was 2.0. Form placed in scanning bin. Routed to covering provider Dr. Moni Meier for review.

## 2018-08-24 ENCOUNTER — TELEPHONE (OUTPATIENT)
Dept: INTERNAL MEDICINE CLINIC | Facility: CLINIC | Age: 83
End: 2018-08-24

## 2018-08-31 ENCOUNTER — TELEPHONE (OUTPATIENT)
Dept: INTERNAL MEDICINE CLINIC | Facility: CLINIC | Age: 83
End: 2018-08-31

## 2018-08-31 DIAGNOSIS — I48.20 CHRONIC ATRIAL FIBRILLATION (HCC): ICD-10-CM

## 2018-08-31 LAB — INR: 1.6 (ref 0.8–1.2)

## 2018-08-31 NOTE — TELEPHONE ENCOUNTER
Keaton Zayas from [facility] and relayed DR. OTTO message - verbalized understanding

## 2018-08-31 NOTE — TELEPHONE ENCOUNTER
Meghan Head from [facility]. .H. Is requesting to extend PT for once a week for two weeks ph.  # [phone]    Routed to clinical

## 2018-08-31 NOTE — TELEPHONE ENCOUNTER
PT 18.8     INR 1.6    Pt takes 3 mg on Mondays & Fridays    2 mg all other days    Return call to Lenore/Parag [phone]

## 2018-09-07 ENCOUNTER — TELEPHONE (OUTPATIENT)
Dept: INTERNAL MEDICINE CLINIC | Facility: CLINIC | Age: 83
End: 2018-09-07

## 2018-09-07 ENCOUNTER — TELEPHONE (OUTPATIENT)
Dept: SURGERY | Facility: CLINIC | Age: 83
End: 2018-09-07

## 2018-09-07 NOTE — TELEPHONE ENCOUNTER
[facility] need two thing    Like to re certify pt for [place] care for new left pains     Like to move the INR from Friday to Wed pt is having a stent removed Friday.

## 2018-09-07 NOTE — TELEPHONE ENCOUNTER
Requests verbal approval to re certify patient  for home PT for maintenance therapy  Call Nelson/Parag   [phone]

## 2018-09-07 NOTE — TELEPHONE ENCOUNTER
Please advise - called Laura Johnson [phone] who wants to recertify patient - his right leg is painful , skin painful to touch ,also asking if INR can be moved from Friday to Wednesday - he is having stent removed on friday - to DR. OTTO

## 2018-09-07 NOTE — TELEPHONE ENCOUNTER
Called [facility] got Daughters cell phone # [phone] called no answer left message to call office back re questions about fathers upcoming cystoscopy scheduled for 9/14/18

## 2018-09-07 NOTE — TELEPHONE ENCOUNTER
Jignesh Maxwell, [facility] RN, notified that Dr. Alfonzo Closs has given his approval for recertification and INR date change.

## 2018-09-12 ENCOUNTER — TELEPHONE (OUTPATIENT)
Dept: INTERNAL MEDICINE CLINIC | Facility: CLINIC | Age: 83
End: 2018-09-12

## 2018-09-12 NOTE — TELEPHONE ENCOUNTER
Routed to stefano    Attempt made to call [facility] nurse to clarify INR result.  [facility]

## 2018-09-12 NOTE — TELEPHONE ENCOUNTER
Please call Edenilson Garcia from [facility]/[phone]  PT 1.5  INR 17.9  tasked to Dr Soni Lozano on call for today

## 2018-09-14 ENCOUNTER — TELEPHONE (OUTPATIENT)
Dept: SURGERY | Facility: CLINIC | Age: 83
End: 2018-09-14

## 2018-09-14 ENCOUNTER — OFFICE VISIT (OUTPATIENT)
Dept: SURGERY | Facility: CLINIC | Age: 83
End: 2018-09-14
Payer: MEDICARE

## 2018-09-14 ENCOUNTER — TELEPHONE (OUTPATIENT)
Dept: INTERNAL MEDICINE CLINIC | Facility: CLINIC | Age: 83
End: 2018-09-14

## 2018-09-14 VITALS
TEMPERATURE: 98 F | SYSTOLIC BLOOD PRESSURE: 119 MMHG | DIASTOLIC BLOOD PRESSURE: 75 MMHG | HEART RATE: 67 BPM | BODY MASS INDEX: 19 KG/M2 | WEIGHT: 160 LBS

## 2018-09-14 DIAGNOSIS — N20.0 KIDNEY STONES: Primary | ICD-10-CM

## 2018-09-14 DIAGNOSIS — I48.20 CHRONIC ATRIAL FIBRILLATION (HCC): ICD-10-CM

## 2018-09-14 PROCEDURE — 99213 OFFICE O/P EST LOW 20 MIN: CPT | Performed by: UROLOGY

## 2018-09-14 PROCEDURE — 52310 CYSTOSCOPY AND TREATMENT: CPT | Performed by: UROLOGY

## 2018-09-14 NOTE — TELEPHONE ENCOUNTER
I contacted Ela Cat with 's office, patient is scheduled for cysto at 1PM today and they have questions regarding INR results. Need call back asap.  To Teresa Valdivia

## 2018-09-14 NOTE — TELEPHONE ENCOUNTER
Tisha Bee from Dr. Felecia Salmeron office calling to f/u on patient's INR, patient is scheduled for procedure today at 1pm Cysto w/stent.

## 2018-09-14 NOTE — TELEPHONE ENCOUNTER
Spoke to Roopa Bean, [facility]  - we discussed message from [facility] stating (by [facility]) that INR is 17.9; In fact the INR was 1.5;   adjusted dose for subtherapeutic INR; To the best of our knowledge, pt followed dosing instructions.

## 2018-09-14 NOTE — PROGRESS NOTES
Anisha Lock is a 80year old male. HPI:   Patient presents with:  Urinary Symptoms (urologic): patient presents for  left stent removal       80year-old male most recently seen in the office July 27, 2018.   He presented initially in June 2018 with u Debra Go MD at [facility] OR  12/10: FAT, FECAL QUALITATIVE  1/11/2017: FEMUR IM NAIL;  Left      Comment:  Performed by Martell Barahona MD at [facility] OR  No date: FRACTURE SURGERY      Comment:  Left femur 2016  01/11/2017: HIP SURGERY      Comment: mg total) by mouth 4 (four) times daily.  Disp: 16 capsule Rfl: 0   Warfarin Sodium 2 MG Oral Tab Take 4mg tonite (7/5/18), then further recs pending next INR on 7/6 Disp: 180 tablet Rfl: 1   acetaminophen (TYLENOL EXTRA STRENGTH) 500 MG Oral Tab Take 1 tab No prescriptions requested or ordered in this encounter       Imaging & Referrals:  None     9/14/2018  Shaunna Hoffman MD

## 2018-09-14 NOTE — TELEPHONE ENCOUNTER
Jayson Davis, pt is scheduled for in office cysto (stent removal ) this afternoon. Upon review of chart, I randomely  reviewed the PCP's note from 09/12/18, regarding their request for clarification regarding elevated INR(see copied below). Please see PCP nicole rivero

## 2018-09-14 NOTE — TELEPHONE ENCOUNTER
Received a call back from Abdulaziz at  office. She wants to know what lab is in question. Informed her the abnormal INR that is documented. She states she will call Freddie Chris, and call me back.  Informed her pt is scheduled at 1pm and will need to cl

## 2018-09-14 NOTE — TELEPHONE ENCOUNTER
Isabell is calling pt.  Is having a procedure done today and they have questions regarding labs  Ph. # [phone]   Routed high to clinical

## 2018-09-14 NOTE — TELEPHONE ENCOUNTER
[facility] [employer] verbally informed of scenario as outlined below. He asked that I call 's office for clarification.  I did this and spoke with nurse, Kobe Moreno (Gabriela Odom?) informed her of need for clarification asap, as pt is scheduled for cysto/ stent remov

## 2018-09-17 ENCOUNTER — TELEPHONE (OUTPATIENT)
Dept: INTERNAL MEDICINE CLINIC | Facility: CLINIC | Age: 83
End: 2018-09-17

## 2018-09-20 ENCOUNTER — TELEPHONE (OUTPATIENT)
Dept: INTERNAL MEDICINE CLINIC | Facility: CLINIC | Age: 83
End: 2018-09-20

## 2018-09-20 DIAGNOSIS — I48.20 CHRONIC ATRIAL FIBRILLATION (HCC): ICD-10-CM

## 2018-09-20 LAB — INR: 2.3 (ref 0.8–1.2)

## 2018-09-20 RX ORDER — HYDROCODONE BITARTRATE AND ACETAMINOPHEN 7.5; 325 MG/1; MG/1
1 TABLET ORAL EVERY 6 HOURS PRN
Qty: 90 TABLET | Refills: 0 | Status: SHIPPED | OUTPATIENT
Start: 2018-09-20 | End: 2018-10-22

## 2018-09-20 NOTE — TELEPHONE ENCOUNTER
Sagar Andi from [employer] Is calling with results:   PT 27.1    INR 2.3   Pt.  Takes 4 mg Mon, Wed, Fri & 2 mg all other days  Ph.# [phone]   Routed high to [facility]

## 2018-09-20 NOTE — TELEPHONE ENCOUNTER
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 6 hours p.r.n.  Pain ,#90; Rx mailed to home address

## 2018-10-05 ENCOUNTER — TELEPHONE (OUTPATIENT)
Dept: INTERNAL MEDICINE CLINIC | Facility: CLINIC | Age: 83
End: 2018-10-05

## 2018-10-05 DIAGNOSIS — I48.20 CHRONIC ATRIAL FIBRILLATION (HCC): ICD-10-CM

## 2018-10-05 NOTE — TELEPHONE ENCOUNTER
Discussed with Letitia Douglas, PT. He has been getting physical therapy since last December but started maintenance therapy for gait and strength. Pt requests continuance because he feels like he will lose ground if he doesn't continue.  Apparently the facility [place]

## 2018-10-05 NOTE — TELEPHONE ENCOUNTER
Need [facility] to define physical therapy to date; how much he has had so far, and when do they think they can stop; would not authorize add'l physical therapy without that input; please call [facility] [place]

## 2018-10-05 NOTE — TELEPHONE ENCOUNTER
Loree Marshall / Parag left a voicemail  INR 3.4, PT 37.2  Coumadin 4 mg Monday, Wednesday & Friday, 2 mg all other days    Tasked to coumadin high

## 2018-10-05 NOTE — TELEPHONE ENCOUNTER
Chava Yousif PT @ [facility] is calling to leave a message with Dr Leslie Castillo: request approval to extend PT to twice a week for two to three weeks.  Best call back for Chava Yousif is [phone]

## 2018-10-06 NOTE — TELEPHONE ENCOUNTER
Bruna Christianson contacted and notified that ok per  to extend patients physical therapy as requested.

## 2018-10-11 ENCOUNTER — TELEPHONE (OUTPATIENT)
Dept: INTERNAL MEDICINE CLINIC | Facility: CLINIC | Age: 83
End: 2018-10-11

## 2018-10-11 NOTE — TELEPHONE ENCOUNTER
To Dr. Eduardo Nichols - see below - per Kelsea Mckeon - pt looked fine, pain in wrist/hip slightly better. Temp 93.5 axillary. Kelsea Mckeon could see pt on Monday if you would like. Pt not using thickener for liquids.

## 2018-10-11 NOTE — TELEPHONE ENCOUNTER
Please call Kelsea Mckeon from [place] [address] at [phone]  Pt has crackles higher up  BP 98/58, heart rate 88 (normally 58-64)  respiratory rate 20, no difficulty breathing  Weight is down edema in ankles down  Would Dr Jolene Titus want pt to take [facility]

## 2018-10-11 NOTE — TELEPHONE ENCOUNTER
Continue furosemide 20 mg po qD prn leg swelling; OK for RN visit next week; please call Tai Lambert

## 2018-10-19 ENCOUNTER — TELEPHONE (OUTPATIENT)
Dept: INTERNAL MEDICINE CLINIC | Facility: CLINIC | Age: 83
End: 2018-10-19

## 2018-10-19 DIAGNOSIS — I48.91 ATRIAL FIBRILLATION, UNSPECIFIED TYPE (HCC): ICD-10-CM

## 2018-10-19 DIAGNOSIS — I48.20 CHRONIC ATRIAL FIBRILLATION (HCC): ICD-10-CM

## 2018-10-19 NOTE — TELEPHONE ENCOUNTER
Left voicemail for Jignesh Maxwell with [facility] relaying the message. Requested call back to confirm message was received. Anticoag flowsheet updated. Routed to [facility] as an [place].

## 2018-10-19 NOTE — TELEPHONE ENCOUNTER
INR     2.2    PT       26.5        4mg M - W- F and 2mg all other days (other than the 2mg Friday that had been ordered).

## 2018-10-22 ENCOUNTER — OFFICE VISIT (OUTPATIENT)
Dept: INTERNAL MEDICINE CLINIC | Facility: CLINIC | Age: 83
End: 2018-10-22
Payer: MEDICARE

## 2018-10-22 VITALS — BODY MASS INDEX: 18.75 KG/M2 | HEART RATE: 60 BPM | HEIGHT: 76 IN | WEIGHT: 154 LBS | TEMPERATURE: 98 F

## 2018-10-22 DIAGNOSIS — L97.529 TOE ULCER, LEFT, WITH UNSPECIFIED SEVERITY (HCC): ICD-10-CM

## 2018-10-22 DIAGNOSIS — I48.20 CHRONIC ATRIAL FIBRILLATION (HCC): ICD-10-CM

## 2018-10-22 DIAGNOSIS — N20.0 NEPHROLITHIASIS: ICD-10-CM

## 2018-10-22 DIAGNOSIS — R22.9 SKIN NODULE: Primary | ICD-10-CM

## 2018-10-22 PROCEDURE — G0463 HOSPITAL OUTPT CLINIC VISIT: HCPCS | Performed by: INTERNAL MEDICINE

## 2018-10-22 PROCEDURE — 99214 OFFICE O/P EST MOD 30 MIN: CPT | Performed by: INTERNAL MEDICINE

## 2018-10-22 PROCEDURE — 96372 THER/PROPH/DIAG INJ SC/IM: CPT | Performed by: INTERNAL MEDICINE

## 2018-10-22 RX ORDER — CLOBETASOL PROPIONATE 0.46 MG/ML
SOLUTION TOPICAL
Qty: 50 ML | Refills: 3 | Status: SHIPPED | OUTPATIENT
Start: 2018-10-22 | End: 2019-07-25

## 2018-10-22 RX ORDER — HYDROCODONE BITARTRATE AND ACETAMINOPHEN 7.5; 325 MG/1; MG/1
1 TABLET ORAL EVERY 6 HOURS PRN
Qty: 90 TABLET | Refills: 0 | Status: SHIPPED | OUTPATIENT
Start: 2018-10-22 | End: 2018-11-30

## 2018-10-22 RX ADMIN — CYANOCOBALAMIN 1000 MCG: 1000 INJECTION INTRAMUSCULAR; SUBCUTANEOUS at 16:45:00

## 2018-10-22 NOTE — PROGRESS NOTES
Agatha Duong is a 80year old male. HPI:   Patient presents with:  Lesion: Pt has a sore on his right hand that needs to be checked. He also has an ulcer on his left great toe. Eye Problem: Pt has sty on his right eyelid.  He has been treating it with ASSOCIATION  2006   • INTRAOCULAR LENS IMPLANT, SECONDARY  2006   • LEXISCAN NUC STRESS TST, CARDIO (DMG)  9/11   • OTHER SURGICAL HISTORY  1970    Billroth anastomosis   • OTHER SURGICAL HISTORY  2011    dental implants      Family History   Problem Relat mouth daily. Disp:  Rfl:    Cholecalciferol (VITAMIN D) 1000 units Oral Tab Take 1 tablet by mouth daily. Disp:  Rfl:    Loperamide HCl 2 MG Oral Cap Take 2 mg by mouth 4 (four) times daily as needed for Diarrhea.  Disp:  Rfl:    DilTIAZem HCl ER Coated Breanne Klebsiella pneumoniae, sensitive to pip/ tazo; s/p Zosyn 3.375 gm IV q8hrs, x6d, then Keflex for 8 more days, along with prophylactic po Vanco 125mg qd.         Nephrolithiasis with moderate left hydronephrosis  CT abdomen/ pelvis 6/29 revealed moderate lef receiving vitamin B12 1,000 mcg IM q. Month            Orders This Visit:  No orders of the defined types were placed in this encounter.       Meds This Visit:  Requested Prescriptions     Signed Prescriptions Disp Refills   • hydrocodone-acetaminophen 7.5-

## 2018-11-05 ENCOUNTER — TELEPHONE (OUTPATIENT)
Dept: INTERNAL MEDICINE CLINIC | Facility: CLINIC | Age: 83
End: 2018-11-05

## 2018-11-05 DIAGNOSIS — I48.20 CHRONIC ATRIAL FIBRILLATION (HCC): ICD-10-CM

## 2018-11-05 DIAGNOSIS — I48.91 ATRIAL FIBRILLATION, UNSPECIFIED TYPE (HCC): ICD-10-CM

## 2018-11-05 NOTE — TELEPHONE ENCOUNTER
When calling back, leave a name. .... She needs an order to allow [address] to do his Protime. Fax the order to them at:  [phone]  (was due on Friday, but strips were recalled).       Patient is too difficult for home health t

## 2018-11-05 NOTE — TELEPHONE ENCOUNTER
Spoke with [address] Wilvergenie Minors was supposed to have INR done on Friday. He has it about every 2 weeks. His last INR was 2.2 on 10/19. She is asking for PT/INR order to be faxed to [facility] living.  INR strips have been recalled and he is too

## 2018-11-06 ENCOUNTER — TELEPHONE (OUTPATIENT)
Dept: INTERNAL MEDICINE CLINIC | Facility: CLINIC | Age: 83
End: 2018-11-06

## 2018-11-06 DIAGNOSIS — I48.20 CHRONIC ATRIAL FIBRILLATION (HCC): ICD-10-CM

## 2018-11-06 DIAGNOSIS — I48.91 ATRIAL FIBRILLATION, UNSPECIFIED TYPE (HCC): ICD-10-CM

## 2018-11-06 NOTE — TELEPHONE ENCOUNTER
To nursing, please tell patient's nurse  to give Coumadin 6 mg today (Tuesday). Do INR tomorrow and call for further instructions. Thanks.     (A. fib)

## 2018-11-06 NOTE — TELEPHONE ENCOUNTER
Shellie Montilla is calling back to get PT/ INR dose ph. # [phone]   Shoaib's ph. # [phone] pt.  Is in room 204   Routed high to Dr. Jackeline Kwong

## 2018-11-06 NOTE — TELEPHONE ENCOUNTER
Santi Nayak from [facility] is calling to speak with a nurse regarding pt.'s meds  Ph. # [phone]  Routed to clinical

## 2018-11-06 NOTE — TELEPHONE ENCOUNTER
I spoke with Ursula Barahona and relayed Dr. Eli West message. She verbalized understanding. She did follow up on her concern with his pain medication.

## 2018-11-06 NOTE — TELEPHONE ENCOUNTER
Spoke with Lexi Verde at [facility]. She reports patient had PT/INR drawn today. PT 12.8, INR 1.2. Patient takes Coumadin 4 mg M/W/F and 2 mg Su/Tu/Th/Sa.  Please call patient with instructions and fax orders to [facility] at [phone] so that they can update in t

## 2018-11-07 NOTE — TELEPHONE ENCOUNTER
Dr. Sumaya Aggarwal' message relayed to  [place]'CECELIA LARKIN who verbalized understanding. Dr. Sumaya Aggarwal' message relayed on Shoaib's  as he self administers. KEVIN for Fuad Sosa to call us back to make sure he complied.   Nursing to f/u in AM.

## 2018-11-07 NOTE — TELEPHONE ENCOUNTER
Spoke with Rosemarie/Magdy - see 121/518 encounter - Fuad Sosa told Essence Mantle to take coumadin 6mg tonight, understanding verbalized. INR will be drawn tomorrow with results called to us for further instructions.

## 2018-11-07 NOTE — TELEPHONE ENCOUNTER
Anu Vogel relayed Dr. Francheska Clark' message to pt who verbalized understanding. Pt to take Coumadin 6mg tonight and take INR tomorrow and call further instructions. See 11/6/18 encounter.

## 2018-11-08 ENCOUNTER — TELEPHONE (OUTPATIENT)
Dept: INTERNAL MEDICINE CLINIC | Facility: CLINIC | Age: 83
End: 2018-11-08

## 2018-11-08 NOTE — TELEPHONE ENCOUNTER
Samaria Ann @ [facility] calling to request an approval for in home therapy for one a week for 3-4 weeks.  Best call back is [phone]

## 2018-11-08 NOTE — TELEPHONE ENCOUNTER
Jenifer Negrete from [facility] and relayed DR. CLARITA cobian - verbalized understanding

## 2018-11-12 ENCOUNTER — TELEPHONE (OUTPATIENT)
Dept: INTERNAL MEDICINE CLINIC | Facility: CLINIC | Age: 83
End: 2018-11-12

## 2018-11-12 DIAGNOSIS — I48.20 CHRONIC ATRIAL FIBRILLATION (HCC): ICD-10-CM

## 2018-11-12 NOTE — TELEPHONE ENCOUNTER
INR 1.6   pt is interesting  getting home testing coumadin device. Also pt is avoiding all the foods that are high in vitamin K nurse stated  wanted her to review his diet.

## 2018-11-14 ENCOUNTER — TELEPHONE (OUTPATIENT)
Dept: INTERNAL MEDICINE CLINIC | Facility: CLINIC | Age: 83
End: 2018-11-14

## 2018-11-14 NOTE — TELEPHONE ENCOUNTER
Res NORM Marshall pt is coughing more have congestion and some wheezing like in extra visit with pt for next week.

## 2018-11-14 NOTE — TELEPHONE ENCOUNTER
Faxed physician notification sheet for patient to [facility] at [facility].

## 2018-11-14 NOTE — TELEPHONE ENCOUNTER
Called BODØ from [facility] and relayed DR. CLARITA cobian - verbalized understanding

## 2018-11-16 ENCOUNTER — TELEPHONE (OUTPATIENT)
Dept: INTERNAL MEDICINE CLINIC | Facility: CLINIC | Age: 83
End: 2018-11-16

## 2018-11-16 NOTE — TELEPHONE ENCOUNTER
Clayton Santamaria from [facility] and relayed DR. OTTO message - verbalized understanding

## 2018-11-16 NOTE — TELEPHONE ENCOUNTER
He is looking for a verbal order for a power wheelchair. He needs an okay for restorative physical therapy.

## 2018-11-19 ENCOUNTER — TELEPHONE (OUTPATIENT)
Dept: INTERNAL MEDICINE CLINIC | Facility: CLINIC | Age: 83
End: 2018-11-19

## 2018-11-19 DIAGNOSIS — I48.20 CHRONIC ATRIAL FIBRILLATION (HCC): ICD-10-CM

## 2018-11-19 NOTE — TELEPHONE ENCOUNTER
INR    2.1    PT      25.1      Patient is currently on 6mg on 11/12 & 2mg Tues, Thurs, Sunday & 4mg X-M-U-Saturday. This was done as a fingerstick.

## 2018-11-27 RX ORDER — DIGOXIN 125 UG/1
TABLET ORAL
Qty: 90 TABLET | Refills: 3 | Status: SHIPPED | OUTPATIENT
Start: 2018-11-27 | End: 2019-11-27

## 2018-11-29 NOTE — TELEPHONE ENCOUNTER
To Dr. Romeo Jones - Jordon LOMAS:  The above refill request is for a controlled substance. Please review pended medication order. Print and sign for staff to fax to pharmacy.

## 2018-11-30 RX ORDER — HYDROCODONE BITARTRATE AND ACETAMINOPHEN 7.5; 325 MG/1; MG/1
1 TABLET ORAL EVERY 6 HOURS PRN
Qty: 90 TABLET | Refills: 0 | Status: SHIPPED | OUTPATIENT
Start: 2018-11-30 | End: 2019-01-02

## 2018-11-30 NOTE — TELEPHONE ENCOUNTER
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 6 hours p.r.n.  Pain, refilled #90 and mailed to home

## 2018-12-03 ENCOUNTER — TELEPHONE (OUTPATIENT)
Dept: INTERNAL MEDICINE CLINIC | Facility: CLINIC | Age: 83
End: 2018-12-03

## 2018-12-03 DIAGNOSIS — I48.20 CHRONIC ATRIAL FIBRILLATION (HCC): ICD-10-CM

## 2018-12-03 NOTE — TELEPHONE ENCOUNTER
BODØ @ [facility] calling with INR results:    PT 16.2  INR 1.4    Pt wasn't taking what Myra Ramos informed him to take.  Was taking:  [facility] Wednesday Friday  2MG Tuesday Thursday Sunday Saturday    Pt was supposed to be taking 4MG Saturday per [facility]

## 2018-12-07 ENCOUNTER — TELEPHONE (OUTPATIENT)
Dept: INTERNAL MEDICINE CLINIC | Facility: CLINIC | Age: 83
End: 2018-12-07

## 2018-12-07 NOTE — TELEPHONE ENCOUNTER
Meghan Head PT at [facility]..  Is calling to request PT for once a week for four weeks for maintenance therapy  Ph. # [phone]  Routed to clinical

## 2018-12-07 NOTE — TELEPHONE ENCOUNTER
Message relayed to [facility] who verbalized understanding. Nothing further at this time.

## 2018-12-09 DIAGNOSIS — I48.20 CHRONIC ATRIAL FIBRILLATION (HCC): ICD-10-CM

## 2018-12-10 ENCOUNTER — TELEPHONE (OUTPATIENT)
Dept: INTERNAL MEDICINE CLINIC | Facility: CLINIC | Age: 83
End: 2018-12-10

## 2018-12-10 DIAGNOSIS — I48.20 CHRONIC ATRIAL FIBRILLATION (HCC): ICD-10-CM

## 2018-12-10 RX ORDER — WARFARIN SODIUM 2 MG/1
TABLET ORAL
Qty: 180 TABLET | Refills: 1 | Status: SHIPPED | OUTPATIENT
Start: 2018-12-10 | End: 2019-06-08

## 2018-12-14 ENCOUNTER — TELEPHONE (OUTPATIENT)
Dept: SURGERY | Facility: CLINIC | Age: 83
End: 2018-12-14

## 2018-12-17 ENCOUNTER — TELEPHONE (OUTPATIENT)
Dept: INTERNAL MEDICINE CLINIC | Facility: CLINIC | Age: 83
End: 2018-12-17

## 2018-12-17 DIAGNOSIS — I48.20 CHRONIC ATRIAL FIBRILLATION (HCC): ICD-10-CM

## 2018-12-17 NOTE — TELEPHONE ENCOUNTER
BODØ @ [facility] callin with PT INR results:  INR 2.0  PT 23.7  Best call back is [phone]

## 2018-12-19 ENCOUNTER — TELEPHONE (OUTPATIENT)
Dept: INTERNAL MEDICINE CLINIC | Facility: CLINIC | Age: 83
End: 2018-12-19

## 2018-12-19 NOTE — TELEPHONE ENCOUNTER
Spoke with patient and relayed message from Dr. Cindi Olvera. She verbalized understanding. Reviewed Dr. El Flores schedule over the next couple of weeks.  Daughter states she will talk to her brother and see when he can bring patient in so she does not need to take off wor

## 2018-12-19 NOTE — TELEPHONE ENCOUNTER
[facility] actually requires an office face-to face visit with completion of order for wheelchair per Medicare requirement; I can complete order and and send office note to [facility] at completion of visit; please call dgtr to schedule office visit

## 2018-12-19 NOTE — TELEPHONE ENCOUNTER
Daughter Aliyah Woodard called to ask if you can complete the [facility] motorized wheelchair paperwork with out bringing her dad in to the office    - fax on your desk    Call back to Frandyсветлана Woodard on cell# [phone] to leave VM message

## 2018-12-31 ENCOUNTER — TELEPHONE (OUTPATIENT)
Dept: INTERNAL MEDICINE CLINIC | Facility: CLINIC | Age: 83
End: 2018-12-31

## 2018-12-31 NOTE — TELEPHONE ENCOUNTER
To Dr OTTO-----ok [facility]?

## 2018-12-31 NOTE — TELEPHONE ENCOUNTER
BODØ called from [facility], would like to re-certifiy pt  for another episode  of home care to keep an eye on surgery sight and instruction on CHF  Verbal order is ok, ok to leave message, include callers name  Tasked to nursing

## 2019-01-02 ENCOUNTER — OFFICE VISIT (OUTPATIENT)
Dept: INTERNAL MEDICINE CLINIC | Facility: CLINIC | Age: 84
End: 2019-01-02
Payer: MEDICARE

## 2019-01-02 VITALS
DIASTOLIC BLOOD PRESSURE: 62 MMHG | OXYGEN SATURATION: 98 % | TEMPERATURE: 98 F | HEIGHT: 76 IN | HEART RATE: 78 BPM | BODY MASS INDEX: 19.79 KG/M2 | WEIGHT: 162.5 LBS | SYSTOLIC BLOOD PRESSURE: 106 MMHG

## 2019-01-02 DIAGNOSIS — M47.812 SPONDYLOSIS OF CERVICAL REGION WITHOUT MYELOPATHY OR RADICULOPATHY: ICD-10-CM

## 2019-01-02 DIAGNOSIS — R26.9 GAIT ABNORMALITY: Primary | ICD-10-CM

## 2019-01-02 DIAGNOSIS — I48.20 CHRONIC ATRIAL FIBRILLATION (HCC): ICD-10-CM

## 2019-01-02 DIAGNOSIS — E53.8 VITAMIN B12 DEFICIENCY: ICD-10-CM

## 2019-01-02 DIAGNOSIS — C44.612 BASAL CELL CARCINOMA (BCC) OF RIGHT HAND: ICD-10-CM

## 2019-01-02 DIAGNOSIS — I27.20 PULMONARY HTN (HCC): ICD-10-CM

## 2019-01-02 PROCEDURE — 99214 OFFICE O/P EST MOD 30 MIN: CPT | Performed by: INTERNAL MEDICINE

## 2019-01-02 PROCEDURE — 96372 THER/PROPH/DIAG INJ SC/IM: CPT | Performed by: INTERNAL MEDICINE

## 2019-01-02 PROCEDURE — G0463 HOSPITAL OUTPT CLINIC VISIT: HCPCS | Performed by: INTERNAL MEDICINE

## 2019-01-02 RX ORDER — HYDROCODONE BITARTRATE AND ACETAMINOPHEN 7.5; 325 MG/1; MG/1
1 TABLET ORAL EVERY 6 HOURS PRN
Qty: 90 TABLET | Refills: 0 | Status: SHIPPED | OUTPATIENT
Start: 2019-01-15 | End: 2019-04-05

## 2019-01-02 RX ADMIN — CYANOCOBALAMIN 1000 MCG: 1000 INJECTION INTRAMUSCULAR; SUBCUTANEOUS at 13:55:00

## 2019-01-02 NOTE — PROGRESS NOTES
Candy Young is a 80year old male. HPI:   Patient presents with: Follow - Up: seen Dr. Brittani Barahona for skin lesion on RT hand, dx: skin CA.  Has to make a f/u appt for removal.  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  Disp: 30 tab pharynx without erythema  Neck/Thyroid: neck supple; no thyromegaly  Lymphatics: no lymphadenopathy of neck or groin  Cardiovascular: RRR, S1, S2, no S3 or murmur  Respiratory: lungs without crackles or wheezes  Abdomen: normoactive bowel sounds, soft, non left hydronephrosis  CT abdomen/ pelvis 6/29 revealed moderate left hydroureteronephrosis related to a 4 x 2 mm left ureterovesical junction calculus; pt seen in consultation with urologist Dr Dhiraj De La Garza; s/p cystoscopy, left retrograde pyelogram, and insertio prescriptions requested or ordered in this encounter       Imaging & Referrals:  None     1/2/2019  Wesley Jimenez MD

## 2019-01-09 ENCOUNTER — TELEPHONE (OUTPATIENT)
Dept: INTERNAL MEDICINE CLINIC | Facility: CLINIC | Age: 84
End: 2019-01-09

## 2019-01-09 DIAGNOSIS — I48.91 ATRIAL FIBRILLATION, UNSPECIFIED TYPE (HCC): ICD-10-CM

## 2019-01-09 DIAGNOSIS — I48.20 CHRONIC ATRIAL FIBRILLATION (HCC): ICD-10-CM

## 2019-01-09 LAB — INR: 2 (ref 0.8–1.2)

## 2019-01-09 NOTE — TELEPHONE ENCOUNTER
INR 2.0; continue warfarin 4 mg po qHS; next PT/INR in 2 weeks; please call RN / [facility]

## 2019-01-10 NOTE — TELEPHONE ENCOUNTER
Called [facility] at [phone]. Spoke with  and René for RN.

## 2019-01-14 NOTE — TELEPHONE ENCOUNTER
Called and spoke to BODMARITZA to verify she got message from 1/9. I reviewed the message with her and she states she had already received it. Will close encounter.

## 2019-01-18 ENCOUNTER — TELEPHONE (OUTPATIENT)
Dept: INTERNAL MEDICINE CLINIC | Facility: CLINIC | Age: 84
End: 2019-01-18

## 2019-01-18 NOTE — TELEPHONE ENCOUNTER
Faxed addended office note to Tito Lowe with [facility] per request from Dr. Faheem Crenshaw at [phone].  Fax confirmation received

## 2019-01-23 ENCOUNTER — TELEPHONE (OUTPATIENT)
Dept: INTERNAL MEDICINE CLINIC | Facility: CLINIC | Age: 84
End: 2019-01-23

## 2019-01-23 DIAGNOSIS — I48.20 CHRONIC ATRIAL FIBRILLATION (HCC): ICD-10-CM

## 2019-01-23 DIAGNOSIS — I48.91 ATRIAL FIBRILLATION, UNSPECIFIED TYPE (HCC): ICD-10-CM

## 2019-01-23 LAB — INR: 2.4 (ref 0.8–1.2)

## 2019-01-23 RX ORDER — NYSTATIN 100000 [USP'U]/G
POWDER TOPICAL
Qty: 60 G | Refills: 2 | Status: SHIPPED | OUTPATIENT
Start: 2019-01-23

## 2019-01-23 NOTE — TELEPHONE ENCOUNTER
Gil Boggs @ [facility] calling with INR results:  INR - 2.4  Pt - 29.9  Pt taking 4MG nightly.    Best call back is [phone]

## 2019-01-23 NOTE — TELEPHONE ENCOUNTER
Please call [facility] at [phone] regarding patient INR  Printed for Dr Shabnam Leyva done

## 2019-01-23 NOTE — TELEPHONE ENCOUNTER
Lor Boyd @ [facility] called back to see if Dr Lazarus Heys can also prescribe the pt something for a fungus. Lor Boyd states that the problem area is on his testicles and right groin. Best call back is [phone].      Lor Boyd states that anything over the counter would b

## 2019-01-24 NOTE — TELEPHONE ENCOUNTER
INR 2.4 on warfarin 4 mg po qHS; same Rx; re-check in 2 weeks; please call Tai Lambert    For fungus to groin, advise nystatin powder [id number] U/gm ATAA QID, #60 gm, 2RF; please call [facility]/ Rx

## 2019-01-24 NOTE — TELEPHONE ENCOUNTER
Called and left a detailed message on [facility]; relayed MDs message about INR and nystatin. INR to be re-checked in 2 weeks. I also called and s/w patient and reviewed instructions for coumadin dosage and directions-verbalized understanding.  Ph

## 2019-01-28 RX ORDER — PREDNISONE 1 MG/1
2.5 TABLET ORAL DAILY
Qty: 30 TABLET | Refills: 5 | Status: SHIPPED | OUTPATIENT
Start: 2019-01-28 | End: 2019-03-12

## 2019-02-06 ENCOUNTER — TELEPHONE (OUTPATIENT)
Dept: INTERNAL MEDICINE CLINIC | Facility: CLINIC | Age: 84
End: 2019-02-06

## 2019-02-06 DIAGNOSIS — I48.20 CHRONIC ATRIAL FIBRILLATION (HCC): ICD-10-CM

## 2019-02-06 DIAGNOSIS — I48.91 ATRIAL FIBRILLATION, UNSPECIFIED TYPE (HCC): ICD-10-CM

## 2019-02-06 LAB — INR: 2.9 (ref 0.8–1.2)

## 2019-02-06 NOTE — TELEPHONE ENCOUNTER
Please call Joycie Moron called from [place] [address] at [phone] regarding the following:  INR 2.9  Dosage 4MG nightly    Pt Alan Gibbs is not giving his right hand enough pain relief, could pt take 2 tablets or would Dr Roxana Coyle like to increase dosage?

## 2019-02-07 NOTE — TELEPHONE ENCOUNTER
Message left on vml at pt's apartment regarding continuing Coumadin 4 mg daily and to repeat INR in 2 wks  Also left message for Avelino Watkins at [facility] with same instructions    Also per Avelino Watkins message   Pt not getting relief of pain in rt h

## 2019-02-07 NOTE — TELEPHONE ENCOUNTER
INR 2.9; continue warfarin 4 mg qHS; repeat in 2 weeks; attempted to call Robert Ramirez at [facility] [place]; RN not available; LMVM; please call pt with warfarin instructions

## 2019-02-13 ENCOUNTER — TELEPHONE (OUTPATIENT)
Dept: INTERNAL MEDICINE CLINIC | Facility: CLINIC | Age: 84
End: 2019-02-13

## 2019-02-13 NOTE — TELEPHONE ENCOUNTER
Voice mail message received from Kathleen Ernst Rn/[facility] ph# [phone]    Writing an order to see pt on Friday/Feb 15   & 2 X next week for wound care to right hand

## 2019-02-15 ENCOUNTER — TELEPHONE (OUTPATIENT)
Dept: INTERNAL MEDICINE CLINIC | Facility: CLINIC | Age: 84
End: 2019-02-15

## 2019-02-15 NOTE — TELEPHONE ENCOUNTER
Alethea Farnsworth from [facility]. H.H. Is calling to see if she can put tubi  on both of his legs also pt. Has a small dark red very painful hematoma on his right lateral shin pt. Doesn't know how it got there pt. Reports waking up with it  Please advise ph.  # [phone]

## 2019-02-15 NOTE — TELEPHONE ENCOUNTER
Called [address] from [facility] and relayed DR. OTTO message - left on  effie frank

## 2019-02-20 ENCOUNTER — TELEPHONE (OUTPATIENT)
Dept: INTERNAL MEDICINE CLINIC | Facility: CLINIC | Age: 84
End: 2019-02-20

## 2019-02-20 DIAGNOSIS — I48.91 ATRIAL FIBRILLATION, UNSPECIFIED TYPE (HCC): ICD-10-CM

## 2019-02-20 DIAGNOSIS — I48.20 CHRONIC ATRIAL FIBRILLATION (HCC): ICD-10-CM

## 2019-02-20 NOTE — TELEPHONE ENCOUNTER
Avelino Watkins from [employer]. Is calling with Results   PT 37.6  INR 3.1 pt.  Takes 4 mg daily  Ph. # [phone]  Routed high to Dr. Arsh Griffin

## 2019-02-21 ENCOUNTER — LAB ENCOUNTER (OUTPATIENT)
Dept: LAB | Age: 84
End: 2019-02-21
Attending: DERMATOLOGY
Payer: MEDICARE

## 2019-02-21 DIAGNOSIS — T14.8XXA SKIN WOUND FROM SURGICAL INCISION: ICD-10-CM

## 2019-02-21 PROCEDURE — 87147 CULTURE TYPE IMMUNOLOGIC: CPT

## 2019-02-21 PROCEDURE — 87186 SC STD MICRODIL/AGAR DIL: CPT

## 2019-02-21 PROCEDURE — 87070 CULTURE OTHR SPECIMN AEROBIC: CPT

## 2019-02-21 PROCEDURE — 87205 SMEAR GRAM STAIN: CPT

## 2019-02-21 NOTE — TELEPHONE ENCOUNTER
INR 3.1; continue warfarin 4 mg qHS; repeat in 2 weeks; please call Thomas Haskins RN at [facility] [place]; please call pt with warfarin instructions

## 2019-02-21 NOTE — TELEPHONE ENCOUNTER
Spoke to Abigail Domínguez from [facility] and advised on MD message below. Abigail Domínguez requesting if OK to repeat INR 3/8/19 instead of 3/6/19; per MD ok to repeat on 3/8. Abigail Domínguez verbalized understanding.      Spoke to pt and advised on Warfarin 4 mg dose per MD; pt verbal

## 2019-02-28 VITALS
SYSTOLIC BLOOD PRESSURE: 124 MMHG | DIASTOLIC BLOOD PRESSURE: 60 MMHG | HEIGHT: 72 IN | RESPIRATION RATE: 18 BRPM | WEIGHT: 167 LBS | BODY MASS INDEX: 22.62 KG/M2 | HEART RATE: 61 BPM

## 2019-03-08 ENCOUNTER — TELEPHONE (OUTPATIENT)
Dept: INTERNAL MEDICINE CLINIC | Facility: CLINIC | Age: 84
End: 2019-03-08

## 2019-03-08 DIAGNOSIS — I48.20 CHRONIC ATRIAL FIBRILLATION (HCC): ICD-10-CM

## 2019-03-08 LAB — INR: 2.8 (ref 0.8–1.2)

## 2019-03-08 NOTE — TELEPHONE ENCOUNTER
Avelino Watkins @ [facility] called back to add to message:    SBP was in 80s yesterday and pt was dizzy. DBP is 48 today. Re-certify pt for another episode for care.      Best khari back: [phone]

## 2019-03-12 RX ORDER — PREDNISONE 1 MG/1
2.5 TABLET ORAL DAILY
Qty: 30 TABLET | Refills: 11 | Status: SHIPPED | OUTPATIENT
Start: 2019-03-12 | End: 2020-01-20

## 2019-03-12 NOTE — TELEPHONE ENCOUNTER
Spoke to pt--- he used extra prednisone throughout the month of his own accord. Explained he needs to stay within Dr.O dosing;  Pt acknowledged;    He may need to pay cash for some of the month due to insurance coverage  Rx sent

## 2019-03-22 ENCOUNTER — TELEPHONE (OUTPATIENT)
Dept: INTERNAL MEDICINE CLINIC | Facility: CLINIC | Age: 84
End: 2019-03-22

## 2019-03-22 DIAGNOSIS — I48.20 CHRONIC ATRIAL FIBRILLATION (HCC): ICD-10-CM

## 2019-03-22 LAB — INR: 2.1 (ref 0.8–1.2)

## 2019-03-22 NOTE — TELEPHONE ENCOUNTER
Meghan Cortes @ [facility] calling with PT INR results:    PT - 25.3  INR - 2.1    Missed one dose last week.      Best call back: [phone]

## 2019-03-25 NOTE — PLAN OF CARE
DISCHARGE PLANNING    • Discharge to home or other facility with appropriate resources Completed        HEMATOLOGIC - ADULT    • Maintains hematologic stability Completed        PAIN - ADULT    • Verbalizes/displays adequate comfort level or patient's stat Patient/Caregiver provided printed discharge information.

## 2019-03-28 RX ORDER — DILTIAZEM HYDROCHLORIDE 180 MG/1
CAPSULE, EXTENDED RELEASE ORAL
Qty: 90 CAPSULE | Refills: 3 | Status: SHIPPED | OUTPATIENT
Start: 2019-03-28 | End: 2020-03-17

## 2019-04-05 ENCOUNTER — TELEPHONE (OUTPATIENT)
Dept: INTERNAL MEDICINE CLINIC | Facility: CLINIC | Age: 84
End: 2019-04-05

## 2019-04-05 DIAGNOSIS — I48.20 CHRONIC ATRIAL FIBRILLATION (HCC): ICD-10-CM

## 2019-04-05 RX ORDER — HYDROCODONE BITARTRATE AND ACETAMINOPHEN 7.5; 325 MG/1; MG/1
1 TABLET ORAL EVERY 6 HOURS PRN
Qty: 90 TABLET | Refills: 0 | Status: SHIPPED | OUTPATIENT
Start: 2019-04-05 | End: 2019-04-18

## 2019-04-05 NOTE — TELEPHONE ENCOUNTER
Pt requesting that refill for [place] be mailed to him at his home address, will be out of medication by Monday or Tuesday  Tasked to [employer]

## 2019-04-05 NOTE — TELEPHONE ENCOUNTER
Dr. Tyler Strong, please review Lenore's message. Norco request already routed to you in a refill request from this morning. [employer], please advise on INR.

## 2019-04-05 NOTE — TELEPHONE ENCOUNTER
Pt requesting PT evaluation for balance and also says he is a bit weaker    Pt requesting higher dose of Norco.  Current dose not helping his right hip, rates pain at 8, Norco brings down to 6.     INR 2.0  PT 23.4  Dosage 4MG daily, pt has not missed any d

## 2019-04-05 NOTE — TELEPHONE ENCOUNTER
TO MD:   The pended medication is for a schedule CII medication;   Please review   Print and sign if appropriate and staff will contact the patient for . To DR. OTTO  Patient wants it mailed to his home

## 2019-04-08 ENCOUNTER — TELEPHONE (OUTPATIENT)
Dept: INTERNAL MEDICINE CLINIC | Facility: CLINIC | Age: 84
End: 2019-04-08

## 2019-04-08 NOTE — TELEPHONE ENCOUNTER
Called Guzman Cassette from [facility] and relayed DR. OTTO message - verbalized understanding

## 2019-04-08 NOTE — TELEPHONE ENCOUNTER
Stanley Rodriguez was refilled on 4/5/19 (see TT) and mailed to pt's home address    OK for physical therapy eval    Please call [facility]

## 2019-04-08 NOTE — TELEPHONE ENCOUNTER
Spoke with pts daughter - [phone] Sharlene Lornabassem per patients consent form] to relay MD message. She verbalized understanding. Faxed LOV and TB document successfully to [facility].

## 2019-04-08 NOTE — TELEPHONE ENCOUNTER
OV from Jan 2019 FAXed to [facility]; all pertinent information requested from [facility] included; TB questionnaire completed and FAXed as well; please notify pt's daughter

## 2019-04-08 NOTE — TELEPHONE ENCOUNTER
Lenore/Parag called to follow up on     [place] request  & PT re evaluation    Please call Roseanna Lazo to advise [phone]

## 2019-04-08 NOTE — TELEPHONE ENCOUNTER
Spoke with Megan Her at [facility] to relay MD message below. She states that Chica Barbour is requesting a higher strength Norco Rx [HYDROcodone-acetaminophen 7.5-325 MG Oral Tab mailed 4/5].  Megan Her at [facility] states if MD will approve, Kings Orteznox daughter Meka Evangelista can c

## 2019-04-08 NOTE — TELEPHONE ENCOUNTER
Seeking call back from MD. Vance Hoffman spoke with pts daughter, George Duff per verbal conversation after discussing with Dr. Mark Anthony Harris that it would be best for Aggie Verma to come in for an OV in order to accurately fill out 9 page assessment that Karen Almodovar is requestin

## 2019-04-10 NOTE — TELEPHONE ENCOUNTER
Dr. Usha Huff, form is on your desk in a red folder. Please advise if nursing can complete form for your review.

## 2019-04-10 NOTE — TELEPHONE ENCOUNTER
[facility] is calling to let us know that they did get the fax we sent them  But they also need the form they sent us asking for the OV notes. Form has to be filled out, by doctor OR nurse, and faxed to them.

## 2019-04-12 ENCOUNTER — TELEPHONE (OUTPATIENT)
Dept: INTERNAL MEDICINE CLINIC | Facility: CLINIC | Age: 84
End: 2019-04-12

## 2019-04-12 DIAGNOSIS — I48.20 CHRONIC ATRIAL FIBRILLATION (HCC): ICD-10-CM

## 2019-04-12 NOTE — TELEPHONE ENCOUNTER
Alethea Farnsworth @ [facility]   Pt would like to get set up for home testing INR system.   Going to discharge pt from [facility] around 5/10    Best call back: [phone]

## 2019-04-17 NOTE — TELEPHONE ENCOUNTER
Pt called, still has not rec'd RX in mail  Please call to advise  Can a second RX be sent?   Pt will be out of medication tomorrow  Tasked to Dr Jolene Titus to advise

## 2019-04-18 RX ORDER — HYDROCODONE BITARTRATE AND ACETAMINOPHEN 7.5; 325 MG/1; MG/1
1 TABLET ORAL EVERY 6 HOURS PRN
Qty: 90 TABLET | Refills: 0 | Status: SHIPPED | OUTPATIENT
Start: 2019-04-18 | End: 2019-05-24

## 2019-04-19 ENCOUNTER — TELEPHONE (OUTPATIENT)
Dept: INTERNAL MEDICINE CLINIC | Facility: CLINIC | Age: 84
End: 2019-04-19

## 2019-04-19 DIAGNOSIS — I48.91 ATRIAL FIBRILLATION, UNSPECIFIED TYPE (HCC): ICD-10-CM

## 2019-04-19 DIAGNOSIS — I48.20 CHRONIC ATRIAL FIBRILLATION (HCC): ICD-10-CM

## 2019-04-19 NOTE — TELEPHONE ENCOUNTER
Spoke with Jameson Lancaster with [facility]. Relayed message from Dr. Marciano Whitmore verbalized understanding and states she will relay message to patient.

## 2019-04-19 NOTE — TELEPHONE ENCOUNTER
Lenore/ [facility]  INR 2.9  PT 35  Coumadin 4 mg nightly     Phone # [phone]   Tasked to Dr Ludivina nieves

## 2019-04-24 NOTE — TELEPHONE ENCOUNTER
Pt. Is calling to inform Dr. Terrence Herrera that he did receive Rx today ph.  # [phone]  Routed to Rx

## 2019-05-03 ENCOUNTER — TELEPHONE (OUTPATIENT)
Dept: INTERNAL MEDICINE CLINIC | Facility: CLINIC | Age: 84
End: 2019-05-03

## 2019-05-03 DIAGNOSIS — I48.20 CHRONIC ATRIAL FIBRILLATION (HCC): ICD-10-CM

## 2019-05-03 NOTE — TELEPHONE ENCOUNTER
Deepthi Angulo at [facility] calling.   Re certify for another episode of home care due to crackling in back

## 2019-05-03 NOTE — TELEPHONE ENCOUNTER
Marva Dk / Res  Worsening crackle sound, no cough, shortness of breath is not worse  Ankle Edema is better  Hasn't taken lasik in over 1 week

## 2019-05-03 NOTE — TELEPHONE ENCOUNTER
I spoke with CHEKO at [facility] and relayed Dr. Chhaya Magallanesestic message. She verbalized understanding. To Titus Bird asked for a call back. Thank you.

## 2019-05-03 NOTE — TELEPHONE ENCOUNTER
Loree Marshall @ [facility] :  Calling with INR results   INR - 2.9  Pt taking 4mg nightly. How is the obtaining the home INR testing.      Best call back: [phone]

## 2019-05-13 ENCOUNTER — TELEPHONE (OUTPATIENT)
Dept: INTERNAL MEDICINE CLINIC | Facility: CLINIC | Age: 84
End: 2019-05-13

## 2019-05-22 ENCOUNTER — TELEPHONE (OUTPATIENT)
Dept: INTERNAL MEDICINE CLINIC | Facility: CLINIC | Age: 84
End: 2019-05-22

## 2019-05-22 NOTE — TELEPHONE ENCOUNTER
Janice Welch / [facility] [phone] is calling she will be ordering tubi  for his Edema  Only need to call back if Dr Enid Simpson doesn't want her to order this   Tasked to nursing

## 2019-05-24 ENCOUNTER — TELEPHONE (OUTPATIENT)
Dept: INTERNAL MEDICINE CLINIC | Facility: CLINIC | Age: 84
End: 2019-05-24

## 2019-05-24 RX ORDER — HYDROCODONE BITARTRATE AND ACETAMINOPHEN 7.5; 325 MG/1; MG/1
1 TABLET ORAL EVERY 6 HOURS PRN
Qty: 90 TABLET | Refills: 0 | Status: SHIPPED | OUTPATIENT
Start: 2019-05-24 | End: 2019-06-21

## 2019-05-24 NOTE — TELEPHONE ENCOUNTER
TO MD:   The pended medication is for a schedule CII medication;   Please review   Print and sign if appropriate and staff will contact the patient for . To DR. CLARITA Whittington wants it mailed to him (  has stamps that can be used)

## 2019-05-24 NOTE — TELEPHONE ENCOUNTER
Pt requesting that RX for Norco be mailed to him at home address  Pt is low on medication  Tasked to [employer]

## 2019-05-24 NOTE — TELEPHONE ENCOUNTER
Rx for Norco mailed to patient's home mailing address. Placed in outgoing mail with  stamp placed on envelope.

## 2019-05-28 NOTE — TELEPHONE ENCOUNTER
Orquidea Messina calling to inform Dr Denae Sutherland that she is adding a [facility] nurse visit for this week (week of May 29)    Best call back: [phone]

## 2019-05-30 ENCOUNTER — TELEPHONE (OUTPATIENT)
Dept: INTERNAL MEDICINE CLINIC | Facility: CLINIC | Age: 84
End: 2019-05-30

## 2019-05-30 DIAGNOSIS — I48.20 CHRONIC ATRIAL FIBRILLATION (HCC): ICD-10-CM

## 2019-06-10 RX ORDER — WARFARIN SODIUM 2 MG/1
TABLET ORAL
Qty: 180 TABLET | Refills: 1 | Status: SHIPPED | OUTPATIENT
Start: 2019-06-10 | End: 2019-12-19

## 2019-06-20 ENCOUNTER — TELEPHONE (OUTPATIENT)
Dept: INTERNAL MEDICINE CLINIC | Facility: CLINIC | Age: 84
End: 2019-06-20

## 2019-06-20 NOTE — TELEPHONE ENCOUNTER
To Dr. Idania Blackwell - Jordon LOMAS:  The above refill request is for a controlled substance. Please review pended medication order. Print and sign for staff to fax to pharmacy.

## 2019-06-20 NOTE — TELEPHONE ENCOUNTER
Pt requesting refill for:  Dustin Calvert  Pt requesting that this refill be mailed to him at his home address  Pt calling early as he knows the mail can be very slow  Tasked to [employer]

## 2019-06-21 ENCOUNTER — TELEPHONE (OUTPATIENT)
Dept: INTERNAL MEDICINE CLINIC | Facility: CLINIC | Age: 84
End: 2019-06-21

## 2019-06-21 RX ORDER — HYDROCODONE BITARTRATE AND ACETAMINOPHEN 7.5; 325 MG/1; MG/1
1 TABLET ORAL EVERY 6 HOURS PRN
Qty: 90 TABLET | Refills: 0 | Status: SHIPPED | OUTPATIENT
Start: 2019-06-21 | End: 2019-07-25

## 2019-06-21 NOTE — TELEPHONE ENCOUNTER
Imp- OA; rec - refilled Norco 7.5 mg q. 6 hours p.r.n.  Pain, refilled #90; please mail to home; please notify pt

## 2019-06-21 NOTE — TELEPHONE ENCOUNTER
Sixto Ma from [facility] calling. Last 2 weeks has had headaches at level 7. Norco not helping with headaches. No issues with vision, no nausea, no dizziness.      Would like order for OT to teach patient stretches and exercises    Sixto Ma from [facility] [phone]

## 2019-06-21 NOTE — TELEPHONE ENCOUNTER
OK for OT order for [facility]; please call [facility]

## 2019-06-24 ENCOUNTER — TELEPHONE (OUTPATIENT)
Dept: INTERNAL MEDICINE CLINIC | Facility: CLINIC | Age: 84
End: 2019-06-24

## 2019-06-24 DIAGNOSIS — I48.20 CHRONIC ATRIAL FIBRILLATION (HCC): ICD-10-CM

## 2019-07-03 ENCOUNTER — TELEPHONE (OUTPATIENT)
Dept: INTERNAL MEDICINE CLINIC | Facility: CLINIC | Age: 84
End: 2019-07-03

## 2019-07-03 LAB — INR: 2.4 (ref 0.8–1.2)

## 2019-07-03 NOTE — TELEPHONE ENCOUNTER
[facility] Is calling to re-certify pt. For another episode of care for his edema and to add PT for his hip pain ph.  # [phone]   Routed to clinical

## 2019-07-04 NOTE — TELEPHONE ENCOUNTER
CARL with Reta Bumpers at [facility] giving her the OK per MD message below. Advised her to call back with any questions or concerns. Also provided her with our fax in the case that any documents need to be signed.

## 2019-07-05 ENCOUNTER — TELEPHONE (OUTPATIENT)
Dept: INTERNAL MEDICINE CLINIC | Facility: CLINIC | Age: 84
End: 2019-07-05

## 2019-07-05 DIAGNOSIS — I48.20 CHRONIC ATRIAL FIBRILLATION (HCC): ICD-10-CM

## 2019-07-05 DIAGNOSIS — I48.21 PERMANENT ATRIAL FIBRILLATION (HCC): ICD-10-CM

## 2019-07-05 NOTE — TELEPHONE ENCOUNTER
S/w patient and relayed MDs message. He verbalized back instructions for coumadin dosing. Informed of next INR to be done in 2 weeks.  He has Co-ag monitor at home

## 2019-07-17 LAB — INR: 2.2 (ref 0.8–1.2)

## 2019-07-18 ENCOUNTER — TELEPHONE (OUTPATIENT)
Dept: INTERNAL MEDICINE CLINIC | Facility: CLINIC | Age: 84
End: 2019-07-18

## 2019-07-18 DIAGNOSIS — I48.20 CHRONIC ATRIAL FIBRILLATION (HCC): ICD-10-CM

## 2019-07-25 ENCOUNTER — OFFICE VISIT (OUTPATIENT)
Dept: INTERNAL MEDICINE CLINIC | Facility: CLINIC | Age: 84
End: 2019-07-25
Payer: MEDICARE

## 2019-07-25 ENCOUNTER — LAB ENCOUNTER (OUTPATIENT)
Dept: LAB | Age: 84
End: 2019-07-25
Attending: INTERNAL MEDICINE
Payer: MEDICARE

## 2019-07-25 VITALS
SYSTOLIC BLOOD PRESSURE: 100 MMHG | OXYGEN SATURATION: 96 % | WEIGHT: 168 LBS | HEIGHT: 76 IN | DIASTOLIC BLOOD PRESSURE: 60 MMHG | BODY MASS INDEX: 20.46 KG/M2 | HEART RATE: 62 BPM | TEMPERATURE: 98 F

## 2019-07-25 DIAGNOSIS — M47.816 OSTEOARTHRITIS OF LUMBAR SPINE, UNSPECIFIED SPINAL OSTEOARTHRITIS COMPLICATION STATUS: Primary | ICD-10-CM

## 2019-07-25 DIAGNOSIS — R26.9 GAIT ABNORMALITY: ICD-10-CM

## 2019-07-25 DIAGNOSIS — E53.8 VITAMIN B12 DEFICIENCY: ICD-10-CM

## 2019-07-25 DIAGNOSIS — I27.20 PULMONARY HTN (HCC): ICD-10-CM

## 2019-07-25 DIAGNOSIS — I48.91 ATRIAL FIBRILLATION, UNSPECIFIED TYPE (HCC): ICD-10-CM

## 2019-07-25 LAB
ALBUMIN SERPL-MCNC: 3.4 G/DL (ref 3.4–5)
ALBUMIN/GLOB SERPL: 0.9 {RATIO} (ref 1–2)
ALP LIVER SERPL-CCNC: 74 U/L (ref 45–117)
ALT SERPL-CCNC: 13 U/L (ref 16–61)
ANION GAP SERPL CALC-SCNC: 7 MMOL/L (ref 0–18)
AST SERPL-CCNC: 13 U/L (ref 15–37)
BASOPHILS # BLD AUTO: 0.04 X10(3) UL (ref 0–0.2)
BASOPHILS NFR BLD AUTO: 0.4 %
BILIRUB SERPL-MCNC: 0.5 MG/DL (ref 0.1–2)
BUN BLD-MCNC: 10 MG/DL (ref 7–18)
BUN/CREAT SERPL: 11.4 (ref 10–20)
CALCIUM BLD-MCNC: 9.1 MG/DL (ref 8.5–10.1)
CHLORIDE SERPL-SCNC: 105 MMOL/L (ref 98–112)
CO2 SERPL-SCNC: 28 MMOL/L (ref 21–32)
CREAT BLD-MCNC: 0.88 MG/DL (ref 0.7–1.3)
DEPRECATED RDW RBC AUTO: 49.6 FL (ref 35.1–46.3)
DIGOXIN SERPL-MCNC: 1.15 NG/ML (ref 0.8–2)
EOSINOPHIL # BLD AUTO: 0.16 X10(3) UL (ref 0–0.7)
EOSINOPHIL NFR BLD AUTO: 1.5 %
ERYTHROCYTE [DISTWIDTH] IN BLOOD BY AUTOMATED COUNT: 15 % (ref 11–15)
GLOBULIN PLAS-MCNC: 3.9 G/DL (ref 2.8–4.4)
GLUCOSE BLD-MCNC: 102 MG/DL (ref 70–99)
HCT VFR BLD AUTO: 44.6 % (ref 39–53)
HGB BLD-MCNC: 14.1 G/DL (ref 13–17.5)
IMM GRANULOCYTES # BLD AUTO: 0.04 X10(3) UL (ref 0–1)
IMM GRANULOCYTES NFR BLD: 0.4 %
LYMPHOCYTES # BLD AUTO: 1.8 X10(3) UL (ref 1–4)
LYMPHOCYTES NFR BLD AUTO: 16.9 %
M PROTEIN MFR SERPL ELPH: 7.3 G/DL (ref 6.4–8.2)
MCH RBC QN AUTO: 28.6 PG (ref 26–34)
MCHC RBC AUTO-ENTMCNC: 31.6 G/DL (ref 31–37)
MCV RBC AUTO: 90.5 FL (ref 80–100)
MONOCYTES # BLD AUTO: 1.2 X10(3) UL (ref 0.1–1)
MONOCYTES NFR BLD AUTO: 11.3 %
NEUTROPHILS # BLD AUTO: 7.4 X10 (3) UL (ref 1.5–7.7)
NEUTROPHILS # BLD AUTO: 7.4 X10(3) UL (ref 1.5–7.7)
NEUTROPHILS NFR BLD AUTO: 69.5 %
OSMOLALITY SERPL CALC.SUM OF ELEC: 289 MOSM/KG (ref 275–295)
PATIENT FASTING: NO
PLATELET # BLD AUTO: 214 10(3)UL (ref 150–450)
POTASSIUM SERPL-SCNC: 4.3 MMOL/L (ref 3.5–5.1)
RBC # BLD AUTO: 4.93 X10(6)UL (ref 3.8–5.8)
SODIUM SERPL-SCNC: 140 MMOL/L (ref 136–145)
TSI SER-ACNC: 2.28 MIU/ML (ref 0.36–3.74)
WBC # BLD AUTO: 10.6 X10(3) UL (ref 4–11)

## 2019-07-25 PROCEDURE — 80162 ASSAY OF DIGOXIN TOTAL: CPT

## 2019-07-25 PROCEDURE — 80053 COMPREHEN METABOLIC PANEL: CPT

## 2019-07-25 PROCEDURE — 84443 ASSAY THYROID STIM HORMONE: CPT

## 2019-07-25 PROCEDURE — 85025 COMPLETE CBC W/AUTO DIFF WBC: CPT

## 2019-07-25 PROCEDURE — 99214 OFFICE O/P EST MOD 30 MIN: CPT | Performed by: INTERNAL MEDICINE

## 2019-07-25 PROCEDURE — G0463 HOSPITAL OUTPT CLINIC VISIT: HCPCS | Performed by: INTERNAL MEDICINE

## 2019-07-25 PROCEDURE — 36415 COLL VENOUS BLD VENIPUNCTURE: CPT

## 2019-07-25 RX ORDER — HYDROCODONE BITARTRATE AND ACETAMINOPHEN 7.5; 325 MG/1; MG/1
1 TABLET ORAL EVERY 6 HOURS PRN
Qty: 90 TABLET | Refills: 0 | Status: SHIPPED | OUTPATIENT
Start: 2019-07-25 | End: 2019-08-21

## 2019-07-25 NOTE — PROGRESS NOTES
Heide Welsh is a 80year old male.     HPI:   Patient presents with:  Checkup      81 y/o M with Osteoarthritis lumbar spine with c/o pain to right lower back extending into right hip; last imaging of L-spine was CT in June 2018 which showed scoliosis an TST, CARDIO ([facility])  9/11   • OTHER SURGICAL HISTORY  1970    Billroth anastomosis   • OTHER SURGICAL HISTORY  2011    dental implants   • SKIN SURGERY  02/11/2019    Mohs/R Dorsal hand/SCC/done by MM      Family History   Problem Relation Age of Onset   • O Disp: 30 tablet Rfl: 0   Multiple Vitamins-Minerals (ICAPS AREDS FORMULA) Oral Tab Take 1 tablet by mouth daily. Disp:  Rfl:    Cholecalciferol (VITAMIN D) 1000 units Oral Tab Take 1 tablet by mouth daily.  Disp:  Rfl:    Loperamide HCl 2 MG Oral Cap Take 2 manual wheelchair, though independent use is limited due to bilateral hand osteoarthritis; a power wheelchair will allow patient to access toilet, dining area and outpatient rehab independently and safely. Patient can execute bed to chair transfer.   Ryanne multifactorial -- COPD     Left hip fracture  s/p IM fixation in Jan 2017; on  Norco 7.5 mg po q4hrs prn     Sacral decubitus ulcer  uses Medihoney wound dressing gel daily prn     Protein calorie malnutrition  advise maximal nutritional support; albumin 2

## 2019-07-31 ENCOUNTER — TELEPHONE (OUTPATIENT)
Dept: INTERNAL MEDICINE CLINIC | Facility: CLINIC | Age: 84
End: 2019-07-31

## 2019-07-31 DIAGNOSIS — I48.20 CHRONIC ATRIAL FIBRILLATION (HCC): ICD-10-CM

## 2019-07-31 LAB — INR: 2.1 (ref 0.8–1.2)

## 2019-07-31 NOTE — TELEPHONE ENCOUNTER
Pt is calling he lives at an assisted living facility, this morning someone came out to draw his blood  Pt would like to know why?   Please call [phone]

## 2019-07-31 NOTE — TELEPHONE ENCOUNTER
He says someone came in today around 5:30 this morning at [facility] and essence blood. He says he is not sure who essence his blood or who ordered blood work. I asked him to speak with someone at [facility] to see if he can get more information. Dr. Luz Maria Hopson, please advise.

## 2019-08-03 ENCOUNTER — TELEPHONE (OUTPATIENT)
Dept: INTERNAL MEDICINE CLINIC | Facility: CLINIC | Age: 84
End: 2019-08-03

## 2019-08-03 NOTE — TELEPHONE ENCOUNTER
Call from [facility] nurse: pt with low grade temp this morning and reports muscle aches and joint aches. Feels chills. No other upper respiratory or urinary issues noted. Just took norco a little while ago.  If pt not feeling better, may need to be evaluated

## 2019-08-05 NOTE — TELEPHONE ENCOUNTER
I called [facility] and spoke with Liliana CARBALLO. She says patient never went to ER. He did not have a temperature over the weekend, no other concerns and has been feeling better. Yolonda Seip says he is feeling better today.

## 2019-08-06 ENCOUNTER — HOSPITAL ENCOUNTER (INPATIENT)
Facility: HOSPITAL | Age: 84
LOS: 1 days | Discharge: HOME | DRG: 152 | End: 2019-08-07
Attending: EMERGENCY MEDICINE | Admitting: INTERNAL MEDICINE
Payer: MEDICARE

## 2019-08-06 ENCOUNTER — TELEPHONE (OUTPATIENT)
Dept: INTERNAL MEDICINE CLINIC | Facility: CLINIC | Age: 84
End: 2019-08-06

## 2019-08-06 ENCOUNTER — APPOINTMENT (OUTPATIENT)
Dept: GENERAL RADIOLOGY | Facility: HOSPITAL | Age: 84
DRG: 152 | End: 2019-08-06
Attending: EMERGENCY MEDICINE
Payer: MEDICARE

## 2019-08-06 DIAGNOSIS — J18.9 COMMUNITY ACQUIRED PNEUMONIA, UNSPECIFIED LATERALITY: Primary | ICD-10-CM

## 2019-08-06 PROBLEM — J44.0 CHRONIC OBSTRUCTIVE PULMONARY DISEASE WITH ACUTE LOWER RESPIRATORY INFECTION (HCC): Status: ACTIVE | Noted: 2019-08-06

## 2019-08-06 LAB
ADENOVIRUS PCR:: NEGATIVE
ANION GAP SERPL CALC-SCNC: 7 MMOL/L (ref 0–18)
B PERT DNA SPEC QL NAA+PROBE: NEGATIVE
BASOPHILS # BLD AUTO: 0.03 X10(3) UL (ref 0–0.2)
BASOPHILS NFR BLD AUTO: 0.2 %
BUN BLD-MCNC: 8 MG/DL (ref 7–18)
BUN/CREAT SERPL: 8.6 (ref 10–20)
C PNEUM DNA SPEC QL NAA+PROBE: NEGATIVE
CALCIUM BLD-MCNC: 9.2 MG/DL (ref 8.5–10.1)
CHLORIDE SERPL-SCNC: 105 MMOL/L (ref 98–112)
CO2 SERPL-SCNC: 28 MMOL/L (ref 21–32)
CORONAVIRUS 229E PCR:: NEGATIVE
CORONAVIRUS HKU1 PCR:: NEGATIVE
CORONAVIRUS NL63 PCR:: NEGATIVE
CORONAVIRUS OC43 PCR:: NEGATIVE
CREAT BLD-MCNC: 0.93 MG/DL (ref 0.7–1.3)
DEPRECATED RDW RBC AUTO: 49.2 FL (ref 35.1–46.3)
DIGOXIN SERPL-MCNC: 1.19 NG/ML (ref 0.8–2)
EOSINOPHIL # BLD AUTO: 0.17 X10(3) UL (ref 0–0.7)
EOSINOPHIL NFR BLD AUTO: 1.4 %
ERYTHROCYTE [DISTWIDTH] IN BLOOD BY AUTOMATED COUNT: 14.8 % (ref 11–15)
FLUAV RNA SPEC QL NAA+PROBE: NEGATIVE
FLUBV RNA SPEC QL NAA+PROBE: NEGATIVE
GLUCOSE BLD-MCNC: 94 MG/DL (ref 70–99)
HCT VFR BLD AUTO: 45.4 % (ref 39–53)
HGB BLD-MCNC: 14.4 G/DL (ref 13–17.5)
IMM GRANULOCYTES # BLD AUTO: 0.07 X10(3) UL (ref 0–1)
IMM GRANULOCYTES NFR BLD: 0.6 %
INR BLD: 1.9 (ref 0.9–1.2)
LACTATE SERPL-SCNC: 2 MMOL/L (ref 0.4–2)
LYMPHOCYTES # BLD AUTO: 0.85 X10(3) UL (ref 1–4)
LYMPHOCYTES NFR BLD AUTO: 6.8 %
MCH RBC QN AUTO: 28.8 PG (ref 26–34)
MCHC RBC AUTO-ENTMCNC: 31.7 G/DL (ref 31–37)
MCV RBC AUTO: 90.8 FL (ref 80–100)
METAPNEUMOVIRUS PCR:: NEGATIVE
MONOCYTES # BLD AUTO: 1.19 X10(3) UL (ref 0.1–1)
MONOCYTES NFR BLD AUTO: 9.5 %
MRSA DNA SPEC QL NAA+PROBE: NEGATIVE
MYCOPLASMA PNEUMONIA PCR:: NEGATIVE
NEUTROPHILS # BLD AUTO: 10.16 X10 (3) UL (ref 1.5–7.7)
NEUTROPHILS # BLD AUTO: 10.16 X10(3) UL (ref 1.5–7.7)
NEUTROPHILS NFR BLD AUTO: 81.5 %
OSMOLALITY SERPL CALC.SUM OF ELEC: 288 MOSM/KG (ref 275–295)
PARAINFLUENZA 1 PCR:: NEGATIVE
PARAINFLUENZA 2 PCR:: NEGATIVE
PARAINFLUENZA 3 PCR:: NEGATIVE
PARAINFLUENZA 4 PCR:: NEGATIVE
PLATELET # BLD AUTO: 227 10(3)UL (ref 150–450)
POTASSIUM SERPL-SCNC: 4 MMOL/L (ref 3.5–5.1)
PROTHROMBIN TIME: 21.9 SECONDS (ref 11.8–14.5)
RBC # BLD AUTO: 5 X10(6)UL (ref 3.8–5.8)
RHINOVIRUS/ENTERO PCR:: POSITIVE
RSV RNA SPEC QL NAA+PROBE: NEGATIVE
SODIUM SERPL-SCNC: 140 MMOL/L (ref 136–145)
TROPONIN I SERPL-MCNC: <0.045 NG/ML (ref ?–0.04)
WBC # BLD AUTO: 12.5 X10(3) UL (ref 4–11)

## 2019-08-06 PROCEDURE — 71046 X-RAY EXAM CHEST 2 VIEWS: CPT | Performed by: EMERGENCY MEDICINE

## 2019-08-06 RX ORDER — LOPERAMIDE HYDROCHLORIDE 2 MG/1
2 CAPSULE ORAL 4 TIMES DAILY PRN
Status: DISCONTINUED | OUTPATIENT
Start: 2019-08-06 | End: 2019-08-07

## 2019-08-06 RX ORDER — DIGOXIN 125 MCG
125 TABLET ORAL DAILY
Status: DISCONTINUED | OUTPATIENT
Start: 2019-08-06 | End: 2019-08-07

## 2019-08-06 RX ORDER — FAMOTIDINE 10 MG/ML
20 INJECTION, SOLUTION INTRAVENOUS ONCE
Status: COMPLETED | OUTPATIENT
Start: 2019-08-06 | End: 2019-08-06

## 2019-08-06 RX ORDER — SODIUM CHLORIDE 9 MG/ML
INJECTION, SOLUTION INTRAVENOUS CONTINUOUS
Status: DISCONTINUED | OUTPATIENT
Start: 2019-08-06 | End: 2019-08-07

## 2019-08-06 RX ORDER — HYDROCODONE BITARTRATE AND ACETAMINOPHEN 7.5; 325 MG/1; MG/1
1 TABLET ORAL EVERY 6 HOURS PRN
Status: DISCONTINUED | OUTPATIENT
Start: 2019-08-06 | End: 2019-08-07

## 2019-08-06 RX ORDER — WARFARIN SODIUM 4 MG/1
4 TABLET ORAL NIGHTLY
Status: DISCONTINUED | OUTPATIENT
Start: 2019-08-06 | End: 2019-08-07

## 2019-08-06 RX ORDER — DILTIAZEM HYDROCHLORIDE 180 MG/1
180 CAPSULE, EXTENDED RELEASE ORAL DAILY
Status: DISCONTINUED | OUTPATIENT
Start: 2019-08-06 | End: 2019-08-07

## 2019-08-06 RX ORDER — PREDNISONE 1 MG/1
2.5 TABLET ORAL DAILY
Status: DISCONTINUED | OUTPATIENT
Start: 2019-08-06 | End: 2019-08-07

## 2019-08-06 RX ORDER — ACETAMINOPHEN 500 MG
500 TABLET ORAL EVERY 6 HOURS PRN
Status: DISCONTINUED | OUTPATIENT
Start: 2019-08-06 | End: 2019-08-07

## 2019-08-06 NOTE — TELEPHONE ENCOUNTER
Yesterday pt was feeling well but   Today he has a  temp 101.3  Coughing up clear phlegm/sneezing/doesn't feel well  Hearing wheezing on right anterior lung sound    Can chest xray be ordered or what does doctor advise    Please call RN [facility] [place] [phone]

## 2019-08-06 NOTE — PLAN OF CARE
Phone call was made to answering service to inform Dr. Donavon Cardenas that patient arrived to unit from emergency room and need admission orders.  reported that doctor is currently seeing a patient and will call back for orders.

## 2019-08-06 NOTE — ED NOTES
Orders for admission, patient is aware of plan and ready to go upstairs.  Any questions, please call ED HARIS Belcher at extension [phone]

## 2019-08-06 NOTE — PLAN OF CARE
Patient received in bed from emergency room. Alert and oriented x 4. Patient denied pain or discomfort at present time. Vital signs taken and stable. Call light within reach. Patient will be monitored for pain or discomfort. No respiratory distress noted.

## 2019-08-06 NOTE — ED PROVIDER NOTES
Patient Seen in: [facility] Emergency Department    History   Patient presents with:  Cough/URI  Fever (infectious)    Stated Complaint: cough, fever    HPI    Patient presents with cough congestion and fever of 101.3 at the assisted living he [address]  2006   • INTRAOCULAR LENS IMPLANT, SECONDARY  2006   • LEXISCAN NUC STRESS TST, CARDIO ([facility])  9/11   • OTHER SURGICAL HISTORY  1970    Billroth anastomosis   • OTHER SURGICAL HISTORY  2011    dental implants   • SKIN SURGERY  02/11/ a cough over the past few days  Cardiovascular: no chest pain  Respiratory: no shortness of breath  Gastrointestinal: no abdominal pain, no nausea, no vomiting      Positive for stated complaint: cough, fever  Other systems are as noted in HPI.   Constituti symptomology I did add on expanded respiratory panel. I did talk with Dr. Iona Aase who did accept patient for admission she did recommend IV Zosyn. We will give dose.   Patient was updated    ED Course     Labs Reviewed   BASIC METABOLIC PANEL (8) - Abnormal; basic health screening including reassessment of your blood pressure. Clinical Impression:  Community acquired pneumonia, unspecified laterality  (primary encounter diagnosis)    Disposition:  There is no disposition on file for this visit.     Follow-

## 2019-08-06 NOTE — TELEPHONE ENCOUNTER
Spoke to Cockle Drasco from [facility] [place]. She reports yesterday patient was feeling better and had no temperature, but today he is not feeling well. She reports he is weak, tired, and has general malaise.  She heard wheezing on auscultation of the right anterior l

## 2019-08-06 NOTE — ED INITIAL ASSESSMENT (HPI)
[facility] for eval offever of 101.3 and non productive cough since yesterday. Pt has no complaints of SOB or chest pian. States \"I just feel delia crummy\".  Last tylenol @ 0800

## 2019-08-06 NOTE — PLAN OF CARE
[facility] extending clindamycin for 8 weeks per Dr. Rg recommendation.     Second phone call was made to answering service, and spoke to Joya Baker who reported that she will have doctor page me for orders. Awaiting phone call back from Dr. Moni Meier for admission orders.

## 2019-08-07 ENCOUNTER — APPOINTMENT (OUTPATIENT)
Dept: GENERAL RADIOLOGY | Facility: HOSPITAL | Age: 84
DRG: 152 | End: 2019-08-07
Attending: INTERNAL MEDICINE
Payer: MEDICARE

## 2019-08-07 ENCOUNTER — TELEPHONE (OUTPATIENT)
Dept: INTERNAL MEDICINE CLINIC | Facility: CLINIC | Age: 84
End: 2019-08-07

## 2019-08-07 VITALS
TEMPERATURE: 98 F | HEIGHT: 76 IN | OXYGEN SATURATION: 95 % | WEIGHT: 166.5 LBS | HEART RATE: 54 BPM | DIASTOLIC BLOOD PRESSURE: 48 MMHG | RESPIRATION RATE: 18 BRPM | SYSTOLIC BLOOD PRESSURE: 112 MMHG | BODY MASS INDEX: 20.27 KG/M2

## 2019-08-07 PROBLEM — B97.89 VIRAL RESPIRATORY INFECTION: Status: ACTIVE | Noted: 2019-08-07

## 2019-08-07 PROBLEM — J98.8 VIRAL RESPIRATORY INFECTION: Status: ACTIVE | Noted: 2019-08-07

## 2019-08-07 LAB
ALBUMIN SERPL-MCNC: 2.7 G/DL (ref 3.4–5)
ALBUMIN/GLOB SERPL: 0.8 {RATIO} (ref 1–2)
ALP LIVER SERPL-CCNC: 53 U/L (ref 45–117)
ALT SERPL-CCNC: 8 U/L (ref 16–61)
ANION GAP SERPL CALC-SCNC: 8 MMOL/L (ref 0–18)
AST SERPL-CCNC: 11 U/L (ref 15–37)
BASOPHILS # BLD AUTO: 0.02 X10(3) UL (ref 0–0.2)
BASOPHILS NFR BLD AUTO: 0.3 %
BILIRUB SERPL-MCNC: 0.6 MG/DL (ref 0.1–2)
BUN BLD-MCNC: 11 MG/DL (ref 7–18)
BUN/CREAT SERPL: 9.7 (ref 10–20)
CALCIUM BLD-MCNC: 8.1 MG/DL (ref 8.5–10.1)
CHLORIDE SERPL-SCNC: 110 MMOL/L (ref 98–112)
CO2 SERPL-SCNC: 24 MMOL/L (ref 21–32)
CREAT BLD-MCNC: 1.13 MG/DL (ref 0.7–1.3)
DEPRECATED RDW RBC AUTO: 49.4 FL (ref 35.1–46.3)
EOSINOPHIL # BLD AUTO: 0.05 X10(3) UL (ref 0–0.7)
EOSINOPHIL NFR BLD AUTO: 0.7 %
ERYTHROCYTE [DISTWIDTH] IN BLOOD BY AUTOMATED COUNT: 15 % (ref 11–15)
GLOBULIN PLAS-MCNC: 3.5 G/DL (ref 2.8–4.4)
GLUCOSE BLD-MCNC: 104 MG/DL (ref 70–99)
HCT VFR BLD AUTO: 37.6 % (ref 39–53)
HGB BLD-MCNC: 12 G/DL (ref 13–17.5)
IMM GRANULOCYTES # BLD AUTO: 0.02 X10(3) UL (ref 0–1)
IMM GRANULOCYTES NFR BLD: 0.3 %
INR BLD: 1.93 (ref 0.9–1.2)
LYMPHOCYTES # BLD AUTO: 0.8 X10(3) UL (ref 1–4)
LYMPHOCYTES NFR BLD AUTO: 10.7 %
M PROTEIN MFR SERPL ELPH: 6.2 G/DL (ref 6.4–8.2)
MCH RBC QN AUTO: 29 PG (ref 26–34)
MCHC RBC AUTO-ENTMCNC: 31.9 G/DL (ref 31–37)
MCV RBC AUTO: 90.8 FL (ref 80–100)
MONOCYTES # BLD AUTO: 1.26 X10(3) UL (ref 0.1–1)
MONOCYTES NFR BLD AUTO: 16.8 %
NEUTROPHILS # BLD AUTO: 5.34 X10 (3) UL (ref 1.5–7.7)
NEUTROPHILS # BLD AUTO: 5.34 X10(3) UL (ref 1.5–7.7)
NEUTROPHILS NFR BLD AUTO: 71.2 %
OSMOLALITY SERPL CALC.SUM OF ELEC: 294 MOSM/KG (ref 275–295)
PLATELET # BLD AUTO: 157 10(3)UL (ref 150–450)
POTASSIUM SERPL-SCNC: 4.1 MMOL/L (ref 3.5–5.1)
PROTHROMBIN TIME: 22.2 SECONDS (ref 11.8–14.5)
RBC # BLD AUTO: 4.14 X10(6)UL (ref 3.8–5.8)
SODIUM SERPL-SCNC: 142 MMOL/L (ref 136–145)
WBC # BLD AUTO: 7.5 X10(3) UL (ref 4–11)

## 2019-08-07 PROCEDURE — 71046 X-RAY EXAM CHEST 2 VIEWS: CPT | Performed by: INTERNAL MEDICINE

## 2019-08-07 PROCEDURE — 99223 1ST HOSP IP/OBS HIGH 75: CPT | Performed by: INTERNAL MEDICINE

## 2019-08-07 RX ORDER — GUAIFENESIN 100 MG/5ML
200 SOLUTION ORAL EVERY 4 HOURS PRN
Refills: 0 | Status: SHIPPED | COMMUNITY
Start: 2019-08-07 | End: 2020-01-20

## 2019-08-07 RX ORDER — GUAIFENESIN 100 MG/5ML
200 SOLUTION ORAL EVERY 4 HOURS PRN
Status: DISCONTINUED | OUTPATIENT
Start: 2019-08-07 | End: 2019-08-07

## 2019-08-07 RX ORDER — MAGNESIUM OXIDE 400 MG (241.3 MG MAGNESIUM) TABLET
2 TABLET ONCE
Status: COMPLETED | OUTPATIENT
Start: 2019-08-07 | End: 2019-08-07

## 2019-08-07 RX ORDER — CEFUROXIME AXETIL 500 MG/1
500 TABLET ORAL EVERY 12 HOURS SCHEDULED
Qty: 20 TABLET | Refills: 0 | Status: SHIPPED | OUTPATIENT
Start: 2019-08-07 | End: 2020-01-27

## 2019-08-07 RX ORDER — CEFUROXIME AXETIL 500 MG/1
500 TABLET ORAL EVERY 12 HOURS SCHEDULED
Status: DISCONTINUED | OUTPATIENT
Start: 2019-08-07 | End: 2019-08-07

## 2019-08-07 NOTE — PLAN OF CARE
Problem: CARDIOVASCULAR - ADULT  Goal: Maintains optimal cardiac output and hemodynamic stability  Description  INTERVENTIONS:  - Monitor vital signs, rhythm, and trends  - Monitor for bleeding, hypotension and signs of decreased cardiac output  - Evalua development  - Assess and document skin integrity  - Monitor for areas of redness and/or skin breakdown  - Initiate interventions, skin care algorithm/standards of care as needed  Outcome: Progressing     Problem: RISK FOR INFECTION - ADULT  Goal: Absence

## 2019-08-07 NOTE — PLAN OF CARE
Problem: Patient Centered Care  Goal: Patient preferences are identified and integrated in the patient's plan of care  Description  Interventions:  - What would you like us to know as we care for you?  I'm ready to go back to [address] [place] [address] antiarrhythmic and heart rate control medications as ordered  - Initiate emergency measures for life threatening arrhythmias  - Monitor electrolytes and administer replacement therapy as ordered  Outcome: Progressing     Problem: RESPIRATORY - ADULT  Goal: for assistance with activity based on assessment  - Modify environment to reduce risk of injury  - Provide assistive devices as appropriate  - Consider OT/PT consult to assist with strengthening/mobility  - Encourage toileting schedule  Outcome: Progressin

## 2019-08-07 NOTE — CM/SW NOTE
1:03pm Update- C orders obtained. --  10:18am-Pt discussed in rounds who states the pt was weak overnight. Pt lives at [facility], must be less than 1 assist to return back there.     SUSANNE spoke with PT/Sis who felt the pt was less than 1 a

## 2019-08-07 NOTE — TELEPHONE ENCOUNTER
[facility] / [address] [place] [address] calling to get date of when pt needs to have next [phone]   Tasked to Dr German Izquierdo

## 2019-08-07 NOTE — OCCUPATIONAL THERAPY NOTE
OCCUPATIONAL THERAPY EVALUATION - INPATIENT     Room Number: 050/059-R  Evaluation Date: 8/7/2019  Type of Evaluation: Initial       Physician Order: IP Consult to Occupational Therapy  Reason for Therapy: ADL/IADL Dysfunction and Discharge Planning    O Approx Degree of Impairment: 32.79% has been calculated based on documentation in the [facility] '6 clicks' Inpatient Daily Activity Short Form.   Research supports that patients with this level of impairment may benefit from no services, however, d/t difficulty Laterality Date   • CARDIOVERSION  2007   • CATARACT  2006   • CATH PERCUTANEOUS  TRANSLUMINAL CORONARY ANGIOPLASTY  1990   • CHOLECYSTECTOMY     • COLON SURGERY Right 10/11   • CYSTOSCOPY STENT INSERTION Left 6/30/2018    Performed by Héctor Ly MD a WFL     ADDITIONAL TESTS                                    NEUROLOGICAL FINDINGS                   ACTIVITIES OF DAILY LIVING ASSESSMENT  AM-PAC ‘6-Clicks’ Inpatient Daily Activity Short Form  How much help from another person does the patient currently n Patient will complete LE dressing min a  Comment:     Goals  on:   Frequency:

## 2019-08-07 NOTE — PHYSICAL THERAPY NOTE
PHYSICAL THERAPY EVALUATION - INPATIENT     Room Number: 313/616-X  Evaluation Date: 8/7/2019  Type of Evaluation: Initial   Physician Order: PT Eval and Treat    Presenting Problem: viral respiratory infection, cough, fever  Reason for Therapy: Mobility Living)    PLAN  PT Treatment Plan: Bed mobility;Transfer training;Gait training;Patient education; Family education; Endurance; Energy conservation; Body mechanics; Coordination;Balance training;Strengthening;Range of motion  Rehab Potential : Fair  Frequency Malnutrition ([facility]     severe    • Osteoarthrosis, unspecified whether generalized or localized, unspecified site     hands   • Pneumonia    • Pneumonia due to organism 2016   • PUD (peptic ulcer disease)    • Pulmonary hypertension ([facility]    • Thrombocytos wheelchair. He is working on home health PT with this. SUBJECTIVE  Pt has 4 sons and 4 daughters.      PHYSICAL THERAPY EXAMINATION     OBJECTIVE  Precautions: (Droplet: Rhinovirus/enterovirus)  Fall Risk: High fall risk    WEIGHT BEARING RESTRICTION  We session/findings; All patient questions and concerns addressed; Alarm set; Discussed recommendations with /    CURRENT GOALS    Goals to be met by: 8/21/19  Patient Goal Patient's self-stated goal is: to walk at his granddaughter's we

## 2019-08-07 NOTE — PROGRESS NOTES
Follow-up chest x-ray shows mild opacification right lower lung. Patient has been feeling fine. He denies any shortness of breath. O2 saturations above 90% on room air. White cell count normalized. There have been no fever since admission.   Therefore

## 2019-08-07 NOTE — SLP NOTE
ADULT SWALLOWING EVALUATION    ASSESSMENT    ASSESSMENT/OVERALL IMPRESSION:  Pt seen sitting upright in bed for all PO trials and evaluation.  Pt verbalized his understanding of reviewed results of VFSS hx and described his noncompliance of nectar thick liq Administration Recommendations: Whole in puree  Treatment Plan/Recommendations: Videofluoroscopic swallow study;Aspiration precautions(VFSS to follow as OP if pt D/C today)  Discharge Recommendations/Plan: Undetermined    HISTORY   MEDICAL HISTORY  Reason admission, downgrade to nectar thick liquids post VFSS was made which patient verbalized his knowledge of at this current evaluation. However, pt described his noncompliance with nectar thick liquids in home environment since that admission.    Imaging Resu assistance 100 % of the time across 2 sessions.   In Progress     FOLLOW UP  Treatment Plan/Recommendations: Videofluoroscopic swallow study;Aspiration precautions(VFSS to follow as OP if pt D/C today)  Number of Visits to Meet Established Goals: 2  Follow

## 2019-08-07 NOTE — H&P
[facility]    History and Physical    Anisha Lock Patient Status:  Inpatient    10/22/1931 MRN [id number]   Location [facility]5W Attending Arlene Vargas MD   Hosp Day # 1 PCP Kane Plata MD     Date:  2019  Date disease      Past Surgical History:   Procedure Laterality Date   • CARDIOVERSION  2007   • CATARACT  2006   • CATH PERCUTANEOUS  TRANSLUMINAL CORONARY ANGIOPLASTY  1990   • CHOLECYSTECTOMY     • COLON SURGERY Right 10/11   • CYSTOSCOPY STENT INSERTION Lef 180 MG Oral Capsule SR 24 Hr TAKE 1 CAPSULE(180 MG) BY MOUTH DAILY   predniSONE 5 MG Oral Tab Take 0.5 tablets (2.5 mg total) by mouth daily.    Nystatin 080324 UNIT/GM External Powder ATAA groin QID   DIGOX 125 MCG Oral Tab TAKE 1 TABLET BY MOUTH EVERY DAY Occasional coarse sounds noted in bases without any wheezing   Abdominal: Soft. He exhibits no mass. There is no tenderness. Lymphadenopathy:     He has no cervical adenopathy. Neurological: He is alert and oriented to person, place, and time.  No cra 08/06/2019 at 12:44 by Ilsa Bolanos      Assessment/Plan:   Principal Problem:    Viral respiratory infection  Active Problems:    Vitamin B12 deficiency    CAD (coronary artery disease)    OA (osteoarthritis)    Atrial fibrillation, persistent ([facility]    Pa

## 2019-08-08 ENCOUNTER — PATIENT OUTREACH (OUTPATIENT)
Dept: CASE MANAGEMENT | Age: 84
End: 2019-08-08

## 2019-08-08 DIAGNOSIS — Z02.9 ENCOUNTERS FOR ADMINISTRATIVE PURPOSE: ICD-10-CM

## 2019-08-08 NOTE — PROGRESS NOTES
Initial Post Discharge Follow Up   Discharge Date: 8/7/19  Contact Date: 8/8/2019    Consent Verification:  Assessment Completed With: Patient  HIPAA Verified?   Yes    Discharge Dx:   Viral respiratory infection     General:   • How have you been since y Capsule SR 24 Hr TAKE 1 CAPSULE(180 MG) BY MOUTH DAILY Disp: 90 capsule Rfl: 3   predniSONE 5 MG Oral Tab Take 0.5 tablets (2.5 mg total) by mouth daily.  Disp: 30 tablet Rfl: 11   Nystatin 642328 UNIT/GM External Powder ATAA kassyin QID Disp: 60 g Rfl: 2   D concerns you need addressed before your next visit with your PCP?  (DME, meds, disease concerns, Etc): No     Follow up appointments:       Your appointments     Date & Time Appointment Department [facility])    Aug 21, 2019  2:20 PM CDT Specialty Office Visi Roseanna Lazo from [facility] is coming tomorrow. [x]  Discharge Summary, Discharge medications discussed with patient,  and orders reviewed and discussed. Any changes or updates to medications and or orders sent to PCP.

## 2019-08-11 ENCOUNTER — TELEPHONE (OUTPATIENT)
Dept: INTERNAL MEDICINE CLINIC | Facility: CLINIC | Age: 84
End: 2019-08-11

## 2019-08-11 NOTE — TELEPHONE ENCOUNTER
Home health nurse paged 8/10 AM: INR 2.1; pt doing well on 4mg daily. Reports low bp today, pt asymptomatic with sbp 90. Advised increased fluid and salt intake; call if he has symptoms.    Nursing to f/u Monday

## 2019-08-12 NOTE — TELEPHONE ENCOUNTER
Called patient to get an update. He states BP is back to normal. He resides at [facility] and has been having the nurse check it. BP today 125/60. He wanted to inform MD that he has an appt with the pain doctor; Dr. Sachin Mann for wrist and back pain.

## 2019-08-13 ENCOUNTER — TELEPHONE (OUTPATIENT)
Dept: INTERNAL MEDICINE CLINIC | Facility: CLINIC | Age: 84
End: 2019-08-13

## 2019-08-13 NOTE — TELEPHONE ENCOUNTER
Calling for verbal orders - will see patient   Once a week for 2 weeks     Effective this week for swallowing    Call back to Meagan/speech therapist /[facility] [phone]

## 2019-08-13 NOTE — TELEPHONE ENCOUNTER
I spoke with Amy Zhang at [facility] to let her know that we received her message. She verbalized understanding. To Dr. Sarah Nagy, [facility].

## 2019-08-14 ENCOUNTER — TELEPHONE (OUTPATIENT)
Dept: INTERNAL MEDICINE CLINIC | Facility: CLINIC | Age: 84
End: 2019-08-14

## 2019-08-16 ENCOUNTER — TELEPHONE (OUTPATIENT)
Dept: INTERNAL MEDICINE CLINIC | Facility: CLINIC | Age: 84
End: 2019-08-16

## 2019-08-16 NOTE — TELEPHONE ENCOUNTER
Bettye Mo from [facility]. H.H. Is calling to inform Dr. Ardine Oppenheim that Pt. Was seen for OT threapy provided education and was discharged without further visits ph.  # [phone]   Routed to clinical

## 2019-08-21 ENCOUNTER — TELEPHONE (OUTPATIENT)
Dept: INTERNAL MEDICINE CLINIC | Facility: CLINIC | Age: 84
End: 2019-08-21

## 2019-08-21 NOTE — TELEPHONE ENCOUNTER
TO MD:   The pended medication is for a schedule CII medication;   Please review   Print and sign if appropriate and staff will contact the patient for . To DR. OTTO

## 2019-08-21 NOTE — TELEPHONE ENCOUNTER
Pt requesting refill for:  Norco  This will be mailed to pt at home address in stamped envelope  Tasked to [employer]

## 2019-08-22 RX ORDER — HYDROCODONE BITARTRATE AND ACETAMINOPHEN 7.5; 325 MG/1; MG/1
1 TABLET ORAL EVERY 6 HOURS PRN
Qty: 90 TABLET | Refills: 0 | Status: SHIPPED | OUTPATIENT
Start: 2019-08-22 | End: 2019-09-30

## 2019-08-26 ENCOUNTER — TELEPHONE (OUTPATIENT)
Dept: INTERNAL MEDICINE CLINIC | Facility: CLINIC | Age: 84
End: 2019-08-26

## 2019-08-26 RX ORDER — LEVOFLOXACIN 500 MG/1
500 TABLET, FILM COATED ORAL DAILY
Qty: 10 TABLET | Refills: 0 | Status: SHIPPED | OUTPATIENT
Start: 2019-08-26 | End: 2019-12-19 | Stop reason: ALTCHOICE

## 2019-08-26 NOTE — TELEPHONE ENCOUNTER
Had +hemoptysis and +yellow sputum; pt was hospitalized 8/7/19 for pneumonia; took Ceftin 500 mg po BID x 10d;      Would not advise PCXR; will extend Abx with Levaquin 500 mg po qD #10; ERx sent; pt called;  RN called

## 2019-08-26 NOTE — TELEPHONE ENCOUNTER
Alberto call [facility] [address] rec'd call from pt   Re:  blood in sputum (one time on Friday), one episode of chills, no fever, more tired than normal, some anterior crackling  Sputum was clear now yellow   Pt having hip pain, unable

## 2019-08-26 NOTE — TELEPHONE ENCOUNTER
Please advise - called Pia Lopes RN with [facility] states patient had blood in sputum once on Friday, chills once , no fever , very tired , some anterior crackling , sputum as clear now yellow.  Has right hip pain but patient is to weak to come to the office

## 2019-08-27 NOTE — TELEPHONE ENCOUNTER
Attempted to call pharmacy back on two occasions and was placed on hold for a total of 15 min. Called again and was placed on hold again. Called back and LMOVM with Dr. Suman Man message.      Dr. Terrence Herrera to further advise (already routed by Dr. Shelton Lomb[place]).

## 2019-08-27 NOTE — TELEPHONE ENCOUNTER
Spoke with Rebekah at [facility]. She reports they are concerned about new Levaquin Rx ordered by Dr. Farfan [place] yesterday. Patient is on Coumadin.  Use of Coumadin with Levaquin may increase risk for bleeds and requires close monitoring of Coumadin dosing fo

## 2019-08-27 NOTE — TELEPHONE ENCOUNTER
To nursing, tell the pharmacy, please fill the levaquin. Dr Reema Peguero is aware pt is on coumadin and need for INR monitoring. We will check w Dr. Elva Mensah on his return tomorrow and verify dose and Rx for levaquin still ok w him. Thanks.      To nursing and to

## 2019-08-29 ENCOUNTER — TELEPHONE (OUTPATIENT)
Dept: INTERNAL MEDICINE CLINIC | Facility: CLINIC | Age: 84
End: 2019-08-29

## 2019-08-29 RX ORDER — HYDROCODONE BITARTRATE AND ACETAMINOPHEN 7.5; 325 MG/1; MG/1
1 TABLET ORAL EVERY 6 HOURS PRN
Qty: 90 TABLET | Refills: 0 | Status: CANCELLED | OUTPATIENT
Start: 2019-08-29

## 2019-08-29 NOTE — TELEPHONE ENCOUNTER
Pt called, still has not rec'd RX  Can another be mailed to him today?  (with stamp?)  Pt hoping this can be done today  Please call to advise  Tasked to nursing

## 2019-08-29 NOTE — TELEPHONE ENCOUNTER
To Dr. Ajay Baez----    Pt calling and reports he did not receive rx for norco sent 8/22/19. Grace Liu  last fill= 7/25/19. Control pended for review.

## 2019-08-29 NOTE — TELEPHONE ENCOUNTER
Pt called to follow up   Needs Dr Dada Abraham to contact Dr Bebeto Davis   (ph# [phone]) to ok for a cortisone injection in patient's hip & if he need to be off of coumadin    Pt also states fax was sent to Dr Arsh Griffin from Dr Bebeto Davis

## 2019-08-30 LAB — INR: 2.3 (ref 0.8–1.2)

## 2019-08-30 NOTE — TELEPHONE ENCOUNTER
Will need to wait to see if mail delivers Rx between now and next week; would not issue new Rx today; please call pt

## 2019-08-30 NOTE — TELEPHONE ENCOUNTER
Patient has not received his Norco script that was mailed on 8/23. Patient wants a new one mailed to him today.   In checking with clinical, patient was advised that we can not issue a new script & it is advisable when possible to have ask to have script f

## 2019-08-31 RX ORDER — LEVOFLOXACIN 500 MG/1
500 TABLET, FILM COATED ORAL DAILY
Qty: 10 TABLET | Refills: 0 | OUTPATIENT
Start: 2019-08-31

## 2019-09-03 ENCOUNTER — TELEPHONE (OUTPATIENT)
Dept: INTERNAL MEDICINE CLINIC | Facility: CLINIC | Age: 84
End: 2019-09-03

## 2019-09-03 DIAGNOSIS — I48.20 CHRONIC ATRIAL FIBRILLATION (HCC): ICD-10-CM

## 2019-09-03 NOTE — TELEPHONE ENCOUNTER
Patient called to let Dr Min Baird know   Norco prescription dated 8/22   was postmarked on 8/30    & patient received today 9/3

## 2019-09-03 NOTE — TELEPHONE ENCOUNTER
Received fax from [address] [place] [address] 8/30/2019  INR 2.3    Fax placed on [facility]

## 2019-09-04 ENCOUNTER — TELEPHONE (OUTPATIENT)
Dept: INTERNAL MEDICINE CLINIC | Facility: CLINIC | Age: 84
End: 2019-09-04

## 2019-09-04 DIAGNOSIS — I48.20 CHRONIC ATRIAL FIBRILLATION (HCC): ICD-10-CM

## 2019-09-04 NOTE — TELEPHONE ENCOUNTER
TO Domingo Welch from [facility] called INR today 1.2 - patient has been off coumadin fr 5 days - was supposed to get injection in right hip 95 , but due to him taking Levaquin he has to wait until Friday 9/6 so he will be off coumadin for 7 days.  Elton Kwan want

## 2019-09-04 NOTE — TELEPHONE ENCOUNTER
Lida Chapa from [facility]. .H.  Is calling to speak with a nurse regarding Shoaib's coumadin routed high to anti coag

## 2019-09-06 PROBLEM — G89.29 CHRONIC RIGHT HIP PAIN: Status: ACTIVE | Noted: 2019-09-06

## 2019-09-06 PROBLEM — M25.551 CHRONIC RIGHT HIP PAIN: Status: ACTIVE | Noted: 2019-09-06

## 2019-09-11 ENCOUNTER — TELEPHONE (OUTPATIENT)
Dept: INTERNAL MEDICINE CLINIC | Facility: CLINIC | Age: 84
End: 2019-09-11

## 2019-09-11 DIAGNOSIS — I48.20 CHRONIC ATRIAL FIBRILLATION (HCC): ICD-10-CM

## 2019-09-11 LAB — INR: 1.3 (ref 0.8–1.2)

## 2019-09-11 NOTE — TELEPHONE ENCOUNTER
Called patient and relayed DR. OTTO message - verbalized understanding.  AC Template updated
Continue warfarin 4 mg po qHS; re-check 1 week; please call Tai Lambert
INR today is 1.3   Pt called in reading - he tests at home    Started back on coumadin this past Friday after an injection    Taking 4 mg daily    [phone]
Please advise - to DR/O
Skye from [facility] is calling to report a critical INR 1.3 she has no call back number please call pt.  Directly   Routed high to espinoza
Other, please explain:/n/a

## 2019-09-18 ENCOUNTER — TELEPHONE (OUTPATIENT)
Dept: INTERNAL MEDICINE CLINIC | Facility: CLINIC | Age: 84
End: 2019-09-18

## 2019-09-18 DIAGNOSIS — I48.20 CHRONIC ATRIAL FIBRILLATION (HCC): ICD-10-CM

## 2019-09-18 LAB — INR: 1.7 (ref 0.8–1.2)

## 2019-09-18 NOTE — TELEPHONE ENCOUNTER
Left detailed message for [facility] RN and asked for call back to confirm receipt of the instructions. Explained that I would also call the patient to relay Dr. Elmo Eubanks message. Left message for patient to call back.  I asked the patient to ask for doc

## 2019-09-18 NOTE — TELEPHONE ENCOUNTER
Yes, may take extra dose of warfarin 2 mg tonight; then resume usual 4 mg po qHS; re-check in 2 weeks; please call Tai Lambert

## 2019-09-23 ENCOUNTER — TELEPHONE (OUTPATIENT)
Dept: INTERNAL MEDICINE CLINIC | Facility: CLINIC | Age: 84
End: 2019-09-23

## 2019-09-23 NOTE — TELEPHONE ENCOUNTER
I called Ritika Bello to give approval for extra visit---ok per protocol. She states a hard nodule patient has had for a while opened up. Patient wants to treat it his way.  Ritika Bello wants to go for an extra visit this week to assess wound to make sure its hea

## 2019-09-23 NOTE — TELEPHONE ENCOUNTER
Denice Henriquez from [facility] calling. Patient has lesion right leg, opened up. Nurse cleaned wound, no bleeding. Patient is wanting to treat wound his way with Silver Sulfa Diazine and a band aid.  Denice Henriquez is asking for verbal order for extra visit on Friday to acc

## 2019-09-30 ENCOUNTER — TELEPHONE (OUTPATIENT)
Dept: INTERNAL MEDICINE CLINIC | Facility: CLINIC | Age: 84
End: 2019-09-30

## 2019-09-30 DIAGNOSIS — I48.20 CHRONIC ATRIAL FIBRILLATION (HCC): ICD-10-CM

## 2019-09-30 LAB — INR: 1.8 (ref 0.8–1.2)

## 2019-09-30 NOTE — TELEPHONE ENCOUNTER
S/w [facility]. Verbal order given for wound care orders as suggested for RT skin, R achilles  She also requested order for an extra visit this week Friday.  She normally see's him once weekly, but would like to see him Friday for wound dressing villegas

## 2019-09-30 NOTE — TELEPHONE ENCOUNTER
[facility] called for wound care orders    Back of right - achilles area  Clean with wound cleanser  Apply xerfoam guaze  Cover with bodered foam   Change 2 x a week     Also requests same for right upper shin    Sagar Spotted requests call back at 61

## 2019-09-30 NOTE — TELEPHONE ENCOUNTER
To Dr. Madeleine Brunner----    The above refill request is for a controlled substance. Please review pended medication order. Print and sign for staff to mail to pt's home.

## 2019-09-30 NOTE — TELEPHONE ENCOUNTER
Pt called to request his [place] refill prescription is mailed out to him    Message 2 of 2 w/same name   Home health message was also sent to clinical

## 2019-10-03 RX ORDER — HYDROCODONE BITARTRATE AND ACETAMINOPHEN 7.5; 325 MG/1; MG/1
1 TABLET ORAL EVERY 6 HOURS PRN
Qty: 90 TABLET | Refills: 0 | Status: SHIPPED | OUTPATIENT
Start: 2019-10-03 | End: 2019-10-24

## 2019-10-04 ENCOUNTER — MED REC SCAN ONLY (OUTPATIENT)
Dept: INTERNAL MEDICINE CLINIC | Facility: CLINIC | Age: 84
End: 2019-10-04

## 2019-10-07 ENCOUNTER — TELEPHONE (OUTPATIENT)
Dept: INTERNAL MEDICINE CLINIC | Facility: CLINIC | Age: 84
End: 2019-10-07

## 2019-10-07 NOTE — TELEPHONE ENCOUNTER
SAdrian ruiz [address] [facility]. Gave verbal order to add another nurse visit this week.

## 2019-10-09 ENCOUNTER — TELEPHONE (OUTPATIENT)
Dept: INTERNAL MEDICINE CLINIC | Facility: CLINIC | Age: 84
End: 2019-10-09

## 2019-10-09 DIAGNOSIS — I48.20 CHRONIC ATRIAL FIBRILLATION (HCC): ICD-10-CM

## 2019-10-16 ENCOUNTER — TELEPHONE (OUTPATIENT)
Dept: INTERNAL MEDICINE CLINIC | Facility: CLINIC | Age: 84
End: 2019-10-16

## 2019-10-16 NOTE — TELEPHONE ENCOUNTER
Dayanara Juarez from [facility]. H.H. is calling to inform Dr. Grant Ye the pt. Is having some respiratory issues   Pt. Will also be switching to an on-site physician due to him having issues with mobility please send pt.  A release of info so his records can be faxed over .

## 2019-10-18 ENCOUNTER — TELEPHONE (OUTPATIENT)
Dept: INTERNAL MEDICINE CLINIC | Facility: CLINIC | Age: 84
End: 2019-10-18

## 2019-10-18 NOTE — TELEPHONE ENCOUNTER
[facility] [address] [place] [address] from Lakia Caceres . requesting extension of PT orders   For 2 more wks  2 days a wk

## 2019-10-18 NOTE — TELEPHONE ENCOUNTER
[facility] from [facility]. H.H. is calling to request new PT orders two tines a week for two weeks ph.  # [phone] ok to leave message  Routed to clinical

## 2019-10-22 ENCOUNTER — TELEPHONE (OUTPATIENT)
Dept: INTERNAL MEDICINE CLINIC | Facility: CLINIC | Age: 84
End: 2019-10-22

## 2019-10-22 NOTE — TELEPHONE ENCOUNTER
To MD:  The above refill request is for a controlled substance. Please review pended medication order. Print and sign for staff to fax to pharmacy or prescribe electronically.     [place] [facility] Medication Data     HYDROcodone-Acetaminophen       Dispensed [employer]

## 2019-10-22 NOTE — TELEPHONE ENCOUNTER
[facility]sworth / [facility] calling for nursing order to see pt Wednesday 10/22  Pt has color changes in his sputum, please call Alethea Farnsworth [phone]   Would like this to go to  on call   Tasked to nursing

## 2019-10-22 NOTE — TELEPHONE ENCOUNTER
Patient calling for refill [place].    [facility] [place]    Best number to contact patient at  [phone]

## 2019-10-22 NOTE — TELEPHONE ENCOUNTER
Verbal order, per protocol, relayed to [facility]  for nurse visit, understanding verbalized.   To Dr. Sigurd Essex

## 2019-10-23 ENCOUNTER — TELEPHONE (OUTPATIENT)
Dept: INTERNAL MEDICINE CLINIC | Facility: CLINIC | Age: 84
End: 2019-10-23

## 2019-10-23 DIAGNOSIS — I48.20 CHRONIC ATRIAL FIBRILLATION (HCC): ICD-10-CM

## 2019-10-23 NOTE — TELEPHONE ENCOUNTER
Fax received from [facility] w/INR results    INR 3.2      - fax sent to scanning/copy placed on Kristen's desk

## 2019-10-23 NOTE — TELEPHONE ENCOUNTER
Toan Staff @ [facility] :  Pt is having increased \"crackles\" in his right lobes, coughing is loose, and mucus is thicker and more yellow. Pt was instructed to take lasix with potassium tomorrow morning. Pt gets pneumonia very easily.      Best call back: [phone]

## 2019-10-24 RX ORDER — HYDROCODONE BITARTRATE AND ACETAMINOPHEN 7.5; 325 MG/1; MG/1
1 TABLET ORAL EVERY 6 HOURS PRN
Qty: 90 TABLET | Refills: 0 | Status: SHIPPED | OUTPATIENT
Start: 2019-10-24 | End: 2020-01-27

## 2019-10-24 NOTE — TELEPHONE ENCOUNTER
Spoke to [employer] and advised on MD message below; Elin Herron verbalized understanding. Reports patient will take lasix dose tomorrow and RN will advise if crackles persist after that.

## 2019-10-24 NOTE — TELEPHONE ENCOUNTER
INR 3.2; was on 4 mg qHS except 6 mg Tuesday; recommend hold x one dose tonight, then resume prior scheudle; next INR on Thurs, 11/7/19; please call [facility] [place]

## 2019-10-24 NOTE — TELEPHONE ENCOUNTER
Spoke to pt and advised on MD message below; pt verbalized understanding. Pt provided correct read back instructions and wrote instructions down. Spoke to Amilcar Vaca from [facility] and advised on MD message; Megan Her verbalized understanding.  Reports she mills

## 2019-10-28 ENCOUNTER — TELEPHONE (OUTPATIENT)
Dept: INTERNAL MEDICINE CLINIC | Facility: CLINIC | Age: 84
End: 2019-10-28

## 2019-10-28 NOTE — TELEPHONE ENCOUNTER
Request for [facility] INR physician order form completed and faxed back to [phone], conformation received. Original sent to scan.

## 2019-10-29 ENCOUNTER — TELEPHONE (OUTPATIENT)
Dept: INTERNAL MEDICINE CLINIC | Facility: CLINIC | Age: 84
End: 2019-10-29

## 2019-10-29 NOTE — TELEPHONE ENCOUNTER
Yuki Gant 3 annual faxed over for MD to review and sign. Reviewed/signed by MD and faxed back to Yuki Rios at [phone], conformation received. Original sent to scan.

## 2019-10-30 ENCOUNTER — TELEPHONE (OUTPATIENT)
Dept: INTERNAL MEDICINE CLINIC | Facility: CLINIC | Age: 84
End: 2019-10-30

## 2019-10-30 NOTE — TELEPHONE ENCOUNTER
To DR. Collette Mcmahan from [facility] called stating patient will be switching PCP- he will be having DR. Kailyn Springer as PCP , he will be signing [phone]. Ursula Barahona needs call back for confirmation.  PHI form mailed to patients home regarding release of information a

## 2019-10-31 NOTE — TELEPHONE ENCOUNTER
OK; please call [facility] [place]; coumadin results should then forward to Dr Alondra Patel; please notify [facility]; FYI to Bruna Ortega

## 2019-10-31 NOTE — TELEPHONE ENCOUNTER
Called Guzman Cassette from [facility] and relayed DR. OTTO message - verbalized understanding

## 2019-11-06 ENCOUNTER — TELEPHONE (OUTPATIENT)
Dept: INTERNAL MEDICINE CLINIC | Facility: CLINIC | Age: 84
End: 2019-11-06

## 2019-11-06 DIAGNOSIS — I48.20 CHRONIC ATRIAL FIBRILLATION (HCC): ICD-10-CM

## 2019-11-06 NOTE — TELEPHONE ENCOUNTER
Received fax from [facility] [place] with INR results.     Test date 11/6/2019   INR 2.0

## 2019-11-13 ENCOUNTER — TELEPHONE (OUTPATIENT)
Dept: INTERNAL MEDICINE CLINIC | Facility: CLINIC | Age: 84
End: 2019-11-13

## 2019-11-13 NOTE — TELEPHONE ENCOUNTER
Received fax from [facility] [place]    On 11/13/19  INR 2.2  Copy placed on Kristen's desk   Tasked to coumadin high

## 2019-12-02 ENCOUNTER — TELEPHONE (OUTPATIENT)
Dept: INTERNAL MEDICINE CLINIC | Facility: CLINIC | Age: 84
End: 2019-12-02

## 2019-12-02 NOTE — TELEPHONE ENCOUNTER
Please all pt daughter with status on forms that were mailed to our office for [address] assistance for pt  She said she mailed the forms over a month ago  Tasked to nursing

## 2019-12-15 ENCOUNTER — TELEPHONE (OUTPATIENT)
Dept: INTERNAL MEDICINE CLINIC | Facility: CLINIC | Age: 84
End: 2019-12-15

## 2019-12-16 RX ORDER — WARFARIN SODIUM 2 MG/1
TABLET ORAL
Qty: 180 TABLET | Refills: 0 | OUTPATIENT
Start: 2019-12-16

## 2019-12-16 NOTE — TELEPHONE ENCOUNTER
Per 11/13/19 telephone encounter, \"we no longer monitor coumadin\". Freddie Chris, ok to keep refilling?

## 2019-12-20 ENCOUNTER — MED REC SCAN ONLY (OUTPATIENT)
Dept: INTERNAL MEDICINE CLINIC | Facility: CLINIC | Age: 84
End: 2019-12-20

## 2019-12-20 NOTE — TELEPHONE ENCOUNTER
Spoke with Shoaib's daughter, [facility] to get an update on how pt has been doing and for her assistance with forms. She reports Crystal Brambila is at an [employer] in [place] and overall doing well;  He sleeps on average 8 hrs a night \"from 10-6\", is eating

## 2020-01-03 NOTE — TELEPHONE ENCOUNTER
As FYI TO DR. Bartolome Brush  - called Tracie Cervantes from [facility] who states that past week patient has had fever every night of 101 or 102 . RN instructed Tracie Cervantes to get patient to ER with ambulance - verbalized understanding .  F/U 7/2
Please call Giselle/[facility] at [phone]  Pt not feeling well today, fever each night this week  Requesting orders   Tasked to nursing
To DR. OTTO - patient admitted
Self/Transport

## 2020-01-17 NOTE — TELEPHONE ENCOUNTER
TCB to see if patient is out of this medication. One year was sent to [facility] on 3/12/19. This refill request is coming from [place]. Does patient want to use a different pharmacy?

## 2020-01-18 NOTE — TELEPHONE ENCOUNTER
Will also need to ask if pt has decided to change back to  (see prior TT for pt changed MDs to different MD where he resides)    Correction:  Pt has OV scheduled for DR. Kassie Gutierrez 1/27

## 2020-01-20 RX ORDER — PREDNISONE 1 MG/1
TABLET ORAL
Qty: 30 TABLET | Refills: 0 | Status: SHIPPED | OUTPATIENT
Start: 2020-01-20 | End: 2020-09-28

## 2020-01-20 NOTE — TELEPHONE ENCOUNTER
Requested Prescriptions     Signed Prescriptions Disp Refills   • PREDNISONE 5 MG Oral Tab 30 tablet 0     Sig: TAKE 1/2 TABLET(2.5 MG TOTAL) BY MOUTH DAILY     Authorizing Provider:  Juan Armstrong     Ordering User: Christianne Gan

## 2020-01-20 NOTE — TELEPHONE ENCOUNTER
Patient called back  He has enough medication to get through this week but would like it to be refilled before his appt with Dr Hero Adams. He states he does not use [facility]. He only uses [facility].   I reviewed chart, last erx sent to y[place]i in

## 2020-01-22 ENCOUNTER — TELEPHONE (OUTPATIENT)
Dept: INTERNAL MEDICINE CLINIC | Facility: CLINIC | Age: 85
End: 2020-01-22

## 2020-01-22 NOTE — TELEPHONE ENCOUNTER
Kathleen Ernst from [facility]. H.H. is calling pt. Was told he needs order for labs they can draw pt. Send orders ph.  # [phone]  Routed to clinical

## 2020-01-23 NOTE — TELEPHONE ENCOUNTER
May order CBC, CMP, digoxin level, PT/INR; also, please ask [facility] \"who is the certifying physician for [facility]? \"; please call [facility]

## 2020-01-24 ENCOUNTER — LAB REQUISITION (OUTPATIENT)
Dept: LAB | Facility: HOSPITAL | Age: 85
End: 2020-01-24
Payer: MEDICARE

## 2020-01-24 ENCOUNTER — TELEPHONE (OUTPATIENT)
Dept: INTERNAL MEDICINE CLINIC | Facility: CLINIC | Age: 85
End: 2020-01-24

## 2020-01-24 DIAGNOSIS — I48.19 OTHER PERSISTENT ATRIAL FIBRILLATION (HCC): ICD-10-CM

## 2020-01-24 DIAGNOSIS — I50.9 HEART FAILURE, UNSPECIFIED (HCC): ICD-10-CM

## 2020-01-24 DIAGNOSIS — I11.0 HYPERTENSIVE HEART DISEASE WITH HEART FAILURE (HCC): ICD-10-CM

## 2020-01-24 LAB
ALBUMIN SERPL-MCNC: 3.2 G/DL (ref 3.4–5)
ALBUMIN/GLOB SERPL: 0.9 {RATIO} (ref 1–2)
ALP LIVER SERPL-CCNC: 63 U/L (ref 45–117)
ALT SERPL-CCNC: 12 U/L (ref 16–61)
ANION GAP SERPL CALC-SCNC: 6 MMOL/L (ref 0–18)
AST SERPL-CCNC: 18 U/L (ref 15–37)
BASOPHILS # BLD AUTO: 0.05 X10(3) UL (ref 0–0.2)
BASOPHILS NFR BLD AUTO: 0.2 %
BILIRUB SERPL-MCNC: 0.9 MG/DL (ref 0.1–2)
BUN BLD-MCNC: 12 MG/DL (ref 7–18)
BUN/CREAT SERPL: 11.5 (ref 10–20)
CALCIUM BLD-MCNC: 9 MG/DL (ref 8.5–10.1)
CHLORIDE SERPL-SCNC: 107 MMOL/L (ref 98–112)
CO2 SERPL-SCNC: 28 MMOL/L (ref 21–32)
CREAT BLD-MCNC: 1.04 MG/DL (ref 0.7–1.3)
DEPRECATED RDW RBC AUTO: 49.7 FL (ref 35.1–46.3)
EOSINOPHIL # BLD AUTO: 0.02 X10(3) UL (ref 0–0.7)
EOSINOPHIL NFR BLD AUTO: 0.1 %
ERYTHROCYTE [DISTWIDTH] IN BLOOD BY AUTOMATED COUNT: 15 % (ref 11–15)
GLOBULIN PLAS-MCNC: 3.6 G/DL (ref 2.8–4.4)
GLUCOSE BLD-MCNC: 105 MG/DL (ref 70–99)
HCT VFR BLD AUTO: 43.7 % (ref 39–53)
HGB BLD-MCNC: 13.6 G/DL (ref 13–17.5)
IMM GRANULOCYTES # BLD AUTO: 0.11 X10(3) UL (ref 0–1)
IMM GRANULOCYTES NFR BLD: 0.5 %
INR BLD: 1.85 (ref 0.9–1.2)
LYMPHOCYTES # BLD AUTO: 1.37 X10(3) UL (ref 1–4)
LYMPHOCYTES NFR BLD AUTO: 6.6 %
M PROTEIN MFR SERPL ELPH: 6.8 G/DL (ref 6.4–8.2)
MCH RBC QN AUTO: 28.2 PG (ref 26–34)
MCHC RBC AUTO-ENTMCNC: 31.1 G/DL (ref 31–37)
MCV RBC AUTO: 90.7 FL (ref 80–100)
MONOCYTES # BLD AUTO: 2.11 X10(3) UL (ref 0.1–1)
MONOCYTES NFR BLD AUTO: 10.2 %
NEUTROPHILS # BLD AUTO: 17.09 X10 (3) UL (ref 1.5–7.7)
NEUTROPHILS # BLD AUTO: 17.09 X10(3) UL (ref 1.5–7.7)
NEUTROPHILS NFR BLD AUTO: 82.4 %
OSMOLALITY SERPL CALC.SUM OF ELEC: 292 MOSM/KG (ref 275–295)
PLATELET # BLD AUTO: 176 10(3)UL (ref 150–450)
POTASSIUM SERPL-SCNC: 4.2 MMOL/L (ref 3.5–5.1)
PROTHROMBIN TIME: 21.4 SECONDS (ref 11.8–14.5)
RBC # BLD AUTO: 4.82 X10(6)UL (ref 3.8–5.8)
SODIUM SERPL-SCNC: 141 MMOL/L (ref 136–145)
WBC # BLD AUTO: 20.8 X10(3) UL (ref 4–11)

## 2020-01-24 PROCEDURE — 85025 COMPLETE CBC W/AUTO DIFF WBC: CPT | Performed by: INTERNAL MEDICINE

## 2020-01-24 PROCEDURE — 85610 PROTHROMBIN TIME: CPT | Performed by: INTERNAL MEDICINE

## 2020-01-24 PROCEDURE — 85060 BLOOD SMEAR INTERPRETATION: CPT | Performed by: INTERNAL MEDICINE

## 2020-01-24 PROCEDURE — 80053 COMPREHEN METABOLIC PANEL: CPT | Performed by: INTERNAL MEDICINE

## 2020-01-24 NOTE — TELEPHONE ENCOUNTER
[facility] called  Cannot draw for Digoxen today as pt did not take Digoxen today  Pt is scheduled to see Dr Lydia Owens on Monday, 1/27/20  Tasked to nursing

## 2020-01-24 NOTE — TELEPHONE ENCOUNTER
OTIS [place] with [facility] and relayed MD message, nurse read back orders, confirmed orders.  She states she will try to have patient's labs done prior to Monday 1/27 appt with Dr. Daryl Narayanan physician is Dr. Arcadio Arzola [facility]?) but patient d

## 2020-01-27 ENCOUNTER — APPOINTMENT (OUTPATIENT)
Dept: INTERNAL MEDICINE CLINIC | Facility: CLINIC | Age: 85
End: 2020-01-27
Payer: MEDICARE

## 2020-01-27 ENCOUNTER — OFFICE VISIT (OUTPATIENT)
Dept: INTERNAL MEDICINE CLINIC | Facility: CLINIC | Age: 85
End: 2020-01-27
Payer: MEDICARE

## 2020-01-27 VITALS
HEIGHT: 74 IN | TEMPERATURE: 97 F | OXYGEN SATURATION: 98 % | SYSTOLIC BLOOD PRESSURE: 104 MMHG | BODY MASS INDEX: 21.15 KG/M2 | HEART RATE: 75 BPM | WEIGHT: 164.81 LBS | DIASTOLIC BLOOD PRESSURE: 70 MMHG

## 2020-01-27 DIAGNOSIS — M47.816 OSTEOARTHRITIS OF LUMBAR SPINE, UNSPECIFIED SPINAL OSTEOARTHRITIS COMPLICATION STATUS: ICD-10-CM

## 2020-01-27 DIAGNOSIS — J20.9 ACUTE BRONCHITIS, UNSPECIFIED ORGANISM: Primary | ICD-10-CM

## 2020-01-27 DIAGNOSIS — E53.8 VITAMIN B12 DEFICIENCY: ICD-10-CM

## 2020-01-27 DIAGNOSIS — I48.20 CHRONIC ATRIAL FIBRILLATION (HCC): ICD-10-CM

## 2020-01-27 DIAGNOSIS — D72.829 LEUKOCYTOSIS, UNSPECIFIED TYPE: ICD-10-CM

## 2020-01-27 PROCEDURE — G0463 HOSPITAL OUTPT CLINIC VISIT: HCPCS | Performed by: INTERNAL MEDICINE

## 2020-01-27 PROCEDURE — 96372 THER/PROPH/DIAG INJ SC/IM: CPT | Performed by: INTERNAL MEDICINE

## 2020-01-27 PROCEDURE — 99214 OFFICE O/P EST MOD 30 MIN: CPT | Performed by: INTERNAL MEDICINE

## 2020-01-27 RX ORDER — CEFUROXIME AXETIL 500 MG/1
500 TABLET ORAL 2 TIMES DAILY
Qty: 20 TABLET | Refills: 0 | Status: SHIPPED | OUTPATIENT
Start: 2020-01-27 | End: 2020-09-28 | Stop reason: ALTCHOICE

## 2020-01-27 RX ORDER — HYDROCODONE BITARTRATE AND ACETAMINOPHEN 7.5; 325 MG/1; MG/1
1 TABLET ORAL EVERY 6 HOURS PRN
Qty: 90 TABLET | Refills: 0 | Status: SHIPPED | OUTPATIENT
Start: 2020-01-27 | End: 2020-03-26

## 2020-01-27 RX ORDER — NEPAFENAC 0.3 %
1 SUSPENSION, DROPS(FINAL DOSAGE FORM)(ML) OPHTHALMIC (EYE) DAILY
Refills: 30 | COMMUNITY
Start: 2019-10-25

## 2020-01-27 RX ADMIN — CYANOCOBALAMIN 1000 MCG: 1000 INJECTION INTRAMUSCULAR; SUBCUTANEOUS at 13:27:00

## 2020-01-27 NOTE — PROGRESS NOTES
Osmar Rosales is a 80year old male. HPI:   Patient presents with:  Checkup: INR?  B 12 blood test results      81 y/o M who is a resident of [facility] in [place] who presents with h/o fever to 100.5 three days ago with mild cough; he repo [place]th anastomosis   • OTHER SURGICAL HISTORY  2011    dental implants   • SKIN SURGERY  02/11/2019    Mohs/R Dorsal hand/SCC/done by MM      Family History   Problem Relation Age of Onset   • Other (old age) Father 80        cause of death   • Other STRENGTH) 500 MG Oral Tab Take 1 tablet (500 mg total) by mouth every 6 (six) hours as needed for Pain (wrist pain). 30 tablet 0   • Multiple Vitamins-Minerals (ICAPS AREDS FORMULA) Oral Tab Take 1 tablet by mouth daily.      • Cholecalciferol (VITAMIN D) 1 L-spine in Aug 2019 shows 5 lumbar segments with 20° levoconvex scoliosis, slight flattening of the lordotic curvature. Endplate compression at L3. Moderate loss of disc heights throughout the lumbar spine. No spondylolisthesis. No destructive lesions. retrograde pyelogram, and insertion of left ureteral stent by Dr Adele Mendes.   Follow up with Dr. Adele Mendes     Stool incontinence  On Imodium 2 mg po q4hrs prn.  GI panel negative.      Left shoulder pain  XR both shoulders neg for DJD; DDx includes frozen shoul

## 2020-01-29 ENCOUNTER — TELEPHONE (OUTPATIENT)
Dept: INTERNAL MEDICINE CLINIC | Facility: CLINIC | Age: 85
End: 2020-01-29

## 2020-01-29 NOTE — TELEPHONE ENCOUNTER
Estelle Alarcon @ [facility] calling to leave a message for Dr Hattie Smart: pt saw Dr Hattie Smart on Monday, and prior to that he had a home health physician. Estelle Alarcon is wondering if Dr Hattie Smart will sign and approve OT for the pt for one time a week for four weeks.      Best call back: [phone]

## 2020-01-31 ENCOUNTER — TELEPHONE (OUTPATIENT)
Dept: INTERNAL MEDICINE CLINIC | Facility: CLINIC | Age: 85
End: 2020-01-31

## 2020-01-31 DIAGNOSIS — I48.20 CHRONIC ATRIAL FIBRILLATION (HCC): ICD-10-CM

## 2020-01-31 NOTE — TELEPHONE ENCOUNTER
Cornell Grier from [facility] calling. INR today 1/31  2.1  INR last week was 1.85    Ball of right foot has sore with small red area. Skin is in tact. Patient will be calling Dr. Alfredo Yeung regarding this.      Routing high to on call MD. Dr. Eileen Brittle.

## 2020-02-02 ENCOUNTER — HOSPITAL ENCOUNTER (OUTPATIENT)
Age: 85
Discharge: STILL ADMITTED OR ADMINISTRATIVE | End: 2020-02-02
Attending: EMERGENCY MEDICINE
Payer: MEDICARE

## 2020-02-02 ENCOUNTER — HOSPITAL ENCOUNTER (OUTPATIENT)
Dept: GENERAL RADIOLOGY | Age: 85
Discharge: HOME | End: 2020-02-02
Attending: INTERNAL MEDICINE
Payer: MEDICARE

## 2020-02-02 ENCOUNTER — HOSPITAL ENCOUNTER (OUTPATIENT)
Dept: GENERAL RADIOLOGY | Age: 85
Discharge: STILL ADMITTED OR ADMINISTRATIVE | End: 2020-02-02
Attending: INTERNAL MEDICINE
Payer: MEDICARE

## 2020-02-02 DIAGNOSIS — R50.9 FEVER: ICD-10-CM

## 2020-02-02 DIAGNOSIS — R50.9 FEVER, UNSPECIFIED FEVER CAUSE: ICD-10-CM

## 2020-02-02 DIAGNOSIS — R50.9 FEVER, UNSPECIFIED: ICD-10-CM

## 2020-02-02 PROCEDURE — 71046 X-RAY EXAM CHEST 2 VIEWS: CPT | Performed by: INTERNAL MEDICINE

## 2020-02-03 NOTE — TELEPHONE ENCOUNTER
Spoke to Keiko Chapman and relayed ok to extend OT and PT (ok per protocol)  Keiko Chapman verbalized understanding.

## 2020-02-03 NOTE — TELEPHONE ENCOUNTER
Tiffani Shipman calling back, still looking for a call back to extend OT and PT. Best call back: [phone], would like a call back tonight.

## 2020-02-03 NOTE — TELEPHONE ENCOUNTER
Noted spoke with home health over the weekend, and instructed them to continue the same Coumadin dosage, next INR in 1 to 2 weeks, but I will defer to Damion Acevedo, and Kishore Plascencia

## 2020-02-07 ENCOUNTER — TELEPHONE (OUTPATIENT)
Dept: INTERNAL MEDICINE CLINIC | Facility: CLINIC | Age: 85
End: 2020-02-07

## 2020-02-07 DIAGNOSIS — I48.20 CHRONIC ATRIAL FIBRILLATION (HCC): ICD-10-CM

## 2020-02-07 LAB — INR: 1.7 (ref 0.8–1.2)

## 2020-02-07 NOTE — TELEPHONE ENCOUNTER
Kathleen Ernst @ [facility]     INR - 1.7    Pt is on an antibiotic.    Best call back: [phone]

## 2020-02-07 NOTE — TELEPHONE ENCOUNTER
Fax rec'd from [facility] with INR result for pt  INR 1.7  Copy given to [address] [place] [address] sent to be scanned

## 2020-02-10 ENCOUNTER — TELEPHONE (OUTPATIENT)
Dept: INTERNAL MEDICINE CLINIC | Facility: CLINIC | Age: 85
End: 2020-02-10

## 2020-02-11 NOTE — TELEPHONE ENCOUNTER
CXR showed   1. Chronic bibasilar parenchymal abnormality with linear atelectasis scarring in the perihilar regions with chronic left basilar retrocardiac opacification. .   2.  Slight progression right basilar subsegmental atelectasis and developing pneumo

## 2020-02-24 ENCOUNTER — TELEPHONE (OUTPATIENT)
Dept: INTERNAL MEDICINE CLINIC | Facility: CLINIC | Age: 85
End: 2020-02-24

## 2020-02-24 DIAGNOSIS — I48.20 CHRONIC ATRIAL FIBRILLATION (HCC): ICD-10-CM

## 2020-02-24 LAB — INR: 1.9 (ref 0.8–1.2)

## 2020-02-28 ENCOUNTER — TELEPHONE (OUTPATIENT)
Dept: INTERNAL MEDICINE CLINIC | Facility: CLINIC | Age: 85
End: 2020-02-28

## 2020-02-28 NOTE — TELEPHONE ENCOUNTER
Brock from [facility] H.H. is calling to inform Dr. Javon Henriquez that she would like to re-certify him for another episode of nursing care due to CHF please leave name an confidential voice mail ph.  # [phone]   Routed to clinical

## 2020-02-28 NOTE — TELEPHONE ENCOUNTER
As FYI to DR. Roselynn Lanes called Pia Lopes from [facility] and gave verbal approval for plan of care per protocol - verbalized understanding

## 2020-03-04 ENCOUNTER — TELEPHONE (OUTPATIENT)
Dept: INTERNAL MEDICINE CLINIC | Facility: CLINIC | Age: 85
End: 2020-03-04

## 2020-03-04 DIAGNOSIS — I48.20 CHRONIC ATRIAL FIBRILLATION (HCC): ICD-10-CM

## 2020-03-04 LAB — INR: 2.1 (ref 0.8–1.2)

## 2020-03-04 NOTE — TELEPHONE ENCOUNTER
Renny Torres from [facility].. is calling to pt. Has a new large skin tear on the right forearm Cleanse the wound with wound cleanser, use petrolleum guaze, bordered foam, and change twice a week ph.  # [phone] ok to leave message on confidential voice mail r

## 2020-03-05 NOTE — TELEPHONE ENCOUNTER
PÅLÄNG from [facility] and relayed DR. OTTO message - ok for wound care plan -verbalized understanding

## 2020-03-16 ENCOUNTER — TELEPHONE (OUTPATIENT)
Dept: INTERNAL MEDICINE CLINIC | Facility: CLINIC | Age: 85
End: 2020-03-16

## 2020-03-17 RX ORDER — DILTIAZEM HYDROCHLORIDE 180 MG/1
CAPSULE, COATED, EXTENDED RELEASE ORAL
Qty: 90 CAPSULE | Refills: 3 | Status: SHIPPED | OUTPATIENT
Start: 2020-03-17 | End: 2021-03-11

## 2020-03-19 ENCOUNTER — TELEPHONE (OUTPATIENT)
Dept: INTERNAL MEDICINE CLINIC | Facility: CLINIC | Age: 85
End: 2020-03-19

## 2020-03-19 DIAGNOSIS — I48.20 CHRONIC ATRIAL FIBRILLATION (HCC): ICD-10-CM

## 2020-03-20 LAB — INR: 2 (ref 0.8–1.2)

## 2020-03-26 ENCOUNTER — TELEPHONE (OUTPATIENT)
Dept: INTERNAL MEDICINE CLINIC | Facility: CLINIC | Age: 85
End: 2020-03-26

## 2020-03-26 RX ORDER — HYDROCODONE BITARTRATE AND ACETAMINOPHEN 7.5; 325 MG/1; MG/1
1 TABLET ORAL EVERY 6 HOURS PRN
Qty: 90 TABLET | Refills: 0 | Status: SHIPPED | OUTPATIENT
Start: 2020-03-26 | End: 2020-05-11

## 2020-03-26 NOTE — TELEPHONE ENCOUNTER
Patient called for [place] refill  Please send to [facility]    He can be reached at [phone] if there are any questions

## 2020-03-26 NOTE — TELEPHONE ENCOUNTER
To MD:  The above refill request is for a controlled substance. Please review pended medication order. Print and sign for staff to fax to pharmacy or prescribe electronically.     Last refilled: 1/27/2020 #- 90 Refill-0

## 2020-03-31 ENCOUNTER — TELEPHONE (OUTPATIENT)
Dept: INTERNAL MEDICINE CLINIC | Facility: CLINIC | Age: 85
End: 2020-03-31

## 2020-04-01 ENCOUNTER — TELEPHONE (OUTPATIENT)
Dept: INTERNAL MEDICINE CLINIC | Facility: CLINIC | Age: 85
End: 2020-04-01

## 2020-04-01 DIAGNOSIS — I48.20 CHRONIC ATRIAL FIBRILLATION (HCC): ICD-10-CM

## 2020-04-01 RX ORDER — PREDNISONE 1 MG/1
TABLET ORAL
Qty: 45 TABLET | Refills: 3 | OUTPATIENT
Start: 2020-04-01

## 2020-04-01 RX ORDER — CYANOCOBALAMIN 1000 UG/ML
1000 INJECTION INTRAMUSCULAR; SUBCUTANEOUS
Qty: 3 ML | Refills: 3 | Status: SHIPPED | OUTPATIENT
Start: 2020-04-01 | End: 2021-03-26

## 2020-04-01 RX ORDER — PREDNISONE 1 MG/1
TABLET ORAL
Qty: 45 TABLET | Refills: 3 | Status: SHIPPED | OUTPATIENT
Start: 2020-04-01 | End: 2020-09-28

## 2020-04-01 RX ORDER — SYRINGE W-NEEDLE,DISPOSAB,3 ML 25GX5/8"
SYRINGE, EMPTY DISPOSABLE MISCELLANEOUS
Qty: 3 EACH | Refills: 3 | Status: SHIPPED | OUTPATIENT
Start: 2020-04-01 | End: 2021-03-29

## 2020-04-01 NOTE — TELEPHONE ENCOUNTER
[facility] Michelle Kid [phone]    Pt right hand is swollen have harp pain when moving pt did not have a fall. Asking for a portable xray.      Also like his b12 shot to be done at home by his visiting nurse will need order for b12 also the medication and syrin

## 2020-04-01 NOTE — TELEPHONE ENCOUNTER
[facility] [phone]    Like a bigger dose on his Prednisone pt is in a lot of pain his right knee is swollen and very painful. He is taken his Norco every 4hr but tats only taken the edge off. It takes 4 people to get him out of bed.  Pt is having a

## 2020-04-01 NOTE — TELEPHONE ENCOUNTER
Prednisone request to Dr Idania Blackwell to please review--is this a chronic dose?   Pt stating he is out of medication

## 2020-04-01 NOTE — TELEPHONE ENCOUNTER
Resident of [facility]; has not had physical therapy x 2 weeks due to limited PT at [facility];     On prednisone 2.5 mg po qD;     INR 1.9; same dose; re-check in 2 weeks    rec- increase prednisone 10 mg po qD x14d, then 5 mg po qD x 14d, then 2.5 mg po qD #30;

## 2020-04-06 ENCOUNTER — TELEPHONE (OUTPATIENT)
Dept: INTERNAL MEDICINE CLINIC | Facility: CLINIC | Age: 85
End: 2020-04-06

## 2020-04-06 NOTE — TELEPHONE ENCOUNTER
Calling  to report change in condition    Right knee is much more swollen than left  Skin is warmer & pink   Whenever pt moves it is very painful/dull ache  Can't stand up on it  Wondering if he has gout exacerbation ?     Call back to [facility] at [place] [address]

## 2020-04-06 NOTE — TELEPHONE ENCOUNTER
Call transferred from , [facility] nurse [facility] is with patient now. Patient's right knee is swollen and painful  Knee is warm to touch and pink. Some of the area above and below the knee is pink too.   Patient does have bad arthritis and is currently

## 2020-04-08 NOTE — TELEPHONE ENCOUNTER
To DR. OTTO - called patient who states his right knee is better, redness gone and swelling went down. When he went to the bathroom with help and sitting down on toilet he twisted his left knee . He alonso redness or swelling .  Sates he put on a brace  , and it

## 2020-04-14 NOTE — TELEPHONE ENCOUNTER
To Dr. Eduardo Nichols - OK to wait for you - pt has enough pills for week. Pt states harmacy is asking for new 2.5mg Rx. Confirmed with pharmacy. See/complete pended if OK.

## 2020-04-15 ENCOUNTER — TELEPHONE (OUTPATIENT)
Dept: INTERNAL MEDICINE CLINIC | Facility: CLINIC | Age: 85
End: 2020-04-15

## 2020-04-15 DIAGNOSIS — I48.20 CHRONIC ATRIAL FIBRILLATION (HCC): ICD-10-CM

## 2020-04-15 RX ORDER — PREDNISONE 2.5 MG
2.5 TABLET ORAL DAILY
Qty: 90 TABLET | Refills: 3 | Status: SHIPPED | OUTPATIENT
Start: 2020-04-15 | End: 2021-04-16

## 2020-04-15 NOTE — TELEPHONE ENCOUNTER
Spoke to BODØ - confirmed our last documented dose was 4 mg six day weekly and 6 mg once weekly; She will confirm with pt as this has been steady dose;    He will continue the home dose and recheck in 1 month based on [facility] visits;  BODØ will call kecia

## 2020-04-15 NOTE — TELEPHONE ENCOUNTER
Please call [facility] at [phone]    INR 2.3  Pt takes 5MG daily    Pt has new skin tear, left upper arm, will treat with triple antibiotic, vaseline guaze and bordered foam and change two times per week    Right wrist and right

## 2020-04-27 ENCOUNTER — TELEPHONE (OUTPATIENT)
Dept: INTERNAL MEDICINE CLINIC | Facility: CLINIC | Age: 85
End: 2020-04-27

## 2020-04-27 NOTE — TELEPHONE ENCOUNTER
Res Alverna Romberg [phone]    Will re certify pt for management of his arthritis also physical therapy and OT.

## 2020-04-30 ENCOUNTER — TELEPHONE (OUTPATIENT)
Dept: INTERNAL MEDICINE CLINIC | Facility: CLINIC | Age: 85
End: 2020-04-30

## 2020-04-30 NOTE — TELEPHONE ENCOUNTER
To Dr. Vineet Barnes - see below. Spoke with Sharmila/Magdy Horan not available) who checked on pt for me. Pt states he is feeling much better. No BM yet today but yesterday has normal BM. Drinking ginger ale now and will try some toast in a bit.

## 2020-04-30 NOTE — TELEPHONE ENCOUNTER
Giselle/Magdy called as FYI to Dr Brionna Bhakta  Wanted to share that pt was not feeling well this morning, temp 99.6, /68 (at baseline) pt complained of stomach upset, shivering  Pt took [place], feeling better  Tasked to nursing

## 2020-05-01 ENCOUNTER — TELEPHONE (OUTPATIENT)
Dept: INTERNAL MEDICINE CLINIC | Facility: CLINIC | Age: 85
End: 2020-05-01

## 2020-05-01 RX ORDER — AMOXICILLIN AND CLAVULANATE POTASSIUM 875; 125 MG/1; MG/1
1 TABLET, FILM COATED ORAL 2 TIMES DAILY
Qty: 20 TABLET | Refills: 0 | Status: SHIPPED | OUTPATIENT
Start: 2020-05-01 | End: 2020-05-11

## 2020-05-01 NOTE — TELEPHONE ENCOUNTER
Dayanara Juarez from [facility]. is requesting PRN orders to see pt. Today he is coughing up blood not a huge amount he isn't s.o.b. he has been having chills without a fever the last time he had pneumonia he had chills and no fever ph.  # [phone]  Routed high t

## 2020-05-01 NOTE — TELEPHONE ENCOUNTER
Imp- suspected aspiration pneumonia;  Rec- check PCXR; start Augmentin 875 mg po BID #20; downgrade diet to soft diet with nectar thick liquids; RN and pt called

## 2020-05-01 NOTE — TELEPHONE ENCOUNTER
To DR. Nicole Ramos called - patient is weaker , Temp 98.2 pulse 56, RR 24, BP left arm 88/48 on right arm 96/48 - INR 2.7 Lungs increased Crackles posterior lobes - normally only crackles in bases , coughing more , sputum thick yellow salmon colored .  Coug

## 2020-05-01 NOTE — TELEPHONE ENCOUNTER
To Dr. Ziggy Olson----    Spoke to Wander Anand RN who reports patient gets PNA very easily. Patient had small amount of hemoptysis. Reports patient is on Coumadin, but INR's have been therapeutic. Patient also reports chills, but no fever.      Denies SOB, difficult

## 2020-05-04 ENCOUNTER — TELEPHONE (OUTPATIENT)
Dept: INTERNAL MEDICINE CLINIC | Facility: CLINIC | Age: 85
End: 2020-05-04

## 2020-05-04 DIAGNOSIS — I48.20 CHRONIC ATRIAL FIBRILLATION (HCC): ICD-10-CM

## 2020-05-04 NOTE — TELEPHONE ENCOUNTER
- IV Vanc and Cefepime   - WBC 27 (9/4)    [address] [facility] faxed a INR result. Collection date- 5/4/2020    INR 2.7    Routed high to [facility].

## 2020-05-11 ENCOUNTER — TELEPHONE (OUTPATIENT)
Dept: INTERNAL MEDICINE CLINIC | Facility: CLINIC | Age: 85
End: 2020-05-11

## 2020-05-11 DIAGNOSIS — I48.20 CHRONIC ATRIAL FIBRILLATION (HCC): ICD-10-CM

## 2020-05-11 RX ORDER — HYDROCODONE BITARTRATE AND ACETAMINOPHEN 7.5; 325 MG/1; MG/1
1 TABLET ORAL EVERY 6 HOURS PRN
Qty: 90 TABLET | Refills: 0 | Status: SHIPPED | OUTPATIENT
Start: 2020-05-11 | End: 2020-07-01

## 2020-05-11 NOTE — TELEPHONE ENCOUNTER
INR 1.9  Dosage 4MG every day except Tuesday taking 6MG    Antibiotics done tomorrow    Tasked to nursing none

## 2020-05-11 NOTE — TELEPHONE ENCOUNTER
As FYI to DR. Esme Baez called Manisha Ly from [facility] and gave verbal approval for plan of care per protocol - verbalized understanding

## 2020-05-11 NOTE — TELEPHONE ENCOUNTER
Jen Miller from [facility] is calling Pt will be seen for OT once a week for three weeks for ADL's, fall prevention and strengthening ph.  # [phone]   Routed to clinical

## 2020-05-11 NOTE — TELEPHONE ENCOUNTER
To MD:  The above refill request is for a controlled substance. Please review pended medication order. Print and sign for staff to fax to pharmacy or prescribe electronically. To DR. OTTO

## 2020-05-18 ENCOUNTER — TELEPHONE (OUTPATIENT)
Dept: INTERNAL MEDICINE CLINIC | Facility: CLINIC | Age: 85
End: 2020-05-18

## 2020-05-18 DIAGNOSIS — I48.20 CHRONIC ATRIAL FIBRILLATION (HCC): ICD-10-CM

## 2020-05-18 NOTE — TELEPHONE ENCOUNTER
Spoke to BODØ - RN is still with pt;  Gave instructions and to recheck 2 weeks as HH will still be seeing pt

## 2020-05-27 ENCOUNTER — TELEPHONE (OUTPATIENT)
Dept: INTERNAL MEDICINE CLINIC | Facility: CLINIC | Age: 85
End: 2020-05-27

## 2020-05-27 DIAGNOSIS — I48.20 CHRONIC ATRIAL FIBRILLATION (HCC): ICD-10-CM

## 2020-05-27 NOTE — TELEPHONE ENCOUNTER
Elton Kwan from [facility] calling with results. INR 2.1    Dosage 5mg every day except Tuesday Tuesday 7.5mg.     Elton Kwan from [facility]

## 2020-05-28 NOTE — TELEPHONE ENCOUNTER
KEVIN for [facility] ;  Specified our documented dose is different and to call back if not 6 mg on Tues and 4 mg all others

## 2020-05-29 ENCOUNTER — TELEPHONE (OUTPATIENT)
Dept: INTERNAL MEDICINE CLINIC | Facility: CLINIC | Age: 85
End: 2020-05-29

## 2020-05-29 DIAGNOSIS — I48.20 CHRONIC ATRIAL FIBRILLATION (HCC): ICD-10-CM

## 2020-06-01 ENCOUNTER — TELEPHONE (OUTPATIENT)
Dept: INTERNAL MEDICINE CLINIC | Facility: CLINIC | Age: 85
End: 2020-06-01

## 2020-06-01 DIAGNOSIS — I48.20 CHRONIC ATRIAL FIBRILLATION (HCC): ICD-10-CM

## 2020-06-01 RX ORDER — WARFARIN SODIUM 2 MG/1
TABLET ORAL DAILY
Qty: 270 TABLET | Refills: 0 | Status: SHIPPED | OUTPATIENT
Start: 2020-06-01 | End: 2020-11-13

## 2020-06-01 NOTE — TELEPHONE ENCOUNTER
[facility] from [facility] is calling pt. Is having hip and wrist pain 8 out of 10 pt. Is moving as much as he can and taking Norco he is requesting prednisone. He is having respiratory wheezing that clears with coughing in his right posterior lobe.    He need

## 2020-06-01 NOTE — TELEPHONE ENCOUNTER
[facility] [phone]    Like to extend OT for two more visits.

## 2020-06-01 NOTE — TELEPHONE ENCOUNTER
Requested Prescriptions     Signed Prescriptions Disp Refills   • Warfarin Sodium 2 MG Oral Tab 270 tablet 0     Sig: Take 2-3 tablets (4-6 mg total) by mouth daily. AS DIRECTED BY COUMADIN CLINIC     Authorizing Provider:  Simón Nguyen     Ordering Us

## 2020-06-01 NOTE — TELEPHONE ENCOUNTER
S/w [facility] who reports Arthritis pain to bilateral wirst/hands and RT hip 8/10. Limited ROM to areas. Can't walk d/t pain. Unable to open top off a bottle/jar. Pain is worse with movement.  Pain goes down to 6/10 with [facility]. He is doing PT exerci

## 2020-06-01 NOTE — TELEPHONE ENCOUNTER
Imp - polyarthralgias to hands and wrists; taking Norco 7.5 mg po q6hrs prn; on prednisone 2.5 mg po qD ; uses lidocaine patches on his hands;      Rec- trial Voltaren gel 1% ATAA BID; RN called

## 2020-06-03 ENCOUNTER — TELEPHONE (OUTPATIENT)
Dept: INTERNAL MEDICINE CLINIC | Facility: CLINIC | Age: 85
End: 2020-06-03

## 2020-06-03 NOTE — TELEPHONE ENCOUNTER
Roxie/[facility] requesting verbal order extension for PT services  1x per week for 4 more weeks  Ok to leave message  Tasked to nursing

## 2020-06-05 ENCOUNTER — TELEPHONE (OUTPATIENT)
Dept: INTERNAL MEDICINE CLINIC | Facility: CLINIC | Age: 85
End: 2020-06-05

## 2020-06-05 DIAGNOSIS — I48.20 CHRONIC ATRIAL FIBRILLATION (HCC): ICD-10-CM

## 2020-06-05 NOTE — TELEPHONE ENCOUNTER
Received fax from [facility] with INR results. Test date 5/29/2020  Visit date 6/5/2020  INR 2.1    Fax placed on Dr. Andrew Munoz desk.

## 2020-06-05 NOTE — TELEPHONE ENCOUNTER
INR 2.1; continue coumadin 4 mg po qHS except 6 mg on Tuesdays; next INR in 2 weeks;     Laura Forde from [facility] called

## 2020-06-11 ENCOUNTER — TELEPHONE (OUTPATIENT)
Dept: INTERNAL MEDICINE CLINIC | Facility: CLINIC | Age: 85
End: 2020-06-11

## 2020-06-11 NOTE — TELEPHONE ENCOUNTER
Verbal order to approval rescheduling of pt visit to next week, per protocol, relayed on Samuel secure VM.

## 2020-06-11 NOTE — TELEPHONE ENCOUNTER
Zora[facility]/OT requesting verbal order to reschedule visit pt cancelled for this week to next week  Tasked to nursing

## 2020-06-22 ENCOUNTER — TELEPHONE (OUTPATIENT)
Dept: INTERNAL MEDICINE CLINIC | Facility: CLINIC | Age: 85
End: 2020-06-22

## 2020-06-22 DIAGNOSIS — I48.20 CHRONIC ATRIAL FIBRILLATION (HCC): ICD-10-CM

## 2020-06-22 NOTE — TELEPHONE ENCOUNTER
[facility]  [phone]  Requesting in order for Physical therapy also like a nurse care once a month for a B12 shots.

## 2020-06-22 NOTE — TELEPHONE ENCOUNTER
S/w home health nurse Janice Welch and verbal order provided for re-certification for PT, and monthly nursing visits for B12 shot. Per Janice Welch, patient will begin self INR monitoring.      yamilet Simpson and Kanchan Tirado

## 2020-06-30 LAB — INR: 0 (ref 0.8–1.2)

## 2020-07-01 ENCOUNTER — TELEPHONE (OUTPATIENT)
Dept: INTERNAL MEDICINE CLINIC | Facility: CLINIC | Age: 85
End: 2020-07-01

## 2020-07-01 DIAGNOSIS — I48.20 CHRONIC ATRIAL FIBRILLATION (HCC): ICD-10-CM

## 2020-07-01 RX ORDER — HYDROCODONE BITARTRATE AND ACETAMINOPHEN 7.5; 325 MG/1; MG/1
1 TABLET ORAL EVERY 6 HOURS PRN
Qty: 90 TABLET | Refills: 0 | Status: SHIPPED | OUTPATIENT
Start: 2020-07-01 | End: 2020-08-05

## 2020-07-01 NOTE — TELEPHONE ENCOUNTER
To DR. CLARITA tavarez who states that his INR was tested last week by [facility] RN 6/22/20 - it is NOT his INR

## 2020-07-01 NOTE — TELEPHONE ENCOUNTER
INR 3.4 on 6/30/20; pt is home anaid; current dose is 4 mg qHS, except 6 mg on Tuesdays; recommend hold dose x one day, then resume previous schedule; re-check in 2 weeks; nursing--> please notify pt; FYI to Neeru Soliz    Also, ERx sent for Norco 7.5 ,g po q6

## 2020-07-05 ENCOUNTER — MOBILE ENCOUNTER (OUTPATIENT)
Dept: INTERNAL MEDICINE CLINIC | Facility: CLINIC | Age: 85
End: 2020-07-05

## 2020-07-05 RX ORDER — LEVOFLOXACIN 500 MG/1
500 TABLET, FILM COATED ORAL DAILY
Qty: 10 TABLET | Refills: 0 | Status: SHIPPED | OUTPATIENT
Start: 2020-07-05 | End: 2020-07-15

## 2020-07-08 ENCOUNTER — TELEPHONE (OUTPATIENT)
Dept: INTERNAL MEDICINE CLINIC | Facility: CLINIC | Age: 85
End: 2020-07-08

## 2020-07-08 DIAGNOSIS — I48.20 CHRONIC ATRIAL FIBRILLATION (HCC): ICD-10-CM

## 2020-07-08 LAB — INR: 1.46 (ref 0.8–1.2)

## 2020-07-08 NOTE — PROGRESS NOTES
I was paged and spoke with facility covering nursing staff on 7/5/2020 patient is currently in a facility, having issues with an abnormal chest x-ray, mild cough and congestion symptoms, he was placed on Levaquin for antibiotics 500 mg daily for 10 days, c

## 2020-07-09 NOTE — TELEPHONE ENCOUNTER
Spoke to pt;  See [facility] note    To  - pt started on Levaquin 500 mg  Daily x10 days 7/5 (pt called dino LOMAS for chest pain, xray done, ABX started); Pt reports now pain was likely caused by indigestion and gas; Do you want him to finish levaquin?

## 2020-07-10 ENCOUNTER — MED REC SCAN ONLY (OUTPATIENT)
Dept: INTERNAL MEDICINE CLINIC | Facility: CLINIC | Age: 85
End: 2020-07-10

## 2020-07-14 ENCOUNTER — TELEPHONE (OUTPATIENT)
Dept: INTERNAL MEDICINE CLINIC | Facility: CLINIC | Age: 85
End: 2020-07-14

## 2020-07-14 DIAGNOSIS — I48.20 CHRONIC ATRIAL FIBRILLATION (HCC): ICD-10-CM

## 2020-07-14 LAB — INR: 1.8 (ref 0.8–1.2)

## 2020-07-31 ENCOUNTER — TELEPHONE (OUTPATIENT)
Dept: INTERNAL MEDICINE CLINIC | Facility: CLINIC | Age: 85
End: 2020-07-31

## 2020-07-31 DIAGNOSIS — I48.20 CHRONIC ATRIAL FIBRILLATION (HCC): ICD-10-CM

## 2020-07-31 LAB — INR: 2.8 (ref 0.8–1.2)

## 2020-07-31 NOTE — TELEPHONE ENCOUNTER
Patient is calling with INR results.   Patient states his INR was at 2.8    Best call back: [phone]

## 2020-08-04 ENCOUNTER — TELEPHONE (OUTPATIENT)
Dept: INTERNAL MEDICINE CLINIC | Facility: CLINIC | Age: 85
End: 2020-08-04

## 2020-08-04 NOTE — TELEPHONE ENCOUNTER
Patient is asking for a refill for [place].      Send to Ridott in [facility]

## 2020-08-04 NOTE — TELEPHONE ENCOUNTER
To (covering) MD:  The above refill request is for a controlled substance. Please review pended medication order. Print and sign for staff to fax to pharmacy or prescribe electronically.     [place]  Med Data:  HYDROCODONE BITARTRATE-ACETAMINOPHE 07/01/202

## 2020-08-05 RX ORDER — HYDROCODONE BITARTRATE AND ACETAMINOPHEN 7.5; 325 MG/1; MG/1
1 TABLET ORAL EVERY 6 HOURS PRN
Qty: 90 TABLET | Refills: 0 | Status: SHIPPED | OUTPATIENT
Start: 2020-08-05 | End: 2020-09-11

## 2020-08-07 ENCOUNTER — TELEPHONE (OUTPATIENT)
Dept: INTERNAL MEDICINE CLINIC | Facility: CLINIC | Age: 85
End: 2020-08-07

## 2020-08-07 NOTE — TELEPHONE ENCOUNTER
Di/physical therapist/[facility] called with Plan of care Update  Will see patient   1 X a week for 4 additional weeks  To Progress with mobility training & strengthening program  - patient is doing well

## 2020-08-24 ENCOUNTER — TELEPHONE (OUTPATIENT)
Dept: INTERNAL MEDICINE CLINIC | Facility: CLINIC | Age: 85
End: 2020-08-24

## 2020-08-24 NOTE — TELEPHONE ENCOUNTER
Lenore from [place] [address] is calling to get orders to recert pt. For services and B-12 inj. Ok to leave a voicemail ph.  # [phone]  Routed to [facility]

## 2020-08-28 ENCOUNTER — TELEPHONE (OUTPATIENT)
Dept: INTERNAL MEDICINE CLINIC | Facility: CLINIC | Age: 85
End: 2020-08-28

## 2020-08-28 DIAGNOSIS — I48.20 CHRONIC ATRIAL FIBRILLATION (HCC): ICD-10-CM

## 2020-08-28 LAB — INR: 3.3 (ref 0.8–1.2)

## 2020-09-11 ENCOUNTER — TELEPHONE (OUTPATIENT)
Dept: INTERNAL MEDICINE CLINIC | Facility: CLINIC | Age: 85
End: 2020-09-11

## 2020-09-11 RX ORDER — HYDROCODONE BITARTRATE AND ACETAMINOPHEN 7.5; 325 MG/1; MG/1
1 TABLET ORAL EVERY 6 HOURS PRN
Qty: 90 TABLET | Refills: 0 | Status: SHIPPED | OUTPATIENT
Start: 2020-09-11 | End: 2021-03-08

## 2020-09-28 ENCOUNTER — APPOINTMENT (OUTPATIENT)
Dept: INTERNAL MEDICINE CLINIC | Facility: CLINIC | Age: 85
End: 2020-09-28
Payer: MEDICARE

## 2020-09-28 ENCOUNTER — LAB ENCOUNTER (OUTPATIENT)
Dept: LAB | Age: 85
End: 2020-09-28
Attending: INTERNAL MEDICINE
Payer: MEDICARE

## 2020-09-28 ENCOUNTER — OFFICE VISIT (OUTPATIENT)
Dept: INTERNAL MEDICINE CLINIC | Facility: CLINIC | Age: 85
End: 2020-09-28
Payer: MEDICARE

## 2020-09-28 VITALS
SYSTOLIC BLOOD PRESSURE: 112 MMHG | HEART RATE: 65 BPM | OXYGEN SATURATION: 96 % | BODY MASS INDEX: 22 KG/M2 | WEIGHT: 172 LBS | DIASTOLIC BLOOD PRESSURE: 60 MMHG | TEMPERATURE: 99 F

## 2020-09-28 DIAGNOSIS — E53.8 VITAMIN B12 DEFICIENCY: ICD-10-CM

## 2020-09-28 DIAGNOSIS — R26.9 GAIT ABNORMALITY: ICD-10-CM

## 2020-09-28 DIAGNOSIS — C44.612 BASAL CELL CARCINOMA (BCC) OF RIGHT HAND: ICD-10-CM

## 2020-09-28 DIAGNOSIS — I48.20 CHRONIC ATRIAL FIBRILLATION (HCC): ICD-10-CM

## 2020-09-28 DIAGNOSIS — M25.511 RIGHT SHOULDER PAIN, UNSPECIFIED CHRONICITY: ICD-10-CM

## 2020-09-28 DIAGNOSIS — M47.816 OSTEOARTHRITIS OF LUMBAR SPINE, UNSPECIFIED SPINAL OSTEOARTHRITIS COMPLICATION STATUS: Primary | ICD-10-CM

## 2020-09-28 PROCEDURE — 80053 COMPREHEN METABOLIC PANEL: CPT

## 2020-09-28 PROCEDURE — 82607 VITAMIN B-12: CPT

## 2020-09-28 PROCEDURE — 36415 COLL VENOUS BLD VENIPUNCTURE: CPT

## 2020-09-28 PROCEDURE — 84443 ASSAY THYROID STIM HORMONE: CPT

## 2020-09-28 PROCEDURE — 99214 OFFICE O/P EST MOD 30 MIN: CPT | Performed by: INTERNAL MEDICINE

## 2020-09-28 PROCEDURE — 85025 COMPLETE CBC W/AUTO DIFF WBC: CPT

## 2020-09-28 PROCEDURE — G0463 HOSPITAL OUTPT CLINIC VISIT: HCPCS | Performed by: INTERNAL MEDICINE

## 2020-09-28 RX ORDER — HYDROCODONE BITARTRATE AND ACETAMINOPHEN 7.5; 325 MG/1; MG/1
1 TABLET ORAL EVERY 8 HOURS PRN
Qty: 90 TABLET | Refills: 0 | Status: SHIPPED | OUTPATIENT
Start: 2020-12-10 | End: 2021-02-03

## 2020-09-28 RX ORDER — HYDROCODONE BITARTRATE AND ACETAMINOPHEN 7.5; 325 MG/1; MG/1
1 TABLET ORAL EVERY 8 HOURS PRN
Qty: 90 TABLET | Refills: 0 | Status: SHIPPED | OUTPATIENT
Start: 2020-11-10 | End: 2021-02-03

## 2020-09-28 RX ORDER — HYDROCODONE BITARTRATE AND ACETAMINOPHEN 7.5; 325 MG/1; MG/1
1 TABLET ORAL EVERY 8 HOURS PRN
Qty: 90 TABLET | Refills: 0 | Status: SHIPPED | OUTPATIENT
Start: 2020-10-11 | End: 2021-02-03

## 2020-09-28 NOTE — PROGRESS NOTES
aNvin Ayoub is a 80year old male.     HPI:   Patient presents with:  Checkup  Arm Pain: Right arm pain for 2 days travels to back of shoulder      79 y/o M with osteoarthritis of lumbar spine, AF here for F/U; pt has been  Taking Norco 7.5 mg po TID prn 2006   • LEXISCAN NUC STRESS TST, CARDIO (DM[facility])  9/11   • OTHER SURGICAL HISTORY  1970    Billroth anastomosis   • OTHER SURGICAL HISTORY  2011    dental implants   • SKIN SURGERY  02/11/2019    Mohs/R Dorsal hand/SCC/done by MM      Family History   Proble apply Misc Inject Vitamin B12 monthly 3 each 3   • DILTIAZEM HCL ER COATED BEADS 180 MG Oral Capsule SR 24 Hr TAKE 1 CAPSULE(180 MG) BY MOUTH DAILY 90 capsule 3   • ILEVRO 0.3 % Ophthalmic Suspension SHAKE LQ AND INT 1 GTT IN OD ATN  30   • digoxin ([address]) lesions  Neck/Thyroid: neck supple; no thyromegaly  Cardiovascular: RRR, S1, S2, no S3 or murmur  Respiratory: lungs without crackles or wheezes  Abdomen: normoactive bowel sounds, soft, non-tender and non-distended  Extremities: no clubbing, cyanosis or e [place]     Toe ulcer  Advise shoes with wide toe box; vey small small ulceration to left 1st toe     History of Sepsis due to Klebsiella UTI  In June 2018; Due to urine septicemia; BCx and UCx showing growing Klebsiella pneumoniae, sensitive to pip/ ta to 100.5 three days ago with WBC elevated to 20.8; lungs clear on exam today, though he has a history of aspiration and pneumonia;  CXR in Feb 2020 showed Chronic bibasilar parenchymal abnormality with linear atelectasis scarring in the perihilar regions w

## 2020-09-30 ENCOUNTER — HOSPITAL ENCOUNTER (EMERGENCY)
Facility: HOSPITAL | Age: 85
Discharge: HOME | End: 2020-09-30
Attending: EMERGENCY MEDICINE
Payer: MEDICARE

## 2020-09-30 ENCOUNTER — TELEPHONE (OUTPATIENT)
Dept: INTERNAL MEDICINE CLINIC | Facility: CLINIC | Age: 85
End: 2020-09-30

## 2020-09-30 VITALS
BODY MASS INDEX: 22 KG/M2 | HEART RATE: 89 BPM | TEMPERATURE: 100 F | DIASTOLIC BLOOD PRESSURE: 81 MMHG | WEIGHT: 171.94 LBS | OXYGEN SATURATION: 93 % | SYSTOLIC BLOOD PRESSURE: 126 MMHG | RESPIRATION RATE: 18 BRPM

## 2020-09-30 DIAGNOSIS — D72.825 BANDEMIA: ICD-10-CM

## 2020-09-30 DIAGNOSIS — D72.825 BANDEMIA: Primary | ICD-10-CM

## 2020-09-30 DIAGNOSIS — L03.113 CELLULITIS OF RIGHT UPPER EXTREMITY: Primary | ICD-10-CM

## 2020-09-30 PROCEDURE — 80048 BASIC METABOLIC PNL TOTAL CA: CPT | Performed by: EMERGENCY MEDICINE

## 2020-09-30 PROCEDURE — 85610 PROTHROMBIN TIME: CPT | Performed by: EMERGENCY MEDICINE

## 2020-09-30 PROCEDURE — 85027 COMPLETE CBC AUTOMATED: CPT | Performed by: EMERGENCY MEDICINE

## 2020-09-30 PROCEDURE — 85007 BL SMEAR W/DIFF WBC COUNT: CPT | Performed by: EMERGENCY MEDICINE

## 2020-09-30 PROCEDURE — 85060 BLOOD SMEAR INTERPRETATION: CPT | Performed by: EMERGENCY MEDICINE

## 2020-09-30 PROCEDURE — 99284 EMERGENCY DEPT VISIT MOD MDM: CPT

## 2020-09-30 PROCEDURE — 96365 THER/PROPH/DIAG IV INF INIT: CPT

## 2020-09-30 PROCEDURE — 85025 COMPLETE CBC W/AUTO DIFF WBC: CPT | Performed by: EMERGENCY MEDICINE

## 2020-09-30 RX ORDER — CEPHALEXIN 500 MG/1
500 CAPSULE ORAL 4 TIMES DAILY
Qty: 28 CAPSULE | Refills: 0 | Status: SHIPPED | OUTPATIENT
Start: 2020-09-30 | End: 2020-10-07

## 2020-09-30 NOTE — ED NOTES
Saroj Vázquez arrived to transport patient back home. Patient left ED in stable condition with all belongings and paperwork/prescriptions. Edelmira Alston made aware of patient return.

## 2020-09-30 NOTE — ED INITIAL ASSESSMENT (HPI)
Patient brought in by [address] [place] [address] [facility] for c/o right arm redness and swelling--atraumatic--began yesterday. Patient is on Warfarin, due for INR check today. Denies SOB/CP.

## 2020-09-30 NOTE — TELEPHONE ENCOUNTER
On-call phone call Wednesday morning  Shayy Rees Prenile Sanford [phone] (nurse) called. Redness and swollen R arm. His chest hurts when breathing out. Is short of breath. /55 O2 sat 94  RR22 temp 98.4.   Hannah Orlando just called 911 and p

## 2020-09-30 NOTE — ED PROVIDER NOTES
Patient Seen in: [facility] Emergency Department      History   Patient presents with:  Arm Pain    Stated Complaint: rue redness swelling    HPI    80year-old male with history of atrial fibrillation, On Coumadin, here with complaints of right a • CYSTOSCOPY STENT INSERTION Left 6/30/2018    Performed by Emerson Rodriguez MD at [facility] OR   • FAT, FECAL QUALITATIVE  12/10   • FEMUR IM NAIL Left 1/11/2017    Performed by Arthea Schwab, MD at [address] [place] [address]   • FRACTURE SURGERY      Left femur 2016   • Temp 99.8 °F (37.7 °C)   Temp src    SpO2 94 %   O2 Device None (Room air)       Current:/81   Pulse 86   Temp 99.8 °F (37.7 °C)   Resp 18   Wt 78 kg   SpO2 94%   BMI 22.08 kg/m²         Physical Exam  Vitals signs and nursing note reviewed.    HENT: MCV 89.9 80.0 - 100.0 fL    MCH 28.8 26.0 - 34.0 pg    MCHC 32.0 31.0 - 37.0 g/dL    RDW-SD 49.7 (H) 35.1 - 46.3 fL    RDW 15.1 (H) 11.0 - 15.0 %    .0 150.0 - 450.0 10(3)uL    Neutrophil Absolute Prelim 13.13 (H) 1.50 - 7.70 x10 (3) uL   SCAN SLID Medical Record Review: I personally reviewed available prior medical records for any recent pertinent discharge summaries, testing, and procedures, and reviewed those reports. Complicating Factors:  The patient already has does not have any pertinent pro

## 2020-09-30 NOTE — TELEPHONE ENCOUNTER
[facility] called from [facility]  Pt taken to [address].  Was diagnosed with cellulitis of right upper extremity  Pt is back at [facility]  Pt was prescribed Cephalexin 500MG   Hospital recommending follow up with Dr Emmie Richards in two days  Tasked to nursing as Hien Cochran

## 2020-10-01 NOTE — TELEPHONE ENCOUNTER
Left message for pt to call back. Spoke with Hayes who has not evaluated pt yet this AM.  Will call back later.

## 2020-10-01 NOTE — TELEPHONE ENCOUNTER
S/w nursing Kate Jackson. He reports that patient is requesting to go back to the ER d/t pain to RT upper arm 9/10. Patient takes Norco 7.5/325 every 4 hours. Last taken at 7am, numeric pain number is still at a 9. Per RN, Swelling & bruising present.  Patient wa

## 2020-10-01 NOTE — TELEPHONE ENCOUNTER
Patient called   Was in the ER yesterday for an infection   Pt does not feel well enough to be home   Feels he should be admitted to the hospital  Requests call back [phone]

## 2020-10-05 ENCOUNTER — TELEPHONE (OUTPATIENT)
Dept: INTERNAL MEDICINE CLINIC | Facility: CLINIC | Age: 85
End: 2020-10-05

## 2020-10-05 DIAGNOSIS — I48.20 CHRONIC ATRIAL FIBRILLATION (HCC): ICD-10-CM

## 2020-10-05 NOTE — TELEPHONE ENCOUNTER
D/w HARIS Cortes; CBC on 10/2; WBC normal per RN; on cephalexin 500 mg po QID d#5/7; RN will see again on 10/9; advise finish same antibiotic; OTIS to [facility]

## 2020-10-05 NOTE — TELEPHONE ENCOUNTER
INR 3.7 today    Pt is on an antibiotic & missed sundays dose    Please call Lenore/Parag [phone]

## 2020-10-05 NOTE — TELEPHONE ENCOUNTER
Patient is calling to speak with Akira Carney. Patient states he was placed on a higher antibiotic and he worried about his INR. Patient states he knows his INR will have to be changed. Please call patient back at [phone]  Routed to Akira Carney.

## 2020-10-05 NOTE — TELEPHONE ENCOUNTER
Chris Chao called;    See [facility] note for supratherapeutic INR--- to  re: arm  On day 5 of Keflex and still and issue;  RN feels fluid blister area in 3 areas today; still red and swollen hand and fingers;  Palm and fingers bruised

## 2020-10-05 NOTE — TELEPHONE ENCOUNTER
Please call Sharmin Sanchez  Is a video visit possible for a follow with pt? Pt having a hard time putting pressure on arm to use walker right now. Pt has to use a special transportation car.     Sharmin Sanchez would like to add addt'l Home Health visit to check on pa

## 2020-10-09 ENCOUNTER — TELEPHONE (OUTPATIENT)
Dept: INTERNAL MEDICINE CLINIC | Facility: CLINIC | Age: 85
End: 2020-10-09

## 2020-10-09 NOTE — TELEPHONE ENCOUNTER
[address] Y[address] Sawyer called    Requests call back with verbal ok to see patient  2 X per week for physical therapy      Call Back # [phone]  Confidential VM/ok to leave message

## 2020-10-12 ENCOUNTER — TELEPHONE (OUTPATIENT)
Dept: INTERNAL MEDICINE CLINIC | Facility: CLINIC | Age: 85
End: 2020-10-12

## 2020-10-12 DIAGNOSIS — I48.20 CHRONIC ATRIAL FIBRILLATION (HCC): ICD-10-CM

## 2020-10-12 NOTE — TELEPHONE ENCOUNTER
Phone call transferred from . [facility] anoop is with Jatin Howell now. She is concerned about his right arm. She reports his pain is worse, has been taking norco every 3.5 to 4 hours and it is no longer helping the pain.   When she touches the upper b

## 2020-10-12 NOTE — TELEPHONE ENCOUNTER
Please call Brittani Kang is having right sided chest pain this morning, pt feeling short of breath, burping, lungs clear, talking normally  Vitals:  97.3 temp  68 pulse  20 respirations  124/64 BP  97% on room air  INR 3.7 on 10/5  Pt has diagnosis o

## 2020-10-12 NOTE — TELEPHONE ENCOUNTER
Pt. Is calling to report his INR of 2.6  Ph. # [phone]  Routed high to [facility]

## 2020-10-12 NOTE — TELEPHONE ENCOUNTER
Lenore/[facility] calling, she is with pt, concern about right arm that had cellulitis  Back of upper arm is warm and tender to touch, pain is not controlled with Norco, has some chills  Pain is feeling short of breath because of pain, no ap

## 2020-10-12 NOTE — TELEPHONE ENCOUNTER
Spoke with Althea Gray from [facility]. This am patient was complaining of R side chest pain/abdominal pain rating 6.5/10 and some SOB. Per Sharmila patient felt like he had a lot of gas and was burping a lot.  She did not feel he looked in distress, she denies him loo

## 2020-10-13 ENCOUNTER — TELEPHONE (OUTPATIENT)
Dept: INTERNAL MEDICINE CLINIC | Facility: CLINIC | Age: 85
End: 2020-10-13

## 2020-10-13 NOTE — TELEPHONE ENCOUNTER
Please call pt  Ok to schedule virtual/video visit with Dr Maria A Rubalcava tomorrow  Would like Dr Maria A Rubalcava to discuss infection on arm, swelling has gone down, between elbow and shoulder sore  Tomorrow pt could get assistance with call  Tasked to nursing

## 2020-10-13 NOTE — TELEPHONE ENCOUNTER
Spoke to patient who reports his arm is \" a lot better\", he only has some soreness between his elbow and shoulder. He states there is one little hard spot. He denies any further chest pain from yesterday. He states the night before he ate some pizza and thinks it gave him gas pain. The nurse gave him some gingerale, he started passing gas and felt much better. He denies any fevers. He states occasionally when sitting at night he will get a slight chill. He would like to do a video visit on his smart phone tomorrow with the nurses there. Transferred patient to [facility] to schedule visit.      FYI to Dr. Liset Murphy

## 2020-10-14 ENCOUNTER — TELEMEDICINE (OUTPATIENT)
Dept: INTERNAL MEDICINE CLINIC | Facility: CLINIC | Age: 85
End: 2020-10-14
Payer: MEDICARE

## 2020-10-14 DIAGNOSIS — L03.113 CELLULITIS OF RIGHT ARM: Primary | ICD-10-CM

## 2020-10-14 DIAGNOSIS — E53.8 VITAMIN B12 DEFICIENCY: ICD-10-CM

## 2020-10-14 DIAGNOSIS — I48.20 CHRONIC ATRIAL FIBRILLATION (HCC): ICD-10-CM

## 2020-10-14 PROCEDURE — 99213 OFFICE O/P EST LOW 20 MIN: CPT | Performed by: INTERNAL MEDICINE

## 2020-10-14 NOTE — PROGRESS NOTES
Virtual Telephone Check-In    Navin Ayoub verbally consents to a Virtual/Telephone Check-In visit on 10/14/20. Patient has been referred to the [facility] website at [email or web address] to review the yearly Consent to Treat document.     Patient Rayo Chauhan outpatient rehab independently and safely. Winston Guillory can execute bed to chair transfer. Winston Guillory has ability to manipulate the control of the power wheelchair safely. [facility] is mentally and physically able to operate a motorized wheelchair safely. Mary Kaiser as gel daily prn     Protein calorie malnutrition  advise maximal nutritional support; albumin 2.6; add Boost shakes when able     Coronary artery disease  the patient currently takes aspirin 81 mg daily.     History of villous adenoma   the patient reports o

## 2020-10-19 ENCOUNTER — TELEPHONE (OUTPATIENT)
Dept: INTERNAL MEDICINE CLINIC | Facility: CLINIC | Age: 85
End: 2020-10-19

## 2020-10-19 NOTE — TELEPHONE ENCOUNTER
Cornell Grier from [facility].  H.H. is calling to inform Dr. Jose E Johnson that pt is being re-certified for O-40 injections and to monitor the bruise on his right arm  Ph.[phone]  Routed to clinical

## 2020-11-10 NOTE — TELEPHONE ENCOUNTER
Imp- osteoarthritis; Rec- refilled Norco 7.5 mg po q6hrs prn, #90;  Rx left fat window for pick-up; please call pt good, to achieve stated therapy goals

## 2020-11-11 DIAGNOSIS — I48.20 CHRONIC ATRIAL FIBRILLATION (HCC): ICD-10-CM

## 2020-11-12 NOTE — TELEPHONE ENCOUNTER
INR 2.6 on 10/12/20.  Pt was due for re-check on 11/2/20    To Demetrice Ochoa, please advise on refill

## 2020-11-13 RX ORDER — WARFARIN SODIUM 2 MG/1
TABLET ORAL
Qty: 270 TABLET | Refills: 0 | OUTPATIENT
Start: 2020-11-13

## 2020-11-13 RX ORDER — WARFARIN SODIUM 2 MG/1
TABLET ORAL DAILY
Qty: 270 TABLET | Refills: 0 | Status: SHIPPED | OUTPATIENT
Start: 2020-11-13 | End: 2021-03-17

## 2020-11-13 NOTE — TELEPHONE ENCOUNTER
Current refill request refused due to refill is either a duplicate request or has active refills at the pharmacy. Check previous templates.     Requested Prescriptions     Refused Prescriptions Disp Refills   • WARFARIN SODIUM 2 MG Oral Tab [Pharmacy Med N

## 2020-11-20 ENCOUNTER — TELEPHONE (OUTPATIENT)
Dept: INTERNAL MEDICINE CLINIC | Facility: CLINIC | Age: 85
End: 2020-11-20

## 2020-11-20 DIAGNOSIS — I48.20 CHRONIC ATRIAL FIBRILLATION (HCC): ICD-10-CM

## 2020-12-03 ENCOUNTER — TELEPHONE (OUTPATIENT)
Dept: INTERNAL MEDICINE CLINIC | Facility: CLINIC | Age: 85
End: 2020-12-03

## 2020-12-03 NOTE — TELEPHONE ENCOUNTER
Request verbal to continue in home physical therapy   1 x week for 4 more weeks for strengthening  & walking program    Di/[facility] [phone]  Direct number/with confidential VM  Ok to leave message

## 2020-12-06 NOTE — ED NOTES
Informed Dr Suman Maddox regarding SPO2 88% on RA, Pt placed on o2 at 2L per [place] SPO2 95% on 2L. Pt denies any complaints, family at [place]. Cardiac

## 2020-12-21 ENCOUNTER — TELEPHONE (OUTPATIENT)
Dept: INTERNAL MEDICINE CLINIC | Facility: CLINIC | Age: 85
End: 2020-12-21

## 2020-12-21 DIAGNOSIS — I48.20 CHRONIC ATRIAL FIBRILLATION (HCC): ICD-10-CM

## 2020-12-23 ENCOUNTER — TELEPHONE (OUTPATIENT)
Dept: INTERNAL MEDICINE CLINIC | Facility: CLINIC | Age: 85
End: 2020-12-23

## 2020-12-23 NOTE — TELEPHONE ENCOUNTER
As FYI to DR. Eladia George called Renny Torres from [facility] and gave verbal approval for re- certification per protocol- verbalized understanding

## 2021-01-01 NOTE — TELEPHONE ENCOUNTER
INR 2.2  PT 25.9    Dosage 4mg M & F, 2mg the other days.     [facility]  [phone]  Routed to Tabitha Arguelles 1

## 2021-01-04 ENCOUNTER — TELEPHONE (OUTPATIENT)
Dept: INTERNAL MEDICINE CLINIC | Facility: CLINIC | Age: 86
End: 2021-01-04

## 2021-01-07 ENCOUNTER — TELEPHONE (OUTPATIENT)
Dept: INTERNAL MEDICINE CLINIC | Facility: CLINIC | Age: 86
End: 2021-01-07

## 2021-01-07 NOTE — TELEPHONE ENCOUNTER
To [employer]----  Called patient to gather more info, pt states he would like [employer] to please call when she has time stating \"it's about a hobby I have\". Pt states message is not r/t INR.  Pt verbalized understanding that PharmALEJANDRO is in clinic seeing patients

## 2021-01-07 NOTE — TELEPHONE ENCOUNTER
Patient is calling to speak to Tanvir Sandra he has a question about his INR  Please call [phone]

## 2021-01-09 NOTE — TELEPHONE ENCOUNTER
Clinical Pharmacy Note  Vancomycin Consult    Ricky Rojas is a 28 y.o. female ordered Vancomycin for pneumonia; consult received from Jones Galeano CNP to manage therapy. Also receiving meropenem. 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8 °C)      Recent Labs     21  1703   WBC 13.3*       Recent Labs     21  1703   BUN 9   CREATININE 0.6         Intake/Output Summary (Last 24 hours) at 2021 0416  Last data filed at 2021 0201  Gross per 24 hour   Intake 1000 ml   Output --   Net 1000 ml       Culture Results:  Pending    Ht Readings from Last 1 Encounters:   21 5' 5\" (1.651 m)        Wt Readings from Last 1 Encounters:   21 (!) 302 lb 14.6 oz (137.4 kg)         Estimated Creatinine Clearance: 184 mL/min (based on SCr of 0.6 mg/dL). Assessment/Plan:  Vancomycin 1250 mg IV every 8 hours ordered. Regimen projects a trough level of 15-20 mg/L. Level ordered for 2100 tonight. Thank you for the consult.      Raphael Long, PharmD  2021 4:17 AM See [facility] note

## 2021-01-22 ENCOUNTER — TELEPHONE (OUTPATIENT)
Dept: INTERNAL MEDICINE CLINIC | Facility: CLINIC | Age: 86
End: 2021-01-22

## 2021-01-22 DIAGNOSIS — I48.20 CHRONIC ATRIAL FIBRILLATION (HCC): ICD-10-CM

## 2021-01-22 LAB — INR: 3.7 (ref 0.8–1.2)

## 2021-01-22 NOTE — TELEPHONE ENCOUNTER
Alethea Farnsworth @ [facility]:    INR - 3.7  Diastaltic /42  Heart Rate 50  After talking and taking it again, went to 64  Slightly more out of breath when sitting down. Pain in wrist is at an 8/10, patient is doing exercises from PT.    Patient has been more tired

## 2021-01-22 NOTE — TELEPHONE ENCOUNTER
Spoke with Megan Her from [facility] who is currently with Chica Barbour. The getting out of breath is something Megan Her has noticed for the past 2 weeks but patient denies. Happens after activity, she is looking at patient now and he is breathing with his mouth open.  He d

## 2021-01-25 ENCOUNTER — TELEPHONE (OUTPATIENT)
Dept: INTERNAL MEDICINE CLINIC | Facility: CLINIC | Age: 86
End: 2021-01-25

## 2021-01-25 DIAGNOSIS — I48.20 CHRONIC ATRIAL FIBRILLATION (HCC): ICD-10-CM

## 2021-01-25 NOTE — TELEPHONE ENCOUNTER
[address] INR from 1/22/2021    INR 3.7    Copy placed on [employer] desk  Tasked to coumadin high

## 2021-01-25 NOTE — TELEPHONE ENCOUNTER
OTIS to Lesia Jones-- I believe this was already addressed by [facility]. Atif [facility]ur [facility] encounter on 1/22/21.

## 2021-02-01 ENCOUNTER — TELEPHONE (OUTPATIENT)
Dept: INTERNAL MEDICINE CLINIC | Facility: CLINIC | Age: 86
End: 2021-02-01

## 2021-02-01 DIAGNOSIS — Z23 NEED FOR VACCINATION: ICD-10-CM

## 2021-02-01 DIAGNOSIS — I48.20 CHRONIC ATRIAL FIBRILLATION (HCC): ICD-10-CM

## 2021-02-01 LAB — INR: 2.1 (ref 0.8–1.2)

## 2021-02-02 ENCOUNTER — TELEPHONE (OUTPATIENT)
Dept: INTERNAL MEDICINE CLINIC | Facility: CLINIC | Age: 86
End: 2021-02-02

## 2021-02-02 NOTE — TELEPHONE ENCOUNTER
To MD:  The above refill request is for a controlled substance. Please review pended medication order. Print and sign for staff to fax to pharmacy or prescribe electronically.     Last office visit:9/28/20  Last time refill sent and quantity/refills:  10

## 2021-02-03 RX ORDER — HYDROCODONE BITARTRATE AND ACETAMINOPHEN 7.5; 325 MG/1; MG/1
1 TABLET ORAL EVERY 8 HOURS PRN
Qty: 90 TABLET | Refills: 0 | Status: SHIPPED | OUTPATIENT
Start: 2021-02-03 | End: 2021-03-05

## 2021-02-04 NOTE — TELEPHONE ENCOUNTER
Imp- Osteoarthritis lumbar spine and cervical spine    Rec- ERx sent for Norco 7.5 mg q. 6 hours p.r.n.  Pain, refilled #90, 2 RF    Please call pt

## 2021-02-16 ENCOUNTER — TELEPHONE (OUTPATIENT)
Dept: INTERNAL MEDICINE CLINIC | Facility: CLINIC | Age: 86
End: 2021-02-16

## 2021-02-16 NOTE — TELEPHONE ENCOUNTER
Sharmin Sanchez from [facility] called. She is looking for a verbal order to re -certify into [facility] services. This would be for B12's, COPD & CHF. Sharmin Sanchez can be reached at [phone] and ok to leave a voicemail for the verbal order.

## 2021-03-08 ENCOUNTER — TELEPHONE (OUTPATIENT)
Dept: INTERNAL MEDICINE CLINIC | Facility: CLINIC | Age: 86
End: 2021-03-08

## 2021-03-08 DIAGNOSIS — I48.20 CHRONIC ATRIAL FIBRILLATION (HCC): ICD-10-CM

## 2021-03-08 LAB — INR: 2.7 (ref 0.8–1.2)

## 2021-03-08 RX ORDER — HYDROCODONE BITARTRATE AND ACETAMINOPHEN 7.5; 325 MG/1; MG/1
1 TABLET ORAL EVERY 6 HOURS PRN
Qty: 90 TABLET | Refills: 0 | Status: CANCELLED | OUTPATIENT
Start: 2021-03-08

## 2021-03-08 RX ORDER — HYDROCODONE BITARTRATE AND ACETAMINOPHEN 7.5; 325 MG/1; MG/1
1 TABLET ORAL EVERY 6 HOURS PRN
Qty: 90 TABLET | Refills: 0 | Status: SHIPPED | OUTPATIENT
Start: 2021-03-08 | End: 2021-07-07

## 2021-03-08 RX ORDER — HYDROCODONE BITARTRATE AND ACETAMINOPHEN 7.5; 325 MG/1; MG/1
1 TABLET ORAL EVERY 6 HOURS PRN
Qty: 90 TABLET | Refills: 0 | Status: SHIPPED | OUTPATIENT
Start: 2021-04-08 | End: 2021-05-08

## 2021-03-08 RX ORDER — HYDROCODONE BITARTRATE AND ACETAMINOPHEN 7.5; 325 MG/1; MG/1
1 TABLET ORAL EVERY 6 HOURS PRN
Qty: 90 TABLET | Refills: 0 | Status: SHIPPED | OUTPATIENT
Start: 2021-05-09 | End: 2021-06-08

## 2021-03-08 NOTE — TELEPHONE ENCOUNTER
Imp- Osteoarthritis lumbar spine and cervical spine     Rec- ERx sent for Norco 7.5 mg q. 6 hours p.r.n.  Pain, refilled #90, 2 RF

## 2021-03-08 NOTE — TELEPHONE ENCOUNTER
Rxs sent in February are for unknown reason all dated the same day therefore are void  Pended new 3 x 30 day Norco Rxs    See [facility] note ;  Spoke to pt

## 2021-03-08 NOTE — TELEPHONE ENCOUNTER
Patient calling with INR result.     Taken today 3/8/2021  INR 2.7      Also asking for refill on [address] call back number [phone]

## 2021-03-11 NOTE — TELEPHONE ENCOUNTER
Hoping to  the medication tonight. Patient states the pharmacy informed him that they requested the refill a few days ago.

## 2021-03-12 RX ORDER — DILTIAZEM HYDROCHLORIDE 180 MG/1
180 CAPSULE, COATED, EXTENDED RELEASE ORAL DAILY
Qty: 90 CAPSULE | Refills: 3 | Status: SHIPPED | OUTPATIENT
Start: 2021-03-12

## 2021-03-12 NOTE — TELEPHONE ENCOUNTER
Patient calling checking on status for refill. Will be completely out of medication tomorrow. Call patient when prescription has been sent in.   Best call back number [phone]

## 2021-03-16 DIAGNOSIS — I48.20 CHRONIC ATRIAL FIBRILLATION (HCC): ICD-10-CM

## 2021-03-17 RX ORDER — WARFARIN SODIUM 2 MG/1
TABLET ORAL DAILY
Qty: 270 TABLET | Refills: 0 | Status: SHIPPED | OUTPATIENT
Start: 2021-03-17 | End: 2021-06-25

## 2021-03-26 ENCOUNTER — TELEPHONE (OUTPATIENT)
Dept: INTERNAL MEDICINE CLINIC | Facility: CLINIC | Age: 86
End: 2021-03-26

## 2021-03-26 RX ORDER — CYANOCOBALAMIN 1000 UG/ML
1000 INJECTION INTRAMUSCULAR; SUBCUTANEOUS
Qty: 3 ML | Refills: 3 | Status: SHIPPED | OUTPATIENT
Start: 2021-03-26

## 2021-03-26 NOTE — TELEPHONE ENCOUNTER
Received a fax from [facility] of the patient's most recent medication list. Emmie Or nurse, Neeraj Means faxed over the medication list with a note stating, \"Can you please review the current med list, sign and fax back for resident's wellness fill. \"

## 2021-03-29 RX ORDER — SYRINGE W-NEEDLE,DISPOSAB,3 ML 25GX5/8"
SYRINGE, EMPTY DISPOSABLE MISCELLANEOUS
Qty: 3 EACH | Refills: 3 | Status: SHIPPED | OUTPATIENT
Start: 2021-03-29

## 2021-04-12 ENCOUNTER — TELEPHONE (OUTPATIENT)
Dept: INTERNAL MEDICINE CLINIC | Facility: CLINIC | Age: 86
End: 2021-04-12

## 2021-04-12 NOTE — TELEPHONE ENCOUNTER
Pt arthritis in hip is very bad/painful  Very hard to bend or to get from chair to wheelchair very painful  Norco is not helping  Please call to discuss/advise  Tasked to nursing

## 2021-04-12 NOTE — TELEPHONE ENCOUNTER
To Dr. Jerson Baez to please advise----  Patient reports right hip pain that began to sorsen yesterday. Pt reports that pain exacerbates when rising from a sitting to standing position and with any movement, rating this pain level a \"12\"/10. Pt reports that when he is sitting or laying supine, pain level is a 0/10. Pt reports his BP is 110/40. Reports skin color over the affected area is WNL. Pt is Rx'ed Norco 7.5-325 mg, reports he has been taking this medication every four hours with minimal improvement in pain. Pt states he has also applied Salon Pas pain relieving ointment. Provided education for patient regarding importance of correct medication regimen adherence. Advised patient that if pain is severe, ER is warranted. Pt states that since he is not in pain when sitting, would like MD to advise on pain medication changes. Relayed message that pt's symptoms will be routed to MD to advise on next steps and if pain persists or worsens, to go to ER. Pt verbalized understanding and agrees with plan.

## 2021-04-14 ENCOUNTER — TELEPHONE (OUTPATIENT)
Dept: INTERNAL MEDICINE CLINIC | Facility: CLINIC | Age: 86
End: 2021-04-14

## 2021-04-15 NOTE — TELEPHONE ENCOUNTER
Patient is calling to check the status of the below mediation refill. Patient was informed that we received the refill request yesterday and that refills do take 48-72 hours to refill.  Patient is requesting that the refill be completed no later than tomorr

## 2021-04-16 RX ORDER — PREDNISONE 2.5 MG
2.5 TABLET ORAL DAILY
Qty: 90 TABLET | Refills: 3 | Status: SHIPPED | OUTPATIENT
Start: 2021-04-16

## 2021-04-16 NOTE — TELEPHONE ENCOUNTER
Pt. Called following up on refill request.  He only has enough for today. His daughter would like to  the meds for him tonight.

## 2021-04-19 ENCOUNTER — TELEPHONE (OUTPATIENT)
Dept: INTERNAL MEDICINE CLINIC | Facility: CLINIC | Age: 86
End: 2021-04-19

## 2021-04-19 NOTE — TELEPHONE ENCOUNTER
Lida Chapa @ [facility]    Re-certify for Q92 and managed for PVD and CHS. Patient's hand pain is a 9/10. Patient is having a hard using his hands. Would like to add OT to help with this. [facility] # [phone], can leave a message.

## 2021-04-28 ENCOUNTER — TELEPHONE (OUTPATIENT)
Dept: INTERNAL MEDICINE CLINIC | Facility: CLINIC | Age: 86
End: 2021-04-28

## 2021-04-28 DIAGNOSIS — I48.91 ATRIAL FIBRILLATION, UNSPECIFIED TYPE (HCC): ICD-10-CM

## 2021-04-28 DIAGNOSIS — Z79.01 ENCOUNTER FOR MONITORING COUMADIN THERAPY: Primary | ICD-10-CM

## 2021-04-28 DIAGNOSIS — I48.20 CHRONIC ATRIAL FIBRILLATION (HCC): ICD-10-CM

## 2021-04-28 DIAGNOSIS — Z51.81 ENCOUNTER FOR MONITORING COUMADIN THERAPY: Primary | ICD-10-CM

## 2021-04-28 NOTE — TELEPHONE ENCOUNTER
[facility] faxed INR-    Collection Date-4/28/21    INR-3.0    Routed High to Dr. Eduardo Dorman and Anti-Brandong

## 2021-04-29 NOTE — TELEPHONE ENCOUNTER
To on call Dr. Jennifer Pompa: Wilfred Camilo is out of the office until 5/4.  Please review INR and provide dosing instructions--see AC module

## 2021-04-29 NOTE — TELEPHONE ENCOUNTER
Spoke to patient and relayed MD message. Patient verbalized understanding.    Bygget 64 nurse from [facility] to provide update regarding Warfarin dosing and when next PT/INR should be drawn--LMTCB

## 2021-04-29 NOTE — TELEPHONE ENCOUNTER
To Dr. Brijesh Casarez - per pt:  Pt took  2 tablets (2mg each) every day except Tuesday he took 3 tablets (2 mg each). Pt skipped Friday last week  Pt took 3 tablets on Saturday to make up for skipping Friday.    Since Sunday pt has been following schedule of 2 t

## 2021-04-29 NOTE — TELEPHONE ENCOUNTER
Thank you for clarifying the Coumadin dosing within the last week:    Patient still in the therapeutic range, though borderline supratherapeutic    Recommend that patient take 2 tablets of the 2 mg (total of 4 mg) nightly for the rest of the week.   Repeat

## 2021-04-29 NOTE — TELEPHONE ENCOUNTER
Per my chart review, INR goal 2. 0–3.0 for atrial fibrillation. Can we call to see his usual Coumadin dosing from the past week?  Per Med review he was instructed to take 2-3 tabs (4-6 mg) total but I would need to know the dose of coumadin he has receive

## 2021-05-03 ENCOUNTER — TELEPHONE (OUTPATIENT)
Dept: INTERNAL MEDICINE CLINIC | Facility: CLINIC | Age: 86
End: 2021-05-03

## 2021-05-03 NOTE — TELEPHONE ENCOUNTER
Please call [facility] requesting verbal order to reschedule OT evaluation from last week to this week, no time to see last week  Ok to leave message  (no name left on voicemail)  Tasked to nursing

## 2021-05-03 NOTE — TELEPHONE ENCOUNTER
LM on Lenore's confidential VM relaying MD message. Klaus Fierro to call our office back with any questions or if PT/INR order is required to be faxed to [facility].     Called patient who states he received a call earlier from this office-unable to verify

## 2021-05-03 NOTE — TELEPHONE ENCOUNTER
Lenore/[facility] returned call  Ok to leave dosage on confidential voicemail  Please call again at [phone]  Tasked to nursing

## 2021-05-03 NOTE — TELEPHONE ENCOUNTER
Spoke with occupational therapist Tray Cardenas. Okay given to reschedule initial OT eval for this week. She will call back post visit with plan of care.

## 2021-05-04 NOTE — TELEPHONE ENCOUNTER
Luana Silva from [facility] is calling to inform Dr. Lazarus Heys that she will be seeing Viraj Pellet for OT twice a week for two weeks and once a week for two weeks for hand function and pain reduction in hand for ADL's ph. # [phone]   Routed to clinical

## 2021-05-05 ENCOUNTER — TELEPHONE (OUTPATIENT)
Dept: INTERNAL MEDICINE CLINIC | Facility: CLINIC | Age: 86
End: 2021-05-05

## 2021-05-05 DIAGNOSIS — I48.20 CHRONIC ATRIAL FIBRILLATION (HCC): ICD-10-CM

## 2021-05-11 ENCOUNTER — TELEPHONE (OUTPATIENT)
Dept: INTERNAL MEDICINE CLINIC | Facility: CLINIC | Age: 86
End: 2021-05-11

## 2021-05-11 RX ORDER — HYDROCODONE BITARTRATE AND ACETAMINOPHEN 7.5; 325 MG/1; MG/1
1 TABLET ORAL EVERY 6 HOURS PRN
Qty: 90 TABLET | Refills: 0 | Status: CANCELLED | OUTPATIENT
Start: 2021-05-11 | End: 2021-06-10

## 2021-05-11 NOTE — TELEPHONE ENCOUNTER
Spoke to pharmacy; active rx available and they will get this ready. Spoke to patient and notified him pharmacy working on refill.

## 2021-05-24 ENCOUNTER — TELEPHONE (OUTPATIENT)
Dept: INTERNAL MEDICINE CLINIC | Facility: CLINIC | Age: 86
End: 2021-05-24

## 2021-05-24 NOTE — TELEPHONE ENCOUNTER
Mona Smith from [facility] calling for recert care Y54 injection and respitory evaluations.      [place] [address] [phone] may leave message has confidential voice mail

## 2021-05-24 NOTE — TELEPHONE ENCOUNTER
Continue Vitamin B12 injections 1000 mcg IM q Month x 12 months    Continue respiratory evaluations    Please call Tai Lambert

## 2021-06-09 ENCOUNTER — TELEPHONE (OUTPATIENT)
Dept: INTERNAL MEDICINE CLINIC | Facility: CLINIC | Age: 86
End: 2021-06-09

## 2021-06-09 DIAGNOSIS — I48.20 CHRONIC ATRIAL FIBRILLATION (HCC): ICD-10-CM

## 2021-06-09 NOTE — TELEPHONE ENCOUNTER
To Dr. Stevenson Rater---    Sueellen Closs out of office. INR 3.2. Goal per [facility] note 2-3. Please advise thanks!

## 2021-06-10 NOTE — TELEPHONE ENCOUNTER
spoke to pt -- supratherapeutic--noproblems/ bleeds; pt denies sig changes--hold x1 and decreased dose and recheck 2-3 weeks

## 2021-06-25 ENCOUNTER — TELEPHONE (OUTPATIENT)
Dept: INTERNAL MEDICINE CLINIC | Facility: CLINIC | Age: 86
End: 2021-06-25

## 2021-06-25 DIAGNOSIS — I48.20 CHRONIC ATRIAL FIBRILLATION (HCC): ICD-10-CM

## 2021-06-25 RX ORDER — WARFARIN SODIUM 2 MG/1
TABLET ORAL
Qty: 270 TABLET | Refills: 0 | Status: SHIPPED | OUTPATIENT
Start: 2021-06-25 | End: 2021-12-06

## 2021-06-25 NOTE — TELEPHONE ENCOUNTER
spoke to pt - therapeutic after dose change---no problems/ bleeds; confirms dose---CPM new dose and recheck 1 month

## 2021-07-06 ENCOUNTER — TELEPHONE (OUTPATIENT)
Dept: INTERNAL MEDICINE CLINIC | Facility: CLINIC | Age: 86
End: 2021-07-06

## 2021-07-07 RX ORDER — HYDROCODONE BITARTRATE AND ACETAMINOPHEN 7.5; 325 MG/1; MG/1
1 TABLET ORAL EVERY 6 HOURS PRN
Qty: 90 TABLET | Refills: 0 | Status: SHIPPED | OUTPATIENT
Start: 2021-07-07 | End: 2021-08-12

## 2021-07-07 NOTE — TELEPHONE ENCOUNTER
To MD:  The above refill request is for a controlled substance. Please review pended medication order. Print and sign for staff to fax to pharmacy or prescribe electronically.     Last office visit: 10/14/2020  Last time refill sent and quantity/refills:

## 2021-07-12 ENCOUNTER — TELEPHONE (OUTPATIENT)
Dept: INTERNAL MEDICINE CLINIC | Facility: CLINIC | Age: 86
End: 2021-07-12

## 2021-07-12 NOTE — TELEPHONE ENCOUNTER
Marti Bustillo called from [place] [address] reporting the following:    Mr. Severiano Foyer arthritis is very bad. Pt states that his pain level is 10 out of 10. He has pain in his right shoulder,right hand, right upper arm and left hand.     Pain is so bad pt

## 2021-07-12 NOTE — TELEPHONE ENCOUNTER
Spoke with Elin Herron from [facility] who reports yesterday patient could not get out of bed due to pain. Today patient was able to get out of bed and get himself dressed, had more of an appetite today.  She reports patient is taking Norco Q3hrs for pain along wi

## 2021-07-16 NOTE — TELEPHONE ENCOUNTER
Alfie Setting / [facility] is calling OT Evaluation will be moved to next week due to scheduling conflict

## 2021-07-19 ENCOUNTER — TELEPHONE (OUTPATIENT)
Dept: INTERNAL MEDICINE CLINIC | Facility: CLINIC | Age: 86
End: 2021-07-19

## 2021-07-19 NOTE — TELEPHONE ENCOUNTER
Patient is calling to speak with Teresa Valdivia. Patient states he 'has a question' for her. Patient would not share with  what the question was regarding. Routed to Teresa Valdivia.

## 2021-07-20 NOTE — TELEPHONE ENCOUNTER
Nimisha Childress / [facility] is calling she saw patient today for OT evaluation  Patient complained of pain and weakness of shoulder it is a recent onset  She noticed patient is having difficulty walking    She will not be seeing patient for OT but is recommendi

## 2021-07-21 NOTE — TELEPHONE ENCOUNTER
LM on confidential VM of [place] with [facility] relaying MD message below- OK For PT eval. Advised to call back with questions or concerns.

## 2021-07-22 NOTE — TELEPHONE ENCOUNTER
Spoke to pt - we talked about sleep aids as pt feels he has insomnia;   At his age, and gait disturbances, sedatives/hypnotics are going to increase his fall risk;  Pt understands  He will work on not napping during the day and falling asleep later

## 2021-07-27 ENCOUNTER — TELEPHONE (OUTPATIENT)
Dept: INTERNAL MEDICINE CLINIC | Facility: CLINIC | Age: 86
End: 2021-07-27

## 2021-07-27 DIAGNOSIS — I48.20 CHRONIC ATRIAL FIBRILLATION (HCC): ICD-10-CM

## 2021-07-27 NOTE — TELEPHONE ENCOUNTER
[facility] faxed past due nitce stating the patient is past due  Days for a INR. Copy placed on Kristen's desk and original sent to scanning.

## 2021-07-28 LAB — INR: 2.7 (ref 0.8–1.2)

## 2021-07-30 ENCOUNTER — TELEPHONE (OUTPATIENT)
Dept: INTERNAL MEDICINE CLINIC | Facility: CLINIC | Age: 86
End: 2021-07-30

## 2021-07-30 DIAGNOSIS — I48.20 CHRONIC ATRIAL FIBRILLATION (HCC): ICD-10-CM

## 2021-08-09 ENCOUNTER — TELEPHONE (OUTPATIENT)
Dept: INTERNAL MEDICINE CLINIC | Facility: CLINIC | Age: 86
End: 2021-08-09

## 2021-08-09 NOTE — TELEPHONE ENCOUNTER
To Dr. Yumiko Brandt to advise--    [phone] Wilda Sawyer to recertify? If pt unable to come into office for annual visit, would you like pt to schedule phone visit?

## 2021-08-09 NOTE — TELEPHONE ENCOUNTER
Spoke to Abigail Domínguez, and relayed MD message. She verbalized understanding. Abigail Domínguez will see pt next week and will determine if pt is able to come in for a visit or if a video visit would be better.  Advised to call back at that time and we can assist with sc

## 2021-08-09 NOTE — TELEPHONE ENCOUNTER
Kelsea Linda / [facility] called to request order to re certify for another episode of care for B12, and managing cardiac, respiratory care   Due for an annual visit with Dr Colin Bennett can assist with Video Visit if Dr Jolene Titus would like one    [phone]

## 2021-08-11 NOTE — TELEPHONE ENCOUNTER
To MD:  The above refill request is for a controlled substance. Please review pended medication order. Print and sign for staff to fax to pharmacy or prescribe electronically.     Last office visit: 9/28/20  Last time refill sent and quantity/refills:  D

## 2021-08-12 RX ORDER — HYDROCODONE BITARTRATE AND ACETAMINOPHEN 7.5; 325 MG/1; MG/1
1 TABLET ORAL EVERY 6 HOURS PRN
Qty: 90 TABLET | Refills: 0 | Status: SHIPPED | OUTPATIENT
Start: 2021-08-12 | End: 2021-09-15

## 2021-08-20 ENCOUNTER — TELEPHONE (OUTPATIENT)
Dept: INTERNAL MEDICINE CLINIC | Facility: CLINIC | Age: 86
End: 2021-08-20

## 2021-08-20 DIAGNOSIS — R26.9 GAIT ABNORMALITY: ICD-10-CM

## 2021-08-20 DIAGNOSIS — M47.816 OSTEOARTHRITIS OF LUMBAR SPINE, UNSPECIFIED SPINAL OSTEOARTHRITIS COMPLICATION STATUS: ICD-10-CM

## 2021-08-20 DIAGNOSIS — I48.20 CHRONIC ATRIAL FIBRILLATION (HCC): Primary | ICD-10-CM

## 2021-08-20 DIAGNOSIS — E53.8 VITAMIN B12 DEFICIENCY: ICD-10-CM

## 2021-08-20 NOTE — TELEPHONE ENCOUNTER
Elin Herron from [facility] is calling she needs order to re-certify pt. For PT [facility] Nursing pt.  Has a video visit scheduled on 8/27 we will contact her for visit at # [phone]  She needs the orders placed before the video visit routed to clinical

## 2021-08-23 NOTE — TELEPHONE ENCOUNTER
Home health orders placed; pt has video visit on 8/27; please notify Roseanna Lazo at [facility] [place]

## 2021-08-25 NOTE — TELEPHONE ENCOUNTER
PÅLÄNG from [facility] and relayed DR. CLARITA cobian - left on confidential VM

## 2021-08-27 ENCOUNTER — TELEMEDICINE (OUTPATIENT)
Dept: INTERNAL MEDICINE CLINIC | Facility: CLINIC | Age: 86
End: 2021-08-27
Payer: MEDICARE

## 2021-08-27 ENCOUNTER — TELEPHONE (OUTPATIENT)
Dept: INTERNAL MEDICINE CLINIC | Facility: CLINIC | Age: 86
End: 2021-08-27

## 2021-08-27 DIAGNOSIS — R76.12 POSITIVE QUANTIFERON-TB GOLD TEST: ICD-10-CM

## 2021-08-27 DIAGNOSIS — E53.8 VITAMIN B12 DEFICIENCY: ICD-10-CM

## 2021-08-27 DIAGNOSIS — M47.816 OSTEOARTHRITIS OF LUMBAR SPINE, UNSPECIFIED SPINAL OSTEOARTHRITIS COMPLICATION STATUS: Primary | ICD-10-CM

## 2021-08-27 DIAGNOSIS — R26.9 GAIT ABNORMALITY: ICD-10-CM

## 2021-08-27 DIAGNOSIS — I48.19 PERSISTENT ATRIAL FIBRILLATION (HCC): ICD-10-CM

## 2021-08-27 PROCEDURE — 99214 OFFICE O/P EST MOD 30 MIN: CPT | Performed by: INTERNAL MEDICINE

## 2021-08-27 NOTE — TELEPHONE ENCOUNTER
CXR ordered
[facility] Sherrlyn Snellen called    Quantiferon blood test was positive   will re check or advise if there are any other orders from Dr Orr Commons     Ph# [phone], fax # [phone]
Hi Castillo from [facility] is returning nurse call
I spoke with Eli Mcclain at [facility]. They were getting his Quantiferon test done for their facility regulations. He has been at [facility] for 1 year. He has no symptoms. Dr. Constance Gonsalves, please advise. Patient has a video visit today.  Should [place] re do this test o
Left message to call back to get more information    -does pt have symptoms? Why was test drawn?     (Note: Patient is scheduled to have a phone visit with Dr Cindi Olvera this afternoon)
no

## 2021-08-27 NOTE — PROGRESS NOTES
Virtual Telephone Check-In    Tundecastillo Olveras verbally consents to a Virtual/Telephone Check-In visit on 08/27/21. Patient has been referred to the [facility] website at [email or web address] to review the yearly Consent to Treat document.     Patient lliy to manipulate the control of the power wheelchair safely. [facility] is mentally and physically able to operate a motorized wheelchair safely.  Mobility assessment form physical therapist reviewed, and I agree with the evaluation and the need for a motorized whee prn     Protein calorie malnutrition  advise maximal nutritional support; albumin 2.6; add Boost shakes when able     Coronary artery disease  the patient currently takes aspirin 81 mg daily.     History of villous adenoma   the patient reports occasional

## 2021-08-30 ENCOUNTER — TELEPHONE (OUTPATIENT)
Dept: INTERNAL MEDICINE CLINIC | Facility: CLINIC | Age: 86
End: 2021-08-30

## 2021-08-30 DIAGNOSIS — I48.20 CHRONIC ATRIAL FIBRILLATION (HCC): ICD-10-CM

## 2021-08-30 LAB — INR: 2.6 (ref 0.8–1.2)

## 2021-08-30 NOTE — TELEPHONE ENCOUNTER
As FYI to DR. Vipul Howe called Mona Smith from [facility] and gave verbal approval for plan of care per protocol - verbalized understanding

## 2021-08-30 NOTE — TELEPHONE ENCOUNTER
Requesting verbal order to see patient today or Wednesday (pt prefers today)    Pt banged his leg, hematoma     Verbal order okay, they see patient every other week    Tasked to nursing

## 2021-08-31 ENCOUNTER — LAB REQUISITION (OUTPATIENT)
Dept: LAB | Facility: HOSPITAL | Age: 86
End: 2021-08-31
Payer: MEDICARE

## 2021-08-31 DIAGNOSIS — J44.9 CHRONIC OBSTRUCTIVE PULMONARY DISEASE, UNSPECIFIED (HCC): ICD-10-CM

## 2021-08-31 LAB
ALBUMIN SERPL-MCNC: 3.3 G/DL (ref 3.4–5)
ALBUMIN/GLOB SERPL: 0.9 {RATIO} (ref 1–2)
ALP LIVER SERPL-CCNC: 78 U/L
ALT SERPL-CCNC: 12 U/L
ANION GAP SERPL CALC-SCNC: 6 MMOL/L (ref 0–18)
AST SERPL-CCNC: 16 U/L (ref 15–37)
BASOPHILS # BLD AUTO: 0.03 X10(3) UL (ref 0–0.2)
BASOPHILS NFR BLD AUTO: 0.3 %
BILIRUB SERPL-MCNC: 0.5 MG/DL (ref 0.1–2)
BUN BLD-MCNC: 8 MG/DL (ref 7–18)
CALCIUM BLD-MCNC: 8.6 MG/DL (ref 8.5–10.1)
CHLORIDE SERPL-SCNC: 109 MMOL/L (ref 98–112)
CO2 SERPL-SCNC: 26 MMOL/L (ref 21–32)
CREAT BLD-MCNC: 0.76 MG/DL
EOSINOPHIL # BLD AUTO: 0.14 X10(3) UL (ref 0–0.7)
EOSINOPHIL NFR BLD AUTO: 1.3 %
ERYTHROCYTE [DISTWIDTH] IN BLOOD BY AUTOMATED COUNT: 14.9 %
GLOBULIN PLAS-MCNC: 3.7 G/DL (ref 2.8–4.4)
GLUCOSE BLD-MCNC: 75 MG/DL (ref 70–99)
HCT VFR BLD AUTO: 43.8 %
HGB BLD-MCNC: 13.1 G/DL
IMM GRANULOCYTES # BLD AUTO: 0.05 X10(3) UL (ref 0–1)
IMM GRANULOCYTES NFR BLD: 0.5 %
LYMPHOCYTES # BLD AUTO: 1.3 X10(3) UL (ref 1–4)
LYMPHOCYTES NFR BLD AUTO: 11.7 %
M PROTEIN MFR SERPL ELPH: 7 G/DL (ref 6.4–8.2)
MCH RBC QN AUTO: 27.8 PG (ref 26–34)
MCHC RBC AUTO-ENTMCNC: 29.9 G/DL (ref 31–37)
MCV RBC AUTO: 93 FL
MONOCYTES # BLD AUTO: 1.85 X10(3) UL (ref 0.1–1)
MONOCYTES NFR BLD AUTO: 16.7 %
NEUTROPHILS # BLD AUTO: 7.74 X10 (3) UL (ref 1.5–7.7)
NEUTROPHILS # BLD AUTO: 7.74 X10(3) UL (ref 1.5–7.7)
NEUTROPHILS NFR BLD AUTO: 69.5 %
OSMOLALITY SERPL CALC.SUM OF ELEC: 289 MOSM/KG (ref 275–295)
PLATELET # BLD AUTO: 174 10(3)UL (ref 150–450)
POTASSIUM SERPL-SCNC: 4.2 MMOL/L (ref 3.5–5.1)
RBC # BLD AUTO: 4.71 X10(6)UL
SODIUM SERPL-SCNC: 141 MMOL/L (ref 136–145)
WBC # BLD AUTO: 11.1 X10(3) UL (ref 4–11)

## 2021-08-31 PROCEDURE — 85025 COMPLETE CBC W/AUTO DIFF WBC: CPT | Performed by: INTERNAL MEDICINE

## 2021-08-31 PROCEDURE — 80053 COMPREHEN METABOLIC PANEL: CPT | Performed by: INTERNAL MEDICINE

## 2021-09-05 ENCOUNTER — TELEPHONE (OUTPATIENT)
Dept: INTERNAL MEDICINE CLINIC | Facility: CLINIC | Age: 86
End: 2021-09-05

## 2021-09-05 DIAGNOSIS — L02.419 LEG ABSCESS: Primary | ICD-10-CM

## 2021-09-05 RX ORDER — CEFUROXIME AXETIL 500 MG/1
500 TABLET ORAL 2 TIMES DAILY
Qty: 20 TABLET | Refills: 0 | Status: SHIPPED | OUTPATIENT
Start: 2021-09-05 | End: 2021-11-27 | Stop reason: ALTCHOICE

## 2021-09-05 NOTE — TELEPHONE ENCOUNTER
Patient presents with:  Lesion: I was called by the nursing staff at [facility] about the patient having a lower extremity mass, claims he has had it for a week, but looks worse today, looks like it is in her opinion\" coming to a head\" they have been puttin

## 2021-09-08 ENCOUNTER — TELEPHONE (OUTPATIENT)
Dept: INTERNAL MEDICINE CLINIC | Facility: CLINIC | Age: 86
End: 2021-09-08

## 2021-09-08 NOTE — TELEPHONE ENCOUNTER
Received a fax from Amara at [facility] requesting a signature from Dr Colleen Waller. Magdy at [facility] states this is their fourth attempt at trying to get a signature from the office. Paperwork placed in Dr Dimas Vaca.

## 2021-09-14 ENCOUNTER — TELEPHONE (OUTPATIENT)
Dept: INTERNAL MEDICINE CLINIC | Facility: CLINIC | Age: 86
End: 2021-09-14

## 2021-09-14 NOTE — TELEPHONE ENCOUNTER
To MD:  The above refill request is for a controlled substance. Please review pended medication order. Print and sign for staff to fax to pharmacy or prescribe electronically.     Last office visit: 9/28/20 in office, 8/27/21 over phone  Last time refill

## 2021-09-15 ENCOUNTER — TELEPHONE (OUTPATIENT)
Dept: INTERNAL MEDICINE CLINIC | Facility: CLINIC | Age: 86
End: 2021-09-15

## 2021-09-15 RX ORDER — HYDROCODONE BITARTRATE AND ACETAMINOPHEN 7.5; 325 MG/1; MG/1
1 TABLET ORAL EVERY 6 HOURS PRN
Qty: 90 TABLET | Refills: 0 | Status: SHIPPED | OUTPATIENT
Start: 2021-09-15 | End: 2021-10-20

## 2021-09-15 NOTE — TELEPHONE ENCOUNTER
Delfina from [facility] called. She is at the pt's home now. He has a Hematoma on the right shin that has opened up and is bleeding a little. Looks like the skin is going to fall off. No sign of infection.   She applied Bordered Foam. Please call her at 61

## 2021-09-16 NOTE — TELEPHONE ENCOUNTER
Spoke to Alethea Farnsworth from [facility] and relayed MD message, she verbalized understanding.

## 2021-09-22 ENCOUNTER — TELEPHONE (OUTPATIENT)
Dept: INTERNAL MEDICINE CLINIC | Facility: CLINIC | Age: 86
End: 2021-09-22

## 2021-09-22 DIAGNOSIS — I48.20 CHRONIC ATRIAL FIBRILLATION (HCC): ICD-10-CM

## 2021-09-22 LAB — INR: 3.5 (ref 0.8–1.2)

## 2021-09-22 NOTE — TELEPHONE ENCOUNTER
To Dr. Hattie Smart---    Gifty Perdomo back in office tomorrow.      INR 3.5 per pt    Per AC note goal= 2-3

## 2021-09-23 NOTE — TELEPHONE ENCOUNTER
spoke to pt ---supratherapeutic-- no problems/ bleeds; denies sig changes Rx, OTC diet; pt has been steady---hold x2 and CPM and recheck 10-14 days

## 2021-09-24 ENCOUNTER — TELEPHONE (OUTPATIENT)
Dept: INTERNAL MEDICINE CLINIC | Facility: CLINIC | Age: 86
End: 2021-09-24

## 2021-09-24 NOTE — TELEPHONE ENCOUNTER
Spoke with Radha Torres from [facility] who reports worsening right shin wound. Now open, crater with undermining, bloody drainage. One of the nurses at the facility told Radha Torres that a big blood clot came out from wound. Radha oTrres does not feel it is infected, no colored drainage or redness around area. Gave verbal ok for wound care as requested. Radha Torres is concerned about patient's R 2nd toe. She did not notice it last week and patient told her he hadn't noticed it until now. He put a callous cream on it which he felt helped. Patient does take Norco for his wrist. He can still walk normally on his feet. She put him on the list to see the visiting podiatrist but she is unsure when that will be. She states he knows to call [facility] if anything worsens.      TO Dr. Marjorie Carbone

## 2021-09-24 NOTE — TELEPHONE ENCOUNTER
Lenore/[facility] requesting verbal order for:  Wound on right shin, now a crater measuring 1x1x.5, undermining at 6:00, no signs of infection  Would like a hydrocolloid w/border foam change 3x per week  Please call with verbal order    Fyi. .. Right second toe is reddened, swollen, has callous.  tender to touch  Sunil Hollingsworth put ointment on toe   requested visiting podiatrist to see patient      Tasked to nursing

## 2021-10-04 ENCOUNTER — TELEPHONE (OUTPATIENT)
Dept: INTERNAL MEDICINE CLINIC | Facility: CLINIC | Age: 86
End: 2021-10-04

## 2021-10-04 NOTE — TELEPHONE ENCOUNTER
Received VM message from   [facility] requests orders     1. To add medical grade honey to wound on patient's right lower leg  2. Wound care visit on Friday Oct 8  3.  Re certify patient for another episode of care for wound care, B12 & respirator

## 2021-10-08 ENCOUNTER — TELEPHONE (OUTPATIENT)
Dept: INTERNAL MEDICINE CLINIC | Facility: CLINIC | Age: 86
End: 2021-10-08

## 2021-10-08 DIAGNOSIS — I48.20 CHRONIC ATRIAL FIBRILLATION (HCC): ICD-10-CM

## 2021-10-15 ENCOUNTER — TELEPHONE (OUTPATIENT)
Dept: INTERNAL MEDICINE CLINIC | Facility: CLINIC | Age: 86
End: 2021-10-15

## 2021-10-15 NOTE — TELEPHONE ENCOUNTER
TOIS to Dr. Rebolledo ----    Spoke to Antonietta Sims with [facility]. She would like to provide wound care for leg wound with petroleum gauze and island dressing and change 2 times weekly. The wound on the toe has serous drainage. Antonietta Sims is going to have podiatrist look at this next week. Verbal ok given to provide wound care.      Antonietta Sims denies any concern for infection (fever/chills, purulent drainage, etc.)

## 2021-10-15 NOTE — TELEPHONE ENCOUNTER
Aretha Young from [place] [address] is calling to let doctor know the following.     Pt has new wound on right leg lateral.    Patients left leg second toe corn fell off and has a open wound with drainage    Aretha Young would like to talk to the nurse to get the ok for wound care    Nurse states that this is not a acute matter

## 2021-10-19 NOTE — TELEPHONE ENCOUNTER
Pt called to check on refill  He will be out of medication by the end of the day  Please send refill today

## 2021-10-19 NOTE — TELEPHONE ENCOUNTER
To MD:  The above refill request is for a controlled substance. Please review pended medication order. Print and sign for staff to fax to pharmacy or prescribe electronically. Last office visit: 9/28/21;  Last telemedicine visit 8/27/21  Last time ref

## 2021-10-20 RX ORDER — HYDROCODONE BITARTRATE AND ACETAMINOPHEN 7.5; 325 MG/1; MG/1
1 TABLET ORAL EVERY 6 HOURS PRN
Qty: 90 TABLET | Refills: 0 | Status: SHIPPED | OUTPATIENT
Start: 2021-10-20 | End: 2021-11-17

## 2021-10-20 NOTE — TELEPHONE ENCOUNTER
Noted, refilled, Dr. Harley Herr can ignore, nursing apologized to the patient and his family, it has been refilled, message came in to me last night after I had left the office

## 2021-10-20 NOTE — TELEPHONE ENCOUNTER
Spoke to patient and notified him this was done, apologized for delay. Patient was appreciative of call.

## 2021-10-29 ENCOUNTER — APPOINTMENT (OUTPATIENT)
Dept: GENERAL RADIOLOGY | Facility: HOSPITAL | Age: 86
End: 2021-10-29
Attending: EMERGENCY MEDICINE
Payer: MEDICARE

## 2021-10-29 ENCOUNTER — TELEPHONE (OUTPATIENT)
Dept: INTERNAL MEDICINE CLINIC | Facility: CLINIC | Age: 86
End: 2021-10-29

## 2021-10-29 ENCOUNTER — HOSPITAL ENCOUNTER (OUTPATIENT)
Age: 86
Discharge: ANOTHER HOSPITAL | End: 2021-10-29
Attending: EMERGENCY MEDICINE
Payer: MEDICARE

## 2021-10-29 ENCOUNTER — HOSPITAL ENCOUNTER (EMERGENCY)
Facility: HOSPITAL | Age: 86
Discharge: HOME | End: 2021-10-29
Attending: EMERGENCY MEDICINE
Payer: MEDICARE

## 2021-10-29 VITALS
TEMPERATURE: 98 F | RESPIRATION RATE: 22 BRPM | SYSTOLIC BLOOD PRESSURE: 128 MMHG | DIASTOLIC BLOOD PRESSURE: 44 MMHG | OXYGEN SATURATION: 94 % | HEART RATE: 56 BPM

## 2021-10-29 VITALS
BODY MASS INDEX: 19.97 KG/M2 | OXYGEN SATURATION: 96 % | WEIGHT: 164 LBS | RESPIRATION RATE: 18 BRPM | DIASTOLIC BLOOD PRESSURE: 56 MMHG | TEMPERATURE: 98 F | SYSTOLIC BLOOD PRESSURE: 141 MMHG | HEART RATE: 64 BPM | HEIGHT: 76 IN

## 2021-10-29 DIAGNOSIS — R50.9 FEVER, UNSPECIFIED FEVER CAUSE: ICD-10-CM

## 2021-10-29 DIAGNOSIS — R04.2 BLOOD-TINGED SPUTUM: ICD-10-CM

## 2021-10-29 DIAGNOSIS — R05.9 COUGH: Primary | ICD-10-CM

## 2021-10-29 DIAGNOSIS — J18.9 ATYPICAL PNEUMONIA: ICD-10-CM

## 2021-10-29 DIAGNOSIS — J40 BRONCHITIS: Primary | ICD-10-CM

## 2021-10-29 PROCEDURE — 93005 ELECTROCARDIOGRAM TRACING: CPT

## 2021-10-29 PROCEDURE — 85730 THROMBOPLASTIN TIME PARTIAL: CPT | Performed by: EMERGENCY MEDICINE

## 2021-10-29 PROCEDURE — 83605 ASSAY OF LACTIC ACID: CPT | Performed by: EMERGENCY MEDICINE

## 2021-10-29 PROCEDURE — 99212 OFFICE O/P EST SF 10 MIN: CPT

## 2021-10-29 PROCEDURE — 71045 X-RAY EXAM CHEST 1 VIEW: CPT | Performed by: EMERGENCY MEDICINE

## 2021-10-29 PROCEDURE — 85610 PROTHROMBIN TIME: CPT | Performed by: EMERGENCY MEDICINE

## 2021-10-29 PROCEDURE — 99284 EMERGENCY DEPT VISIT MOD MDM: CPT

## 2021-10-29 PROCEDURE — 93010 ELECTROCARDIOGRAM REPORT: CPT | Performed by: EMERGENCY MEDICINE

## 2021-10-29 PROCEDURE — 80053 COMPREHEN METABOLIC PANEL: CPT | Performed by: EMERGENCY MEDICINE

## 2021-10-29 PROCEDURE — 87040 BLOOD CULTURE FOR BACTERIA: CPT | Performed by: EMERGENCY MEDICINE

## 2021-10-29 PROCEDURE — 85025 COMPLETE CBC W/AUTO DIFF WBC: CPT | Performed by: EMERGENCY MEDICINE

## 2021-10-29 PROCEDURE — 96360 HYDRATION IV INFUSION INIT: CPT

## 2021-10-29 PROCEDURE — 36415 COLL VENOUS BLD VENIPUNCTURE: CPT

## 2021-10-29 PROCEDURE — 99213 OFFICE O/P EST LOW 20 MIN: CPT

## 2021-10-29 RX ORDER — ALBUTEROL SULFATE 90 UG/1
2 AEROSOL, METERED RESPIRATORY (INHALATION) EVERY 4 HOURS PRN
Qty: 1 EACH | Refills: 0 | Status: ON HOLD | OUTPATIENT
Start: 2021-10-29 | End: 2021-12-06

## 2021-10-29 RX ORDER — DOXYCYCLINE HYCLATE 100 MG/1
100 CAPSULE ORAL 2 TIMES DAILY
Qty: 20 CAPSULE | Refills: 0 | Status: SHIPPED | OUTPATIENT
Start: 2021-10-29 | End: 2021-11-08

## 2021-10-29 NOTE — ED INITIAL ASSESSMENT (HPI)
Patient sent from [facility]. Patient has recent cough and has been coughing up blood. Patient is on coumadin.

## 2021-10-29 NOTE — ED INITIAL ASSESSMENT (HPI)
Several days of cough. Fever 101.9 yesterday and coughing up \"flecks of blood\". Prone to pneumonia. Fully vaccinated for covid.

## 2021-10-29 NOTE — ED PROVIDER NOTES
Patient Seen in: [facility] [place]      History   Patient presents with:  Cough/URI    Stated Complaint: COUGH/SPIT UP BLOOD    Subjective:   HPI    79 yo male with several days of cough and temp at home of 101. 9. has had some blood tinged sputum an diagnosis)  Blood-tinged sputum  Fever, unspecified fever cause     Disposition: Ic to ed  10/29/2021  4:50 pm    Follow-up:  No follow-up provider specified.         Medications Prescribed:  Current Discharge Medication List

## 2021-10-29 NOTE — TELEPHONE ENCOUNTER
D/w HARIS Rivera at [phone]    Due to fever, pneumonia not excluded    Advise ED sylvia    [facility] called; she will notify pt

## 2021-10-29 NOTE — TELEPHONE ENCOUNTER
Left detailed voicemail for Kena Casanova with [facility] (on confidential VM) advising that we are not able to call IC ahead of time. Advised IC evaluation would be better than no evaluation at all, but MD's recommendation was ER evaluation.  Advised to call back with

## 2021-10-29 NOTE — TELEPHONE ENCOUNTER
Spoke to Thomas Haskins, [facility] RN, who is currently with the patient. Guzman Jozef wanted to let Leroy Pelayo know that patient had a temp of 102 on 10/27/21, has been normal since then. Since 10/27/21 patient has been coughing up thick blood tinged sputum.  P

## 2021-10-29 NOTE — ED QUICK NOTES
Pt states his home health RN advised him to go to the IM for chest Xray. She was concerned about possible pneumonia due to wheezing. Pt states 2 days ago he did have a fever (101), but resolved by afternoon.  Denies coughing, but did have phlegm x 1 that ha

## 2021-10-29 NOTE — TELEPHONE ENCOUNTER
Jignesh Maxwell just spoke with Dr Major Duncan, needs to speak with nursing to relay the following information     Pt did not want to go to the ER  Pt agreed to go to the IC in [facility] [address], but wants our office to call [place] to let them know why he is coming  Would rory rai

## 2021-10-31 NOTE — ED PROVIDER NOTES
Patient Seen in: [facility] Emergency Department      History   Patient presents with:  Cough/URI  Bleeding    Stated Complaint: fever/cough/spitting up blood    Subjective:   HPI    80year old male with h/o a fib on coumadin, chf, copd, dysphagi History:   Procedure Laterality Date   • CARDIOVERSION  2007   • CATARACT  2006   • CATH PERCUTANEOUS  TRANSLUMINAL CORONARY ANGIOPLASTY  1990   • CHOLECYSTECTOMY     • COLON SURGERY Right 10/11   • FAT, FECAL QUALITATIVE  12/10   • FRACTURE SURGERY      L tympanic membrane and ear canal normal.      Nose: Mucosal edema and rhinorrhea present. Mouth/Throat:      Pharynx: No oropharyngeal exudate or uvula swelling. Tonsils: No tonsillar abscesses.    Eyes:      Conjunctiva/sclera: Conjunctivae normal All other components within normal limits   PTT, ACTIVATED - Abnormal; Notable for the following components:    PTT 53.5 (*)     All other components within normal limits   CBC W/ DIFFERENTIAL - Abnormal; Notable for the following components:    HGB 12. for any reasons. No hemoptysis here. Daughter also feels comfortable with pt going home, will continue to monitor. Pt will return for any worsening sx.                                Disposition and Plan     Clinical Impression:  Bronchitis  (primary encoun

## 2021-11-01 ENCOUNTER — TELEPHONE (OUTPATIENT)
Dept: INTERNAL MEDICINE CLINIC | Facility: CLINIC | Age: 86
End: 2021-11-01

## 2021-11-01 NOTE — TELEPHONE ENCOUNTER
Stop doxycyline; no alternative Abx advised    Monitor for fever; monitor diarrhea off doxyxycline    Please call [facility] [facility]

## 2021-11-01 NOTE — TELEPHONE ENCOUNTER
To Dr. Agnes Cash to please advise---  Spoke to [facility] who reports BP today was lower than pt's baseline-  BP 98/52, HR 64, temp 97.4. Pt c/o feeling exhausted today.  Reports since starting doxycycline on 10/29 pt has had alternating liquid and soft stoo

## 2021-11-01 NOTE — TELEPHONE ENCOUNTER
Ritika Bello from [facility] called to report a BP of 98/52. Heart rate is good and no dizziness. The Doxycycline is causing Diarrhea and he just does not feel well. Pt. Refused Imodium. Does Dr. Swathi Richard. Want to change the antibiotic?   Ritika Bello can be reached at 61

## 2021-11-01 NOTE — TELEPHONE ENCOUNTER
Called [facility] from [facility] and relayed DR. OTTO message - verbalized understanding and will contact patient

## 2021-11-02 ENCOUNTER — TELEPHONE (OUTPATIENT)
Dept: INTERNAL MEDICINE CLINIC | Facility: CLINIC | Age: 86
End: 2021-11-02

## 2021-11-02 DIAGNOSIS — I48.20 CHRONIC ATRIAL FIBRILLATION (HCC): ICD-10-CM

## 2021-11-02 NOTE — TELEPHONE ENCOUNTER
Received by fax from [facility] [place] notifying Dr. Ileen Moritz patient is past due for INR testing. Letter has been placed on [employer].

## 2021-11-05 ENCOUNTER — TELEPHONE (OUTPATIENT)
Dept: INTERNAL MEDICINE CLINIC | Facility: CLINIC | Age: 86
End: 2021-11-05

## 2021-11-08 RX ORDER — DIGOXIN 125 MCG
125 TABLET ORAL
Qty: 30 TABLET | Refills: 0 | Status: SHIPPED | OUTPATIENT
Start: 2021-11-08 | End: 2021-11-08

## 2021-11-08 RX ORDER — DIGOXIN 125 MCG
125 TABLET ORAL
Qty: 90 TABLET | Refills: 3 | Status: SHIPPED | OUTPATIENT
Start: 2021-11-08

## 2021-11-08 NOTE — TELEPHONE ENCOUNTER
Last serum digoxin done 2019. To Dr. Hess Body, please advise if you want to order? Or if pt needs to schedule annual PE?  Last visit was virtual 8/27/21    eRx short supply sent as pt is out of med  Refill request is for a maintenance medication and has met the cr

## 2021-11-09 RX ORDER — DIGOXIN 125 MCG
TABLET ORAL
Qty: 90 TABLET | Refills: 0 | OUTPATIENT
Start: 2021-11-09

## 2021-11-09 NOTE — TELEPHONE ENCOUNTER
Current refill request refused due to refill is either a duplicate request or has active refills at the pharmacy. Check previous templates.     Requested Prescriptions     Refused Prescriptions Disp Refills   • DIGOXIN 0.125 MG Oral Tab [Pharmacy Med Name:

## 2021-11-10 ENCOUNTER — TELEPHONE (OUTPATIENT)
Dept: INTERNAL MEDICINE CLINIC | Facility: CLINIC | Age: 86
End: 2021-11-10

## 2021-11-10 DIAGNOSIS — I48.20 CHRONIC ATRIAL FIBRILLATION (HCC): ICD-10-CM

## 2021-11-10 NOTE — TELEPHONE ENCOUNTER
Noted INR of 2.2 within goal range for pt of 2.0-3.0 per   \"episode\" anticoagulation tab. To Damion Acevedo (expected to RTC tomorrow) [facility] PCP as well.

## 2021-11-17 ENCOUNTER — TELEPHONE (OUTPATIENT)
Dept: INTERNAL MEDICINE CLINIC | Facility: CLINIC | Age: 86
End: 2021-11-17

## 2021-11-17 RX ORDER — HYDROCODONE BITARTRATE AND ACETAMINOPHEN 7.5; 325 MG/1; MG/1
1 TABLET ORAL EVERY 6 HOURS PRN
Qty: 90 TABLET | Refills: 0 | Status: SHIPPED | OUTPATIENT
Start: 2021-11-17 | End: 2021-12-29

## 2021-11-17 NOTE — TELEPHONE ENCOUNTER
To MD:  The above refill request is for a controlled substance. Please review pended medication order. Print and sign for staff to fax to pharmacy or prescribe electronically.     Last office visit: 8/27/21 (virtual visit)  Last time refill sent and Laury Bartholomew

## 2021-11-20 ENCOUNTER — TELEPHONE (OUTPATIENT)
Dept: INTERNAL MEDICINE CLINIC | Facility: CLINIC | Age: 86
End: 2021-11-20

## 2021-11-20 NOTE — TELEPHONE ENCOUNTER
On call telephone call from last night. Pt has been placed on keflex by Podiatrist for possible cellulitis. I will route this to dr Brie Pritchard as an Josafat Eduardo.

## 2021-11-22 ENCOUNTER — TELEPHONE (OUTPATIENT)
Dept: INTERNAL MEDICINE CLINIC | Facility: CLINIC | Age: 86
End: 2021-11-22

## 2021-11-22 NOTE — TELEPHONE ENCOUNTER
Braden Staton from [facility] calling. With patient at this time. Patient heart rate 48   Blood pressure 124/56  No dizziness, no increase fatigue, has not skipped or take any extra dosages on medication. Heart rate is normal not irregular.

## 2021-11-22 NOTE — TELEPHONE ENCOUNTER
To Dr. Brittany Garcia----    Spoke to [facility] RN who was with pt at time of call. RN noted HR of 48, which per RN is lower than pt's normal of 60's-70's. Rate normal per RN. Per RN BP stable at 124/56.  Pt denies chest pain, palpitations, dizziness, visual knowles

## 2021-11-23 NOTE — TELEPHONE ENCOUNTER
Spoke to BODØ with [facility]  and relayed MD message below. Per Dr. Hoda Villafana, ok for this to be done any time this week. BODØ will do this at her visit on Friday.

## 2021-11-26 ENCOUNTER — LAB REQUISITION (OUTPATIENT)
Dept: LAB | Facility: HOSPITAL | Age: 86
DRG: 504 | End: 2021-11-26
Payer: MEDICARE

## 2021-11-26 DIAGNOSIS — S91.102A UNSPECIFIED OPEN WOUND OF LEFT GREAT TOE WITHOUT DAMAGE TO NAIL, INITIAL ENCOUNTER: ICD-10-CM

## 2021-11-26 DIAGNOSIS — I50.41 ACUTE COMBINED SYSTOLIC (CONGESTIVE) AND DIASTOLIC (CONGESTIVE) HEART FAILURE (HCC): ICD-10-CM

## 2021-11-26 PROCEDURE — 85652 RBC SED RATE AUTOMATED: CPT | Performed by: PODIATRIST

## 2021-11-26 PROCEDURE — 80162 ASSAY OF DIGOXIN TOTAL: CPT | Performed by: INTERNAL MEDICINE

## 2021-11-26 PROCEDURE — 85025 COMPLETE CBC W/AUTO DIFF WBC: CPT | Performed by: PODIATRIST

## 2021-11-26 PROCEDURE — 80048 BASIC METABOLIC PNL TOTAL CA: CPT | Performed by: INTERNAL MEDICINE

## 2021-11-27 ENCOUNTER — HOSPITAL ENCOUNTER (INPATIENT)
Facility: HOSPITAL | Age: 86
LOS: 9 days | Discharge: HOME | DRG: 504 | End: 2021-12-06
Attending: EMERGENCY MEDICINE | Admitting: INTERNAL MEDICINE
Payer: MEDICARE

## 2021-11-27 ENCOUNTER — APPOINTMENT (OUTPATIENT)
Dept: MRI IMAGING | Facility: HOSPITAL | Age: 86
DRG: 504 | End: 2021-11-27
Attending: EMERGENCY MEDICINE
Payer: MEDICARE

## 2021-11-27 DIAGNOSIS — M86.9 OSTEOMYELITIS OF LEFT FOOT, UNSPECIFIED TYPE (HCC): Primary | ICD-10-CM

## 2021-11-27 PROCEDURE — 73718 MRI LOWER EXTREMITY W/O DYE: CPT | Performed by: EMERGENCY MEDICINE

## 2021-11-27 RX ORDER — MORPHINE SULFATE 4 MG/ML
4 INJECTION, SOLUTION INTRAMUSCULAR; INTRAVENOUS EVERY 4 HOURS PRN
Status: DISCONTINUED | OUTPATIENT
Start: 2021-11-27 | End: 2021-12-06

## 2021-11-27 RX ORDER — DOCUSATE SODIUM 100 MG/1
100 CAPSULE, LIQUID FILLED ORAL 2 TIMES DAILY
Status: DISCONTINUED | OUTPATIENT
Start: 2021-11-27 | End: 2021-12-06

## 2021-11-27 RX ORDER — ALBUTEROL SULFATE 90 UG/1
2 AEROSOL, METERED RESPIRATORY (INHALATION) EVERY 4 HOURS PRN
Status: DISCONTINUED | OUTPATIENT
Start: 2021-11-27 | End: 2021-12-06

## 2021-11-27 RX ORDER — MORPHINE SULFATE 2 MG/ML
1 INJECTION, SOLUTION INTRAMUSCULAR; INTRAVENOUS EVERY 4 HOURS PRN
Status: DISCONTINUED | OUTPATIENT
Start: 2021-11-27 | End: 2021-12-06

## 2021-11-27 RX ORDER — ONDANSETRON 2 MG/ML
4 INJECTION INTRAMUSCULAR; INTRAVENOUS EVERY 6 HOURS PRN
Status: DISCONTINUED | OUTPATIENT
Start: 2021-11-27 | End: 2021-12-06

## 2021-11-27 RX ORDER — VANCOMYCIN/0.9 % SOD CHLORIDE 1.75 G/5
25 PLASTIC BAG, INJECTION (ML) INTRAVENOUS ONCE
Status: COMPLETED | OUTPATIENT
Start: 2021-11-27 | End: 2021-11-27

## 2021-11-27 RX ORDER — HYDROCODONE BITARTRATE AND ACETAMINOPHEN 10; 325 MG/1; MG/1
1 TABLET ORAL EVERY 6 HOURS PRN
Status: DISCONTINUED | OUTPATIENT
Start: 2021-11-27 | End: 2021-11-30

## 2021-11-27 RX ORDER — WARFARIN SODIUM 1 MG/1
4 TABLET ORAL
Status: DISCONTINUED | OUTPATIENT
Start: 2021-11-27 | End: 2021-12-06

## 2021-11-27 RX ORDER — KETOROLAC TROMETHAMINE 5 MG/ML
1 SOLUTION OPHTHALMIC 4 TIMES DAILY
Refills: 30 | Status: DISCONTINUED | OUTPATIENT
Start: 2021-11-27 | End: 2021-12-06

## 2021-11-27 RX ORDER — FUROSEMIDE 20 MG/1
20 TABLET ORAL DAILY
Status: DISCONTINUED | OUTPATIENT
Start: 2021-11-27 | End: 2021-12-06

## 2021-11-27 RX ORDER — MAGNESIUM OXIDE 400 MG (241.3 MG MAGNESIUM) TABLET
1 TABLET NIGHTLY PRN
Status: DISCONTINUED | OUTPATIENT
Start: 2021-11-27 | End: 2021-12-06

## 2021-11-27 RX ORDER — LOPERAMIDE HYDROCHLORIDE 2 MG/1
2 CAPSULE ORAL 4 TIMES DAILY PRN
Status: DISCONTINUED | OUTPATIENT
Start: 2021-11-27 | End: 2021-12-06

## 2021-11-27 RX ORDER — POTASSIUM CHLORIDE 750 MG/1
10 TABLET, EXTENDED RELEASE ORAL DAILY
Status: DISCONTINUED | OUTPATIENT
Start: 2021-11-27 | End: 2021-12-06

## 2021-11-27 RX ORDER — DILTIAZEM HYDROCHLORIDE 180 MG/1
180 CAPSULE, EXTENDED RELEASE ORAL DAILY
Status: DISCONTINUED | OUTPATIENT
Start: 2021-11-27 | End: 2021-12-06

## 2021-11-27 RX ORDER — PREDNISONE 2.5 MG
2.5 TABLET ORAL DAILY
Status: DISCONTINUED | OUTPATIENT
Start: 2021-11-27 | End: 2021-12-06

## 2021-11-27 RX ORDER — DIGOXIN 125 MCG
125 TABLET ORAL
Status: DISCONTINUED | OUTPATIENT
Start: 2021-11-27 | End: 2021-12-06

## 2021-11-27 RX ORDER — MORPHINE SULFATE 2 MG/ML
2 INJECTION, SOLUTION INTRAMUSCULAR; INTRAVENOUS EVERY 4 HOURS PRN
Status: DISCONTINUED | OUTPATIENT
Start: 2021-11-27 | End: 2021-12-06

## 2021-11-27 NOTE — ED QUICK NOTES
Orders for admission, patient is aware of plan and ready to go upstairs. Any questions, please call ED RN Vicenta Khan  at extension [phone].    Type of COVID test sent: Rapid  COVID Suspicion level: Low    LOC at time of transport: a&o 4/4  Family at bedside

## 2021-11-27 NOTE — CONSULTS
[facility]    Report of Consultation    Benito López Patient Status:  Inpatient    10/22/1931 MRN [id number]   Location [facility] 4W/SW/SE Attending Michael Ro MD   Hosp Day # 0 PCP Paolo Dillard MD     Date of Admi 12/10   • FRACTURE SURGERY      Left femur 2016   • HIP SURGERY  01/11/2017    INTERMEDULLARY FIXATION OF LEFT HIP FRACTURE   • HLA B 27 DISEASE ASSOCIATION  2006   • INTRAOCULAR LENS IMPLANT, SECONDARY  2006   • LEXISCAN NUC STRESS TST, CARDIO (DMG)  9/11 Intravenous, Q4H PRN   Or  morphINE sulfate (PF) 4 MG/ML injection 4 mg, 4 mg, Intravenous, Q4H PRN  Vancomycin IVPB - PHARMACY HOLDING, 1 mg, Intravenous, See Admin Instructions (RX holding)      HYDROcodone-acetaminophen 7.5-325 MG Oral Tab, Take 1 table daily. Loperamide HCl 2 MG Oral Cap, Take 2 mg by mouth 4 (four) times daily as needed for Diarrhea.         Allergies    Dronedarone                 Comment:Other reaction(s): extreme dizzinss    Review of Systems:   Review Of Systems:   Alert and oriente 42.2 11/26/2021    .0 11/26/2021    CREATSERUM 0.92 11/26/2021    BUN 14 11/26/2021     11/26/2021    K 4.1 11/26/2021     11/26/2021    CO2 27.0 11/26/2021    GLU 95 11/26/2021    CA 8.8 11/26/2021    ALB 3.1 (L) 10/29/2021    ALKPHO 62 ordered for surgical planning. Will need cardiac clearance for IV sedation for surgical intervention of 2nd digit left foot. Thank you for allowing me to participate in the care of your patient.     Destinee Pathak DPM  11/27/2021

## 2021-11-27 NOTE — PROGRESS NOTES
[facility] Dosing Service    Initial Pharmacokinetic Consult for Vancomycin AUC Dosing    Agatha Duong is a 80year old patient who is being treated for L foot, 2nd toe OM . Pharmacy has been asked to dose vancomycin by David Mayo.     Weights:  Ideal body w

## 2021-11-28 ENCOUNTER — APPOINTMENT (OUTPATIENT)
Dept: GENERAL RADIOLOGY | Facility: HOSPITAL | Age: 86
DRG: 504 | End: 2021-11-28
Attending: PODIATRIST
Payer: MEDICARE

## 2021-11-28 ENCOUNTER — APPOINTMENT (OUTPATIENT)
Dept: CV DIAGNOSTICS | Facility: HOSPITAL | Age: 86
DRG: 504 | End: 2021-11-28
Attending: INTERNAL MEDICINE
Payer: MEDICARE

## 2021-11-28 PROBLEM — J44.9 COPD (CHRONIC OBSTRUCTIVE PULMONARY DISEASE) (HCC): Status: ACTIVE | Noted: 2021-11-28

## 2021-11-28 PROCEDURE — 73630 X-RAY EXAM OF FOOT: CPT | Performed by: PODIATRIST

## 2021-11-28 PROCEDURE — 99222 1ST HOSP IP/OBS MODERATE 55: CPT | Performed by: INTERNAL MEDICINE

## 2021-11-28 PROCEDURE — 93306 TTE W/DOPPLER COMPLETE: CPT | Performed by: INTERNAL MEDICINE

## 2021-11-28 NOTE — PHYSICAL THERAPY NOTE
Attempted PT evaluation:  No MD note on file at this time. Awaiting podiatry and wound care consultation.

## 2021-11-28 NOTE — PROGRESS NOTES
[facility] Dosing Service    Follow-up Pharmacokinetic Consult for Vancomycin AUC Dosing     Erika Lewis is a 80year old patient who is being treated for osteomyelitis.   Patient received vancomycin 1750 mg IV and first-dose AUC24 is now being calcula

## 2021-11-28 NOTE — H&P
[facility] HOSP - [facility]    History and Physical    Negar Peterson Patient Status:  Inpatient    10/22/1931 MRN [id number]   Location [facility] 4W/SW/SE Attending Khloe Zendejas MD   Hosp Day # 1 PCP Estuardo Mcqueen MD     Date:   • Pneumonia    • Pneumonia due to organism 2016   • PUD (peptic ulcer disease)    • Pulmonary hypertension ([facility] [address]    • Thrombocytosis 2013   • Villous adenoma    • Vitamin B12 deficiency    • White matter disease      Past Surgical History:   Procedure La of Breath.   Spacer/Aero-Holding Chambers Does not apply Device, Use with albuterol inhaler  WARFARIN 2 MG Oral Tab, TAKE 2 TO 3 TABLETS(4 TO 6 MG) BY MOUTH DAILY AS DIRECTED BY COUMADIN CLINIC (Patient taking differently: Take 4 mg by mouth daily with dinn abdominal distention, abdominal pain, nausea and vomiting. Genitourinary: Negative for dysuria. Musculoskeletal: Negative for back pain and neck pain. Neurological: Negative for dizziness, syncope, light-headedness and headaches.    Psychiatric/Behavi Motor: No weakness.       Coordination: Coordination normal.   Psychiatric:         Mood and Affect: Mood normal.          Results:     Lab Results   Component Value Date    WBC 6.8 11/28/2021    HGB 13.3 11/28/2021    HCT 43.0 11/28/2021    .0 11/28 Rocephin. Patient has been seen in consultation by podiatry. Lower extremity arterial ultrasounds have been ordered by podiatry. Continue with local wound care.   Plan on definitive treatment with left foot second digit amputation to be scheduled for lat

## 2021-11-29 ENCOUNTER — APPOINTMENT (OUTPATIENT)
Dept: CT IMAGING | Facility: HOSPITAL | Age: 86
DRG: 504 | End: 2021-11-29
Attending: CLINICAL NURSE SPECIALIST
Payer: MEDICARE

## 2021-11-29 ENCOUNTER — APPOINTMENT (OUTPATIENT)
Dept: NUCLEAR MEDICINE | Facility: HOSPITAL | Age: 86
DRG: 504 | End: 2021-11-29
Attending: INTERNAL MEDICINE
Payer: MEDICARE

## 2021-11-29 ENCOUNTER — APPOINTMENT (OUTPATIENT)
Dept: CV DIAGNOSTICS | Facility: HOSPITAL | Age: 86
DRG: 504 | End: 2021-11-29
Attending: INTERNAL MEDICINE
Payer: MEDICARE

## 2021-11-29 ENCOUNTER — APPOINTMENT (OUTPATIENT)
Dept: ULTRASOUND IMAGING | Facility: HOSPITAL | Age: 86
DRG: 504 | End: 2021-11-29
Attending: PODIATRIST
Payer: MEDICARE

## 2021-11-29 PROCEDURE — 73706 CT ANGIO LWR EXTR W/O&W/DYE: CPT | Performed by: CLINICAL NURSE SPECIALIST

## 2021-11-29 PROCEDURE — 93017 CV STRESS TEST TRACING ONLY: CPT | Performed by: INTERNAL MEDICINE

## 2021-11-29 PROCEDURE — 99232 SBSQ HOSP IP/OBS MODERATE 35: CPT | Performed by: INTERNAL MEDICINE

## 2021-11-29 PROCEDURE — 93923 UPR/LXTR ART STDY 3+ LVLS: CPT | Performed by: PODIATRIST

## 2021-11-29 PROCEDURE — 78452 HT MUSCLE IMAGE SPECT MULT: CPT | Performed by: INTERNAL MEDICINE

## 2021-11-29 RX ORDER — VANCOMYCIN HYDROCHLORIDE
1250 EVERY 24 HOURS
Status: DISCONTINUED | OUTPATIENT
Start: 2021-11-29 | End: 2021-12-06

## 2021-11-29 NOTE — PLAN OF CARE
Patient is hospital day 2 for osteomyelitis of left toe. Dressing in place. Some redness with pain with palpation noted. +1 pedals bilaterally. Patient had venous and arterial dopplers performed, Kathy scan for cardiac clearance.  Patient left room around 10 Consider cultural and social influences on pain and pain management  - Manage/alleviate anxiety  - Utilize distraction and/or relaxation techniques  - Monitor for opioid side effects  - Notify MD/LIP if interventions unsuccessful or patient reports new she if the patient needs post-hospital services based on physician/LIP order or complex needs related to functional status, cognitive ability or social support system  Outcome: Progressing

## 2021-11-29 NOTE — OCCUPATIONAL THERAPY NOTE
Occupational Therapy order received, chart reviewed. Patient currently off unit for ultrasound. OT will re-attempt when appropriate. 1325: Attempt x2. Patient in nuclear medicine. Will re-attempt OT evaluation tomorrow (11/30).     Natalia Krause

## 2021-11-29 NOTE — PLAN OF CARE
Lori Montoya had a good night. Pain is managed with oral Norco as needed. He is voiding freely per urinal. Arterial doppler is planned for today. Dressing to Left 2nd toe remains in place, no active drainage noted.  Receiving Ceftriaxone and Vancomycin antibiotics

## 2021-11-29 NOTE — CONSULTS
[facility]  Vascular Interventional Radiology Consultation    Josee Salazar Patient Status:  Inpatient    10/22/1931 MRN [id number]   Location [facility] 4W/SW/SE Attending Sheri Noe MD   Hosp Day # 2 PCP Patria Krishnan MD HISTORY  2011    dental implants   • SKIN SURGERY  02/11/2019    Mohs/R Dorsal hand/SCC/done by MM     Family History   Problem Relation Age of Onset   • Other (old age) Father 80        cause of death   • Other (Other) Mother 80        cause of death   • **OR** morphINE sulfate (PF) 2 MG/ML injection 2 mg, 2 mg, Intravenous, Q4H PRN **OR** morphINE sulfate (PF) 4 MG/ML injection 4 mg, 4 mg, Intravenous, Q4H PRN    Review of Systems:   CONSTITUTIONAL: denies fever, chills  CHEST/CVS: denies chest pain, VENEGAS BILATERAL (W+WO) (WAP=66592(O0))    Result Date: 11/29/2021  CONCLUSION:  1.  Right Lower Extremity:  Atherosclerotic vascular calcifications of the iliac arteries of hemodynamically significant stenosis or occlusion; atherosclerosis of the superficial femo (coronary artery disease)     Monoclonal gammopathy     Leg weakness, bilateral     Atrial flutter (HCC)     Degenerative arthritis of pelvis     Anemia     Thumb pain     TIA (transient ischemic attack)     Knee pain, chronic     Partial hamstring tear

## 2021-11-29 NOTE — PROGRESS NOTES
[facility]    Progress Note    Ishaan Morgan Patient Status:  Inpatient    10/22/1931 MRN [id number]   Location [facility] 4W/SW/SE Attending Laurel MD Christiano   Hosp Day # 2 PCP Lizzette Espino MD         Assessment and Rx per [facility] coumadin clinic     Gait abnormality  Pt has high fall risk; using manual wheelchair, though independent use is limited due to bilateral hand osteoarthritis;      Right hand basal cell skin cancer  S/p biopsy in Dec 2018;  Pt awaits formal excisi cardiac stress test before procedure     History of villous adenoma   the patient reports occasional loose stools.  The patient has been advised to see gastroenterologist, Dr. Camron Muniz, as an outpatient for colonoscopy though he has declined repeat colonoscop inhaler 2 puff, 2 puff, Inhalation, Q4H PRN  digoxin (LANOXIN) tab 125 mcg, 125 mcg, Oral, Daily  dilTIAZem (cardIZEM CD) 24 hr cap 180 mg, 180 mg, Oral, Daily  furosemide (LASIX) tab 20 mg, 20 mg, Oral, Daily  ketorolac (ACULAR) 0.5 % ophthalmic solution Brissa Bales MD on 11/28/2021 at 10:58 AM     Finalized by (CST): Brissa Camp MD on 11/28/2021 at 11:00 AM          MRI FOOT, LEFT (FGN=[id number])    Result Date: 11/27/2021  CONCLUSION:  Overall examination quality is substantially compromised by pa

## 2021-11-29 NOTE — PROGRESS NOTES
[facility] Pharmacy Note:  Renal Adjustment for vancomycin [facility])    Trev Jhonson is a 80year old patient who has been prescribed vancomycin (VANCOCIN) 750 mg every 12 hrs. The estimated creatinine clearance is 53.4 mL/min (based on SCr of 0.92 mg/dL).  Jose Peterson

## 2021-11-29 NOTE — ED PROVIDER NOTES
Patient Seen in: [facility] 4w/sw/se    History   Patient presents with:  Cellulitis    Stated Complaint: osteomyelitis, MD requesting MRI    HPI    Patient complains of foot swelling and redness. On abx for week.   Foot swollen, red and 2nd toe i hand/SCC/done by MM       Medications :   HYDROcodone-acetaminophen 7.5-325 MG Oral Tab,  Take 1 tablet by mouth every 6 (six) hours as needed for Pain.   digoxin (DIGOX) 0.125 MG Oral Tab,  Take 1 tablet (125 mcg total) by mouth once daily.    albuterol 10 Problem Relation Age of Onset   • Other (old age) Father 80        cause of death   • Other (Other) Mother 80        cause of death   • Cancer Paternal Aunt    • Diabetes Paternal Aunt        Social History    Tobacco Use      Smoking status: Former [employer],    Differential includes:celluitis vs. osteomyelitis    ED Course     Labs Reviewed   VANCOMYCIN TROUGH, SERUM - Abnormal; Notable for the following components:       Result Value    Vancomycin Trough 6.9 (*)     All other components within no unspecified type [facility])  (primary encounter diagnosis)     Disposition:  Admit  11/27/2021 10:45 am    Follow-up:  No follow-up provider specified.   We recommend that you schedule follow up care with a primary care provider within the next three months to o

## 2021-11-29 NOTE — PROGRESS NOTES
Spoke to Dr Trell Murillo today regarding LE intervention, plan intervention for poplital artery Wednesday. Plan for amp likely Fri am if all OK per cardiology. Attempted to contact pt, no answer. Contacted Blas Mae, pt daughter, to discuss the plan.  She understand

## 2021-11-29 NOTE — WOUND PROGRESS NOTE
RN consult for wound care. Per podiatry note:        Recommendations:  Examined pt foot  Reviewed MRI resutls  Discussed with patient and family surigical intervention of amptation 2nd digit vs IV abx x6 weeks.   Patient's daughter Oh Verma was on speaker ph

## 2021-11-29 NOTE — PHYSICAL THERAPY NOTE
PT evaluation orders received and chart reviewed. Patient off floor for ultrasound. Possible surgical intervention on L foot once medically cleared. Will re-attempt as appropriate. Attempt x 2 in p.m. Patient off floor for CT, now in nuclear medicine.

## 2021-11-30 PROCEDURE — 99232 SBSQ HOSP IP/OBS MODERATE 35: CPT | Performed by: INTERNAL MEDICINE

## 2021-11-30 RX ORDER — HEPARIN SODIUM 5000 [USP'U]/ML
5000 INJECTION, SOLUTION INTRAVENOUS; SUBCUTANEOUS EVERY 12 HOURS SCHEDULED
Status: COMPLETED | OUTPATIENT
Start: 2021-11-30 | End: 2021-11-30

## 2021-11-30 RX ORDER — POTASSIUM CHLORIDE 20 MEQ/1
40 TABLET, EXTENDED RELEASE ORAL EVERY 4 HOURS
Status: COMPLETED | OUTPATIENT
Start: 2021-11-30 | End: 2021-11-30

## 2021-11-30 RX ORDER — HYDROCODONE BITARTRATE AND ACETAMINOPHEN 7.5; 325 MG/1; MG/1
1 TABLET ORAL EVERY 6 HOURS PRN
Status: DISCONTINUED | OUTPATIENT
Start: 2021-11-30 | End: 2021-12-06

## 2021-11-30 NOTE — CONSULTS
[place] [facility] - [facility] ID CONSULT NOTE    Mao Strauss Patient Status:  Inpatient    10/22/1931 MRN [id number]   Location [facility] 4W/SW/SE Attending Meryle Raveling, MD   Hosp Day # 3 PCP Kesha Rush MD       Reason fo hands   • Pneumonia    • Pneumonia due to organism 2016   • PUD (peptic ulcer disease)    • Pulmonary hypertension ([facility] [address]    • Thrombocytosis 2013   • Villous adenoma    • Vitamin B12 deficiency    • White matter disease      Past Surgical History:   Proced chloride 0.9% 100 mL IVPB-ADDV, 2 g, Intravenous, Q24H  •  albuterol 108 (90 Base) MCG/ACT inhaler 2 puff, 2 puff, Inhalation, Q4H PRN  •  digoxin (LANOXIN) tab 125 mcg, 125 mcg, Oral, Daily  •  dilTIAZem (cardIZEM CD) 24 hr cap 180 mg, 180 mg, Oral, Daily resp. rate 18, height 6' 3\" (1.905 m), weight 156 lb (70.8 kg), SpO2 94 %. General: Alert, oriented, NAD  HEENT: Moist mucous membranes. EOMI  Neck: No lymphadenopathy. Supple. Cardiovascular: RRR  Respiratory: Clear to auscultation bilaterally.   No consulted for antibiotics management      # Left foot 2nd digit infection with acute OM  -  MRI l foot reviewed - acute OM of 2nd digit  -  Plan for angiogram with intervention on 12/1  -  IR and podiatry following.      # CAD/PAD  # A fib  # pulm HTN    PL

## 2021-11-30 NOTE — PHYSICAL THERAPY NOTE
Chart reviewed , discussed pt status below  with HARIS Epps. Discussed PT order from primary care MD with vascular intervention scheduled for tomorrow and sx on left foot pending this admission. No WB status in chart currently.    Recommend use of l

## 2021-11-30 NOTE — OCCUPATIONAL THERAPY NOTE
Occupational therapy orders received, chart review complete, pt with osteomyelitis of L 2nd toe -pending amputation this admission. Pt to have angiogram in prep     Will hold evaluation at this time and f/u 12/1.     Jesu ROYAL/[facility] AT Critical access hospital

## 2021-11-30 NOTE — DIETARY NOTE
ADULT NUTRITION INITIAL ASSESSMENT    Pt is at high nutrition risk. Pt meets severe malnutrition criteria.       CRITERIA FOR MALNUTRITION DIAGNOSIS:  Criteria for severe malnutrition diagnosis: chronic illness related to body fat severe depletion and musc intake.       FOOD/NUTRITION RELATED HISTORY:  Appetite: Good  Intake: 100%  Intake Meeting Needs: Yes, and oral nutrition supplements (ONS) to maximize  Percent Meals Eaten (last 3 days)     Date/Time Percent Meals Eaten (%)    11/28/21 0900 100 %    11/28 Integrity: intact per RN documentation    ANTHROPOMETRICS:  HT: 190.5 cm (6' 3\")  WT: 70.8 kg (156 lb)   BMI: Body mass index is 19.5 kg/m².   BMI CLASSIFICATION: considered low for age  IBW: 196 lbs        79.6% IBW  Usual Body Wt: 180 lbs years ago     8 Nutrition education: discussed importance of adequate intake, high calorie/high protein foods--reviewed good sources and discussed benefits of nutritional supplements.     - Coordination of nutrition care: collaboration with other providers  - Discharge and

## 2021-11-30 NOTE — PLAN OF CARE
Patient is A&Ox4. Remote tele placed d/t wide variations in HR noted earlier in day, hx chronic afib. Cdiff r/o canceled per protocol as patient did not have BM for >24hrs. Patient reports he has chronic diarrhea. Holding stool softener.   Patient decli comfort level or patient's stated pain goal  Description: INTERVENTIONS:  - Encourage pt to monitor pain and request assistance  - Assess pain using appropriate pain scale  - Administer analgesics based on type and severity of pain and evaluate response  - care, etc)  - Arrange for interpreters to assist at discharge as needed  - Consider post-discharge preferences of patient/family/discharge partner  - Complete POLST form as appropriate  - Assess patient's ability to be responsible for managing their own he

## 2021-11-30 NOTE — PROGRESS NOTES
[place] [facility] - [facility]    Progress Note    Navin Ayoub Patient Status:  Inpatient    10/22/1931 MRN [id number]   Location [facility] 4W/SW/SE Attending Torin Lopez MD   Hosp Day # 3 PCP Huy Saxena MD       SUBJECTIVE:  Feel and noncompressible vessels in this elderly patient. In distal Doppler waveforms are mildly diminished, suggestive of at least mild compromise in distal arterial perfusion.   Additional noninvasive studies such as CTA may be helpful for further evaluation, colonic diverticulosis without CT evidence of acute complication in the imaged region. 5. Postoperative changes of left femoral intramedullary pearl and nail placement. 6. Lesser incidental findings as above.    Dictated by (CST): General Dhara MD on 11/2 atrium was mildly to moderately dilated. 5. Right ventricle: The cavity size was dilated. Systolic function    was reduced. 6. Right atrium: The atrium was mildly dilated.  7. Consider repeating limited echocardiogram with contrast if more    accurate asses annulus. Normal thickened leaflets. Mobility was not restricted. Doppler: There was no evidence for stenosis. Mild regurgitation. Peak gradient (D): 5mm Hg. Left atrium:  The atrium was mildly to moderately dilated. Right ventricle:   The cavity si LV E/e', average               12           ---------- -----------   Ventricular septum             Value        01/12/2017 Reference  IVS thickness, ED              1.0   cm     1.0        0.6 - 1.0   LVOT                           Value        01/12/2017 -----------  RV s', lateral, S              0.08  m/sec  ---------- 0.06 - 0.13 Legend: (L)  and  (H)  omari values outside specified reference range.  Electronically signed       11/29/2021 07:45 Blas Fagan MD         Meds:   vancomycin IVPB premix 1.25g do angiogram tomorrow 12/1  -awaits eventual left 2nd toe amputation per podiatrist, Dr Mark Sanchez; EKG shows Afib with old inferior anterior infarcts (seen in 2018); ECHO shows EF 40-45%; calcified aortic annulus  -Lexiscan GXT 11/29 negative  -Norco pr Dr Navi Hills; s/p cystoscopy, left retrograde pyelogram, and insertion of left ureteral stent by Dr Navi Hills.   Follow up with Dr. Navi Hills     Stool incontinence  On Imodium 2 mg po q4hrs prn.  GI panel negative.      Left shoulder pain  XR both shoulders neg fo

## 2021-12-01 ENCOUNTER — APPOINTMENT (OUTPATIENT)
Dept: INTERVENTIONAL RADIOLOGY/VASCULAR | Facility: HOSPITAL | Age: 86
DRG: 504 | End: 2021-12-01
Attending: CLINICAL NURSE SPECIALIST
Payer: MEDICARE

## 2021-12-01 PROCEDURE — 99232 SBSQ HOSP IP/OBS MODERATE 35: CPT | Performed by: INTERNAL MEDICINE

## 2021-12-01 PROCEDURE — B41G1ZZ FLUOROSCOPY OF LEFT LOWER EXTREMITY ARTERIES USING LOW OSMOLAR CONTRAST: ICD-10-PCS | Performed by: RADIOLOGY

## 2021-12-01 RX ORDER — HEPARIN SODIUM 5000 [USP'U]/ML
5000 INJECTION, SOLUTION INTRAVENOUS; SUBCUTANEOUS EVERY 12 HOURS SCHEDULED
Status: COMPLETED | OUTPATIENT
Start: 2021-12-01 | End: 2021-12-02

## 2021-12-01 RX ORDER — DIPHENHYDRAMINE HYDROCHLORIDE 50 MG/ML
INJECTION INTRAMUSCULAR; INTRAVENOUS
Status: DISCONTINUED
Start: 2021-12-01 | End: 2021-12-01 | Stop reason: WASHOUT

## 2021-12-01 RX ORDER — LIDOCAINE HYDROCHLORIDE 20 MG/ML
INJECTION, SOLUTION EPIDURAL; INFILTRATION; INTRACAUDAL; PERINEURAL
Status: COMPLETED
Start: 2021-12-01 | End: 2021-12-01

## 2021-12-01 RX ORDER — MIDAZOLAM HYDROCHLORIDE 1 MG/ML
INJECTION INTRAMUSCULAR; INTRAVENOUS
Status: COMPLETED
Start: 2021-12-01 | End: 2021-12-01

## 2021-12-01 RX ORDER — NITROGLYCERIN 20 MG/100ML
INJECTION INTRAVENOUS
Status: COMPLETED
Start: 2021-12-01 | End: 2021-12-01

## 2021-12-01 RX ORDER — VERAPAMIL HYDROCHLORIDE 2.5 MG/ML
INJECTION, SOLUTION INTRAVENOUS
Status: COMPLETED
Start: 2021-12-01 | End: 2021-12-01

## 2021-12-01 NOTE — OCCUPATIONAL THERAPY NOTE
OCCUPATIONAL THERAPY EVALUATION - INPATIENT      Room Number: 429/429-A  Evaluation Date: 12/1/2021  Type of Evaluation: Initial  Presenting Problem: Admit 11/27 with osteomyelitis L foot, plan for 2nd toe amputation 12/3.      Physician Order: IP Consult t t/f, maintaining standing balance with Tanika, & able to briefly mobilize to chair with Tanika (2nd person assist throughout for safety). Pt in chair at end of session, call button within reach & RN aware. The patient's Approx Degree of Impairment: 46. 6 Pulmonary hypertension ([facility] [address]    • Thrombocytosis 2013   • Villous adenoma    • Vitamin B12 deficiency    • White matter disease        Past Surgical History  Past Surgical History:   Procedure Laterality Date   • CARDIOVERSION  2007   • CATARACT  2006   • ROM is within functional limits     STRENGTH ASSESSMENT  Upper extremity strength is within functional limits     ACTIVITIES OF DAILY LIVING ASSESSMENT  AM-PAC ‘6-Clicks’ Inpatient Daily Activity Short Form  How much help from another person does the patie

## 2021-12-01 NOTE — PROGRESS NOTES
INFECTIOUS DISEASE PROGRESS NOTE    Hiede Welsh Patient Status:  Inpatient    10/22/1931 MRN [id number]   Location [facility] Attending Virginia Zarate, [address] [facility] Se Day # 4 PCP Santo Bahena MD     Subjective:   ROS Protein calorie malnutrition ([facility] [address]     Sacral decubitus ulcer     Aspiration pneumonia of lower lobe (HCC)     Hip fracture, left (HCC)     Hip fracture, left, closed, initial encounter [facility])     Atrial fibrillation, chronic (HCC)     Coagulopathy ([facility] [address] and ceftriaxone.   ID consulted for antibiotics management        # Left foot 2nd digit infection with acute OM  -  MRI l foot reviewed - acute OM of 2nd digit  -  s/p LLE angiogram with popliteal angioplasty today  12/1  -  IR and podiatry following.

## 2021-12-01 NOTE — PLAN OF CARE
Pt's vital signs stable. HR a-fib and can drop to 40's briefly while asleep. Alert and oriented x4. Baseline tingling present to RLE. On room air. Tolerating general diet. NPO at midnight. Voids freely. Max assist s/p. Appropriate repositioning provided.  H period  Description: INTERVENTIONS  - Monitor WBC  - Administer growth factors as ordered  - Implement neutropenic guidelines  Outcome: Progressing     Problem: SAFETY ADULT - FALL  Goal: Free from fall injury  Description: INTERVENTIONS:  - Assess pt freq

## 2021-12-01 NOTE — PROGRESS NOTES
[facility]    Progress Note    Benito Pac Patient Status:  Inpatient    10/22/1931 MRN [id number]   Location [facility] Attending Michael Ro MD   Hosp Day # 4 PCP Liberty Mistry MD         As though independent use is limited due to bilateral hand osteoarthritis;      Right hand basal cell skin cancer  S/p biopsy in Dec 2018;  Pt awaits formal excision by dermatologist in [place]     History of Sepsis due to Klebsiella UTI  In June 2018; Due occasional loose stools.  The patient has been advised to see gastroenterologist, Dr. Corrinne Pleasant, as an outpatient for colonoscopy though he has declined repeat colonoscopy     Vitamin B12 deficiency  the patient had been receiving vitamin B12 1,000 mcg IM q.  [facility] (IMODIUM) cap 2 mg, 2 mg, Oral, QID PRN  Miconazole Nitrate 2 % powder, , Topical, PRN  potassium chloride (K-DUR) CR tab 10 mEq, 10 mEq, Oral, Daily  predniSONE (DELTASONE) tab 2.5 mg, 2.5 mg, Oral, Daily  [Held by provider] warfarin (COUMADIN) tab 4 mg, clinically indicated.      Dictated by (CST): Junior Chang MD on 11/29/2021 at 10:14 AM     Finalized by (CST): Junior Chang MD on 11/29/2021 at 10:18 AM          CARD NUC STRESS TEST LEXISCAN (ODQ=00753/38940/)    Result Date: 11/29/2021  CONCLU 11/29/2021 at 12:21 PM     Finalized by (CST): Lance Dandy, MD on 11/29/2021 at 12:42 PM                             Lidia Monsalve MD

## 2021-12-01 NOTE — BRIEF PROCEDURE NOTE
[facility] HOSP - [facility]  Procedure Note    Trev Johnson Patient Status:  Inpatient    10/22/1931 MRN [id number]   Location [facility] Attending Ra Souza, [address] [facility] Se Day # 4 PCP Kevin Cardona MD     Proce

## 2021-12-01 NOTE — PROGRESS NOTES
[facility] - [facility]    Report of Consultation    Negar Peterson Patient Status:  Inpatient    10/22/1931 MRN [id number]   Location [facility] 4W/SW/SE Attending Khloe Zendejas MD   Hosp Day # 3 PCP Estuardo Mcqueen MD     Date of Admi INTRAOCULAR LENS IMPLANT, SECONDARY  2006   • LEXISCAN NUC STRESS TST, CARDIO (DMG)  9/11   • OTHER SURGICAL HISTORY  1970    Billroth anastomosis   • OTHER SURGICAL HISTORY  2011    dental implants   • SKIN SURGERY  02/11/2019    Mohs/R Dorsal hand/SCC/do Oral, BID  morphINE sulfate (PF) 2 MG/ML injection 1 mg, 1 mg, Intravenous, Q4H PRN   Or  morphINE sulfate (PF) 2 MG/ML injection 2 mg, 2 mg, Intravenous, Q4H PRN   Or  morphINE sulfate (PF) 4 MG/ML injection 4 mg, 4 mg, Intravenous, Q4H PRN      HYDROcodo daily.  Cholecalciferol (VITAMIN D) 1000 units Oral Tab, Take 1 tablet by mouth daily. Loperamide HCl 2 MG Oral Cap, Take 2 mg by mouth 4 (four) times daily as needed for Diarrhea.         Allergies    Dronedarone                 Comment:Other reaction(s): 10/29/2021    AST 11 (L) 10/29/2021    ALT 9 (L) 10/29/2021    PTT 53.5 (H) 10/29/2021    INR 1.64 (H) 11/30/2021    PTP 19.2 (H) 11/30/2021    TSH 2.520 09/28/2020    LIP 18 (L) 06/29/2018    ESRML 23 11/26/2021    MG 1.8 07/05/2018    PHOS 2.5 07/04/2018 femoral artery with mild luminal narrowing distally in the region of the adductor canal; atherosclerosis of the popliteal artery without flow-limiting stenosis or occlusion; occlusion of the tibioperoneal trunk with significant occlusion of the posterior t

## 2021-12-01 NOTE — IVS NOTE
Procedure hand off report given to Janice Chandler RN. Procedural access site is dry and intact with no signs and symptoms of bleeding and hematoma. Left groin site check done with Ronnell Sue RN, upon transfer to floor. Pt is generally stable.

## 2021-12-01 NOTE — PLAN OF CARE
Pt is alert and oriented x4, stand and pivot to chair. Voiding via urinal, no BM today. Heparin and SCDs for DVT prophylaxis. Tele in place, no calls today. Blue boots in place bilaterally. No dressing on left 2nd toe.  Patient can wiggle toes, plantar and analgesics based on type and severity of pain and evaluate response  - Implement non-pharmacological measures as appropriate and evaluate response  - Consider cultural and social influences on pain and pain management  - Manage/alleviate anxiety  - Utilize Assess patient's ability to be responsible for managing their own health  - Refer to Case Management Department for coordinating discharge planning if the patient needs post-hospital services based on physician/LIP order or complex needs related to functio

## 2021-12-01 NOTE — PHYSICAL THERAPY NOTE
PHYSICAL THERAPY EVALUATION - INPATIENT     Room Number: [facility] IVS/[facility] IVS  Evaluation Date: 12/1/2021  Type of Evaluation: Initial   Physician Order: PT Eval and Treat    Presenting Problem: osteomyelitis of left toe .    Upcoming IR procedure / Podiatry Sx right LE. Observation reveals decrease skin integrity B distal LE . Pt is alert and orientated able to provide PLF . PTA pt [facility] at [facility] HOME intermediate - pt ambulatory with walker (short distances) with staff support and close w/c follow.    Pt  able to need for 2 person assist with fxn transfers this session . Pt currently is without restrictions for WB per podiatry MD --pre procedure / sx .       The patient's Approx Degree of Impairment: 68.66% has been calculated based on documentation in the [facility] '6 12/3; EKG shows Afib with old inferior anterior infarcts (seen in 2018); ECHO shows EF 40-45%; calcified aortic annulus;  Lexiscan GXT 11/29 negative for ischemia; low cardiac risk for planned procedure  -on Norco 7.5mg po [id number] prn     PVD  -CTA 11/29 re with urologist Dr Simona Frank; s/p cystoscopy, left retrograde pyelogram, and insertion of left ureteral stent by Dr Simona Frank.   Follow up with Dr. Simona Frank     Stool incontinence  On Imodium 2 mg po q4hrs prn.  GI panel negative.      Left shoulder pain  XR both s History  Past Medical History:   Diagnosis Date   • AF (atrial fibrillation) (HCC)    • Arrhythmia    • Cataract    • Chronic pulmonary aspiration     ARF 6/16 BiPAP   • Congestive heart disease (HCC)    • COPD [facility]chronic obstructive [facility] disease) [facility] Glasses        SUBJECTIVE  \"If I get up with you , I want to go back to bed , they are coming shortly for my procedure \"     PHYSICAL THERAPY EXAMINATION     OBJECTIVE  Precautions: Bed/chair alarm  Fall Risk: High fall risk    WEIGHT BEARING RESTRICTION Lot   Help from Another: Need to walk in hospital room?: Total   Help from Another: Climbing 3-5 steps with a railing?: Total     AM-PAC Score:  Raw Score: 12   Approx Degree of Impairment: 68.66%   Standardized Score (AM-PAC Scale): 35.33   CMS Modifier (

## 2021-12-02 PROCEDURE — 99232 SBSQ HOSP IP/OBS MODERATE 35: CPT | Performed by: INTERNAL MEDICINE

## 2021-12-02 RX ORDER — SODIUM CHLORIDE 9 MG/ML
INJECTION, SOLUTION INTRAVENOUS CONTINUOUS
Status: DISCONTINUED | OUTPATIENT
Start: 2021-12-03 | End: 2021-12-05

## 2021-12-02 NOTE — PHYSICAL THERAPY NOTE
PHYSICAL THERAPY TREATMENT NOTE - INPATIENT     Room Number: 429/429-A       Presenting Problem: osteomyelitis of left toe . Upcoming IR procedure / Podiatry Sx scheduled for this admission .   Pt currently 12/01/21 per Podiatry MD morillo for therapy particip mobility: Min assist--extra time provided ---therapist assisting with LE . Transfers:   Pt required mod assist of 2 for sit to stand from EOB . Pt initially with need for min support of 2 to right COG over CHIQUITA for steady stand.   Pt reporting pain in l PT.       RN aware of patient status post session. Patient will benefit from continued IP PT services to address these deficits in preparation for discharge.       DISCHARGE RECOMMENDATIONS  PT Discharge Recommendations: Undetermined (pt pending sx 12/03 - currently need. ..    Help from Another: Moving to and from a bed to a chair (including a wheelchair)?: A Lot   Help from Another: Need to walk in hospital room?: Total   Help from Another: Climbing 3-5 steps with a railing?: Total     AM-PAC Score:  Raw Sco

## 2021-12-02 NOTE — PROGRESS NOTES
[facility]    Report of Consultation    Rubi Owens Patient Status:  Inpatient    10/22/1931 MRN [id number]   Location [facility] 4W/SW/SE Attending Mary Robb MD   Hosp Day # 5 PCP Sun Alcantara MD     Date of Admi 2006   • LEXISCAN NUC STRESS TST, CARDIO (DMG)  9/11   • OTHER SURGICAL HISTORY  1970    Billroth anastomosis   • OTHER SURGICAL HISTORY  2011    dental implants   • SKIN SURGERY  02/11/2019    Mohs/R Dorsal hand/SCC/done by MM       Family History  Family injection 1 mg, 1 mg, Intravenous, Q4H PRN   Or  morphINE sulfate (PF) 2 MG/ML injection 2 mg, 2 mg, Intravenous, Q4H PRN   Or  morphINE sulfate (PF) 4 MG/ML injection 4 mg, 4 mg, Intravenous, Q4H PRN      HYDROcodone-acetaminophen 7.5-325 MG Oral Tab, [facility] 1 tablet by mouth daily. Loperamide HCl 2 MG Oral Cap, Take 2 mg by mouth 4 (four) times daily as needed for Diarrhea.         Allergies    Dronedarone                 Comment:Other reaction(s): extreme dizzinss      Physical Exam:   Blood pressure 102/69, 10/29/2021    INR 1.17 12/02/2021    PTP 14.7 (H) 12/02/2021    TSH 2.520 09/28/2020    LIP 18 (L) 06/29/2018    ESRML 23 11/26/2021    MG 1.8 07/05/2018    PHOS 3.4 12/01/2021    TROP <0.045 08/06/2019    B12 681 09/28/2020         Imaging:  US ART LOWER single vessel runoff via the anterior tibial artery.   2. Left Lower Extremity:  Iliac atherosclerosis without hemodynamically significant stenosis or occlusion; atherosclerosis of the superficial femoral artery with 65-70% stenosis just beyond the adductor

## 2021-12-02 NOTE — PROGRESS NOTES
[facility]  Progress Note    Bertrand Grossman Patient Status:  Inpatient    10/22/1931 MRN [id number]   Location [facility] 4W/SW/SE Attending Valentín Rene MD   Hosp Day # 5 PCP Eloise Pérez MD       Subjective:   Denies

## 2021-12-02 NOTE — OCCUPATIONAL THERAPY NOTE
Chart reviewed, RN stated patient okay to see. Patient received supine in bed, declined to participate in OT treatment session at this time d/t fatigue. Plan is for L 2nd toe amputation tomorrow (12/3).  Will need new orders and updated restrictions post-op

## 2021-12-02 NOTE — PROGRESS NOTES
[facility]    Progress Note    Arby Grad Patient Status:  Inpatient    10/22/1931 MRN [id number]   Location [facility] 4W/SW/SE Attending Maddy Bai MD   Hosp Day # 5 PCP Rajeev Patel MD       SUBJECTIVE:  Feel ------------------------------------------------------------------- Ordering MD: Jose Maria Rodriguez  Interpreting physician:       Blas Fagan MD Sonographer: Alex Jimenez  CC: Jose Maria Rodriguez CC: Bee MAHARAJ  ------------------------------- Study date: 11/28/2021. Study time: 01:37 PM.  Location:  Echo laboratory. ------------------------------------------------------------------- Findings Left ventricle: The cavity size was normal. Wall thickness was increased in a pattern of mild LVH.  Syst ---------- 4.2 - 5.8  LV ID, ES, PLAX                3.1   cm     ---------- 2.5 - 4.0  LV fx shortening, PLAX         30    %      ---------- 25 - 43  LV ID, major axis, ES,         6.5   cm     ---------- -----------  A4C  LV PW thickness, ED    (H) cm/sec 74.5       -----------  velocity  Mitral deceleration            116   ms     299        -----------  time  Mitral peak gradient,          5     mm Hg  2          -----------  D  Mitral E/A ratio, peak         2.6          1          -----------   T Oral, Daily  predniSONE (DELTASONE) tab 2.5 mg, 2.5 mg, Oral, Daily  [Held by provider] warfarin (COUMADIN) tab 4 mg, 4 mg, Oral, Daily with dinner  melatonin tab TABS 1 mg, 1 mg, Oral, Nightly PRN  ondansetron (ZOFRAN) injection 4 mg, 4 mg, Intravenous, Q spine.  No spondylolisthesis.  No destructive lesions.  Mild calcification of the abdominal aorta; has seen physiatrist Dr Beltran Shallow  -the patient had been taking Norco 7.5 mg q. 6 hours p.r.n.  Pain     Atrial fibrillation   had been on diltiazem ER 18 ulcer  uses Medihoney wound dressing gel daily prn     Protein calorie malnutrition  advise maximal nutritional support; albumin 2.6; add Boost shakes when able     Coronary artery disease   Had PTCA in 1990s; last Mckenna Mckeon in 2011;  -the patient currently

## 2021-12-03 ENCOUNTER — ANESTHESIA (OUTPATIENT)
Dept: SURGERY | Facility: HOSPITAL | Age: 86
DRG: 504 | End: 2021-12-03
Payer: MEDICARE

## 2021-12-03 ENCOUNTER — APPOINTMENT (OUTPATIENT)
Dept: GENERAL RADIOLOGY | Facility: HOSPITAL | Age: 86
DRG: 504 | End: 2021-12-03
Attending: PODIATRIST
Payer: MEDICARE

## 2021-12-03 ENCOUNTER — ANESTHESIA EVENT (OUTPATIENT)
Dept: SURGERY | Facility: HOSPITAL | Age: 86
DRG: 504 | End: 2021-12-03
Payer: MEDICARE

## 2021-12-03 PROBLEM — I73.9 PVD (PERIPHERAL VASCULAR DISEASE): Status: ACTIVE | Noted: 2021-12-03

## 2021-12-03 PROBLEM — I73.9 PVD (PERIPHERAL VASCULAR DISEASE) (HCC): Status: ACTIVE | Noted: 2021-12-03

## 2021-12-03 PROCEDURE — 99232 SBSQ HOSP IP/OBS MODERATE 35: CPT | Performed by: INTERNAL MEDICINE

## 2021-12-03 PROCEDURE — 0Y6S0Z0 DETACHMENT AT LEFT 2ND TOE, COMPLETE, OPEN APPROACH: ICD-10-PCS | Performed by: PODIATRIST

## 2021-12-03 PROCEDURE — 73630 X-RAY EXAM OF FOOT: CPT | Performed by: PODIATRIST

## 2021-12-03 RX ORDER — MORPHINE SULFATE 4 MG/ML
2 INJECTION, SOLUTION INTRAMUSCULAR; INTRAVENOUS EVERY 10 MIN PRN
Status: DISCONTINUED | OUTPATIENT
Start: 2021-12-03 | End: 2021-12-03 | Stop reason: HOSPADM

## 2021-12-03 RX ORDER — PROCHLORPERAZINE EDISYLATE 5 MG/ML
5 INJECTION INTRAMUSCULAR; INTRAVENOUS ONCE AS NEEDED
Status: DISCONTINUED | OUTPATIENT
Start: 2021-12-03 | End: 2021-12-03 | Stop reason: HOSPADM

## 2021-12-03 RX ORDER — HYDROMORPHONE HYDROCHLORIDE 1 MG/ML
0.6 INJECTION, SOLUTION INTRAMUSCULAR; INTRAVENOUS; SUBCUTANEOUS EVERY 5 MIN PRN
Status: DISCONTINUED | OUTPATIENT
Start: 2021-12-03 | End: 2021-12-03 | Stop reason: HOSPADM

## 2021-12-03 RX ORDER — HALOPERIDOL 5 MG/ML
0.25 INJECTION INTRAMUSCULAR ONCE AS NEEDED
Status: DISCONTINUED | OUTPATIENT
Start: 2021-12-03 | End: 2021-12-03 | Stop reason: HOSPADM

## 2021-12-03 RX ORDER — LIDOCAINE HYDROCHLORIDE 10 MG/ML
INJECTION, SOLUTION EPIDURAL; INFILTRATION; INTRACAUDAL; PERINEURAL AS NEEDED
Status: DISCONTINUED | OUTPATIENT
Start: 2021-12-03 | End: 2021-12-03 | Stop reason: HOSPADM

## 2021-12-03 RX ORDER — MORPHINE SULFATE 10 MG/ML
6 INJECTION, SOLUTION INTRAMUSCULAR; INTRAVENOUS EVERY 10 MIN PRN
Status: DISCONTINUED | OUTPATIENT
Start: 2021-12-03 | End: 2021-12-03 | Stop reason: HOSPADM

## 2021-12-03 RX ORDER — HYDROCODONE BITARTRATE AND ACETAMINOPHEN 5; 325 MG/1; MG/1
1 TABLET ORAL AS NEEDED
Status: DISCONTINUED | OUTPATIENT
Start: 2021-12-03 | End: 2021-12-03 | Stop reason: HOSPADM

## 2021-12-03 RX ORDER — SODIUM CHLORIDE, SODIUM LACTATE, POTASSIUM CHLORIDE, CALCIUM CHLORIDE 600; 310; 30; 20 MG/100ML; MG/100ML; MG/100ML; MG/100ML
INJECTION, SOLUTION INTRAVENOUS CONTINUOUS
Status: DISCONTINUED | OUTPATIENT
Start: 2021-12-03 | End: 2021-12-03 | Stop reason: HOSPADM

## 2021-12-03 RX ORDER — BUPIVACAINE HYDROCHLORIDE 5 MG/ML
INJECTION, SOLUTION EPIDURAL; INTRACAUDAL AS NEEDED
Status: DISCONTINUED | OUTPATIENT
Start: 2021-12-03 | End: 2021-12-03 | Stop reason: HOSPADM

## 2021-12-03 RX ORDER — ONDANSETRON 2 MG/ML
4 INJECTION INTRAMUSCULAR; INTRAVENOUS ONCE AS NEEDED
Status: DISCONTINUED | OUTPATIENT
Start: 2021-12-03 | End: 2021-12-03 | Stop reason: HOSPADM

## 2021-12-03 RX ORDER — HYDROMORPHONE HYDROCHLORIDE 1 MG/ML
0.4 INJECTION, SOLUTION INTRAMUSCULAR; INTRAVENOUS; SUBCUTANEOUS EVERY 5 MIN PRN
Status: DISCONTINUED | OUTPATIENT
Start: 2021-12-03 | End: 2021-12-03 | Stop reason: HOSPADM

## 2021-12-03 RX ORDER — HYDROCODONE BITARTRATE AND ACETAMINOPHEN 5; 325 MG/1; MG/1
2 TABLET ORAL AS NEEDED
Status: DISCONTINUED | OUTPATIENT
Start: 2021-12-03 | End: 2021-12-03 | Stop reason: HOSPADM

## 2021-12-03 RX ORDER — MORPHINE SULFATE 4 MG/ML
4 INJECTION, SOLUTION INTRAMUSCULAR; INTRAVENOUS EVERY 10 MIN PRN
Status: DISCONTINUED | OUTPATIENT
Start: 2021-12-03 | End: 2021-12-03 | Stop reason: HOSPADM

## 2021-12-03 RX ORDER — HYDROMORPHONE HYDROCHLORIDE 1 MG/ML
0.2 INJECTION, SOLUTION INTRAMUSCULAR; INTRAVENOUS; SUBCUTANEOUS EVERY 5 MIN PRN
Status: DISCONTINUED | OUTPATIENT
Start: 2021-12-03 | End: 2021-12-03 | Stop reason: HOSPADM

## 2021-12-03 RX ORDER — NALOXONE HYDROCHLORIDE 0.4 MG/ML
80 INJECTION, SOLUTION INTRAMUSCULAR; INTRAVENOUS; SUBCUTANEOUS AS NEEDED
Status: DISCONTINUED | OUTPATIENT
Start: 2021-12-03 | End: 2021-12-03 | Stop reason: HOSPADM

## 2021-12-03 NOTE — PROGRESS NOTES
[facility]    Report of Consultation    Benito López Patient Status:  Inpatient    10/22/1931 MRN [id number]   Location [facility] 4W/SW/SE Attending Michael Ro MD   Hosp Day # 6 PCP Paolo Dillard MD     Date of Admi STRESS TST, CARDIO (DMG)  9/11   • OTHER SURGICAL HISTORY  1970    Billroth anastomosis   • OTHER SURGICAL HISTORY  2011    dental implants   • SKIN SURGERY  02/11/2019    Mohs/R Dorsal hand/SCC/done by MM       Family History  Family History   Problem Rel injection 2 mg, 2 mg, Intravenous, Q4H PRN   Or  morphINE sulfate (PF) 4 MG/ML injection 4 mg, 4 mg, Intravenous, Q4H PRN      HYDROcodone-acetaminophen 7.5-325 MG Oral Tab, Take 1 tablet by mouth every 6 (six) hours as needed for Pain.  digoxin ([address]) 0. (four) times daily as needed for Diarrhea. Allergies    Dronedarone                 Comment:Other reaction(s): extreme dizzinss      Physical Exam:   Blood pressure 113/55, pulse 76, temperature 98 °F (36.7 °C), temperature source Oral, resp.  rate 1 14.3 12/03/2021    TSH 2.520 09/28/2020    LIP 18 (L) 06/29/2018    ESRML 23 11/26/2021    MG 1.8 07/05/2018    PHOS 3.4 12/01/2021    TROP <0.045 08/06/2019    B12 681 09/28/2020         Imaging:  No results found.         Impression:     Osteomyelitis of

## 2021-12-03 NOTE — ANESTHESIA POSTPROCEDURE EVALUATION
Patient: Betina Lawson    Procedure Summary     Date: 12/03/21 Room / Location: [address] [place] [address] MAIN OR 17 / [address] [place] [address] MAIN OR    Anesthesia Start: 6397 Anesthesia Stop:     Procedure: amputation 2nd digit, left foot (Left Foot) Diagnosis: (osteomyelitis)    Surgeons: Scott Tian

## 2021-12-03 NOTE — PLAN OF CARE
Problem: Patient Centered Care  Goal: Patient preferences are identified and integrated in the patient's plan of care  Description: Interventions:  - What would you like us to know as we care for you?  - Provide timely, complete, and accurate information for assistance with activity based on assessment  - Modify environment to reduce risk of injury  - Provide assistive devices as appropriate  - Consider OT/PT consult to assist with strengthening/mobility  - Encourage toileting schedule  Outcome: Progressin

## 2021-12-03 NOTE — PROGRESS NOTES
INFECTIOUS DISEASE PROGRESS NOTE    Manpreet Oyster Patient Status:  Inpatient    10/22/1931 MRN [id number]   Location [facility] Attending Yani De Paz, [address] [facility] [address] Day # 6 PCP Dennise Madrid MD     Subjective:   ROS Sacral decubitus ulcer     Aspiration pneumonia of lower lobe (HCC)     Hip fracture, left (HCC)     Hip fracture, left, closed, initial encounter [facility])     Atrial fibrillation, chronic (HCC)     Coagulopathy (HCC)     Aspiration pneumonia ([facility]     Rodríguez Chaves vancomycin and ceftriaxone.   ID consulted for antibiotics management        # Left foot 2nd digit infection with acute OM now S/p 2nd digit amputation.   -  MRI l foot reviewed - acute OM of 2nd digit  -  s/p LLE angiogram with popliteal angioplasty today

## 2021-12-03 NOTE — OPERATIVE REPORT
Surgeon: Redd Mitchell DPM  Assistant: None  Preop Diagnosis: Osteomyelitis 2nd digit Left Foot   Post op Diagnosis: Same  Procedure: Amputation 2nd digit left foot  Anesthesia: MAC with Local  Hemostasis: None  EBL: < 5 cc  Materials:3-0 vicry, 4-0 N

## 2021-12-03 NOTE — PLAN OF CARE
Problem: Patient Centered Care  Goal: Patient preferences are identified and integrated in the patient's plan of care  Description: Interventions:  - What would you like us to know as we care for you?  From [address]  - Provide timely, complete, and accu INTERVENTIONS  - Monitor WBC  - Administer growth factors as ordered  - Implement neutropenic guidelines  Outcome: Progressing     Problem: SAFETY ADULT - FALL  Goal: Free from fall injury  Description: INTERVENTIONS:  - Assess pt frequently for physical n

## 2021-12-03 NOTE — PROGRESS NOTES
[facility]    Progress Note    Reanna Fuentes Patient Status:  Inpatient    10/22/1931 MRN [id number]   Location [facility] 4W/SW/SE Attending Sam Cardenas MD   Hosp Day # 6 PCP Sumaya Hermosillo MD         Assessment and po at bedtime at home;  Rx per [facility] coumadin clinic  -warfarin on hold for upcoming procedures as above; INR 1.13 today     Gait abnormality  Pt has high fall risk; using manual wheelchair, though independent use is limited due to bilateral hand osteoarthrit before procedure     History of villous adenoma   the patient reports occasional loose stools.  The patient has been advised to see gastroenterologist, Dr. Frandy Almodovar, as an outpatient for colonoscopy though he has declined repeat colonoscopy     Vitamin B12 de Daily  furosemide (LASIX) tab 20 mg, 20 mg, Oral, Daily  ketorolac (ACULAR) 0.5 % ophthalmic solution 1 drop, 1 drop, Ophthalmic, QID  loperamide (IMODIUM) cap 2 mg, 2 mg, Oral, QID PRN  Miconazole Nitrate 2 % powder, , Topical, PRN  potassium chloride (K-

## 2021-12-03 NOTE — CDS QUERY
.Potential for Impaired Nutritional Status  DOCUMENTATION CLARIFICATION FORM  Dear Doctor: Rj Augustin Registered Dietician evaluated this patient and found them to meet Severe Malnutrition Criteria using the [place] Guidelines based off the following cli

## 2021-12-03 NOTE — CM/SW NOTE
SW followed up on DC planning. SW performed chart review and discussed pt during DC rounds. Pt currently lives in [address] in [place]. Pt has 24hr staff present in facility.      Pt has W/C, Rollator, electric scooter an

## 2021-12-03 NOTE — ANESTHESIA PREPROCEDURE EVALUATION
Anesthesia PreOp Note    HPI:     Anna Fang is a 80year old male who presents for preoperative consultation requested by: Wendy Krause DPM    Date of Surgery: 11/27/2021 - 12/3/2021    Procedure(s):  amputation 2nd digit, left foot  Indicatio left [facility])         Date Noted: 01/10/2017      Hip fracture, left, closed, initial encounter [facility])         Date Noted: 01/10/2017      Atrial fibrillation, chronic ([facility]         Date Noted: 01/10/2017      Protein calorie malnutrition ([facility]         Date No organism 2016   • PUD (peptic ulcer disease)    • Pulmonary hypertension ([facility]    • Thrombocytosis 2013   • Villous adenoma    • Vitamin B12 deficiency    • White matter disease        Past Surgical History:   Procedure Laterality Date   • CARDIOVERSION  2 Does not apply Misc, Inject Vitamin B12 monthly, Disp: 3 each, Rfl: 3  cyanocobalamin 1000 MCG/ML Injection Solution, Inject 1 mL (1,000 mcg total) into the muscle every 30 (thirty) days. , Disp: 3 mL, Rfl: 3  dilTIAZem HCl ER Coated Beads 180 MG Oral Capsu Q24H, Jarrod Harvey MD, Last Rate: 200 mL/hr at 12/03/21 0109, 2 g at 12/03/21 0109  [MAR Hold] albuterol 108 (90 Base) MCG/ACT inhaler 2 puff, 2 puff, Inhalation, Q4H PRN, Jarrod Harvey MD  [MAR Hold] digoxin (LANOXIN) tab 125 mcg, 125 mcg, Oral, Comment:Other reaction(s): extreme dizzinss    Family History   Problem Relation Age of Onset   • Other (old age) Father 80        cause of death   • Other (Other) Mother 80        cause of death   • Cancer Paternal Aunt    • Diabetes Paternal Aunt      So 7.1 12/01/2021    RBC 4.42 12/01/2021    HGB 12.4 (L) 12/01/2021    HCT 40.9 12/01/2021    MCV 92.5 12/01/2021    MCH 28.1 12/01/2021    MCHC 30.3 (L) 12/01/2021    RDW 15.0 12/01/2021    .0 (L) 12/01/2021     Lab Results   Component Value Date    N

## 2021-12-04 PROCEDURE — 99232 SBSQ HOSP IP/OBS MODERATE 35: CPT | Performed by: INTERNAL MEDICINE

## 2021-12-04 RX ORDER — HEPARIN SODIUM 5000 [USP'U]/ML
5000 INJECTION, SOLUTION INTRAVENOUS; SUBCUTANEOUS EVERY 12 HOURS SCHEDULED
Status: DISCONTINUED | OUTPATIENT
Start: 2021-12-04 | End: 2021-12-06

## 2021-12-04 NOTE — PLAN OF CARE
Patient did not complain of any surgical pain. He did report arthritic pain in his wrists which he requested norco x1 for. Patient attempted to have a BM, but only passed flatus. Patient repositioned as requested. L leg elevated on a pillow.  If patient mills influences on pain and pain management  - Manage/alleviate anxiety  - Utilize distraction and/or relaxation techniques  - Monitor for opioid side effects  - Notify MD/LIP if interventions unsuccessful or patient reports new pain  - Anticipate increased she post-hospital services based on physician/LIP order or complex needs related to functional status, cognitive ability or social support system  Outcome: Progressing

## 2021-12-05 PROCEDURE — 99232 SBSQ HOSP IP/OBS MODERATE 35: CPT | Performed by: INTERNAL MEDICINE

## 2021-12-05 RX ORDER — POLYETHYLENE GLYCOL 3350 17 G/17G
17 POWDER, FOR SOLUTION ORAL DAILY
Status: DISCONTINUED | OUTPATIENT
Start: 2021-12-05 | End: 2021-12-06

## 2021-12-05 RX ORDER — VANCOMYCIN HYDROCHLORIDE 125 MG/1
125 CAPSULE ORAL DAILY
Status: DISCONTINUED | OUTPATIENT
Start: 2021-12-05 | End: 2021-12-06

## 2021-12-05 NOTE — PROGRESS NOTES
[facility]    Report of Consultation    Akilah Diallo Patient Status:  Inpatient    10/22/1931 MRN [id number]   Location [facility] 4W/SW/SE Attending Pepito James MD   Hosp Day # 8 PCP Radha Benítez MD     Date of Admi 1970    Billroth anastomosis   • OTHER SURGICAL HISTORY  2011    dental implants   • SKIN SURGERY  02/11/2019    Mohs/R Dorsal hand/SCC/done by MM       Family History  Family History   Problem Relation Age of Onset   • Other (old age) Father 80        cau Q6H PRN  docusate sodium (COLACE) cap 100 mg, 100 mg, Oral, BID  morphINE sulfate (PF) 2 MG/ML injection 1 mg, 1 mg, Intravenous, Q4H PRN   Or  morphINE sulfate (PF) 2 MG/ML injection 2 mg, 2 mg, Intravenous, Q4H PRN   Or  morphINE sulfate (PF) 4 MG/ML inj Oral Tab, Take 1 tablet by mouth daily. Cholecalciferol (VITAMIN D) 1000 units Oral Tab, Take 1 tablet by mouth daily. Loperamide HCl 2 MG Oral Cap, Take 2 mg by mouth 4 (four) times daily as needed for Diarrhea.         Allergies    Dronedarone [facility])        PVD (peripheral vascular disease) ([facility]        Recommendations:  Dressing changed  OK to discharge tomorrow morning after cleared by ID  Reviewed pathology  Pt to follow up with podiatry on Dec. 20, 9:30 am for post op      Thank you for allow

## 2021-12-05 NOTE — PHYSICAL THERAPY NOTE
PHYSICAL THERAPY EVALUATION - INPATIENT     Room Number: 429/429-A  Evaluation Date: 12/5/2021  Type of Evaluation: Re-evaluation   Physician Order: PT Eval and Treat    Presenting Problem: Initial admit with osteomyelitis of L toe, now s/p 2nd toe amputa the [facility] '6 clicks' Inpatient Basic Mobility Short Form. Research supports that patients with this level of impairment may benefit from DONTA.    (Patient is from [place] - if prison is able to provide assist of 2 people for transfers and [place] PT/OT this may be poss hypertension ([facility]    • Thrombocytosis 2013   • Villous adenoma    • Vitamin B12 deficiency    • White matter disease        Past Surgical History  Past Surgical History:   Procedure Laterality Date   • CARDIOVERSION  2007   • CATARACT  2006   • CATH PERCU extremity strength demonstrates significant functional deficits    BALANCE  Static Sitting: Fair  Dynamic Sitting: Fair -  Static Standing: Poor  Dynamic Standing: Poor -    ADDITIONAL TESTS                                    NEUROLOGICAL FINDINGS level: supervision     Goal #1   Current Status    Goal #2 Patient is able to demonstrate transfers EOB to/from Chair/Wheelchair at assistance level: minimum assistance with walker - rolling     Goal #2  Current Status    Goal #3 Patient is able to ambulat

## 2021-12-05 NOTE — PROGRESS NOTES
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  The patient has been advised to see gastroenterologist, Dr. Marquez Lim, as an outpatient for colon Intravenous, Q24H  cefTRIAXone Sodium (ROCEPHIN) 2 g in sodium chloride 0.9% 100 mL IVPB-ADDV, 2 g, Intravenous, Q24H  albuterol 108 (90 Base) MCG/ACT inhaler 2 puff, 2 puff, Inhalation, Q4H PRN  digoxin (LANOXIN) tab 125 mcg, 125 mcg, Oral, Daily  dilTIAZ previous study. Soft tissue swelling with subcutaneous air more likely postoperative in origin. Correlate clinically. Moderate bony de ossification. No acute appearing fracture or site of focal bone destruction is seen.   Extensive vascular calcificatio

## 2021-12-05 NOTE — PROGRESS NOTES
[address] [facility]    CDI Prediction Tool Protocol (Vancomycin Initiated)    OVP (oral vancomycin prophylaxis) 125 mg PO Daily is being started in this patient based on a score of 13.           This patient is currently at high risk for developing CDI du

## 2021-12-05 NOTE — DIETARY NOTE
ADULT NUTRITION REASSESSMENT    Pt is at moderate nutrition risk. Pt meets severe malnutrition criteria.       CRITERIA FOR MALNUTRITION DIAGNOSIS:  Criteria for severe malnutrition diagnosis: chronic illness related to body fat severe depletion and muscle intake. 12/5 update: Pt was seen today for reassessment. Pt is seen with lunch tray present. Pt reports a good appetite, liking our food and taking his supplements 100% as ordered. Pt is POD #2 from left toe amputation.   No additional interventions w soft BM's; +BM 12/2    NUTRITION RELATED PHYSICAL FINDINGS:  - Nutrition Focused Physical Exam (NFPE): Nutrition Focused Physical Exam (NFPE): moderate depletion body fat fat overlying rib cage region, severe depletion body fat orbital region and triceps r encouraged patient to continue with good PO intake at meals and good supplement intake   - Meals and snacks: Encouraged adequate PO intake  - Medical Food and or Oral Nutritional Supplements (ONS)-RD added Ensure Enlive (350 calories/ 20 g protein each) St

## 2021-12-05 NOTE — PLAN OF CARE
Patient is alert and oriented. RA. SCDs on for DVT prophylaxis. Tele in place. Voiding freely, using urinal. PRN norco given for pain management. Dressing to Left foot clean, dry, intact. Call light within reach, bed alarm active.   Problem: Patient Hector Laughter new pain  - Anticipate increased pain with activity and pre-medicate as appropriate  Outcome: Progressing     Problem: RISK FOR INFECTION - ADULT  Goal: Absence of fever/infection during anticipated neutropenic period  Description: INTERVENTIONS  - Monitor

## 2021-12-06 VITALS
OXYGEN SATURATION: 97 % | WEIGHT: 156 LBS | SYSTOLIC BLOOD PRESSURE: 111 MMHG | TEMPERATURE: 98 F | HEIGHT: 75 IN | BODY MASS INDEX: 19.4 KG/M2 | DIASTOLIC BLOOD PRESSURE: 60 MMHG | RESPIRATION RATE: 16 BRPM | HEART RATE: 58 BPM

## 2021-12-06 PROCEDURE — 99239 HOSP IP/OBS DSCHRG MGMT >30: CPT | Performed by: INTERNAL MEDICINE

## 2021-12-06 RX ORDER — POLYETHYLENE GLYCOL 3350 17 G/17G
17 POWDER, FOR SOLUTION ORAL DAILY
Qty: 30 EACH | Refills: 1 | Status: SHIPPED | OUTPATIENT
Start: 2021-12-07

## 2021-12-06 RX ORDER — PSEUDOEPHEDRINE HCL 30 MG
100 TABLET ORAL 2 TIMES DAILY
Qty: 60 CAPSULE | Refills: 3 | Status: SHIPPED | OUTPATIENT
Start: 2021-12-06

## 2021-12-06 RX ORDER — ALBUTEROL SULFATE 90 UG/1
2 AEROSOL, METERED RESPIRATORY (INHALATION) EVERY 4 HOURS PRN
Qty: 1 EACH | Refills: 0 | Status: SHIPPED | OUTPATIENT
Start: 2021-12-06 | End: 2022-01-05

## 2021-12-06 RX ORDER — WARFARIN SODIUM 4 MG/1
4 TABLET ORAL
Qty: 90 TABLET | Refills: 3 | Status: SHIPPED | OUTPATIENT
Start: 2021-12-06

## 2021-12-06 RX ORDER — ENOXAPARIN SODIUM 100 MG/ML
70 INJECTION SUBCUTANEOUS DAILY
Status: DISCONTINUED | OUTPATIENT
Start: 2021-12-06 | End: 2021-12-06

## 2021-12-06 RX ORDER — ENOXAPARIN SODIUM 100 MG/ML
70 INJECTION SUBCUTANEOUS DAILY
Qty: 3 EACH | Refills: 0 | Status: SHIPPED | OUTPATIENT
Start: 2021-12-06

## 2021-12-06 NOTE — CM/SW NOTE
SUSANNE followed up on DC planning. SUSANNE spoke with pt at bedside with OT who confirms pt is a 1 person assist, 2 person assist initially for safety but should be able to manage on his own.      SUSANNE spoke with pt at bedside who confirmed he would like to DC ragini

## 2021-12-06 NOTE — PLAN OF CARE
Patient is alert and oriented. SCDs on for DVT prophylaxis. Norco 7.5 given for pain management. Dressing in place to L foot and using boot. Up with 1-2 assist. Discharge to [facility] [place] with Tai Lambert today via [employer].      Problem: Patient Centered C unsuccessful or patient reports new pain  - Anticipate increased pain with activity and pre-medicate as appropriate  Outcome: Adequate for Discharge     Problem: RISK FOR INFECTION - ADULT  Goal: Absence of fever/infection during anticipated neutropenic pe Discharge

## 2021-12-06 NOTE — DISCHARGE SUMMARY
[facility] AND CLINICS  Discharge Summary    Bill Francis Patient Status:  Inpatient    10/22/1931 MRN [id number]   Location [facility] 4W/SW/SE Attending Linda Coy MD   [facility] Day # 9 PCP Edwina Mcdaniels MD     Date of Admission:  101/82, pulse 59, temperature 98.4 °F (36.9 °C), temperature source Oral, resp. rate 16, height 6' 3\" (1.905 m), weight 156 lb (70.8 kg), SpO2 97 %. Physical Exam  Vitals reviewed. Constitutional:       Appearance: He is not ill-appearing.    HENT: on oral antibiotic treatment prescribed by podiatrist (patient is unsure of name of antibiotic) for approximately 7 days after podiatrist noted cellulitic changes of left foot originating from infected second digit corn.   Patient denied any fevers or chill 20° levoconvex scoliosis, slight flattening of the lordotic curvature.  Endplate compression at L3.  Moderate loss of disc heights throughout the lumbar spine.  No spondylolisthesis.  No destructive lesions.  Mild calcification of the abdominal aorta; has daily; now with mild right shoulder pain x 2d--> advise stretching exercises for now; call if sxs persists     Pulmonary HTN  seen on ECHO in Jan 2017 with PA pressure 54 (previous PA pressure 44 in June 2016); thought to be multifactorial     Left hip fra Solution  Inject 0.7 mL (70 mg total) into the skin daily.   Qty: 3 each Refills: 0      CONTINUE these medications which have CHANGED    albuterol 108 (90 Base) MCG/ACT Inhalation Aero Soln  Inhale 2 puffs into the lungs every 4 (four) hours as needed for HCl 2 MG Oral Cap  Take 2 mg by mouth 4 (four) times daily as needed for Diarrhea. Follow up Visits:  Follow-up with Dr Carrie Meneses on 12/20/21 and Dr Amanda Lares in 2 weeks    Follow up Labs: PT/INR in 4 days     This is a > 30 minute visit and josé luis

## 2021-12-06 NOTE — PROGRESS NOTES
INFECTIOUS DISEASE PROGRESS NOTE    Babatundesuma William Patient Status:  Inpatient    10/22/1931 MRN [id number]   Location [facility] Attending Alison Dumont, [address] [facility] [address] Day # 9 PCP Hudson Downing MD     Subjective:   ROS fibrillation, chronic (HCC)     Coagulopathy (HCC)     Aspiration pneumonia (HCC)     Malnutrition ([address]     Chronic atrial fibrillation ([address]     Seborrheic dermatitis     Pulmonary HTN ([facility]     Blood infection ([facility]     Sepsis, due to unspecified organi clear margins  -  MRI l foot reviewed - acute OM of 2nd digit  -  s/p LLE angiogram with popliteal angioplasty 12/1  -  IR and podiatry following.      # CAD/PAD  # A fib  # pulm HTN     PLAN:  -  Will complete course of vancomycin and ceftriaxone today.

## 2021-12-06 NOTE — PLAN OF CARE
Patient is alert and oriented. RA. Tele in place. SCDs on for DVT prophylaxis. Voiding freely, using urinal. PRN norco given for pain management. Dressing in place to L foot, clean, dry, intact. Call light within reach, bed alarm active.   Problem: Patient reports new pain  - Anticipate increased pain with activity and pre-medicate as appropriate  Outcome: Progressing     Problem: RISK FOR INFECTION - ADULT  Goal: Absence of fever/infection during anticipated neutropenic period  Description: INTERVENTIONS  -

## 2021-12-07 ENCOUNTER — PATIENT OUTREACH (OUTPATIENT)
Dept: CASE MANAGEMENT | Age: 86
End: 2021-12-07

## 2021-12-07 ENCOUNTER — TELEPHONE (OUTPATIENT)
Dept: INTERNAL MEDICINE CLINIC | Facility: CLINIC | Age: 86
End: 2021-12-07

## 2021-12-07 DIAGNOSIS — Z02.9 ENCOUNTERS FOR UNSPECIFIED ADMINISTRATIVE PURPOSE: ICD-10-CM

## 2021-12-07 NOTE — PROGRESS NOTES
Initial Post Discharge Follow Up   Discharge Date: 12/6/21  Contact Date: 12/7/2021    Consent Verification:  Assessment Completed With: Patient  HIPAA Verified?   Yes    Discharge Dx:     Osteomyelitis left 2nd toe   PVD  Constipation  Osteoarthritis juve well  • Do you have any questions about your discharge instructions? No  • Before leaving the hospital was your diagnoses explained to you? Yes  • Do you have any questions about your diagnoses?  No  • Are you able to perform normal daily activities of klaani capsule 3   • ILEVRO 0.3 % Ophthalmic Suspension 1 drop by Each eye route daily. 30   • Nystatin 603729 UNIT/GM External Powder ATAA groin QID (Patient taking differently: Apply 1 Application topically as needed.  ATAA groin QID) 60 g 2   • furosemide ([facility]/Services ordered at D/C? Yes   What services:   Rehab, CHF, Stroke, PT, OT, Speech, Other: RN/PT   (NCM) (If HH was ordered) Has HH been set up? Yes; The patient will begin home visits tomorrow.      If Yes: With Whom: [facility] Hoyos cannot make your appointments? No     NCM Reviewed upcoming Specialist Appt with patient     Yes; The patient is scheduled with dr. Nahed Hamilton on 12/20/2021. Overall Rating:    How would you rate the care you received while in the hospital?  excellent

## 2021-12-07 NOTE — TELEPHONE ENCOUNTER
Spoke to the pt today for TCM. The patient was recently hospitalized for osteomyelitis and left 2nd toe amputation. The pt does not have a HFU appt scheduled at this time.  A TCM/HFU appt is recommended by 12/13/2021 as the pt is a high risk for readmission

## 2021-12-10 LAB — INR: 1.3 (ref 0.8–1.2)

## 2021-12-13 ENCOUNTER — TELEPHONE (OUTPATIENT)
Dept: INTERNAL MEDICINE CLINIC | Facility: CLINIC | Age: 86
End: 2021-12-13

## 2021-12-13 DIAGNOSIS — I48.20 CHRONIC ATRIAL FIBRILLATION (HCC): ICD-10-CM

## 2021-12-13 LAB — INR: 1.8 (ref 0.8–1.2)

## 2021-12-13 NOTE — TELEPHONE ENCOUNTER
Spoke with home health nurse Leatha Gaucher. She's currently visiting with patient. She reports VS as written below. Baseline HR is 60. Baseline /120. Patient reports feeling \"a little\" lightheaded when up and ambulating. Per RN, he denies dizziness. Lightheadedness resolves after a while, once he sits in his recliner. Patient takes Digoxin 125 mcg daily and Diltiazem 180 mg daily. H/o CHF. He is usually SOB, \"it's a little worse\". No home 02. Baseline BLE edema-non-pitting. Chronic productive cough, crackles to lungs which is not new, \"he always has\". Prn lasix-has not taken. Patient reports getting up frequently at night d/t bilateral arthritis hand pain. 8/10 at it's worse. After he takes Norco, pain subsides she he goes back to sleep in 30 mins. He gets about 6 hours of sleep nightly, but is broken up d/t pain. Office visit advised as last in-person visit was 9/2020 and patient previously declined hospital f/u visit. Patient will discuss with dtr who arranges transportation. To dr. Laine Estevez: please advise.

## 2021-12-13 NOTE — TELEPHONE ENCOUNTER
Lenore/[facility] called    Reporting pt's heart rate today is 52  Blood pressure is 112/50     Pt describes feeling blah, not himself since he returned from the hospital   He is also not sleeping well, waking up 2 - 3 X per night, can something be prescribed or over the counter med recommended     Lenore's call back # [phone]

## 2021-12-13 NOTE — TELEPHONE ENCOUNTER
To Dr. Jim Byrd for coumadin management as Laya Lea is out of the office all week. Current INR goal:  2.0-3.0     Per AC module, patient takes 4 mg coumadin daily.

## 2021-12-14 NOTE — TELEPHONE ENCOUNTER
Called and relayed MDs message-verbalized understanding. Patient test INR at home (home meter). Advised to recheck INR in 2 week. FYI: patient elaborated that Dr. Joseluis Moore called him Friday regarding INR 1.3 and gave him instructions to take 6mg Fri/Sat/Sun. Recheck INR today, 12/13/21. AC module updated with Friday and Today's INR.      Charla Avilez

## 2021-12-14 NOTE — TELEPHONE ENCOUNTER
To Dr. Damaris Gracia: I called patient to relayed coumadin instructions, however patient reported that he took 6mg coumadin Fri/Sat/Sun as instructed by Dr. Tory Arteaga. I do not see any encounter.  Please advise if okay to stick with your plan and relayed your message

## 2021-12-14 NOTE — TELEPHONE ENCOUNTER
Update noted; would still take warfarin 4 mg po at bedtime Mon-Sun (every day); re-check 2 weeks; please call pt

## 2021-12-21 ENCOUNTER — TELEPHONE (OUTPATIENT)
Dept: INTERNAL MEDICINE CLINIC | Facility: CLINIC | Age: 86
End: 2021-12-21

## 2021-12-21 NOTE — TELEPHONE ENCOUNTER
Requests verbal orders to re certify patient for home health to help with pt's toe amputation, B12 s and to manage CHF    Call back to [facility]     [phone], confidential voice mail - ok to leave message

## 2021-12-22 ENCOUNTER — TELEPHONE (OUTPATIENT)
Dept: INTERNAL MEDICINE CLINIC | Facility: CLINIC | Age: 86
End: 2021-12-22

## 2021-12-22 DIAGNOSIS — I48.20 CHRONIC ATRIAL FIBRILLATION (HCC): ICD-10-CM

## 2021-12-22 NOTE — TELEPHONE ENCOUNTER
Lenore/[facility] called with today's INR    INR 1.5    Blood pressure 96/50  Heart rate 60  No dizzyness    Call back # [phone], confidential VM  Ok to leave a message

## 2021-12-22 NOTE — TELEPHONE ENCOUNTER
FYI to Dr. Atif Thakur----    Spoke to BODØ and advised OK for verbal [facility] orders; she verbalized understanding     Priority changed from high to routing. Not an urgent call.

## 2021-12-23 NOTE — TELEPHONE ENCOUNTER
spoke to pt ---subtherapeutic---s/p surg. for toe amp;  was on lovenox;  he reports a few doses of 6 mg (10,11,12th) since low INR;  in the last week he has taken 4 mg daily;  denies sig changes Rx, OTC, diet---6 mg x2 and recheck 5 days;   ask about MVI? ?

## 2021-12-28 ENCOUNTER — TELEPHONE (OUTPATIENT)
Dept: INTERNAL MEDICINE CLINIC | Facility: CLINIC | Age: 86
End: 2021-12-28

## 2021-12-28 DIAGNOSIS — I48.20 CHRONIC ATRIAL FIBRILLATION (HCC): ICD-10-CM

## 2021-12-28 NOTE — TELEPHONE ENCOUNTER
Patient called to report todays INR result     INR  2.6      Please follow up with him on cell# [phone]

## 2021-12-29 ENCOUNTER — TELEPHONE (OUTPATIENT)
Dept: INTERNAL MEDICINE CLINIC | Facility: CLINIC | Age: 86
End: 2021-12-29

## 2021-12-29 RX ORDER — HYDROCODONE BITARTRATE AND ACETAMINOPHEN 7.5; 325 MG/1; MG/1
1 TABLET ORAL EVERY 6 HOURS PRN
Qty: 90 TABLET | Refills: 0 | Status: SHIPPED | OUTPATIENT
Start: 2021-12-29 | End: 2022-02-07

## 2021-12-29 NOTE — TELEPHONE ENCOUNTER
To MD:  The above refill request is for a controlled substance. Please review pended medication order. Print and sign for staff to fax to pharmacy or prescribe electronically.     Last office visit: 8/27/2021 (virtual visit)  Last time refill sent and qu

## 2021-12-30 ENCOUNTER — TELEPHONE (OUTPATIENT)
Dept: INTERNAL MEDICINE CLINIC | Facility: CLINIC | Age: 86
End: 2021-12-30

## 2021-12-30 NOTE — TELEPHONE ENCOUNTER
Alin Conde / [facility] is calling they evaluated patient for physical therapy    They will be seeing patient   1 time a week for 1 week  2 times a week for 1 week  1 time a week for 5 weeks

## 2022-01-07 ENCOUNTER — TELEPHONE (OUTPATIENT)
Dept: INTERNAL MEDICINE CLINIC | Facility: CLINIC | Age: 87
End: 2022-01-07

## 2022-01-07 NOTE — TELEPHONE ENCOUNTER
Candy / [facility] is calling to report patient's HR is 49, no other symptoms  Phone [phone]

## 2022-01-10 ENCOUNTER — TELEMEDICINE (OUTPATIENT)
Dept: INTERNAL MEDICINE CLINIC | Facility: CLINIC | Age: 87
End: 2022-01-10
Payer: MEDICARE

## 2022-01-10 DIAGNOSIS — E53.8 VITAMIN B12 DEFICIENCY: ICD-10-CM

## 2022-01-10 DIAGNOSIS — I73.9 PVD (PERIPHERAL VASCULAR DISEASE) (HCC): ICD-10-CM

## 2022-01-10 DIAGNOSIS — M86.9 OSTEOMYELITIS OF LEFT FOOT, UNSPECIFIED TYPE (HCC): Primary | ICD-10-CM

## 2022-01-10 DIAGNOSIS — I48.20 CHRONIC ATRIAL FIBRILLATION (HCC): ICD-10-CM

## 2022-01-10 LAB — INR: 2.6 (ref 0.8–1.2)

## 2022-01-10 PROCEDURE — 99214 OFFICE O/P EST MOD 30 MIN: CPT | Performed by: INTERNAL MEDICINE

## 2022-01-10 NOTE — PROGRESS NOTES
This is a telemedicine visit with live, interactive video and audio. Patient understands and accepts financial responsibility for any deductible, co-insurance and/or co-pays associated with this service.     SUBJECTIVE  81 y/o M with recent osteomyeliti Date   • CARDIOVERSION  2007   • CATARACT  2006   • CATH PERCUTANEOUS  TRANSLUMINAL CORONARY ANGIOPLASTY  1990   • CHOLECYSTECTOMY     • COLON SURGERY Right 10/11   • FAT, FECAL QUALITATIVE  12/10   • FRACTURE SURGERY      Left femur 2016   • HIP SURGERY total) into the skin daily. 3 each 0   • digoxin (DIGOX) 0.125 MG Oral Tab Take 1 tablet (125 mcg total) by mouth once daily.  90 tablet 3   • Spacer/Aero-Holding Chambers Does not apply Device Use with albuterol inhaler 1 each 0   • predniSONE 2.5 MG Oral vancomycin 1250 mg IV q 24 hours, and ceftriaxone 2 gm IV [id number], d#8; Dr Wasserman's ID consult noted;stop Abx today at discharge  -s/p left 2nd toe amputation 12/3/21 per podiatrist, Dr Gutierrez; pathology with acute and chronic osteomyelitis   -dress formal excision by dermatologist in [place]     History of Sepsis due to Klebsiella UTI  In June 2018; Due to urine septicemia; BCx and UCx showing growing Klebsiella pneumoniae, sensitive to pip/ tazo; s/p Zosyn 3.375 gm IV q8hrs, x6d, then Keflex for Month       Spent 30 minutes obtaining history, evaluating patient, discussing treatment options, diet, exercise, review of available labs and radiology reports, and completing documentation.          Sheldon Garcia MD

## 2022-01-11 ENCOUNTER — TELEPHONE (OUTPATIENT)
Dept: INTERNAL MEDICINE CLINIC | Facility: CLINIC | Age: 87
End: 2022-01-11

## 2022-01-11 DIAGNOSIS — I48.20 CHRONIC ATRIAL FIBRILLATION (HCC): ICD-10-CM

## 2022-01-11 NOTE — TELEPHONE ENCOUNTER
Received a fax from [employer] with the patient's most recent INR results.      1/10/2022   INR - 2.6  12/28/2021 INR - 2.6  12/22/2021 INR - 1.5  12/13/2021 INR - 1.8  11/10/2021 INR  - 2.2

## 2022-01-31 ENCOUNTER — TELEPHONE (OUTPATIENT)
Dept: INTERNAL MEDICINE CLINIC | Facility: CLINIC | Age: 87
End: 2022-01-31

## 2022-01-31 DIAGNOSIS — I48.20 CHRONIC ATRIAL FIBRILLATION (HCC): ICD-10-CM

## 2022-01-31 NOTE — TELEPHONE ENCOUNTER
Patient called in with his INR today. It is 2.4. He has been taking 4 mgs of warfarin daily. Patient can be reached at [phone].

## 2022-01-31 NOTE — TELEPHONE ENCOUNTER
INR Q2W due to home monitoring;  Discuss with pt once monthly when in range  spoke to pt ---therapeutic---no problems/ bleeds---CPM and recheck 1 month

## 2022-02-04 ENCOUNTER — TELEPHONE (OUTPATIENT)
Dept: INTERNAL MEDICINE CLINIC | Facility: CLINIC | Age: 87
End: 2022-02-04

## 2022-02-04 NOTE — TELEPHONE ENCOUNTER
Patient left a voicemail requesting to have [facility] call him, no other information given    Phone [phone]

## 2022-02-07 ENCOUNTER — TELEPHONE (OUTPATIENT)
Dept: INTERNAL MEDICINE CLINIC | Facility: CLINIC | Age: 87
End: 2022-02-07

## 2022-02-07 RX ORDER — HYDROCODONE BITARTRATE AND ACETAMINOPHEN 7.5; 325 MG/1; MG/1
1 TABLET ORAL EVERY 6 HOURS PRN
Qty: 90 TABLET | Refills: 0 | Status: SHIPPED | OUTPATIENT
Start: 2022-02-07 | End: 2022-03-04

## 2022-02-07 NOTE — TELEPHONE ENCOUNTER
Next appt 4/16/18  Last appt 4/12/17    Refill request for  gabapentin (NEURONTIN) 300 MG capsule 14 capsule 0 2/15/2017       Sig - Route: Take 1 capsule by mouth nightly. - Oral   SHORT TERM REFILL LOCAL UNTIL MAIL ORDER ARRIVES.    Refilled per standing med protocol.     To MD:  The above refill request is for a controlled substance. Please review pended medication order. Print and sign for staff to fax to pharmacy or prescribe electronically. Last office visit:1/10/22 Tele visit  Last time refill sent and quantity/refills:12/29/21 # 90  To DR. OTTO

## 2022-02-07 NOTE — TELEPHONE ENCOUNTER
Pt called and left voicemail refill request for:  [address]  Tasked to [employer]

## 2022-02-10 ENCOUNTER — MED REC SCAN ONLY (OUTPATIENT)
Dept: INTERNAL MEDICINE CLINIC | Facility: CLINIC | Age: 87
End: 2022-02-10

## 2022-02-10 NOTE — PROGRESS NOTES
dme request for hospital bed signed and faxed back to [facility] [phone], conformation received. Original sent to scan.

## 2022-02-11 ENCOUNTER — TELEPHONE (OUTPATIENT)
Dept: INTERNAL MEDICINE CLINIC | Facility: CLINIC | Age: 87
End: 2022-02-11

## 2022-02-11 NOTE — TELEPHONE ENCOUNTER
Pt called, hoping that order sent by [facility] ([phone]) for patient hospital bed was signed by Dr Alexis Valdivia and returned   Tasked to nursing

## 2022-02-15 ENCOUNTER — TELEPHONE (OUTPATIENT)
Dept: INTERNAL MEDICINE CLINIC | Facility: CLINIC | Age: 87
End: 2022-02-15

## 2022-02-15 NOTE — TELEPHONE ENCOUNTER
As FYI to DR. Rito Tilley called Carleton Merlin from [facility] and gave verbal approval for plan of care per protocol

## 2022-02-19 RX ORDER — CYANOCOBALAMIN 1000 UG/ML
INJECTION, SOLUTION INTRAMUSCULAR; SUBCUTANEOUS
Qty: 3 ML | Refills: 3 | OUTPATIENT
Start: 2022-02-19

## 2022-03-04 ENCOUNTER — TELEPHONE (OUTPATIENT)
Dept: INTERNAL MEDICINE CLINIC | Facility: CLINIC | Age: 87
End: 2022-03-04

## 2022-03-04 RX ORDER — HYDROCODONE BITARTRATE AND ACETAMINOPHEN 7.5; 325 MG/1; MG/1
1 TABLET ORAL EVERY 6 HOURS PRN
Qty: 90 TABLET | Refills: 0 | Status: SHIPPED | OUTPATIENT
Start: 2022-03-04 | End: 2022-04-03

## 2022-03-04 NOTE — TELEPHONE ENCOUNTER
Patient called back and was informed of Dr Bull Abreu message below. Patient verbalized understanding and thanks Dr Cruz Elena for refilling and nursing for calling.

## 2022-03-04 NOTE — TELEPHONE ENCOUNTER
LM on patient's cell (OK per HIPAA) and relayed MD message below. Advised in VM to call back with any questions/concerns.

## 2022-03-04 NOTE — TELEPHONE ENCOUNTER
To MD:  The above refill request is for a controlled substance. Please review pended medication order. Print and sign for staff to fax to pharmacy or prescribe electronically.     Last office visit: tele visit 1/10/22  Last time refill sent and quantity/refills: 2/7/22 qty 90 plus 0

## 2022-03-07 ENCOUNTER — TELEPHONE (OUTPATIENT)
Dept: INTERNAL MEDICINE CLINIC | Facility: CLINIC | Age: 87
End: 2022-03-07

## 2022-03-07 LAB — INR: 1.6 (ref 0.8–1.2)

## 2022-03-07 NOTE — TELEPHONE ENCOUNTER
HHRN called---subtherapeutic---no problems/ bleeds; pt missed dose last week and took 6 mg last night; no other sig changes---6 mg x1 and CPM and recheck 2 weeks

## 2022-03-07 NOTE — TELEPHONE ENCOUNTER
Kavon Deal @ [place] [id number] calling with INR results,    INR - 1.6    Patient missed a dose last week but cannot remember when. Patient is taking 4 MG daily.       # [phone], can leave a message on voicemail/confiendital

## 2022-03-14 RX ORDER — DILTIAZEM HYDROCHLORIDE 180 MG/1
CAPSULE, COATED, EXTENDED RELEASE ORAL
Qty: 90 CAPSULE | Refills: 1 | Status: SHIPPED | OUTPATIENT
Start: 2022-03-14

## 2022-03-21 ENCOUNTER — TELEPHONE (OUTPATIENT)
Dept: INTERNAL MEDICINE CLINIC | Facility: CLINIC | Age: 87
End: 2022-03-21

## 2022-03-21 LAB — INR: 1.6 (ref 0.8–1.2)

## 2022-03-21 NOTE — TELEPHONE ENCOUNTER
Igor Guajardo from [facility] calling with INR results    INR1. 6    Has not missed any dosages  Did take extra dosage that Sangita Batista had instructed. No dietary changes     Igor Guajardo call back number [phone]. Has confidential vms, may leave message.

## 2022-03-21 NOTE — TELEPHONE ENCOUNTER
HHRN called---subtherapeutic---no problems/ bleeds; pt denies missed dose; no other sig changes---6 mg x1 and increased dose and recheck 2 weeks

## 2022-03-22 NOTE — TELEPHONE ENCOUNTER
Spoke with patient and reviewed Neo Lundy instructions from yesterday. All questions answered. Patient verbalized understanding of instructions. States he may take an INR next week to see where he is at, but understood that Cassandra Mosley had advised a recheck in 2 weeks. Took Coumadin 6 mg last night for 1x dose and plans to take 6 mg tonight and every Tuesday moving forward; 4 mg all other days. He is to call with any concerns. Mindy Marie.

## 2022-03-22 NOTE — TELEPHONE ENCOUNTER
Pt. Called asking to speak with Felipa Garza. She gave him some different instructions yesterday and he mills not remember what she told him. Pt can be reached at [phone].

## 2022-03-22 NOTE — TELEPHONE ENCOUNTER
Per anticoag template: \"6 mg every Tue; 4 mg all other days\" is the new plan. Next INR to be done in 2 weeks. Left message to call back.

## 2022-04-04 ENCOUNTER — TELEPHONE (OUTPATIENT)
Dept: INTERNAL MEDICINE CLINIC | Facility: CLINIC | Age: 87
End: 2022-04-04

## 2022-04-04 LAB — INR: 3.3 (ref 0.8–1.2)

## 2022-04-04 NOTE — TELEPHONE ENCOUNTER
spoke to pt---supratherapeutic---pt denies Rx, OTC changes; no problems/ bleeds; hold x1 and CPM and recheck 2 weeks

## 2022-04-04 NOTE — TELEPHONE ENCOUNTER
As FYI to DR. Rosalie Nugent called Francisco Willoughby from [facility] and gave verbal approval for plan of care per protocol

## 2022-04-04 NOTE — TELEPHONE ENCOUNTER
Sullygolden Maddox from [facility] calling for verbal orders. Recert patient for care. Visits every other week. B12 injections, management for CHF and COPD. Physical Therapy to evaluate patient for increased back, wrist and hand pain. Patient states pain is 8 out of 10, waking him up during the evening. Best call back number Modesto Maddox [phone]  May leave message on confidential vms.

## 2022-04-04 NOTE — TELEPHONE ENCOUNTER
[facility] faxed hard copy of PT/INR results. Copy to Ghazala Newman and original to scan.    INR-3.3

## 2022-04-07 ENCOUNTER — TELEPHONE (OUTPATIENT)
Dept: INTERNAL MEDICINE CLINIC | Facility: CLINIC | Age: 87
End: 2022-04-07

## 2022-04-07 RX ORDER — HYDROCODONE BITARTRATE AND ACETAMINOPHEN 7.5; 325 MG/1; MG/1
1 TABLET ORAL EVERY 6 HOURS PRN
Qty: 90 TABLET | Refills: 0 | Status: SHIPPED | OUTPATIENT
Start: 2022-04-07 | End: 2022-05-07

## 2022-04-07 NOTE — TELEPHONE ENCOUNTER
To MD:  The above refill request is for a controlled substance. Please review pended medication order. Print and sign for staff to fax to pharmacy or prescribe electronically.     Last office visit: tele visit 1/10/22  Last time refill sent and quantity/refills: 3/4/22 qty 90 plus 0

## 2022-04-20 ENCOUNTER — TELEPHONE (OUTPATIENT)
Dept: INTERNAL MEDICINE CLINIC | Facility: CLINIC | Age: 87
End: 2022-04-20

## 2022-04-20 LAB — INR: 2.4 (ref 0.8–1.2)

## 2022-04-20 NOTE — TELEPHONE ENCOUNTER
Lenore/[facility] called  INR 2.4  Dosage:  6MG on Tuesday  4MG rest of days    NOTE:  Pt took only 4MG yesterday by accident    Tasked to Coumadin

## 2022-04-20 NOTE — TELEPHONE ENCOUNTER
Telephone Encounter by Bindu Lemon RN at 02/01/17 02:58 PM     Author:  Bindu Lemon RN Service:  (none) Author Type:  Registered Nurse     Filed:  02/01/17 02:58 PM Encounter Date:  1/30/2017 Status:  Signed     :  Bindu Lemon RN (Registered Nurse)            Letter printed, awaiting MD signature[JL1.1M]      Revision History        User Key Date/Time User Provider Type Action    > JL1.1 02/01/17 02:58 PM Bindu Lemon RN Registered Nurse Sign    M - Manual             To [employer]

## 2022-04-21 NOTE — TELEPHONE ENCOUNTER
HHRN called LM---therapeutic---no problems/ bleeds; pt took 4 mg Tues night; no other sig changes;---CPM and recheck 2-4 weeks

## 2022-04-22 RX ORDER — PREDNISONE 2.5 MG
TABLET ORAL
Qty: 90 TABLET | Refills: 3 | Status: SHIPPED | OUTPATIENT
Start: 2022-04-22

## 2022-05-04 ENCOUNTER — TELEPHONE (OUTPATIENT)
Dept: INTERNAL MEDICINE CLINIC | Facility: CLINIC | Age: 87
End: 2022-05-04

## 2022-05-04 LAB — INR: 4.4 (ref 0.8–1.2)

## 2022-05-04 NOTE — TELEPHONE ENCOUNTER
To Dr. Neto Narayanan---    Please advise, inr goal 2-3. Hugo Vora out of office until tomorrow. Thanks!

## 2022-05-04 NOTE — TELEPHONE ENCOUNTER
Ferdinand Prajapati / [facility] calling with INR results 5/4/22    INR 4.4  Coumadin 6 mg Tuesday's  4 mg all other days     No extra doses, has not changed his diet and has had no alcohol for 2 weeks

## 2022-05-05 NOTE — TELEPHONE ENCOUNTER
LMVM for HARIS Sims    INR 4.4; hold x 2 d; then resume 6 mg Tues, then 4 mg all other days; re-check 2 weeks; pt called

## 2022-05-10 ENCOUNTER — TELEPHONE (OUTPATIENT)
Dept: INTERNAL MEDICINE CLINIC | Facility: CLINIC | Age: 87
End: 2022-05-10

## 2022-05-10 RX ORDER — HYDROCODONE BITARTRATE AND ACETAMINOPHEN 7.5; 325 MG/1; MG/1
1 TABLET ORAL EVERY 6 HOURS PRN
Qty: 90 TABLET | Refills: 0 | Status: SHIPPED | OUTPATIENT
Start: 2022-05-10 | End: 2022-06-09

## 2022-05-10 NOTE — TELEPHONE ENCOUNTER
To Dr. Hernan Bolaños----    The above refill request is for a controlled substance. Please review pended medication order. Lov: in office 9/2020.  Televisit 1/2022  Prescribed: #90 4/7/22  : \"

## 2022-05-18 ENCOUNTER — TELEPHONE (OUTPATIENT)
Dept: INTERNAL MEDICINE CLINIC | Facility: CLINIC | Age: 87
End: 2022-05-18

## 2022-05-18 DIAGNOSIS — I48.20 CHRONIC ATRIAL FIBRILLATION (HCC): ICD-10-CM

## 2022-05-18 LAB — INR: 3.5 (ref 0.8–1.2)

## 2022-05-18 NOTE — TELEPHONE ENCOUNTER
INR 3.5; goal INR 2-3; on 6 mg Tues, and 4 mg all other days    Rec- advise hold x 1 day; advise change to 4 mg po at bedtime; re-check 2 weeks    Please call pt

## 2022-05-18 NOTE — TELEPHONE ENCOUNTER
Pt. Called back stating he had just missed a call from here. Nurse unavailable. Please return his call to [phone].

## 2022-05-27 ENCOUNTER — TELEPHONE (OUTPATIENT)
Dept: INTERNAL MEDICINE CLINIC | Facility: CLINIC | Age: 87
End: 2022-05-27

## 2022-05-27 NOTE — TELEPHONE ENCOUNTER
Aida Can / [facility] is calling patient notified her that he has pain in his hamstring, his caregiver said his leg is bruised. Aida Can is requesting an order to visit the patient.   Phone [phone] confidential VM

## 2022-05-27 NOTE — TELEPHONE ENCOUNTER
Le Hernandez @ [facility]    Patient did something to the back of his left leg, bruised about left knee and into inner thigh. Bruise is tender, and warm to the touch. Does not look like a blood clot, patient has been informed to use ice per Le Hernandez. Going to see if therapy can see patient on Tuesday. Patient is on blood thinners and INR is 2.1     # [phone], confidential voicemail. No call back is needed. OTIS.

## 2022-05-27 NOTE — TELEPHONE ENCOUNTER
As FYI to DR. Jerome Carrion called Nanette Sagastume from [facility] and gave verbal approval for plan of care per protocol - left on confidential VM

## 2022-05-30 RX ORDER — CYANOCOBALAMIN 1000 UG/ML
INJECTION INTRAMUSCULAR; SUBCUTANEOUS
Qty: 3 ML | Refills: 3 | Status: SHIPPED | OUTPATIENT
Start: 2022-05-30

## 2022-05-31 ENCOUNTER — TELEPHONE (OUTPATIENT)
Dept: INTERNAL MEDICINE CLINIC | Facility: CLINIC | Age: 87
End: 2022-05-31

## 2022-05-31 NOTE — TELEPHONE ENCOUNTER
Aida Can from [facility] called for verbal orders to re-certify the pt. For B12 and nursing services.   She can be reached at [phone]

## 2022-05-31 NOTE — TELEPHONE ENCOUNTER
Irais Green called back to also request a visit with the pt. For Friday 6/3/22. This will be to check the back of his left leg. Leg is reddened, very tender and swollen.

## 2022-05-31 NOTE — TELEPHONE ENCOUNTER
HALII to Dr. Sunita Serrano----    Spoke to [address] [place] [address] RN and advised ok for both verbal orders below per MD protocol; she verbalized understanding.      Leg concern was triaged/assess in TE on 5/27/22

## 2022-06-08 ENCOUNTER — TELEPHONE (OUTPATIENT)
Dept: INTERNAL MEDICINE CLINIC | Facility: CLINIC | Age: 87
End: 2022-06-08

## 2022-06-08 NOTE — TELEPHONE ENCOUNTER
Lenore/[facility] called  Reporting BP of  92/46, heart rate 58   Some dizziness yesterday, not today     INR 1.9  4MG daily, no missed doses    Left leg is slightly improved     Tasked to nursing and Coumadin

## 2022-06-08 NOTE — TELEPHONE ENCOUNTER
I spoke with Nanette Sagastume, [facility] nurse. She says patient's blood pressure and heart rate were low today, blood pressure 92/46 and heart rate 58. Yesterday patient had felt dizzy but feels ok today. [place] Mitesh says he feels better since passing gas yesterday. He says he will plan to drink more than he did yesterday. He is on cefadroxil. Left leg is slightly better. INR is 1.9. Explained that I would relay this to Dr. Lin Lofton and call her back with his recommendation. To Dr. Lin Lofton.

## 2022-06-10 ENCOUNTER — TELEPHONE (OUTPATIENT)
Dept: INTERNAL MEDICINE CLINIC | Facility: CLINIC | Age: 87
End: 2022-06-10

## 2022-06-10 RX ORDER — HYDROCODONE BITARTRATE AND ACETAMINOPHEN 7.5; 325 MG/1; MG/1
1 TABLET ORAL EVERY 6 HOURS PRN
Qty: 90 TABLET | Refills: 0 | Status: SHIPPED | OUTPATIENT
Start: 2022-06-10 | End: 2022-07-10

## 2022-06-10 NOTE — TELEPHONE ENCOUNTER
Pt called, requesting refill for:  Norco  Pt uses [facility] as pharmacy  Pt is low on medication  Tasked to [employer]

## 2022-06-10 NOTE — TELEPHONE ENCOUNTER
To MD:  The above refill request is for a controlled substance. Please review pended medication order. Print and sign for staff to fax to pharmacy or prescribe electronically. Last office visit:Tele visit 1/10/22  Last time refill sent and quantity/refills:5/10/22 # 90  To DR. OTTO

## 2022-06-15 ENCOUNTER — TELEPHONE (OUTPATIENT)
Dept: INTERNAL MEDICINE CLINIC | Facility: CLINIC | Age: 87
End: 2022-06-15

## 2022-06-15 DIAGNOSIS — I48.20 CHRONIC ATRIAL FIBRILLATION (HCC): ICD-10-CM

## 2022-06-15 LAB — INR: 2.1 (ref 0.8–1.2)

## 2022-06-16 NOTE — TELEPHONE ENCOUNTER
spoke to pt --- therapeutic---confirmed dose changed to 4 mg daily; CPM this dose and recheck 1 month

## 2022-06-22 ENCOUNTER — TELEPHONE (OUTPATIENT)
Dept: INTERNAL MEDICINE CLINIC | Facility: CLINIC | Age: 87
End: 2022-06-22

## 2022-06-22 NOTE — TELEPHONE ENCOUNTER
Received voicemail message from Lenore/[facility] reporting vitals that are out of parameter     Heart rate 56/irregular    Blood pressure  Right arm 94/50  Left arm 94/52    Pt has not taken any extra meds  No dizzy ness  Pt is urinating the same amount  Instructed pt to drink more plain water and less caffeinated beverages  Any questions Sabas Talley can be reached at   [phone]

## 2022-07-20 ENCOUNTER — TELEPHONE (OUTPATIENT)
Dept: INTERNAL MEDICINE CLINIC | Facility: CLINIC | Age: 87
End: 2022-07-20

## 2022-07-20 DIAGNOSIS — I48.20 CHRONIC ATRIAL FIBRILLATION (HCC): ICD-10-CM

## 2022-07-20 LAB — INR: 1.6 (ref 0.8–1.2)

## 2022-07-20 NOTE — TELEPHONE ENCOUNTER
Received fax from [facility] advising pt is past due for their INR testing    Fax placed in Dr Marybeth Harrington mail slot

## 2022-07-20 NOTE — TELEPHONE ENCOUNTER
[facility] called to report patient INR    INR 1.6  Dosage:  4MG Coumadin, no missed doses    Tasked to Coumadin

## 2022-07-21 NOTE — TELEPHONE ENCOUNTER
Contacted [address] [facility]. Spoke with Lisa Sterling RN. Relayed MD msg/instructions. Melissa Padilla verbalized understanding.

## 2022-07-21 NOTE — TELEPHONE ENCOUNTER
INR 1.6; continue warfarin 4 mg po qHS; re-check 1 month    Please call [address] [facility]

## 2022-07-22 ENCOUNTER — TELEPHONE (OUTPATIENT)
Dept: INTERNAL MEDICINE CLINIC | Facility: CLINIC | Age: 87
End: 2022-07-22

## 2022-07-22 NOTE — TELEPHONE ENCOUNTER
PÅLÄNG from [facility] and left message to call back .  Called patient who had INR done 7/20  It was 1.6 and he was instructed to continue 4 mg daily and next INR in 1 month  PÅLÄNG again and left VM that INR resulted on 7/20 and patient was given instructions and [facility] HARIS Galeana was informed - left on confidential VM

## 2022-07-22 NOTE — TELEPHONE ENCOUNTER
Francisco Willoughby RN at [place] [address]:    Patient informed Francisco Willoughby that he was instructed to continue on his INR steps. But patient was not sure which day he was supposed to have it checked next. Ph # [phone], confidential voicemail.

## 2022-08-03 ENCOUNTER — TELEPHONE (OUTPATIENT)
Dept: INTERNAL MEDICINE CLINIC | Facility: CLINIC | Age: 87
End: 2022-08-03

## 2022-08-03 NOTE — TELEPHONE ENCOUNTER
As FYI to DR. Chary Cheng called Kavon Deal from [facility] and gave verbal approval for plan of care per protocol

## 2022-08-03 NOTE — TELEPHONE ENCOUNTER
Sabas Talley from [facility]:    Requesting to re-certify patient for S59 injections; two times a month. Patient is also going to miss a nursing visit this week. FYI. Going to see patient next week. Ph # [phone], confidential voicemail.

## 2022-08-08 ENCOUNTER — TELEPHONE (OUTPATIENT)
Dept: INTERNAL MEDICINE CLINIC | Facility: CLINIC | Age: 87
End: 2022-08-08

## 2022-08-08 RX ORDER — HYDROCODONE BITARTRATE AND ACETAMINOPHEN 7.5; 325 MG/1; MG/1
1 TABLET ORAL EVERY 6 HOURS PRN
Qty: 90 TABLET | Refills: 0 | Status: SHIPPED | OUTPATIENT
Start: 2022-08-08 | End: 2022-09-07

## 2022-08-08 NOTE — TELEPHONE ENCOUNTER
Ziyad Herrera / [facility] calling to request an order stating patient is able to take his medication on his own.     Order can be faxed # [phone] or verbal phone [phone]

## 2022-08-08 NOTE — TELEPHONE ENCOUNTER
To MD:  The above refill request is for a controlled substance. Please review pended medication order. Print and sign for staff to fax to pharmacy or prescribe electronically.     Last office visit: 2/23/2022  Last time refill sent and quantity/refills: 6/10/2022 #90/0

## 2022-08-10 NOTE — TELEPHONE ENCOUNTER
I spoke with Tyler Brian and relayed Dr. Deitra Barthel message. She verbalized understanding. She asks for a written order to be faxed. Per her request, written order faxed to [facility] at [phone]. Await fax confirmation. Copy of written order to scanning.

## 2022-08-11 ENCOUNTER — TELEPHONE (OUTPATIENT)
Dept: INTERNAL MEDICINE CLINIC | Facility: CLINIC | Age: 87
End: 2022-08-11

## 2022-08-11 NOTE — TELEPHONE ENCOUNTER
Antonietta Sims / [facility] is calling patient is missing a nursing visit this week  B12 postponed 1 week    No call back necessary

## 2022-08-11 NOTE — TELEPHONE ENCOUNTER
Received via mail parking placard renewal for Dr. Rene Farrell to fill out and mail to . Placed in Dr. Sherry Joshi.

## 2022-08-12 ENCOUNTER — TELEPHONE (OUTPATIENT)
Dept: INTERNAL MEDICINE CLINIC | Facility: CLINIC | Age: 87
End: 2022-08-12

## 2022-08-12 DIAGNOSIS — I48.20 CHRONIC ATRIAL FIBRILLATION (HCC): ICD-10-CM

## 2022-08-12 LAB — INR: 1.6 (ref 0.8–1.2)

## 2022-08-12 NOTE — TELEPHONE ENCOUNTER
Attempted to fax order again and fax failed.     Left message to call back for Remi Arzate asking if she can please call back to confirm fax number or provide us with alternate number  Order is in triage room

## 2022-08-12 NOTE — TELEPHONE ENCOUNTER
spoke to pt --subtherapeutic---pt denies sig changes Rx, OTC, diet; no problems/ bleeds---6 mg x1 and resume 6 mg Tues, then 4 mg all other days; re-check 2 weeks

## 2022-08-16 NOTE — TELEPHONE ENCOUNTER
Order that patient can his own medications faxed to [facility]/Rogerio at [phone] confirmation recieved   To DR. OTTO  Need order that patient is no longer on nectar thickened liquids- on your desk with fax cover sheet ,an first order regarding patient being able to take his own medication    Olvin Murray will call our office when their fax machine is working again, so both orders need to be faxed

## 2022-08-16 NOTE — TELEPHONE ENCOUNTER
Jessika/Magdy Kevin called    Provided alternate fax # [phone]    For order confirming pt can take his own meds    and order confirming pt is no longer on Nectar thickened liquids

## 2022-08-18 ENCOUNTER — TELEPHONE (OUTPATIENT)
Dept: INTERNAL MEDICINE CLINIC | Facility: CLINIC | Age: 87
End: 2022-08-18

## 2022-08-18 NOTE — TELEPHONE ENCOUNTER
Patient is calling to speak to Sangita Batista he has a question about his Norco dosage.   He would like to talk to her since she is a pharmacist    Phone [phone]

## 2022-08-19 NOTE — TELEPHONE ENCOUNTER
Spoke to pt - he has hand pain; Wanted to increase to Norco 1 1/2 tabs/dose;  Recommended staying with 1 tab/dose and using heat/creams to help;  Pt denies redness, swollen, color change, injury, s&s of gout, he reports \"it's just painful\";    Declined visit to MD  He will call back with any issues

## 2022-08-27 RX ORDER — SYRINGE WITH NEEDLE, 1 ML 25GX5/8"
SYRINGE, EMPTY DISPOSABLE MISCELLANEOUS
Qty: 3 EACH | Refills: 3 | Status: SHIPPED | OUTPATIENT
Start: 2022-08-27

## 2022-08-31 ENCOUNTER — TELEPHONE (OUTPATIENT)
Dept: INTERNAL MEDICINE CLINIC | Facility: CLINIC | Age: 87
End: 2022-08-31

## 2022-08-31 DIAGNOSIS — I48.20 CHRONIC ATRIAL FIBRILLATION (HCC): ICD-10-CM

## 2022-08-31 LAB — INR: 1.8 (ref 0.8–1.2)

## 2022-08-31 NOTE — TELEPHONE ENCOUNTER
Pt called to report today's INR Reading     INR 1.8     Le Hernandez / [facility] was with patient   Requests call back with dosage and date to   re check INR  Le Hernandez will relay info to patient     [phone] - confidential voicemail

## 2022-09-01 NOTE — TELEPHONE ENCOUNTER
LM for [facility] - subtherapeutic---pt has been on low side; hesitate d/t age but will increase dose and recheck 3weeks

## 2022-09-08 ENCOUNTER — TELEPHONE (OUTPATIENT)
Dept: INTERNAL MEDICINE CLINIC | Facility: CLINIC | Age: 87
End: 2022-09-08

## 2022-09-08 NOTE — TELEPHONE ENCOUNTER
To MD:  The above refill request is for a controlled substance. Please review pended medication order. Print and sign for staff to fax to pharmacy or prescribe electronically.     Last office visit: 6/21/2022   Last time refill sent and quantity/refills: 8/8/2022  #90/0   [place] 8[place]20[place] #30

## 2022-09-08 NOTE — TELEPHONE ENCOUNTER
Pt requesting refill for:  Norco  Pt is low on medication  Pt uses [facility] as [facility]  Tasked to [employer]

## 2022-09-09 RX ORDER — HYDROCODONE BITARTRATE AND ACETAMINOPHEN 7.5; 325 MG/1; MG/1
1 TABLET ORAL EVERY 6 HOURS PRN
Qty: 90 TABLET | Refills: 0 | Status: SHIPPED | OUTPATIENT
Start: 2022-09-09 | End: 2022-10-09

## 2022-09-09 RX ORDER — DILTIAZEM HYDROCHLORIDE 180 MG/1
CAPSULE, COATED, EXTENDED RELEASE ORAL
Qty: 90 CAPSULE | Refills: 3 | Status: SHIPPED | OUTPATIENT
Start: 2022-09-09

## 2022-09-09 NOTE — TELEPHONE ENCOUNTER
To [facility] for consult, seems pt is being seen outside of office by Dr. Bonnie Maurice ([facility]) last seen 6/21/22    I am unable to view the note, VS, med list etc from those visits.  Please advise on request

## 2022-09-12 ENCOUNTER — TELEPHONE (OUTPATIENT)
Dept: INTERNAL MEDICINE CLINIC | Facility: CLINIC | Age: 87
End: 2022-09-12

## 2022-09-12 NOTE — TELEPHONE ENCOUNTER
Daughter faxed over forms from [address] [place] [address] 9/12/22. Patients daughter ask if the form could be mailed back to her when completed. Address is on paperwork. Daughter phone# [phone]    Documents in Dr Gregory Smart.

## 2022-09-21 ENCOUNTER — TELEPHONE (OUTPATIENT)
Dept: INTERNAL MEDICINE CLINIC | Facility: CLINIC | Age: 87
End: 2022-09-21

## 2022-09-21 DIAGNOSIS — I48.20 CHRONIC ATRIAL FIBRILLATION (HCC): ICD-10-CM

## 2022-09-21 LAB — INR: 2 (ref 0.8–1.2)

## 2022-09-21 NOTE — TELEPHONE ENCOUNTER
Lenore/[facility] requesting verbal order for nursing visit for next week  Pt has rash/blister in left groin area, blister is intact, not causing patient any pain.   Please call, ok to leave voicemail message  Tasked to nursing

## 2022-09-21 NOTE — TELEPHONE ENCOUNTER
As FYI to DR. Cathryn Keyled Jaquelinia Letters from [facility] and gave verbal approval for plan of care per protocol -left on confidential VM

## 2022-09-21 NOTE — TELEPHONE ENCOUNTER
Received voicemail message from patient reporting his INR of 2.0    Patient requests call back at [phone]

## 2022-09-26 ENCOUNTER — TELEPHONE (OUTPATIENT)
Dept: INTERNAL MEDICINE CLINIC | Facility: CLINIC | Age: 87
End: 2022-09-26

## 2022-09-26 NOTE — TELEPHONE ENCOUNTER
Orquidea Messina / [facility] is calling she didn't receive a call from Damion Acevedo regarding INR results    Phone [phone]

## 2022-09-26 NOTE — TELEPHONE ENCOUNTER
BODØ / [facility] is calling patient has a left groin wound trauma.   It is red, tender, it has a scab, no drainage  Requesting an order for wound    Phone [phone]

## 2022-10-03 ENCOUNTER — TELEPHONE (OUTPATIENT)
Dept: INTERNAL MEDICINE CLINIC | Facility: CLINIC | Age: 87
End: 2022-10-03

## 2022-10-03 NOTE — TELEPHONE ENCOUNTER
Patient's daughter Waqas Kiser is calling to check on the status of [employer] [facility]    Please call [place], okay to leave voicemail

## 2022-10-03 NOTE — TELEPHONE ENCOUNTER
Bailee/ [facility] called to report     Heart rate today of 56   Continues to be irregular  Pt is asymptomatic  Would like to change parameters to 52    Left groin ulcer is worse/yellow slough in the wound bed - recommending medi honey with a dry dressing     Pt also missed a dose of warfarin a couple of days ago, pt doubled his dose  Rechecked INR and it is fine   Not due to re check for another 3 weeks    Evette call back # [phone]

## 2022-10-05 NOTE — TELEPHONE ENCOUNTER
Patient's daughter Fabián Saucedo is calling to check on the status of the [place] Forms  Please call Fabián Sheryl with an update [phone]

## 2022-10-05 NOTE — TELEPHONE ENCOUNTER
Received call from Antionette Sharpe - home health nurse, following up on Monday's call (10/3). Antionette Sharpe states the blister on pt's left thigh/groin opened up. The drainage is yellow loosely adherent fluff, with a small amount of serosanguinous drainage. The recommended dressing is medihoney dry dressing. Antionette Sharpe also asking for [facility] to recertify with patient for nursing, B12 injections, wound care and an extra visit for wound care. Antionette Sharpe states patient missed a dose of Warfarin last week and he took a double dose the next day. INR was checked on 10/3: 1.8. Next INR is in 3 weeks. Verbal orders given for home health per protocol.      FYI to Dr. Juan Antonio Moses--

## 2022-10-06 NOTE — TELEPHONE ENCOUNTER
Forms signed and mailed to [facility] Graciela Nguyen; Graciela Nguyen called

## 2022-10-19 ENCOUNTER — TELEPHONE (OUTPATIENT)
Dept: INTERNAL MEDICINE CLINIC | Facility: CLINIC | Age: 87
End: 2022-10-19

## 2022-10-19 DIAGNOSIS — I48.20 CHRONIC ATRIAL FIBRILLATION (HCC): ICD-10-CM

## 2022-10-19 LAB — INR: 2 (ref 0.8–1.2)

## 2022-10-20 ENCOUNTER — TELEPHONE (OUTPATIENT)
Dept: INTERNAL MEDICINE CLINIC | Facility: CLINIC | Age: 87
End: 2022-10-20

## 2022-10-20 RX ORDER — HYDROCODONE BITARTRATE AND ACETAMINOPHEN 7.5; 325 MG/1; MG/1
1 TABLET ORAL EVERY 6 HOURS PRN
Qty: 90 TABLET | Refills: 0 | Status: SHIPPED | OUTPATIENT
Start: 2022-10-20 | End: 2022-11-19

## 2022-10-20 NOTE — TELEPHONE ENCOUNTER
Patient is calling and would like a refill for   [facility]    Please sent to the [facility] in [place]

## 2022-10-20 NOTE — TELEPHONE ENCOUNTER
[facility] ---no problems/ bleeds in message--CPM and recheck 1 month  Phone Marvel Ta

## 2022-10-20 NOTE — TELEPHONE ENCOUNTER
Francisco Willoughby a nurse/wound care is calling from [facility]. Nurse Francisco Willoughby is requesting a verbal orders for Occupational Therapy.     Patient needs OT for his hand and wrist.    OK to leave message on confidential voice mail

## 2022-10-20 NOTE — TELEPHONE ENCOUNTER
To MD:  The above refill request is for a controlled substance. Please review pended medication order. Print and sign for staff to fax to pharmacy or prescribe electronically.     Last office visit: tele visit 1/10/22  Last time refill sent and quantity/refills: 9/9/22 [place]

## 2022-10-26 ENCOUNTER — TELEPHONE (OUTPATIENT)
Dept: INTERNAL MEDICINE CLINIC | Facility: CLINIC | Age: 87
End: 2022-10-26

## 2022-10-26 NOTE — TELEPHONE ENCOUNTER
Aida Can from [facility] called asking for orders for a physical therapy eval to help the patient with walking.

## 2022-11-18 ENCOUNTER — TELEPHONE (OUTPATIENT)
Dept: INTERNAL MEDICINE CLINIC | Facility: CLINIC | Age: 87
End: 2022-11-18

## 2022-11-18 DIAGNOSIS — I48.20 CHRONIC ATRIAL FIBRILLATION (HCC): ICD-10-CM

## 2022-11-18 LAB — INR: 2.6 (ref 0.8–1.2)

## 2022-11-29 ENCOUNTER — TELEPHONE (OUTPATIENT)
Dept: INTERNAL MEDICINE CLINIC | Facility: CLINIC | Age: 87
End: 2022-11-29

## 2022-11-29 NOTE — TELEPHONE ENCOUNTER
Vicente Núñez from [facility]:    Patient is going to miss a nursing visit this week, patient has covid. Vicente Núñez will see the patient next week, he will be given his B12 at that time. Ph # [phone], conferential voicemail. No call back needed. HALII.

## 2022-12-01 ENCOUNTER — HOSPITAL ENCOUNTER (INPATIENT)
Facility: HOSPITAL | Age: 87
LOS: 4 days | Discharge: HOME | End: 2022-12-05
Attending: EMERGENCY MEDICINE | Admitting: INTERNAL MEDICINE
Payer: MEDICARE

## 2022-12-01 ENCOUNTER — APPOINTMENT (OUTPATIENT)
Dept: GENERAL RADIOLOGY | Facility: HOSPITAL | Age: 87
End: 2022-12-01
Attending: EMERGENCY MEDICINE
Payer: MEDICARE

## 2022-12-01 ENCOUNTER — HOSPITAL ENCOUNTER (INPATIENT)
Facility: HOSPITAL | Age: 87
LOS: 4 days | Discharge: NURSING, REHAB OR LONG-TERM CARE | End: 2022-12-05
Attending: EMERGENCY MEDICINE | Admitting: INTERNAL MEDICINE
Payer: MEDICARE

## 2022-12-01 ENCOUNTER — APPOINTMENT (OUTPATIENT)
Dept: CT IMAGING | Facility: HOSPITAL | Age: 87
End: 2022-12-01
Attending: EMERGENCY MEDICINE
Payer: MEDICARE

## 2022-12-01 DIAGNOSIS — I48.20 ATRIAL FIBRILLATION, CHRONIC (HCC): ICD-10-CM

## 2022-12-01 DIAGNOSIS — J96.00 ACUTE RESPIRATORY FAILURE DUE TO COVID-19 (HCC): Primary | ICD-10-CM

## 2022-12-01 DIAGNOSIS — U07.1 ACUTE RESPIRATORY FAILURE DUE TO COVID-19 (HCC): Primary | ICD-10-CM

## 2022-12-01 DIAGNOSIS — R91.1 LUNG NODULE: ICD-10-CM

## 2022-12-01 PROBLEM — D69.6 THROMBOCYTOPENIA: Status: ACTIVE | Noted: 2022-12-01

## 2022-12-01 PROBLEM — D69.6 THROMBOCYTOPENIA (HCC): Status: ACTIVE | Noted: 2022-12-01

## 2022-12-01 LAB
ALBUMIN SERPL-MCNC: 2.9 G/DL (ref 3.4–5)
ALBUMIN SERPL-MCNC: 2.9 G/DL (ref 3.4–5)
ALBUMIN/GLOB SERPL: 0.7 {RATIO} (ref 1–2)
ALP LIVER SERPL-CCNC: 63 U/L
ALP LIVER SERPL-CCNC: 63 U/L
ALT SERPL-CCNC: 17 U/L
ALT SERPL-CCNC: 17 U/L
ANION GAP SERPL CALC-SCNC: 7 MMOL/L (ref 0–18)
ANION GAP SERPL CALC-SCNC: 9 MMOL/L (ref 0–18)
AST SERPL-CCNC: 25 U/L (ref 15–37)
AST SERPL-CCNC: 25 U/L (ref 15–37)
BASOPHILS # BLD: 0 X10(3) UL (ref 0–0.2)
BASOPHILS NFR BLD: 0 %
BILIRUB DIRECT SERPL-MCNC: 0.6 MG/DL (ref 0–0.2)
BILIRUB SERPL-MCNC: 1.1 MG/DL (ref 0.1–2)
BILIRUB SERPL-MCNC: 1.1 MG/DL (ref 0.1–2)
BUN BLD-MCNC: 17 MG/DL (ref 7–18)
BUN BLD-MCNC: 17 MG/DL (ref 7–18)
BUN/CREAT SERPL: 17.2 (ref 10–20)
BUN/CREAT SERPL: 17.2 (ref 10–20)
CALCIUM BLD-MCNC: 8.7 MG/DL (ref 8.5–10.1)
CALCIUM BLD-MCNC: 8.8 MG/DL (ref 8.5–10.1)
CHLORIDE SERPL-SCNC: 103 MMOL/L (ref 98–112)
CHLORIDE SERPL-SCNC: 104 MMOL/L (ref 98–112)
CO2 SERPL-SCNC: 28 MMOL/L (ref 21–32)
CO2 SERPL-SCNC: 28 MMOL/L (ref 21–32)
CREAT BLD-MCNC: 0.99 MG/DL
CREAT BLD-MCNC: 0.99 MG/DL
D DIMER PPP FEU-MCNC: <0.27 UG/ML FEU (ref ?–0.91)
DEPRECATED RDW RBC AUTO: 51.5 FL (ref 35.1–46.3)
EOSINOPHIL # BLD: 0 X10(3) UL (ref 0–0.7)
EOSINOPHIL NFR BLD: 0 %
ERYTHROCYTE [DISTWIDTH] IN BLOOD BY AUTOMATED COUNT: 15.7 % (ref 11–15)
GFR SERPLBLD BASED ON 1.73 SQ M-ARVRAT: 72 ML/MIN/1.73M2 (ref 60–?)
GFR SERPLBLD BASED ON 1.73 SQ M-ARVRAT: 72 ML/MIN/1.73M2 (ref 60–?)
GLOBULIN PLAS-MCNC: 4.1 G/DL (ref 2.8–4.4)
GLUCOSE BLD-MCNC: 92 MG/DL (ref 70–99)
GLUCOSE BLD-MCNC: 98 MG/DL (ref 70–99)
HCT VFR BLD AUTO: 41.4 %
HGB BLD-MCNC: 12.8 G/DL
INR BLD: 2.4 (ref 0.85–1.16)
LYMPHOCYTES NFR BLD: 0.28 X10(3) UL (ref 1–4)
LYMPHOCYTES NFR BLD: 2 %
MCH RBC QN AUTO: 27.6 PG (ref 26–34)
MCHC RBC AUTO-ENTMCNC: 30.9 G/DL (ref 31–37)
MCV RBC AUTO: 89.4 FL
MONOCYTES # BLD: 1.55 X10(3) UL (ref 0.1–1)
MONOCYTES NFR BLD: 11 %
MORPHOLOGY: NORMAL
NEUTROPHILS # BLD AUTO: 10.85 X10 (3) UL (ref 1.5–7.7)
NEUTROPHILS NFR BLD: 81 %
NEUTS BAND NFR BLD: 6 %
NEUTS HYPERSEG # BLD: 12.27 X10(3) UL (ref 1.5–7.7)
OSMOLALITY SERPL CALC.SUM OF ELEC: 289 MOSM/KG (ref 275–295)
OSMOLALITY SERPL CALC.SUM OF ELEC: 292 MOSM/KG (ref 275–295)
PLATELET # BLD AUTO: 124 10(3)UL (ref 150–450)
PLATELET MORPHOLOGY: NORMAL
POTASSIUM SERPL-SCNC: 4.1 MMOL/L (ref 3.5–5.1)
POTASSIUM SERPL-SCNC: 4.1 MMOL/L (ref 3.5–5.1)
PROCALCITONIN SERPL-MCNC: 1.05 NG/ML (ref ?–0.16)
PROT SERPL-MCNC: 7 G/DL (ref 6.4–8.2)
PROT SERPL-MCNC: 7 G/DL (ref 6.4–8.2)
PROTHROMBIN TIME: 25.9 SECONDS (ref 11.6–14.8)
Q-T INTERVAL: 350 MS
QRS DURATION: 112 MS
QTC CALCULATION (BEZET): 477 MS
R AXIS: -47 DEGREES
RBC # BLD AUTO: 4.63 X10(6)UL
SARS-COV-2 RNA RESP QL NAA+PROBE: DETECTED
SODIUM SERPL-SCNC: 139 MMOL/L (ref 136–145)
SODIUM SERPL-SCNC: 140 MMOL/L (ref 136–145)
T AXIS: 134 DEGREES
TOTAL CELLS COUNTED BLD: 100
VENTRICULAR RATE: 112 BPM
WBC # BLD AUTO: 14.1 X10(3) UL (ref 4–11)

## 2022-12-01 PROCEDURE — 71045 X-RAY EXAM CHEST 1 VIEW: CPT | Performed by: EMERGENCY MEDICINE

## 2022-12-01 PROCEDURE — 71260 CT THORAX DX C+: CPT | Performed by: EMERGENCY MEDICINE

## 2022-12-01 PROCEDURE — XW033E5 INTRODUCTION OF REMDESIVIR ANTI-INFECTIVE INTO PERIPHERAL VEIN, PERCUTANEOUS APPROACH, NEW TECHNOLOGY GROUP 5: ICD-10-PCS | Performed by: INTERNAL MEDICINE

## 2022-12-01 PROCEDURE — 3E0333Z INTRODUCTION OF ANTI-INFLAMMATORY INTO PERIPHERAL VEIN, PERCUTANEOUS APPROACH: ICD-10-PCS | Performed by: INTERNAL MEDICINE

## 2022-12-01 PROCEDURE — 99223 1ST HOSP IP/OBS HIGH 75: CPT | Performed by: INTERNAL MEDICINE

## 2022-12-01 RX ORDER — POLYETHYLENE GLYCOL 3350 17 G/17G
17 POWDER, FOR SOLUTION ORAL DAILY
Status: DISCONTINUED | OUTPATIENT
Start: 2022-12-02 | End: 2022-12-05

## 2022-12-01 RX ORDER — KETOROLAC TROMETHAMINE 5 MG/ML
1 SOLUTION OPHTHALMIC 4 TIMES DAILY
Status: DISCONTINUED | OUTPATIENT
Start: 2022-12-01 | End: 2022-12-02 | Stop reason: ALTCHOICE

## 2022-12-01 RX ORDER — DILTIAZEM HYDROCHLORIDE 180 MG/1
180 CAPSULE, EXTENDED RELEASE ORAL DAILY
Status: DISCONTINUED | OUTPATIENT
Start: 2022-12-02 | End: 2022-12-05

## 2022-12-01 RX ORDER — VITS A,C,E/LUTEIN/MINERALS 300MCG-200
1 TABLET ORAL DAILY
Status: DISCONTINUED | OUTPATIENT
Start: 2022-12-02 | End: 2022-12-02

## 2022-12-01 RX ORDER — MELATONIN
1000 DAILY
Status: DISCONTINUED | OUTPATIENT
Start: 2022-12-02 | End: 2022-12-05

## 2022-12-01 RX ORDER — HYDROCODONE BITARTRATE AND ACETAMINOPHEN 7.5; 325 MG/1; MG/1
1 TABLET ORAL EVERY 6 HOURS PRN
Status: DISCONTINUED | OUTPATIENT
Start: 2022-12-01 | End: 2022-12-05

## 2022-12-01 RX ORDER — DEXAMETHASONE SODIUM PHOSPHATE 4 MG/ML
6 VIAL (ML) INJECTION DAILY
Status: DISCONTINUED | OUTPATIENT
Start: 2022-12-01 | End: 2022-12-03

## 2022-12-01 RX ORDER — LOPERAMIDE HYDROCHLORIDE 2 MG/1
2 CAPSULE ORAL 4 TIMES DAILY PRN
Status: DISCONTINUED | OUTPATIENT
Start: 2022-12-01 | End: 2022-12-05

## 2022-12-01 RX ORDER — DIGOXIN 125 MCG
125 TABLET ORAL DAILY
Status: DISCONTINUED | OUTPATIENT
Start: 2022-12-02 | End: 2022-12-05

## 2022-12-01 RX ORDER — WARFARIN SODIUM 6 MG/1
6 TABLET ORAL DAILY
COMMUNITY

## 2022-12-01 RX ORDER — WARFARIN SODIUM 1 MG/1
4 TABLET ORAL NIGHTLY
Status: DISCONTINUED | OUTPATIENT
Start: 2022-12-01 | End: 2022-12-05

## 2022-12-01 RX ORDER — DOCUSATE SODIUM 100 MG/1
100 CAPSULE, LIQUID FILLED ORAL 2 TIMES DAILY
Status: DISCONTINUED | OUTPATIENT
Start: 2022-12-01 | End: 2022-12-05

## 2022-12-01 RX ORDER — AZITHROMYCIN 250 MG/1
500 TABLET, FILM COATED ORAL
Status: COMPLETED | OUTPATIENT
Start: 2022-12-02 | End: 2022-12-03

## 2022-12-01 NOTE — ED QUICK NOTES
Orders for admission, patient is aware of plan and ready to go upstairs. Any questions, please call ED RN Emerson Johnson at extension [phone].      Patient Covid vaccination status: Unvaccinated     COVID Test Ordered in ED: None    COVID Suspicion at Admission: POSITIVE    Running Infusions:  None    Mental Status/LOC at time of transport: AXOX4    Other pertinent information:   CIWA score: N/A   NIH score:  N/A

## 2022-12-01 NOTE — TELEPHONE ENCOUNTER
Laith Burr from [facility] called to let the doctor know the patient has covid and was not doing good this morning. Patient had a fever of 101.7 body aches and his oxygen level was 88. Patient was taken to [facility] via Ambulance.

## 2022-12-01 NOTE — PROGRESS NOTES
[facility] Pharm[facility] Note:  Karan Mitchell is a 80year old patient admitted 12/1/2022  8:14 AM who is COVID (+) and has been been started on Remdesivir as of 12/1/22. Pertinent Patient Lab Values:  Estimated GFR: >60 mL/min/1.73m2  No results for input(s): Rolm Citrin in the last 72 hours. LFT values pending, but historical values have been less than 10 times the upper limit of normal.    Alkaline Phosphate:   No results for input(s): ALKPHO in the last 168 hours. ALT:   No results for input(s): ALT in the last 168 hours. AST:   No results for input(s): AST in the last 168 hours. Bilirubin:   No results for input(s): BILT in the last 168 hours. eGFR is above 30mL/min. ALT is less than ten times the upper limit of normal.      Recommendation: Continue therapy as is.       Viviana Brewer, PharmD  12/1/2022  1:27 PM

## 2022-12-01 NOTE — ED INITIAL ASSESSMENT (HPI)
Patient was diagnosed with COVID 2 days ago. Patient has cough & body aches. Patient had fever of 101.5. 1000 mg of Tylenol administered at 7:30am. 99.3F per EMS. Patient's O2 was 88-90%. Patient has history of COPD.

## 2022-12-02 LAB
BASOPHILS # BLD AUTO: 0.01 X10(3) UL (ref 0–0.2)
BASOPHILS NFR BLD AUTO: 0.1 %
DEPRECATED RDW RBC AUTO: 53.5 FL (ref 35.1–46.3)
DIGOXIN SERPL-MCNC: 1.21 NG/ML (ref 0.8–2)
EOSINOPHIL # BLD AUTO: 0 X10(3) UL (ref 0–0.7)
EOSINOPHIL NFR BLD AUTO: 0 %
ERYTHROCYTE [DISTWIDTH] IN BLOOD BY AUTOMATED COUNT: 15.8 % (ref 11–15)
HCT VFR BLD AUTO: 41.9 %
HGB BLD-MCNC: 12.7 G/DL
IMM GRANULOCYTES # BLD AUTO: 0.06 X10(3) UL (ref 0–1)
IMM GRANULOCYTES NFR BLD: 0.6 %
INR BLD: 2.49 (ref 0.85–1.16)
LYMPHOCYTES # BLD AUTO: 0.58 X10(3) UL (ref 1–4)
LYMPHOCYTES NFR BLD AUTO: 6 %
MCH RBC QN AUTO: 27.9 PG (ref 26–34)
MCHC RBC AUTO-ENTMCNC: 30.3 G/DL (ref 31–37)
MCV RBC AUTO: 92.1 FL
MONOCYTES # BLD AUTO: 0.58 X10(3) UL (ref 0.1–1)
MONOCYTES NFR BLD AUTO: 6 %
NEUTROPHILS # BLD AUTO: 8.51 X10 (3) UL (ref 1.5–7.7)
NEUTROPHILS # BLD AUTO: 8.51 X10(3) UL (ref 1.5–7.7)
NEUTROPHILS NFR BLD AUTO: 87.3 %
PLATELET # BLD AUTO: 127 10(3)UL (ref 150–450)
PROTHROMBIN TIME: 26.6 SECONDS (ref 11.6–14.8)
RBC # BLD AUTO: 4.55 X10(6)UL
WBC # BLD AUTO: 9.7 X10(3) UL (ref 4–11)

## 2022-12-02 PROCEDURE — 99232 SBSQ HOSP IP/OBS MODERATE 35: CPT | Performed by: PHYSICIAN ASSISTANT

## 2022-12-02 PROCEDURE — 99223 1ST HOSP IP/OBS HIGH 75: CPT | Performed by: INTERNAL MEDICINE

## 2022-12-02 RX ORDER — VITS A,C,E/LUTEIN/MINERALS 300MCG-200
1 TABLET ORAL NIGHTLY
Status: DISCONTINUED | OUTPATIENT
Start: 2022-12-02 | End: 2022-12-05

## 2022-12-02 NOTE — PLAN OF CARE
Patient no longer on oxygen, saturation in 90s. Daughter Kang Rabago updated on plan of care. Problem: Patient Centered Care  Goal: Patient preferences are identified and integrated in the patient's plan of care  Description: Interventions:  - What would you like us to know as we care for you?  [place] am from [place]  - Provide timely, complete, and accurate information to patient/family  - Incorporate patient and family knowledge, values, beliefs, and cultural backgrounds into the planning and delivery of care  - Encourage patient/family to participate in care and decision-making at the level they choose  - Honor patient and family perspectives and choices  Outcome: Progressing     Problem: Patient/Family Goals  Goal: Patient/Family Long Term Goal  Description: Patient's Long Term Goal: to go back home    Interventions:  - monitor VS, labs, test results  - follow MD orders  - administer medications  - discharge planning   - See additional Care Plan goals for specific interventions  Outcome: Not Progressing  Goal: Patient/Family Short Term Goal  Description: Patient's Short Term Goal: to feel better    Interventions:   - prn meds  - remdesivir/decadron  - MD orders  - oxygen therapy protocol  - See additional Care Plan goals for specific interventions  Outcome: Progressing     Problem: RESPIRATORY - ADULT  Goal: Achieves optimal ventilation and oxygenation  Description: INTERVENTIONS:  - Assess for changes in respiratory status  - Assess for changes in mentation and behavior  - Position to facilitate oxygenation and minimize respiratory effort  - Oxygen supplementation based on oxygen saturation or ABGs  - Provide Smoking Cessation handout, if applicable  - Encourage broncho-pulmonary hygiene including cough, deep breathe, Incentive Spirometry  - Assess the need for suctioning and perform as needed  - Assess and instruct to report SOB or any respiratory difficulty  - Respiratory Therapy support as indicated  - Manage/alleviate anxiety  - Monitor for signs/symptoms of CO2 retention  Outcome: Progressing     Problem: SAFETY ADULT - FALL  Goal: Free from fall injury  Description: INTERVENTIONS:  - Assess pt frequently for physical needs  - Identify cognitive and physical deficits and behaviors that affect risk of falls.   - [place] fall precautions as indicated by assessment.  - Educate pt/family on patient safety including physical limitations  - Instruct pt to call for assistance with activity based on assessment  - Modify environment to reduce risk of injury  - Provide assistive devices as appropriate  - Consider OT/PT consult to assist with strengthening/mobility  - Encourage toileting schedule  Outcome: Progressing

## 2022-12-02 NOTE — PLAN OF CARE
Problem: Patient Centered Care  Goal: Patient preferences are identified and integrated in the patient's plan of care  Description: Interventions:  - What would you like us to know as we care for you? I am from [place]  - Provide timely, complete, and accurate information to patient/family  - Incorporate patient and family knowledge, values, beliefs, and cultural backgrounds into the planning and delivery of care  - Encourage patient/family to participate in care and decision-making at the level they choose  - Honor patient and family perspectives and choices  Outcome: Progressing    Problem: RESPIRATORY - ADULT  Goal: Achieves optimal ventilation and oxygenation  Description: INTERVENTIONS:  - Assess for changes in respiratory status  - Assess for changes in mentation and behavior  - Position to facilitate oxygenation and minimize respiratory effort  - Oxygen supplementation based on oxygen saturation or ABGs  - Provide Smoking Cessation handout, if applicable  - Encourage broncho-pulmonary hygiene including cough, deep breathe, Incentive Spirometry  - Assess the need for suctioning and perform as needed  - Assess and instruct to report SOB or any respiratory difficulty  - Respiratory Therapy support as indicated  - Manage/alleviate anxiety  - Monitor for signs/symptoms of CO2 retention  Outcome: Progressing     Problem: SAFETY ADULT - FALL  Goal: Free from fall injury  Description: INTERVENTIONS:  - Assess pt frequently for physical needs  - Identify cognitive and physical deficits and behaviors that affect risk of falls.   - [place] fall precautions as indicated by assessment.  - Educate pt/family on patient safety including physical limitations  - Instruct pt to call for assistance with activity based on assessment  - Modify environment to reduce risk of injury  - Provide assistive devices as appropriate  - Consider OT/PT consult to assist with strengthening/mobility  - Encourage toileting schedule  Outcome: Progressing

## 2022-12-02 NOTE — PLAN OF CARE
Problem: Patient Centered Care  Goal: Patient preferences are identified and integrated in the patient's plan of care  Description: Interventions:  - What would you like us to know as we care for you?  I am from [place]  - Provide timely, complete, and accurate information to patient/family  - Incorporate patient and family knowledge, values, beliefs, and cultural backgrounds into the planning and delivery of care  - Encourage patient/family to participate in care and decision-making at the level they choose  - Honor patient and family perspectives and choices  Outcome: Adequate for Discharge     Problem: Patient/Family Goals  Goal: Patient/Family Long Term Goal  Description: Patient's Long Term Goal: to go back home    Interventions:  - monitor VS, labs, test results  - follow MD orders  - administer medications  - discharge planning   - See additional Care Plan goals for specific interventions  Outcome: Adequate for Discharge  Goal: Patient/Family Short Term Goal  Description: Patient's Short Term Goal: to feel better    Interventions:   - prn meds  - remdesivir/decadron  - MD orders  - oxygen therapy protocol  - See additional Care Plan goals for specific interventions  Outcome: Adequate for Discharge     Problem: RESPIRATORY - ADULT  Goal: Achieves optimal ventilation and oxygenation  Description: INTERVENTIONS:  - Assess for changes in respiratory status  - Assess for changes in mentation and behavior  - Position to facilitate oxygenation and minimize respiratory effort  - Oxygen supplementation based on oxygen saturation or ABGs  - Provide Smoking Cessation handout, if applicable  - Encourage broncho-pulmonary hygiene including cough, deep breathe, Incentive Spirometry  - Assess the need for suctioning and perform as needed  - Assess and instruct to report SOB or any respiratory difficulty  - Respiratory Therapy support as indicated  - Manage/alleviate anxiety  - Monitor for signs/symptoms of CO2 retention  Outcome: Adequate for Discharge     Problem: SAFETY ADULT - FALL  Goal: Free from fall injury  Description: INTERVENTIONS:  - Assess pt frequently for physical needs  - Identify cognitive and physical deficits and behaviors that affect risk of falls. - [place] fall precautions as indicated by assessment.  - Educate pt/family on patient safety including physical limitations  - Instruct pt to call for assistance with activity based on assessment  - Modify environment to reduce risk of injury  - Provide assistive devices as appropriate  - Consider OT/PT consult to assist with strengthening/mobility  - Encourage toileting schedule  Outcome: Adequate for Discharge     HR elevated in the 120's. MD notified and patient started on metoprolol. Monitoring vital signs- stable at this time on RA. Remdesivir day 2 tonight. One episode of loose stool, pending collection for cdiff. Safety and fall precautions maintained- bed alarm on, bed locked in lowest position, call light within reach. Frequent rounding by nursing staff. Patient aware and agreeable to plan of care.

## 2022-12-03 LAB
ALBUMIN SERPL-MCNC: 2.6 G/DL (ref 3.4–5)
ALBUMIN/GLOB SERPL: 0.6 {RATIO} (ref 1–2)
ALP LIVER SERPL-CCNC: 58 U/L
ALT SERPL-CCNC: 14 U/L
ANION GAP SERPL CALC-SCNC: 7 MMOL/L (ref 0–18)
AST SERPL-CCNC: 13 U/L (ref 15–37)
BASOPHILS # BLD AUTO: 0.01 X10(3) UL (ref 0–0.2)
BASOPHILS NFR BLD AUTO: 0.1 %
BILIRUB SERPL-MCNC: 0.3 MG/DL (ref 0.1–2)
BUN BLD-MCNC: 23 MG/DL (ref 7–18)
BUN/CREAT SERPL: 28 (ref 10–20)
C DIFF TOX B STL QL: NEGATIVE
CALCIUM BLD-MCNC: 8.9 MG/DL (ref 8.5–10.1)
CHLORIDE SERPL-SCNC: 105 MMOL/L (ref 98–112)
CO2 SERPL-SCNC: 28 MMOL/L (ref 21–32)
CREAT BLD-MCNC: 0.82 MG/DL
DEPRECATED RDW RBC AUTO: 50.5 FL (ref 35.1–46.3)
EOSINOPHIL # BLD AUTO: 0.01 X10(3) UL (ref 0–0.7)
EOSINOPHIL NFR BLD AUTO: 0.1 %
ERYTHROCYTE [DISTWIDTH] IN BLOOD BY AUTOMATED COUNT: 15.3 % (ref 11–15)
GFR SERPLBLD BASED ON 1.73 SQ M-ARVRAT: 83 ML/MIN/1.73M2 (ref 60–?)
GLOBULIN PLAS-MCNC: 4.1 G/DL (ref 2.8–4.4)
GLUCOSE BLD-MCNC: 109 MG/DL (ref 70–99)
HCT VFR BLD AUTO: 39.7 %
HGB BLD-MCNC: 12.4 G/DL
IMM GRANULOCYTES # BLD AUTO: 0.07 X10(3) UL (ref 0–1)
IMM GRANULOCYTES NFR BLD: 0.6 %
INR BLD: 2.55 (ref 0.85–1.16)
LYMPHOCYTES # BLD AUTO: 1.04 X10(3) UL (ref 1–4)
LYMPHOCYTES NFR BLD AUTO: 8.8 %
MCH RBC QN AUTO: 28.1 PG (ref 26–34)
MCHC RBC AUTO-ENTMCNC: 31.2 G/DL (ref 31–37)
MCV RBC AUTO: 89.8 FL
MONOCYTES # BLD AUTO: 1.65 X10(3) UL (ref 0.1–1)
MONOCYTES NFR BLD AUTO: 13.9 %
NEUTROPHILS # BLD AUTO: 9.08 X10 (3) UL (ref 1.5–7.7)
NEUTROPHILS # BLD AUTO: 9.08 X10(3) UL (ref 1.5–7.7)
NEUTROPHILS NFR BLD AUTO: 76.5 %
OSMOLALITY SERPL CALC.SUM OF ELEC: 294 MOSM/KG (ref 275–295)
PLATELET # BLD AUTO: 142 10(3)UL (ref 150–450)
POTASSIUM SERPL-SCNC: 4 MMOL/L (ref 3.5–5.1)
PROT SERPL-MCNC: 6.7 G/DL (ref 6.4–8.2)
PROTHROMBIN TIME: 26.9 SECONDS (ref 11.6–14.8)
RBC # BLD AUTO: 4.42 X10(6)UL
SODIUM SERPL-SCNC: 140 MMOL/L (ref 136–145)
WBC # BLD AUTO: 11.9 X10(3) UL (ref 4–11)

## 2022-12-03 PROCEDURE — 99232 SBSQ HOSP IP/OBS MODERATE 35: CPT | Performed by: INTERNAL MEDICINE

## 2022-12-03 NOTE — PLAN OF CARE
No reports of pain or discomfort. Oxygen sat in 90s on room air. No blood-tinged sputum thus far. Bradycardic overnight, MD notified, waiting for response. Problem: Patient Centered Care  Goal: Patient preferences are identified and integrated in the patient's plan of care  Description: Interventions:  - What would you like us to know as we care for you?  [place] am [place]  - Provide timely, complete, and accurate information to patient/family  - Incorporate patient and family knowledge, values, beliefs, and cultural backgrounds into the planning and delivery of care  - Encourage patient/family to participate in care and decision-making at the level they choose  - Honor patient and family perspectives and choices  Outcome: Progressing     Problem: Patient/Family Goals  Goal: Patient/Family Long Term Goal  Description: Patient's Long Term Goal: to go back home    Interventions:  - monitor VS, labs, test results  - follow MD orders  - administer medications  - discharge planning   - See additional Care Plan goals for specific interventions  Outcome: Progressing  Goal: Patient/Family Short Term Goal  Description: Patient's Short Term Goal: to feel better    Interventions:   - prn meds  - remdesivir/decadron  - MD orders  - oxygen therapy protocol  - See additional Care Plan goals for specific interventions  Outcome: Progressing     Problem: RESPIRATORY - ADULT  Goal: Achieves optimal ventilation and oxygenation  Description: INTERVENTIONS:  - Assess for changes in respiratory status  - Assess for changes in mentation and behavior  - Position to facilitate oxygenation and minimize respiratory effort  - Oxygen supplementation based on oxygen saturation or ABGs  - Provide Smoking Cessation handout, if applicable  - Encourage broncho-pulmonary hygiene including cough, deep breathe, Incentive Spirometry  - Assess the need for suctioning and perform as needed  - Assess and instruct to report SOB or any respiratory difficulty  - Respiratory Therapy support as indicated  - Manage/alleviate anxiety  - Monitor for signs/symptoms of CO2 retention  Outcome: Progressing     Problem: SAFETY ADULT - FALL  Goal: Free from fall injury  Description: INTERVENTIONS:  - Assess pt frequently for physical needs  - Identify cognitive and physical deficits and behaviors that affect risk of falls.   - [place] fall precautions as indicated by assessment.  - Educate pt/family on patient safety including physical limitations  - Instruct pt to call for assistance with activity based on assessment  - Modify environment to reduce risk of injury  - Provide assistive devices as appropriate  - Consider OT/PT consult to assist with strengthening/mobility  - Encourage toileting schedule  Outcome: Progressing

## 2022-12-04 LAB
ALBUMIN SERPL-MCNC: 2.7 G/DL (ref 3.4–5)
ALBUMIN/GLOB SERPL: 0.7 {RATIO} (ref 1–2)
ALP LIVER SERPL-CCNC: 54 U/L
ALT SERPL-CCNC: 14 U/L
ANION GAP SERPL CALC-SCNC: 4 MMOL/L (ref 0–18)
AST SERPL-CCNC: 27 U/L (ref 15–37)
BASOPHILS # BLD AUTO: 0.01 X10(3) UL (ref 0–0.2)
BASOPHILS NFR BLD AUTO: 0.1 %
BILIRUB SERPL-MCNC: 0.5 MG/DL (ref 0.1–2)
BUN BLD-MCNC: 25 MG/DL (ref 7–18)
BUN/CREAT SERPL: 33.8 (ref 10–20)
CALCIUM BLD-MCNC: 9.2 MG/DL (ref 8.5–10.1)
CHLORIDE SERPL-SCNC: 103 MMOL/L (ref 98–112)
CO2 SERPL-SCNC: 29 MMOL/L (ref 21–32)
CREAT BLD-MCNC: 0.74 MG/DL
DEPRECATED RDW RBC AUTO: 50.9 FL (ref 35.1–46.3)
EOSINOPHIL # BLD AUTO: 0.04 X10(3) UL (ref 0–0.7)
EOSINOPHIL NFR BLD AUTO: 0.3 %
ERYTHROCYTE [DISTWIDTH] IN BLOOD BY AUTOMATED COUNT: 15.4 % (ref 11–15)
GFR SERPLBLD BASED ON 1.73 SQ M-ARVRAT: 86 ML/MIN/1.73M2 (ref 60–?)
GLOBULIN PLAS-MCNC: 4 G/DL (ref 2.8–4.4)
GLUCOSE BLD-MCNC: 92 MG/DL (ref 70–99)
HCT VFR BLD AUTO: 42.2 %
HGB BLD-MCNC: 13.1 G/DL
IMM GRANULOCYTES # BLD AUTO: 0.06 X10(3) UL (ref 0–1)
IMM GRANULOCYTES NFR BLD: 0.5 %
INR BLD: 2.29 (ref 0.85–1.16)
LYMPHOCYTES # BLD AUTO: 1.86 X10(3) UL (ref 1–4)
LYMPHOCYTES NFR BLD AUTO: 15.9 %
MCH RBC QN AUTO: 27.8 PG (ref 26–34)
MCHC RBC AUTO-ENTMCNC: 31 G/DL (ref 31–37)
MCV RBC AUTO: 89.4 FL
MONOCYTES # BLD AUTO: 1.33 X10(3) UL (ref 0.1–1)
MONOCYTES NFR BLD AUTO: 11.4 %
NEUTROPHILS # BLD AUTO: 8.37 X10 (3) UL (ref 1.5–7.7)
NEUTROPHILS # BLD AUTO: 8.37 X10(3) UL (ref 1.5–7.7)
NEUTROPHILS NFR BLD AUTO: 71.8 %
OSMOLALITY SERPL CALC.SUM OF ELEC: 286 MOSM/KG (ref 275–295)
PHOSPHATE SERPL-MCNC: 2.9 MG/DL (ref 2.5–4.9)
PLATELET # BLD AUTO: 147 10(3)UL (ref 150–450)
POTASSIUM SERPL-SCNC: 4.1 MMOL/L (ref 3.5–5.1)
PROT SERPL-MCNC: 6.7 G/DL (ref 6.4–8.2)
PROTHROMBIN TIME: 24.8 SECONDS (ref 11.6–14.8)
RBC # BLD AUTO: 4.72 X10(6)UL
SODIUM SERPL-SCNC: 136 MMOL/L (ref 136–145)
WBC # BLD AUTO: 11.7 X10(3) UL (ref 4–11)

## 2022-12-04 PROCEDURE — 99233 SBSQ HOSP IP/OBS HIGH 50: CPT | Performed by: INTERNAL MEDICINE

## 2022-12-04 PROCEDURE — 99232 SBSQ HOSP IP/OBS MODERATE 35: CPT | Performed by: INTERNAL MEDICINE

## 2022-12-04 RX ORDER — VANCOMYCIN HYDROCHLORIDE 125 MG/1
125 CAPSULE ORAL DAILY
Status: DISCONTINUED | OUTPATIENT
Start: 2022-12-04 | End: 2022-12-05

## 2022-12-04 NOTE — PLAN OF CARE
Monitoring vital signs per order, no acute changes, no complaints of pain. Pt verbalizes wanting to be discharged soon. This RN offered pt to get up to chair or ambulate in room, pt declined. Pt resting comfortably on room air. Safety precautions in place, frequent rounding by nursing staff. Problem: Patient Centered Care  Goal: Patient preferences are identified and integrated in the patient's plan of care  Description: Interventions:  - What would you like us to know as we care for you?  [place] am from [place]  - Provide timely, complete, and accurate information to patient/family  - Incorporate patient and family knowledge, values, beliefs, and cultural backgrounds into the planning and delivery of care  - Encourage patient/family to participate in care and decision-making at the level they choose  - Honor patient and family perspectives and choices  Outcome: Progressing     Problem: Patient/Family Goals  Goal: Patient/Family Long Term Goal  Description: Patient's Long Term Goal: to go back home    Interventions:  - monitor VS, labs, test results  - follow MD orders  - administer medications  - discharge planning   - See additional Care Plan goals for specific interventions  Outcome: Progressing  Goal: Patient/Family Short Term Goal  Description: Patient's Short Term Goal: to feel better    Interventions:   - prn meds  - remdesivir/decadron  - MD orders  - oxygen therapy protocol  - See additional Care Plan goals for specific interventions  Outcome: Progressing     Problem: RESPIRATORY - ADULT  Goal: Achieves optimal ventilation and oxygenation  Description: INTERVENTIONS:  - Assess for changes in respiratory status  - Assess for changes in mentation and behavior  - Position to facilitate oxygenation and minimize respiratory effort  - Oxygen supplementation based on oxygen saturation or ABGs  - Provide Smoking Cessation handout, if applicable  - Encourage broncho-pulmonary hygiene including cough, deep breathe, Incentive Spirometry  - Assess the need for suctioning and perform as needed  - Assess and instruct to report SOB or any respiratory difficulty  - Respiratory Therapy support as indicated  - Manage/alleviate anxiety  - Monitor for signs/symptoms of CO2 retention  Outcome: Progressing     Problem: SAFETY ADULT - FALL  Goal: Free from fall injury  Description: INTERVENTIONS:  - Assess pt frequently for physical needs  - Identify cognitive and physical deficits and behaviors that affect risk of falls.   - [place] fall precautions as indicated by assessment.  - Educate pt/family on patient safety including physical limitations  - Instruct pt to call for assistance with activity based on assessment  - Modify environment to reduce risk of injury  - Provide assistive devices as appropriate  - Consider OT/PT consult to assist with strengthening/mobility  - Encourage toileting schedule  Outcome: Progressing

## 2022-12-04 NOTE — PLAN OF CARE
Isolation precautions maintained. Remdesivir dose Day 3 administered. Call light within reach, ibed awareness on, ibed alarm on. Purposeful and frequent rounding completed. Plan of care continued. Problem: Patient Centered Care  Goal: Patient preferences are identified and integrated in the patient's plan of care  Description: Interventions:  - What would you like us to know as we care for you?  [place] am from [place]  - Provide timely, complete, and accurate information to patient/family  - Incorporate patient and family knowledge, values, beliefs, and cultural backgrounds into the planning and delivery of care  - Encourage patient/family to participate in care and decision-making at the level they choose  - Honor patient and family perspectives and choices  Outcome: Progressing     Problem: Patient/Family Goals  Goal: Patient/Family Long Term Goal  Description: Patient's Long Term Goal: to go back home    Interventions:  - monitor VS, labs, test results  - follow MD orders  - administer medications  - discharge planning   - See additional Care Plan goals for specific interventions  Outcome: Progressing  Goal: Patient/Family Short Term Goal  Description: Patient's Short Term Goal: to feel better    Interventions:   - prn meds  - remdesivir/decadron  - MD orders  - oxygen therapy protocol  - See additional Care Plan goals for specific interventions  Outcome: Progressing     Problem: RESPIRATORY - ADULT  Goal: Achieves optimal ventilation and oxygenation  Description: INTERVENTIONS:  - Assess for changes in respiratory status  - Assess for changes in mentation and behavior  - Position to facilitate oxygenation and minimize respiratory effort  - Oxygen supplementation based on oxygen saturation or ABGs  - Provide Smoking Cessation handout, if applicable  - Encourage broncho-pulmonary hygiene including cough, deep breathe, Incentive Spirometry  - Assess the need for suctioning and perform as needed  - Assess and instruct to report SOB or any respiratory difficulty  - Respiratory Therapy support as indicated  - Manage/alleviate anxiety  - Monitor for signs/symptoms of CO2 retention  Outcome: Progressing     Problem: SAFETY ADULT - FALL  Goal: Free from fall injury  Description: INTERVENTIONS:  - Assess pt frequently for physical needs  - Identify cognitive and physical deficits and behaviors that affect risk of falls.   - [place] fall precautions as indicated by assessment.  - Educate pt/family on patient safety including physical limitations  - Instruct pt to call for assistance with activity based on assessment  - Modify environment to reduce risk of injury  - Provide assistive devices as appropriate  - Consider OT/PT consult to assist with strengthening/mobility  - Encourage toileting schedule  Outcome: Progressing

## 2022-12-05 VITALS
DIASTOLIC BLOOD PRESSURE: 49 MMHG | OXYGEN SATURATION: 94 % | BODY MASS INDEX: 18.91 KG/M2 | HEIGHT: 75 IN | HEART RATE: 59 BPM | TEMPERATURE: 98 F | SYSTOLIC BLOOD PRESSURE: 119 MMHG | WEIGHT: 152.13 LBS | RESPIRATION RATE: 16 BRPM

## 2022-12-05 LAB
ALBUMIN SERPL-MCNC: 2.7 G/DL (ref 3.4–5)
ALBUMIN/GLOB SERPL: 0.7 {RATIO} (ref 1–2)
ALP LIVER SERPL-CCNC: 57 U/L
ALT SERPL-CCNC: 17 U/L
ANION GAP SERPL CALC-SCNC: 4 MMOL/L (ref 0–18)
AST SERPL-CCNC: 15 U/L (ref 15–37)
BASOPHILS # BLD AUTO: 0.02 X10(3) UL (ref 0–0.2)
BASOPHILS NFR BLD AUTO: 0.2 %
BILIRUB SERPL-MCNC: 0.5 MG/DL (ref 0.1–2)
BUN BLD-MCNC: 20 MG/DL (ref 7–18)
BUN/CREAT SERPL: 29 (ref 10–20)
CALCIUM BLD-MCNC: 8.7 MG/DL (ref 8.5–10.1)
CHLORIDE SERPL-SCNC: 103 MMOL/L (ref 98–112)
CO2 SERPL-SCNC: 32 MMOL/L (ref 21–32)
CREAT BLD-MCNC: 0.69 MG/DL
DEPRECATED RDW RBC AUTO: 50.7 FL (ref 35.1–46.3)
EOSINOPHIL # BLD AUTO: 0.09 X10(3) UL (ref 0–0.7)
EOSINOPHIL NFR BLD AUTO: 0.9 %
ERYTHROCYTE [DISTWIDTH] IN BLOOD BY AUTOMATED COUNT: 15.3 % (ref 11–15)
GFR SERPLBLD BASED ON 1.73 SQ M-ARVRAT: 87 ML/MIN/1.73M2 (ref 60–?)
GLOBULIN PLAS-MCNC: 3.8 G/DL (ref 2.8–4.4)
GLUCOSE BLD-MCNC: 88 MG/DL (ref 70–99)
HCT VFR BLD AUTO: 41.7 %
HGB BLD-MCNC: 12.9 G/DL
IMM GRANULOCYTES # BLD AUTO: 0.07 X10(3) UL (ref 0–1)
IMM GRANULOCYTES NFR BLD: 0.7 %
INR BLD: 2.19 (ref 0.85–1.16)
LYMPHOCYTES # BLD AUTO: 1.63 X10(3) UL (ref 1–4)
LYMPHOCYTES NFR BLD AUTO: 17.1 %
MCH RBC QN AUTO: 27.9 PG (ref 26–34)
MCHC RBC AUTO-ENTMCNC: 30.9 G/DL (ref 31–37)
MCV RBC AUTO: 90.3 FL
MONOCYTES # BLD AUTO: 1.49 X10(3) UL (ref 0.1–1)
MONOCYTES NFR BLD AUTO: 15.7 %
NEUTROPHILS # BLD AUTO: 6.21 X10 (3) UL (ref 1.5–7.7)
NEUTROPHILS # BLD AUTO: 6.21 X10(3) UL (ref 1.5–7.7)
NEUTROPHILS NFR BLD AUTO: 65.4 %
OSMOLALITY SERPL CALC.SUM OF ELEC: 290 MOSM/KG (ref 275–295)
PLATELET # BLD AUTO: 153 10(3)UL (ref 150–450)
POTASSIUM SERPL-SCNC: 3.7 MMOL/L (ref 3.5–5.1)
PROT SERPL-MCNC: 6.5 G/DL (ref 6.4–8.2)
PROTHROMBIN TIME: 24.1 SECONDS (ref 11.6–14.8)
RBC # BLD AUTO: 4.62 X10(6)UL
SODIUM SERPL-SCNC: 139 MMOL/L (ref 136–145)
WBC # BLD AUTO: 9.5 X10(3) UL (ref 4–11)

## 2022-12-05 PROCEDURE — 99232 SBSQ HOSP IP/OBS MODERATE 35: CPT | Performed by: PHYSICIAN ASSISTANT

## 2022-12-05 PROCEDURE — 99239 HOSP IP/OBS DSCHRG MGMT >30: CPT | Performed by: INTERNAL MEDICINE

## 2022-12-05 RX ORDER — FUROSEMIDE 20 MG/1
20 TABLET ORAL DAILY PRN
Status: SHIPPED | COMMUNITY
Start: 2022-12-05

## 2022-12-05 RX ORDER — POTASSIUM CHLORIDE 750 MG/1
10 TABLET, FILM COATED, EXTENDED RELEASE ORAL DAILY PRN
Status: SHIPPED | COMMUNITY
Start: 2022-12-05

## 2022-12-05 RX ORDER — CEFDINIR 300 MG/1
300 CAPSULE ORAL 2 TIMES DAILY
Qty: 10 CAPSULE | Refills: 0 | Status: SHIPPED | OUTPATIENT
Start: 2022-12-05

## 2022-12-05 RX ORDER — WARFARIN SODIUM 4 MG/1
4 TABLET ORAL
Status: SHIPPED | COMMUNITY
Start: 2022-12-05 | End: 2022-12-06

## 2022-12-05 NOTE — PLAN OF CARE
Patient had episodes of bradycardia early in the morning. Provider notified and digoxin held. Patient on Afib rhythm and heart rate continued to be variable throughout the day. Resting comfortably in bed, watching TV. Call light within reach. Problem: Patient Centered Care  Goal: Patient preferences are identified and integrated in the patient's plan of care  Description: Interventions:  - What would you like us to know as we care for you?  [place] am [place]  - Provide timely, complete, and accurate information to patient/family  - Incorporate patient and family knowledge, values, beliefs, and cultural backgrounds into the planning and delivery of care  - Encourage patient/family to participate in care and decision-making at the level they choose  - Honor patient and family perspectives and choices  Outcome: Progressing     Problem: Patient/Family Goals  Goal: Patient/Family Long Term Goal  Description: Patient's Long Term Goal: to go back home    Interventions:  - monitor VS, labs, test results  - follow MD orders  - administer medications  - discharge planning   - See additional Care Plan goals for specific interventions  Outcome: Progressing  Goal: Patient/Family Short Term Goal  Description: Patient's Short Term Goal: to feel better    Interventions:   - prn meds  - remdesivir/decadron  - MD orders  - oxygen therapy protocol  - See additional Care Plan goals for specific interventions  Outcome: Progressing     Problem: RESPIRATORY - ADULT  Goal: Achieves optimal ventilation and oxygenation  Description: INTERVENTIONS:  - Assess for changes in respiratory status  - Assess for changes in mentation and behavior  - Position to facilitate oxygenation and minimize respiratory effort  - Oxygen supplementation based on oxygen saturation or ABGs  - Provide Smoking Cessation handout, if applicable  - Encourage broncho-pulmonary hygiene including cough, deep breathe, Incentive Spirometry  - Assess the need for suctioning and perform as needed  - Assess and instruct to report SOB or any respiratory difficulty  - Respiratory Therapy support as indicated  - Manage/alleviate anxiety  - Monitor for signs/symptoms of CO2 retention  Outcome: Progressing     Problem: SAFETY ADULT - FALL  Goal: Free from fall injury  Description: INTERVENTIONS:  - Assess pt frequently for physical needs  - Identify cognitive and physical deficits and behaviors that affect risk of falls.   - [place] fall precautions as indicated by assessment.  - Educate pt/family on patient safety including physical limitations  - Instruct pt to call for assistance with activity based on assessment  - Modify environment to reduce risk of injury  - Provide assistive devices as appropriate  - Consider OT/PT consult to assist with strengthening/mobility  - Encourage toileting schedule  Outcome: Progressing

## 2022-12-05 NOTE — DISCHARGE PLANNING
MDO for discharge today. Patient chart reviewed for discharge: Medication Reconciliation completed, Specialist/PCP follow up listed, and disease specific Instructions/Education included in After Visit Summary. Discharge RN notified patient's RN of AVS completion and verified all consultants have signed off. Patient's RN to notify DC RN if discharge status changes.       Felice Smith RN, Discharge Leader

## 2022-12-05 NOTE — PLAN OF CARE
Alert, denies pain, tolerating room air. Completing rocephin and Remdesivir IV prior to discharge. Agata Cat is aware of plan and eager to return home. Calling appropriately. Will continue to monitor. Problem: Patient Centered Care  Goal: Patient preferences are identified and integrated in the patient's plan of care  Description: Interventions:  - What would you like us to know as we care for you?  [place]  - Provide timely, complete, and accurate information to patient/family  - Incorporate patient and family knowledge, values, beliefs, and cultural backgrounds into the planning and delivery of care  - Encourage patient/family to participate in care and decision-making at the level they choose  - Honor patient and family perspectives and choices  Outcome: Completed     Problem: Patient/Family Goals  Goal: Patient/Family Long Term Goal  Description: Patient's Long Term Goal: to go back home    Interventions:  - monitor VS, labs, test results  - follow MD orders  - administer medications  - discharge planning   - See additional Care Plan goals for specific interventions  Outcome: Completed  Goal: Patient/Family Short Term Goal  Description: Patient's Short Term Goal: to feel better    Interventions:   - prn meds  - remdesivir/decadron  - MD orders  - oxygen therapy protocol  - See additional Care Plan goals for specific interventions  Outcome: Completed     Problem: RESPIRATORY - ADULT  Goal: Achieves optimal ventilation and oxygenation  Description: INTERVENTIONS:  - Assess for changes in respiratory status  - Assess for changes in mentation and behavior  - Position to facilitate oxygenation and minimize respiratory effort  - Oxygen supplementation based on oxygen saturation or ABGs  - Provide Smoking Cessation handout, if applicable  - Encourage broncho-pulmonary hygiene including cough, deep breathe, Incentive Spirometry  - Assess the need for suctioning and perform as needed  - Assess and instruct to report SOB or any respiratory difficulty  - Respiratory Therapy support as indicated  - Manage/alleviate anxiety  - Monitor for signs/symptoms of CO2 retention  Outcome: Completed     Problem: SAFETY ADULT - FALL  Goal: Free from fall injury  Description: INTERVENTIONS:  - Assess pt frequently for physical needs  - Identify cognitive and physical deficits and behaviors that affect risk of falls.   - [place] fall precautions as indicated by assessment.  - Educate pt/family on patient safety including physical limitations  - Instruct pt to call for assistance with activity based on assessment  - Modify environment to reduce risk of injury  - Provide assistive devices as appropriate  - Consider OT/PT consult to assist with strengthening/mobility  - Encourage toileting schedule  Outcome: Completed

## 2022-12-05 NOTE — CM/SW NOTE
12/05/22 1510   Discharge disposition   Post Acute Care Provider   ([facility])   Discharge transportation [facility]       Pt discussed in RN Rounds. Pt will discharge back to [id number]. [address] called, ambulance arranged, eta is 2/3 hours. PCS form completed. RN aware of ETA. Plan: DC to [facility]- PCS completed, [place] 2/3 hours     SW/CM to remain available for support and/or discharge planning.      Ivis Kimble, MSW, LSW   x [phone]

## 2022-12-05 NOTE — PLAN OF CARE
Problem: Patient Centered Care  Goal: Patient preferences are identified and integrated in the patient's plan of care  Description: Interventions:  - What would you like us to know as we care for you?  [facility]  - Provide timely, complete, and accurate information to patient/family  - Incorporate patient and family knowledge, values, beliefs, and cultural backgrounds into the planning and delivery of care  - Encourage patient/family to participate in care and decision-making at the level they choose  - Honor patient and family perspectives and choices  Outcome: Progressing     Problem: Patient/Family Goals  Goal: Patient/Family Long Term Goal  Description: Patient's Long Term Goal: to go back home    Interventions:  - monitor VS, labs, test results  - follow MD orders  - administer medications  - discharge planning   - See additional Care Plan goals for specific interventions  Outcome: Progressing  Goal: Patient/Family Short Term Goal  Description: Patient's Short Term Goal: to feel better    Interventions:   - prn meds  - remdesivir/decadron  - MD orders  - oxygen therapy protocol  - See additional Care Plan goals for specific interventions  Outcome: Progressing     Problem: RESPIRATORY - ADULT  Goal: Achieves optimal ventilation and oxygenation  Description: INTERVENTIONS:  - Assess for changes in respiratory status  - Assess for changes in mentation and behavior  - Position to facilitate oxygenation and minimize respiratory effort  - Oxygen supplementation based on oxygen saturation or ABGs  - Provide Smoking Cessation handout, if applicable  - Encourage broncho-pulmonary hygiene including cough, deep breathe, Incentive Spirometry  - Assess the need for suctioning and perform as needed  - Assess and instruct to report SOB or any respiratory difficulty  - Respiratory Therapy support as indicated  - Manage/alleviate anxiety  - Monitor for signs/symptoms of CO2 retention  Outcome: Progressing Problem: SAFETY ADULT - FALL  Goal: Free from fall injury  Description: INTERVENTIONS:  - Assess pt frequently for physical needs  - Identify cognitive and physical deficits and behaviors that affect risk of falls.   - [place] fall precautions as indicated by assessment.  - Educate pt/family on patient safety including physical limitations  - Instruct pt to call for assistance with activity based on assessment  - Modify environment to reduce risk of injury  - Provide assistive devices as appropriate  - Consider OT/PT consult to assist with strengthening/mobility  - Encourage toileting schedule  Outcome: Progressing

## 2022-12-05 NOTE — DISCHARGE PLANNING
Report given to Jadon at [facility]. Discharge AVS reviewed with patient and all questions answered. Per patient, family will  his prescription medication from the [facility] pharmacy for him.

## 2022-12-06 ENCOUNTER — TELEPHONE (OUTPATIENT)
Dept: INTERNAL MEDICINE CLINIC | Facility: CLINIC | Age: 87
End: 2022-12-06

## 2022-12-06 ENCOUNTER — PATIENT OUTREACH (OUTPATIENT)
Dept: CASE MANAGEMENT | Age: 87
End: 2022-12-06

## 2022-12-06 DIAGNOSIS — I48.20 CHRONIC ATRIAL FIBRILLATION (HCC): ICD-10-CM

## 2022-12-06 DIAGNOSIS — Z02.9 ENCOUNTERS FOR UNSPECIFIED ADMINISTRATIVE PURPOSE: ICD-10-CM

## 2022-12-06 RX ORDER — MELATONIN
3 NIGHTLY
COMMUNITY

## 2022-12-06 NOTE — TELEPHONE ENCOUNTER
To Dr Chester Dowell with pt. States he will call tomorrow to schedule TCM appointment. Pt states his warfarin dosing is:  4 mg on Monday, Wednesday, Friday, Saturday, Sunday. 6 mg on Tuesday and Thursday.

## 2022-12-06 NOTE — TELEPHONE ENCOUNTER
Spoke to the [facility] today for TCM and left a message for the patient to call the NCM back. The patient was recently hospitalized for COVID/pneumonia. The pt does not have a HFU appt scheduled at this time. A virtual TCM/HFU appt is recommended by 12/7/2022 as the pt is a high risk for readmission. NCM unable to review warfarin dosing and due INR lab during the TCM call. Please advise. BOOK BY DATE (last date for TCM): 12/19/2022      Please f/u with the pt and assist with scheduling a TCM/HFU appt and also confirm warfarin instructions. Thank you!

## 2022-12-06 NOTE — PROGRESS NOTES
Attempted to contact pt for TCM however call rang multiple times then sounded as though it answered but no response and then disconnected. NCM to try again at a later time.

## 2022-12-07 NOTE — TELEPHONE ENCOUNTER
Yes, warfarin dosing is  4 mg on Monday, Wednesday, Friday, Saturday, Sunday. 6 mg on Tuesday and Thursday.      Scheduled visit 1/13/23 is OK

## 2022-12-07 NOTE — TELEPHONE ENCOUNTER
Spoke to pt and relayed MD message. Pt verbalized understanding. He states he does not have any concerns and will wait for 1/13 appt. To Ghazala Newman, please advise when you would like repeat INR (pt aware that you are out of the office and return tomorrow 12/8). Pt checks himself at home. Pt states Friday would be a good day for him to check his INR.

## 2022-12-08 RX ORDER — WARFARIN SODIUM 4 MG/1
4 TABLET ORAL
Qty: 100 TABLET | Refills: 3 | Status: SHIPPED | OUTPATIENT
Start: 2022-12-08

## 2022-12-08 RX ORDER — HYDROCODONE BITARTRATE AND ACETAMINOPHEN 7.5; 325 MG/1; MG/1
1 TABLET ORAL EVERY 6 HOURS PRN
Qty: 90 TABLET | Refills: 0 | Status: SHIPPED | OUTPATIENT
Start: 2022-12-08 | End: 2023-01-07

## 2022-12-08 NOTE — TELEPHONE ENCOUNTER
To MD:  The above refill request is for a controlled substance. Please review pended medication order. Print and sign for staff to fax to pharmacy or prescribe electronically.     Last office visit: tele visit 8/27/21   Last time refill sent and quantity/refills: 10/10/22 qty 90 plus 0

## 2022-12-09 ENCOUNTER — TELEPHONE (OUTPATIENT)
Dept: INTERNAL MEDICINE CLINIC | Facility: CLINIC | Age: 87
End: 2022-12-09

## 2022-12-09 NOTE — TELEPHONE ENCOUNTER
Informed patient both Norco and Warfarin have been sent to [facility] in [facility] [address]

## 2022-12-10 NOTE — TELEPHONE ENCOUNTER
Reason for call:  Received call from [facility] concern for low blood pressure      Allergies:    Dronedarone                 Comment:Other reaction(s): extreme dizzinss        Discussed with patient's home health nurse with patient present:  Low blood pressure of 98/60, intermittent dizziness. No fevers. 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 Has home visit with podiatry next week    Aside from dizziness, he is not having any other symptoms. No bleeding of any kind. Home health nurse notified me of high INR 3.4, goal 2.0-3.0. He recently was discharged from [facility] for COVID-pneumonia, no cough/shortness of breath. No medications due this evening      Recommendations:  - Would recommend frequent blood pressure checks at assisted living, push fluids. Advised nursing staff to call if blood pressure persistently low. May need urgent evaluation if no improvement with oral rehydration  - Hold Coumadin dose tonight. We will resume his usual Coumadin dosing 4 mg Saturday and Sunday. Per chart review, Coumadin clinic Pharm. DAdrian Ornelas has asked for an INR on Monday  -Home visit by podiatry this upcoming week, chronic toe wound seem to be at baseline per home health nurse    OTIS to clinic on-call doctor for the weekend Dr. Jessi Escobar and [employer]

## 2022-12-12 ENCOUNTER — TELEPHONE (OUTPATIENT)
Dept: INTERNAL MEDICINE CLINIC | Facility: CLINIC | Age: 87
End: 2022-12-12

## 2022-12-12 DIAGNOSIS — I48.20 CHRONIC ATRIAL FIBRILLATION (HCC): ICD-10-CM

## 2022-12-12 LAB — INR: 2.9 (ref 0.8–1.2)

## 2022-12-12 NOTE — TELEPHONE ENCOUNTER
Lenore/[facility] called  INR today is 2.9  Per Dr Trev Porter, Pt did not take any coumadin on Friday went back to his regular dosing schedule on Saturday     Pt is super tired after having Covid    Call back # [phone], confidential voicemail

## 2022-12-12 NOTE — TELEPHONE ENCOUNTER
[facility] called--- therapeutic on upper end; pt very fatigued and he skipped on dose last week; ABX finished; will pull back on dose for the time being until he is feeling better---recheck 1 week

## 2022-12-19 ENCOUNTER — TELEPHONE (OUTPATIENT)
Dept: INTERNAL MEDICINE CLINIC | Facility: CLINIC | Age: 87
End: 2022-12-19

## 2022-12-19 DIAGNOSIS — I48.20 CHRONIC ATRIAL FIBRILLATION (HCC): ICD-10-CM

## 2022-12-19 LAB — INR: 2 (ref 0.8–1.2)

## 2022-12-19 NOTE — TELEPHONE ENCOUNTER
for [facility] - therapeutic---pt should be feeling better by now; resume prior dose and recheck 3weeks

## 2022-12-19 NOTE — TELEPHONE ENCOUNTER
[facility] left voicemail message reporting pt's INR  Result    INR 2.0     Pt takes 4 mg daily    Call back # [phone], confidential Voicemail ok to leave message

## 2022-12-28 ENCOUNTER — TELEPHONE (OUTPATIENT)
Dept: INTERNAL MEDICINE CLINIC | Facility: CLINIC | Age: 87
End: 2022-12-28

## 2022-12-28 DIAGNOSIS — I48.20 CHRONIC ATRIAL FIBRILLATION (HCC): ICD-10-CM

## 2022-12-28 LAB — INR: 3.9 (ref 0.8–1.2)

## 2022-12-28 NOTE — TELEPHONE ENCOUNTER
Irais Green / [facility] is calling to report elevateda INR 12/28/22    INR 3.9      Patient has had diarrhea since 12/25/22  He did not take his Warfarin on 12/25 but he took an extra 1 mg on 12/26.     Phone [phone]

## 2023-01-04 ENCOUNTER — TELEPHONE (OUTPATIENT)
Dept: INTERNAL MEDICINE CLINIC | Facility: CLINIC | Age: 88
End: 2023-01-04

## 2023-01-04 DIAGNOSIS — I48.20 CHRONIC ATRIAL FIBRILLATION (HCC): ICD-10-CM

## 2023-01-04 LAB — INR: 2.1 (ref 0.8–1.2)

## 2023-01-04 NOTE — TELEPHONE ENCOUNTER
Received pt/inr results from [facility] via fax. Phone #[phone]    2.1 pt/inr    Placed in Dr Xenia Burks mail box

## 2023-01-04 NOTE — TELEPHONE ENCOUNTER
Bailee Mendes / [facility] calling with INR results from 1/4/23    INR 2.1    Coumadin 4 mg daily  Patient skipped the 2 doses that Dr Barrett Mukherjee instructed him to do on Friday and Saturday    Phone [phone]

## 2023-01-05 NOTE — TELEPHONE ENCOUNTER
LM for [facility] ---therapeutic after high INR--- will resume prior dose unless pt is still not feeling well; I requested BODØ call me back 1/6 to discuss if pt not feeling well; pt is usually very steady with INRs

## 2023-01-06 NOTE — TELEPHONE ENCOUNTER
Jeni Felix from [place] [address] is calling Roxie Parham to let her know that the patient is feeling better but is still really really tired. The patients diarrhea has resolved.     Nurse Jeni Felix will check on the patient on 1/13/23    Any questions or concerns call Jeni Felix at  [phone]

## 2023-01-09 ENCOUNTER — TELEPHONE (OUTPATIENT)
Dept: INTERNAL MEDICINE CLINIC | Facility: CLINIC | Age: 88
End: 2023-01-09

## 2023-01-09 RX ORDER — HYDROCODONE BITARTRATE AND ACETAMINOPHEN 7.5; 325 MG/1; MG/1
1 TABLET ORAL EVERY 6 HOURS PRN
Qty: 90 TABLET | Refills: 0 | Status: SHIPPED | OUTPATIENT
Start: 2023-01-09 | End: 2023-02-08

## 2023-01-09 NOTE — TELEPHONE ENCOUNTER
To Dr. Diya Au    The above refill request is for a controlled substance. Please review pended medication order.       lov 1/10/22 tele  prescibed #90 12/8   12/9 #90

## 2023-01-13 ENCOUNTER — TELEPHONE (OUTPATIENT)
Dept: INTERNAL MEDICINE CLINIC | Facility: CLINIC | Age: 88
End: 2023-01-13

## 2023-01-13 DIAGNOSIS — I48.20 CHRONIC ATRIAL FIBRILLATION (HCC): ICD-10-CM

## 2023-01-13 LAB — INR: 1.8 (ref 0.8–1.2)

## 2023-01-13 NOTE — TELEPHONE ENCOUNTER
[address]    INR 1.8  4mg nightly   Patient is feeling better.      Ph # [phone], confidential voicemail

## 2023-01-13 NOTE — TELEPHONE ENCOUNTER
[facility] called--- subtherapeutic; pt very fatigued but feeling better; will resume prior dose---recheck 2 week

## 2023-01-25 ENCOUNTER — MED REC SCAN ONLY (OUTPATIENT)
Dept: INTERNAL MEDICINE CLINIC | Facility: CLINIC | Age: 88
End: 2023-01-25

## 2023-01-27 ENCOUNTER — TELEMEDICINE (OUTPATIENT)
Dept: INTERNAL MEDICINE CLINIC | Facility: CLINIC | Age: 88
End: 2023-01-27

## 2023-01-27 DIAGNOSIS — U07.1 COVID-19 VIRUS INFECTION: Primary | ICD-10-CM

## 2023-01-27 DIAGNOSIS — Z87.39 HISTORY OF OSTEOMYELITIS: ICD-10-CM

## 2023-01-27 DIAGNOSIS — I35.0 AORTIC VALVE STENOSIS, ETIOLOGY OF CARDIAC VALVE DISEASE UNSPECIFIED: ICD-10-CM

## 2023-01-27 DIAGNOSIS — J18.9 PNEUMONIA DUE TO INFECTIOUS ORGANISM, UNSPECIFIED LATERALITY, UNSPECIFIED PART OF LUNG: ICD-10-CM

## 2023-01-27 DIAGNOSIS — I73.9 PVD (PERIPHERAL VASCULAR DISEASE) (HCC): ICD-10-CM

## 2023-01-27 DIAGNOSIS — I48.20 CHRONIC ATRIAL FIBRILLATION (HCC): ICD-10-CM

## 2023-01-27 DIAGNOSIS — M47.816 OSTEOARTHRITIS OF LUMBAR SPINE, UNSPECIFIED SPINAL OSTEOARTHRITIS COMPLICATION STATUS: ICD-10-CM

## 2023-01-27 LAB — INR: 1.5 (ref 0.8–1.2)

## 2023-01-27 PROCEDURE — 99214 OFFICE O/P EST MOD 30 MIN: CPT | Performed by: INTERNAL MEDICINE

## 2023-02-01 ENCOUNTER — TELEPHONE (OUTPATIENT)
Dept: INTERNAL MEDICINE CLINIC | Facility: CLINIC | Age: 88
End: 2023-02-01

## 2023-02-01 NOTE — TELEPHONE ENCOUNTER
[facility] faxed notification that patient is 13 days past due for his INR.       Original to scan  Copy to Dr. Kari Lim and Jovita Brandon

## 2023-02-08 ENCOUNTER — TELEPHONE (OUTPATIENT)
Dept: INTERNAL MEDICINE CLINIC | Facility: CLINIC | Age: 88
End: 2023-02-08

## 2023-02-09 RX ORDER — HYDROCODONE BITARTRATE AND ACETAMINOPHEN 7.5; 325 MG/1; MG/1
1 TABLET ORAL EVERY 6 HOURS PRN
Qty: 90 TABLET | Refills: 0 | Status: SHIPPED | OUTPATIENT
Start: 2023-02-09 | End: 2023-03-11

## 2023-02-09 NOTE — TELEPHONE ENCOUNTER
To Dr. Eugenie Najjar--    The above refill request is for a controlled substance. Please review pended medication order.        lov 1/27/23 tele visit   Prescribed #90 1/9/23    \"

## 2023-02-10 ENCOUNTER — TELEPHONE (OUTPATIENT)
Dept: INTERNAL MEDICINE CLINIC | Facility: CLINIC | Age: 88
End: 2023-02-10

## 2023-02-10 DIAGNOSIS — I48.20 CHRONIC ATRIAL FIBRILLATION (HCC): ICD-10-CM

## 2023-02-10 LAB — INR: 1.6 (ref 0.8–1.2)

## 2023-02-10 NOTE — TELEPHONE ENCOUNTER
Kenya Post from [facility] called to report an INR of 1.6. His dose is 4 mgs every day but Friday.

## 2023-02-16 NOTE — TELEPHONE ENCOUNTER
LOV 9/28/2020, pt has had tele visits since then. Dig level normal at 1.21 12/2/22    Palmira Gutierrez can we continue refilling? No future appts scheduled.

## 2023-02-17 RX ORDER — DIGOXIN 125 MCG
TABLET ORAL
Qty: 30 TABLET | Refills: 11 | Status: SHIPPED | OUTPATIENT
Start: 2023-02-17

## 2023-02-24 ENCOUNTER — TELEPHONE (OUTPATIENT)
Dept: INTERNAL MEDICINE CLINIC | Facility: CLINIC | Age: 88
End: 2023-02-24

## 2023-02-24 DIAGNOSIS — I48.20 CHRONIC ATRIAL FIBRILLATION (HCC): ICD-10-CM

## 2023-02-24 LAB — INR: 2.8 (ref 0.8–1.2)

## 2023-02-24 NOTE — TELEPHONE ENCOUNTER
[facility]'s [facility] results.      INR - 2.8       # [phone]

## 2023-02-24 NOTE — TELEPHONE ENCOUNTER
spoke to [facility] - therapeutic---pt on new dose; no problems/ bleeds; CPM dose and recheck 2 weeks

## 2023-02-28 ENCOUNTER — TELEPHONE (OUTPATIENT)
Dept: INTERNAL MEDICINE CLINIC | Facility: CLINIC | Age: 88
End: 2023-02-28

## 2023-02-28 NOTE — TELEPHONE ENCOUNTER
Reviewed 2/24/23 TE. Spoke with [place] from [facility]. Duplicate result--no INR was done today, the fax is in regards to pt's INR from 2/24. Rancho mirage confirmed instructions she received from Ye Mccarthy PharmD on 2/24 and will repeat INR in 2 wks.

## 2023-02-28 NOTE — TELEPHONE ENCOUNTER
Pt/inr 2.8 2/28/23 received via fax from [address] [facility] in Dr Aleksandra Bal mail box

## 2023-03-02 DIAGNOSIS — I48.20 CHRONIC ATRIAL FIBRILLATION (HCC): ICD-10-CM

## 2023-03-06 RX ORDER — WARFARIN SODIUM 4 MG/1
TABLET ORAL
Qty: 110 TABLET | Refills: 0 | Status: SHIPPED | OUTPATIENT
Start: 2023-03-06

## 2023-03-08 RX ORDER — DIGOXIN 125 MCG
TABLET ORAL
Qty: 90 TABLET | Refills: 0 | OUTPATIENT
Start: 2023-03-08

## 2023-03-08 NOTE — TELEPHONE ENCOUNTER
Please refer to other 2/15 refill encounter. Digoxin was sent in that encounter on 2/17.   Will refuse this request

## 2023-03-10 ENCOUNTER — TELEPHONE (OUTPATIENT)
Dept: INTERNAL MEDICINE CLINIC | Facility: CLINIC | Age: 88
End: 2023-03-10

## 2023-03-10 DIAGNOSIS — I48.20 CHRONIC ATRIAL FIBRILLATION (HCC): ICD-10-CM

## 2023-03-10 LAB — INR: 2.2 (ref 0.8–1.2)

## 2023-03-10 NOTE — TELEPHONE ENCOUNTER
spoke to [facility] - therapeutic---pt on new dose; no problems/ bleeds; CPM dose and recheck 4weeks

## 2023-03-13 ENCOUNTER — TELEPHONE (OUTPATIENT)
Dept: INTERNAL MEDICINE CLINIC | Facility: CLINIC | Age: 88
End: 2023-03-13

## 2023-03-13 RX ORDER — SYRINGE WITH NEEDLE, 1 ML 25GX5/8"
SYRINGE, EMPTY DISPOSABLE MISCELLANEOUS
Qty: 12 EACH | Refills: 0 | Status: SHIPPED | OUTPATIENT
Start: 2023-03-13

## 2023-03-14 NOTE — TELEPHONE ENCOUNTER
To MD:  The above refill request is for a controlled substance. Please review pended medication order. Print and sign for staff to fax to pharmacy or prescribe electronically.     Last office visit: 1/27/23 virtual  Last time refill sent and quantity/refills: 2/9/23 #90   Per [place] , last dispensed 2/9/23    Also to  to please call patient to schedule visit

## 2023-03-14 NOTE — TELEPHONE ENCOUNTER
Contacted pt. And informed him to make an appt. Pt. States he is not able to come in for in person visits. He just recently had a virtual visit with Dr. Jacquelyn Bennett.  Does he need to set up another virtual visit?

## 2023-03-15 RX ORDER — HYDROCODONE BITARTRATE AND ACETAMINOPHEN 7.5; 325 MG/1; MG/1
1 TABLET ORAL EVERY 6 HOURS PRN
Qty: 90 TABLET | Refills: 0 | Status: SHIPPED | OUTPATIENT
Start: 2023-03-15 | End: 2023-04-14

## 2023-03-15 NOTE — TELEPHONE ENCOUNTER
Patient is calling to check the status of the refill request below for Norco. Patient states he only has about 2 days left of the medication currently.

## 2023-03-22 RX ORDER — WARFARIN SODIUM 4 MG/1
TABLET ORAL
Qty: 135 TABLET | Refills: 0 | OUTPATIENT
Start: 2023-03-22

## 2023-03-24 ENCOUNTER — TELEPHONE (OUTPATIENT)
Dept: INTERNAL MEDICINE CLINIC | Facility: CLINIC | Age: 88
End: 2023-03-24

## 2023-03-24 DIAGNOSIS — I48.20 CHRONIC ATRIAL FIBRILLATION (HCC): ICD-10-CM

## 2023-03-24 LAB — INR: 2.5 (ref 0.8–1.2)

## 2023-03-24 NOTE — TELEPHONE ENCOUNTER
Jsaon Caller from [facility] calling with INR    INR 2.5    Dosage:  4mg Sun, Mon, Wed, Thurs and Sat  6mg Tues Fri    Jason Caller [phone]

## 2023-03-24 NOTE — TELEPHONE ENCOUNTER
LMVM to [facility] - therapeutic---pt on new dose; no problems/ bleeds; CPM dose and recheck 4weeks

## 2023-03-28 ENCOUNTER — TELEPHONE (OUTPATIENT)
Dept: INTERNAL MEDICINE CLINIC | Facility: CLINIC | Age: 88
End: 2023-03-28

## 2023-03-28 NOTE — TELEPHONE ENCOUNTER
Patient's daughter Waqas Kiser is faxing Form from the [facility]. Dr Francisca Silverio has filled them out in the past, they need to be completed within 30 days.     Forms placed in Dr Francisca Silverio mail slot

## 2023-04-04 ENCOUNTER — TELEPHONE (OUTPATIENT)
Dept: INTERNAL MEDICINE CLINIC | Facility: CLINIC | Age: 88
End: 2023-04-04

## 2023-04-04 DIAGNOSIS — I48.20 CHRONIC ATRIAL FIBRILLATION (HCC): ICD-10-CM

## 2023-04-04 LAB — INR: 2.5 (ref 0.8–1.2)

## 2023-04-04 NOTE — TELEPHONE ENCOUNTER
Spoke with Lenore/VIRIDIANA, relayed MD message below. States pt takes coumadin 6 mg on Tuesday and Friday, then 4 mg on other days. AC module updated.

## 2023-04-04 NOTE — TELEPHONE ENCOUNTER
Lenore/[facility] [phone]   Called Lenore/NORMRMARIBEL, VM is not confidential. Alisa Gonzalez with pt,  Relayed MD message below, he verbalizes understanding and agrees with plan. States Tasha Melissa is on vacation and will be getting back on Friday.

## 2023-04-04 NOTE — TELEPHONE ENCOUNTER
Previous INR on 3/24/23 was also 2.5    Continue same coumadin dose (looks like 6mg on Mon,Fri and 4mg on other 5 days)    Repeat INR in 4 weeks

## 2023-04-04 NOTE — TELEPHONE ENCOUNTER
[address] INR results from 4/4/23    INR 2.5    Copy placed on Kristen's desk   Copy placed on Dr Moses Pathak desk (on call)

## 2023-04-06 NOTE — TELEPHONE ENCOUNTER
Form filled out and mailed to [facility] \[facility]"" ct per request. Copy sent to scanning and copy placed in hold bin.

## 2023-04-07 ENCOUNTER — TELEPHONE (OUTPATIENT)
Dept: INTERNAL MEDICINE CLINIC | Facility: CLINIC | Age: 88
End: 2023-04-07

## 2023-04-07 NOTE — TELEPHONE ENCOUNTER
Left message to call back for Render Dears to get more information on patient's symptoms    Lasix and potassium have not been prescribed from our office for many years, but do still show on patient's med list, how does patient currently take these?

## 2023-04-07 NOTE — TELEPHONE ENCOUNTER
Radha Torres from [facility]:     Requesting an orfer to re-certify for another episode of care for B12 injections. Patient is having trouble with legs swelling. Also requesting refills on Lasix and Potassium.       # [phone], confidential voicemail

## 2023-04-10 RX ORDER — POTASSIUM CHLORIDE 750 MG/1
10 TABLET, FILM COATED, EXTENDED RELEASE ORAL DAILY PRN
Qty: 90 TABLET | Refills: 3 | Status: SHIPPED | OUTPATIENT
Start: 2023-04-10

## 2023-04-10 RX ORDER — FUROSEMIDE 20 MG/1
20 TABLET ORAL DAILY PRN
Qty: 90 TABLET | Refills: 3 | Status: SHIPPED | OUTPATIENT
Start: 2023-04-10

## 2023-04-10 NOTE — TELEPHONE ENCOUNTER
As FYI to DR. Colette Nelson from [facility] and gave verbal approval for plan of care ( PT and recertification) left on confidential VM

## 2023-04-10 NOTE — TELEPHONE ENCOUNTER
Pt. Called back following up on refill request.  He states his daughter will have to  the medication for him so he is hoping it can be done soon.

## 2023-04-10 NOTE — TELEPHONE ENCOUNTER
Patient is calling regarding the refills    Patient states both of his legs are swelling the visiting nurse said he should start taking them again. Patient has not been taking these medications.   Patient uses [facility]    Patient can be reached at [phone]

## 2023-04-10 NOTE — TELEPHONE ENCOUNTER
ERx sent for Lasix 20 mg po every day prn leg swelling, and Kdur 10 mEQ po every day prn, #90, 3 RF    Please call pt

## 2023-04-13 ENCOUNTER — TELEPHONE (OUTPATIENT)
Dept: INTERNAL MEDICINE CLINIC | Facility: CLINIC | Age: 88
End: 2023-04-13

## 2023-04-13 RX ORDER — HYDROCODONE BITARTRATE AND ACETAMINOPHEN 7.5; 325 MG/1; MG/1
1 TABLET ORAL EVERY 6 HOURS PRN
Qty: 90 TABLET | Refills: 0 | Status: SHIPPED | OUTPATIENT
Start: 2023-04-13 | End: 2023-05-13

## 2023-04-13 RX ORDER — PREDNISONE 2.5 MG
TABLET ORAL
Qty: 90 TABLET | Refills: 3 | Status: SHIPPED | OUTPATIENT
Start: 2023-04-13

## 2023-04-13 NOTE — TELEPHONE ENCOUNTER
To [facility] for consult, this pt hasn't been physically seen in over 2 years. Can we refill?

## 2023-04-13 NOTE — TELEPHONE ENCOUNTER
To MD:  The above refill request is for a controlled substance. Please review pended medication order. Print and sign for staff to fax to pharmacy or prescribe electronically.     Last office visit: 2020  Last time refill sent and quantity/refills:  3/16/23

## 2023-04-13 NOTE — TELEPHONE ENCOUNTER
Spoke with patient and relayed MD's message. Reviewed prescription directions/instructions-Verbalized understanding and agreement with plan.

## 2023-04-17 ENCOUNTER — TELEPHONE (OUTPATIENT)
Dept: INTERNAL MEDICINE CLINIC | Facility: CLINIC | Age: 88
End: 2023-04-17

## 2023-04-17 DIAGNOSIS — I48.20 CHRONIC ATRIAL FIBRILLATION (HCC): ICD-10-CM

## 2023-04-17 NOTE — TELEPHONE ENCOUNTER
Le Hernandez / Parag called with INR Result    INR 2.6    Call back # [phone], confidential voicemail   Ok to leave a message

## 2023-04-21 ENCOUNTER — TELEPHONE (OUTPATIENT)
Dept: INTERNAL MEDICINE CLINIC | Facility: CLINIC | Age: 88
End: 2023-04-21

## 2023-04-21 NOTE — TELEPHONE ENCOUNTER
Spoke with patient relayed physician message below. Patient verbalized understanding.      Denver Brookes given verbal order for speech therapist. Bridgette Mcgowan verbalized understanding

## 2023-04-21 NOTE — TELEPHONE ENCOUNTER
Advise speech therapist do bedside swallow evaluation before change in food consistency is permitted    Please call pt; please forward order to [address] at [facility], [phone]

## 2023-04-21 NOTE — TELEPHONE ENCOUNTER
Please call patient  He is living at [address] [place]    He would like order sent to remove thickeners from food  This was only while patient was hospital  He no longer needs it    Please call with any questions    Please send to [facility] [place]    Tasked to nursing

## 2023-05-04 ENCOUNTER — TELEPHONE (OUTPATIENT)
Dept: INTERNAL MEDICINE CLINIC | Facility: CLINIC | Age: 88
End: 2023-05-04

## 2023-05-04 PROCEDURE — 99284 EMERGENCY DEPT VISIT MOD MDM: CPT

## 2023-05-04 RX ORDER — POTASSIUM CHLORIDE 10 MEQ
10 TABLET, EXT RELEASE, PARTICLES/CRYSTALS ORAL PRN
COMMUNITY

## 2023-05-04 RX ORDER — FUROSEMIDE 20 MG/1
20 TABLET ORAL DAILY
COMMUNITY

## 2023-05-04 RX ORDER — ACETAMINOPHEN 500 MG
TABLET ORAL
COMMUNITY

## 2023-05-04 RX ORDER — DIGOXIN 125 MCG
125 TABLET ORAL DAILY
COMMUNITY

## 2023-05-04 RX ORDER — WARFARIN SODIUM 2 MG/1
4 TABLET ORAL DAILY
COMMUNITY

## 2023-05-04 RX ORDER — GUAIFENESIN 200 MG/10ML
LIQUID ORAL
COMMUNITY

## 2023-05-04 RX ORDER — HYDROCODONE BITARTRATE AND ACETAMINOPHEN 7.5; 325 MG/1; MG/1
1 TABLET ORAL EVERY 6 HOURS PRN
COMMUNITY

## 2023-05-04 RX ORDER — DILTIAZEM HYDROCHLORIDE EXTENDED-RELEASE TABLETS 180 MG/1
180 TABLET, EXTENDED RELEASE ORAL DAILY
COMMUNITY

## 2023-05-04 RX ORDER — LANOLIN ALCOHOL/MO/W.PET/CERES
CREAM (GRAM) TOPICAL
COMMUNITY

## 2023-05-04 RX ORDER — PREDNISONE 2.5 MG/1
2.5 TABLET ORAL DAILY
COMMUNITY

## 2023-05-04 ASSESSMENT — PAIN DESCRIPTION - PAIN TYPE: TYPE: ACUTE PAIN

## 2023-05-04 ASSESSMENT — PAIN SCALES - GENERAL: PAINLEVEL_OUTOF10: 8

## 2023-05-04 NOTE — TELEPHONE ENCOUNTER
To Dr Rebolledo  -- Please Advise     Spoke with pt, states yesterday he noticed a black and blue bruise on his right anterior leg just below the knee the size of a half dollar. Does not recall injury to leg. Reports the area has mild swelling. Pt has been elevating his leg. Reports without palpation, he feels achy pain 1/10, with palpation, achy pain 6/10. Denies redness, warmth to touch, fever, laceration/ulcer. Reports today the bruise size is unchanged, the black and blue color is more apparent. Asking if he should monitor or be seen for OV. Advised patient their symptoms/message will be passed on to their doctor for review. Asked that patient call the office back if they develop any new or worsening symptoms while awaiting our call back with recommendations. Advised that patient present to the ER if these symptoms worsen.

## 2023-05-04 NOTE — TELEPHONE ENCOUNTER
Please call patient  He has a bump on his leg, below knee, it hurts, overnight changed color, it is black and blue  [facility] suggested patient call Dr Reilly Downing office  Should addt'l nurse visit be entered for patient  Tasked to nursing

## 2023-05-04 NOTE — TELEPHONE ENCOUNTER
Spoke with patient relayed physician message below. Patient verbalized understanding. Asked that patient call the office back if they develop any new or worsening symptoms. Reminded patient that there is always a physician on call after hours and on the weekends and that, if they call our main office number, the answering service will page that physician to address patient's concerns.

## 2023-05-05 ENCOUNTER — APPOINTMENT (OUTPATIENT)
Dept: GENERAL RADIOLOGY | Age: 88
End: 2023-05-05
Attending: EMERGENCY MEDICINE

## 2023-05-05 ENCOUNTER — HOSPITAL ENCOUNTER (EMERGENCY)
Age: 88
Discharge: NURSING, REHAB OR LONG-TERM CARE | End: 2023-05-05
Attending: EMERGENCY MEDICINE

## 2023-05-05 ENCOUNTER — APPOINTMENT (OUTPATIENT)
Dept: ULTRASOUND IMAGING | Age: 88
End: 2023-05-05
Attending: EMERGENCY MEDICINE

## 2023-05-05 VITALS
BODY MASS INDEX: 20.11 KG/M2 | DIASTOLIC BLOOD PRESSURE: 81 MMHG | HEART RATE: 82 BPM | SYSTOLIC BLOOD PRESSURE: 121 MMHG | HEIGHT: 76 IN | RESPIRATION RATE: 16 BRPM | WEIGHT: 165.12 LBS | TEMPERATURE: 97.1 F | OXYGEN SATURATION: 98 %

## 2023-05-05 DIAGNOSIS — S80.11XA HEMATOMA OF RIGHT LOWER LEG: Primary | ICD-10-CM

## 2023-05-05 PROCEDURE — 93971 EXTREMITY STUDY: CPT

## 2023-05-05 PROCEDURE — 73562 X-RAY EXAM OF KNEE 3: CPT

## 2023-05-05 PROCEDURE — 10002803 HB RX 637: Performed by: EMERGENCY MEDICINE

## 2023-05-05 RX ORDER — HYDROCODONE BITARTRATE AND ACETAMINOPHEN 10; 325 MG/1; MG/1
1 TABLET ORAL ONCE
Status: COMPLETED | OUTPATIENT
Start: 2023-05-05 | End: 2023-05-05

## 2023-05-05 RX ADMIN — HYDROCODONE BITARTRATE AND ACETAMINOPHEN 1 TABLET: 10; 325 TABLET ORAL at 03:15

## 2023-05-05 NOTE — TELEPHONE ENCOUNTER
Patient is calling and states that he went to the ER yesterday for his right swollen leg. Patient states the ER wrapped his leg in a ace bandage and told him to ice. The patients leg is swollen just under the knee. ER believes he hit his leg on something and that is why there is a bump and swollen. Patient is calling to ask if he could get some kind of pain medication.   Patient states he is in a lot of pain,    Please call and advise

## 2023-05-05 NOTE — TELEPHONE ENCOUNTER
Patient is calling and would like a refill for his     Norco    Please send to [facility] in [place]

## 2023-05-05 NOTE — TELEPHONE ENCOUNTER
Per chart review, x-ray and ultrasound done in ER - no DVT.     To Dr. Sarika Nicole to please advise on pain medication--

## 2023-05-08 NOTE — TELEPHONE ENCOUNTER
Patient is calling checking on the status of the [place] refill. Patient states it may be a little early for the refill but it is because he has this big contusion (see task dated 5/4/23)on his leg and was taking a little more for the pain in his leg.

## 2023-05-09 NOTE — TELEPHONE ENCOUNTER
Date changed to today. To MD:  The above refill request is for a controlled substance. Please review pended medication order. Print and sign for staff to fax to pharmacy or prescribe electronically.     Last office visit:   9/28/20  Last time refill sent and quantity/refills:   4/13/23   #90/0

## 2023-05-10 RX ORDER — HYDROCODONE BITARTRATE AND ACETAMINOPHEN 7.5; 325 MG/1; MG/1
1 TABLET ORAL EVERY 6 HOURS PRN
Qty: 90 TABLET | Refills: 0 | Status: ON HOLD | OUTPATIENT
Start: 2023-05-10 | End: 2023-05-13

## 2023-05-10 NOTE — TELEPHONE ENCOUNTER
To Darrell  RX pended    Requesting refill for Hydrocodone 7.5 / 325mg  pended    Spoke to pt, expressing concern regarding \"softball under the skin\"  RLE edema extending from knee to foot,  States area is red and swollen though he feels the edema is improving and pain intensity has decreased.

## 2023-05-10 NOTE — TELEPHONE ENCOUNTER
Pt called to check status on refill  He is out of medication   Please send today    Pt would also like to talk to Dr Renetta Cao about his leg  Pt states it looks like there's a red softball under the skin  Pt has been elevating and using Ice     Call back # [phone]

## 2023-05-11 ENCOUNTER — APPOINTMENT (OUTPATIENT)
Dept: ULTRASOUND IMAGING | Facility: HOSPITAL | Age: 88
End: 2023-05-11
Attending: EMERGENCY MEDICINE
Payer: MEDICARE

## 2023-05-11 ENCOUNTER — APPOINTMENT (OUTPATIENT)
Dept: GENERAL RADIOLOGY | Facility: HOSPITAL | Age: 88
End: 2023-05-11
Attending: EMERGENCY MEDICINE
Payer: MEDICARE

## 2023-05-11 ENCOUNTER — HOSPITAL ENCOUNTER (INPATIENT)
Facility: HOSPITAL | Age: 88
LOS: 4 days | Discharge: NURSING, REHAB OR LONG-TERM CARE | End: 2023-05-15
Attending: EMERGENCY MEDICINE | Admitting: INTERNAL MEDICINE
Payer: MEDICARE

## 2023-05-11 DIAGNOSIS — M79.81 HEMATOMA, NONTRAUMATIC, SOFT TISSUE: Primary | ICD-10-CM

## 2023-05-11 DIAGNOSIS — R79.1 SUPRATHERAPEUTIC INR: ICD-10-CM

## 2023-05-11 LAB
ANION GAP SERPL CALC-SCNC: 6 MMOL/L (ref 0–18)
BUN BLD-MCNC: 17 MG/DL (ref 7–18)
BUN/CREAT SERPL: 20.7 (ref 10–20)
CALCIUM BLD-MCNC: 8.9 MG/DL (ref 8.5–10.1)
CHLORIDE SERPL-SCNC: 104 MMOL/L (ref 98–112)
CO2 SERPL-SCNC: 26 MMOL/L (ref 21–32)
CREAT BLD-MCNC: 0.82 MG/DL
GFR SERPLBLD BASED ON 1.73 SQ M-ARVRAT: 83 ML/MIN/1.73M2 (ref 60–?)
GLUCOSE BLD-MCNC: 109 MG/DL (ref 70–99)
INR BLD: 3.59 (ref 0.85–1.16)
OSMOLALITY SERPL CALC.SUM OF ELEC: 284 MOSM/KG (ref 275–295)
POTASSIUM SERPL-SCNC: 3.7 MMOL/L (ref 3.5–5.1)
PROTHROMBIN TIME: 35 SECONDS (ref 11.6–14.8)
SODIUM SERPL-SCNC: 136 MMOL/L (ref 136–145)

## 2023-05-11 PROCEDURE — 73590 X-RAY EXAM OF LOWER LEG: CPT | Performed by: EMERGENCY MEDICINE

## 2023-05-11 PROCEDURE — 76882 US LMTD JT/FCL EVL NVASC XTR: CPT | Performed by: EMERGENCY MEDICINE

## 2023-05-11 RX ORDER — DILTIAZEM HYDROCHLORIDE 180 MG/1
180 CAPSULE, EXTENDED RELEASE ORAL DAILY
Status: DISCONTINUED | OUTPATIENT
Start: 2023-05-12 | End: 2023-05-15

## 2023-05-11 RX ORDER — ACETAMINOPHEN 500 MG
500 TABLET ORAL EVERY 6 HOURS PRN
COMMUNITY

## 2023-05-11 RX ORDER — VITS A,C,E/LUTEIN/MINERALS 300MCG-200
1 TABLET ORAL DAILY
Status: DISCONTINUED | OUTPATIENT
Start: 2023-05-12 | End: 2023-05-15

## 2023-05-11 RX ORDER — PREDNISONE 2.5 MG/1
2.5 TABLET ORAL DAILY
Status: DISCONTINUED | OUTPATIENT
Start: 2023-05-12 | End: 2023-05-15

## 2023-05-11 RX ORDER — DIGOXIN 125 MCG
125 TABLET ORAL DAILY
Status: DISCONTINUED | OUTPATIENT
Start: 2023-05-12 | End: 2023-05-15

## 2023-05-11 RX ORDER — LOPERAMIDE HYDROCHLORIDE 2 MG/1
2 CAPSULE ORAL 4 TIMES DAILY PRN
Status: DISCONTINUED | OUTPATIENT
Start: 2023-05-11 | End: 2023-05-15

## 2023-05-11 RX ORDER — HYDROCODONE BITARTRATE AND ACETAMINOPHEN 7.5; 325 MG/1; MG/1
1 TABLET ORAL EVERY 6 HOURS PRN
Status: DISCONTINUED | OUTPATIENT
Start: 2023-05-11 | End: 2023-05-15

## 2023-05-11 RX ORDER — MELATONIN
3 NIGHTLY
Status: DISCONTINUED | OUTPATIENT
Start: 2023-05-11 | End: 2023-05-15

## 2023-05-11 RX ORDER — CYCLOBENZAPRINE HCL 10 MG
10 TABLET ORAL 3 TIMES DAILY PRN
Status: DISCONTINUED | OUTPATIENT
Start: 2023-05-11 | End: 2023-05-15

## 2023-05-11 NOTE — ED INITIAL ASSESSMENT (HPI)
Reports worsening swelling to RLE & bruising. Pt was here on Thursday with contusion but denies any trauma to leg. Returns for worsening pain & swelling. Unable to bear any weight. Pt on coumadin for afib. Reports taking norco about 1 hr ago.

## 2023-05-11 NOTE — ED QUICK NOTES
Orders for admission, patient is aware of plan and ready to go upstairs. Any questions, please call ED RN Janice Myers at extension [phone].      Patient Covid vaccination status: Fully vaccinated     COVID Test Ordered in ED: None    COVID Suspicion at Admission: N/A    Running Infusions:  None    Mental Status/LOC at time of transport: x4    Other pertinent information:   CIWA score: N/A   NIH score:  N/A

## 2023-05-12 PROBLEM — D50.0 ANEMIA, BLOOD LOSS: Status: ACTIVE | Noted: 2023-05-12

## 2023-05-12 LAB
ANION GAP SERPL CALC-SCNC: 6 MMOL/L (ref 0–18)
BASOPHILS # BLD AUTO: 0.01 X10(3) UL (ref 0–0.2)
BASOPHILS # BLD AUTO: 0.02 X10(3) UL (ref 0–0.2)
BASOPHILS NFR BLD AUTO: 0.1 %
BASOPHILS NFR BLD AUTO: 0.3 %
BUN BLD-MCNC: 15 MG/DL (ref 7–18)
BUN/CREAT SERPL: 20.8 (ref 10–20)
C DIFF TOX B STL QL: NEGATIVE
CALCIUM BLD-MCNC: 8.8 MG/DL (ref 8.5–10.1)
CHLORIDE SERPL-SCNC: 103 MMOL/L (ref 98–112)
CO2 SERPL-SCNC: 26 MMOL/L (ref 21–32)
CREAT BLD-MCNC: 0.72 MG/DL
DEPRECATED RDW RBC AUTO: 50.8 FL (ref 35.1–46.3)
DEPRECATED RDW RBC AUTO: 51.8 FL (ref 35.1–46.3)
EOSINOPHIL # BLD AUTO: 0.16 X10(3) UL (ref 0–0.7)
EOSINOPHIL # BLD AUTO: 0.2 X10(3) UL (ref 0–0.7)
EOSINOPHIL NFR BLD AUTO: 1.8 %
EOSINOPHIL NFR BLD AUTO: 2.6 %
ERYTHROCYTE [DISTWIDTH] IN BLOOD BY AUTOMATED COUNT: 15.6 % (ref 11–15)
ERYTHROCYTE [DISTWIDTH] IN BLOOD BY AUTOMATED COUNT: 15.7 % (ref 11–15)
GFR SERPLBLD BASED ON 1.73 SQ M-ARVRAT: 86 ML/MIN/1.73M2 (ref 60–?)
GLUCOSE BLD-MCNC: 89 MG/DL (ref 70–99)
HCT VFR BLD AUTO: 30.5 %
HCT VFR BLD AUTO: 32.8 %
HGB BLD-MCNC: 10 G/DL
HGB BLD-MCNC: 9.5 G/DL
IMM GRANULOCYTES # BLD AUTO: 0.03 X10(3) UL (ref 0–1)
IMM GRANULOCYTES # BLD AUTO: 0.05 X10(3) UL (ref 0–1)
IMM GRANULOCYTES NFR BLD: 0.4 %
IMM GRANULOCYTES NFR BLD: 0.6 %
INR BLD: 2.27 (ref 0.85–1.16)
LYMPHOCYTES # BLD AUTO: 1.13 X10(3) UL (ref 1–4)
LYMPHOCYTES # BLD AUTO: 1.26 X10(3) UL (ref 1–4)
LYMPHOCYTES NFR BLD AUTO: 13 %
LYMPHOCYTES NFR BLD AUTO: 16.6 %
MCH RBC QN AUTO: 27.7 PG (ref 26–34)
MCH RBC QN AUTO: 28 PG (ref 26–34)
MCHC RBC AUTO-ENTMCNC: 30.5 G/DL (ref 31–37)
MCHC RBC AUTO-ENTMCNC: 31.1 G/DL (ref 31–37)
MCV RBC AUTO: 90 FL
MCV RBC AUTO: 90.9 FL
MONOCYTES # BLD AUTO: 1.96 X10(3) UL (ref 0.1–1)
MONOCYTES # BLD AUTO: 2.12 X10(3) UL (ref 0.1–1)
MONOCYTES NFR BLD AUTO: 24.3 %
MONOCYTES NFR BLD AUTO: 25.8 %
NEUTROPHILS # BLD AUTO: 4.12 X10 (3) UL (ref 1.5–7.7)
NEUTROPHILS # BLD AUTO: 4.12 X10(3) UL (ref 1.5–7.7)
NEUTROPHILS # BLD AUTO: 5.24 X10 (3) UL (ref 1.5–7.7)
NEUTROPHILS # BLD AUTO: 5.24 X10(3) UL (ref 1.5–7.7)
NEUTROPHILS NFR BLD AUTO: 54.3 %
NEUTROPHILS NFR BLD AUTO: 60.2 %
OSMOLALITY SERPL CALC.SUM OF ELEC: 280 MOSM/KG (ref 275–295)
PLATELET # BLD AUTO: 189 10(3)UL (ref 150–450)
PLATELET # BLD AUTO: 208 10(3)UL (ref 150–450)
POTASSIUM SERPL-SCNC: 3.4 MMOL/L (ref 3.5–5.1)
PROTHROMBIN TIME: 24.6 SECONDS (ref 11.6–14.8)
RBC # BLD AUTO: 3.39 X10(6)UL
RBC # BLD AUTO: 3.61 X10(6)UL
SODIUM SERPL-SCNC: 135 MMOL/L (ref 136–145)
WBC # BLD AUTO: 7.6 X10(3) UL (ref 4–11)
WBC # BLD AUTO: 8.7 X10(3) UL (ref 4–11)

## 2023-05-12 PROCEDURE — 99223 1ST HOSP IP/OBS HIGH 75: CPT | Performed by: INTERNAL MEDICINE

## 2023-05-12 RX ORDER — MELATONIN
325
Status: DISCONTINUED | OUTPATIENT
Start: 2023-05-13 | End: 2023-05-15

## 2023-05-12 NOTE — CM/SW NOTE
05/12/23 1600   CM/SW Referral Data   Referral Source Physician;Social Work (self-referral)   Reason for Referral Discharge planning   Informant Daughter   Medical Hx   Does patient have an established PCP? Yes   Patient Info   Patient's Current Mental Status at Time of Assessment Alert;Oriented   Patient's Home Environment Assisted Living   Post Acute Care Provider Upon Admission   [facility] in [place])   Number of Levels in Home 1   Patient lives with Alone   Patient Status Prior to Admission   Independent with ADLs and Mobility Yes   Discharge Needs   Anticipated D/C needs Subacute rehab   Services Requested   PASRR Level 1 Submitted Yes   PMR Consult Requested Not clinically appropriate at this time   Choice of Post-Acute Provider   Informed patient of right to choose their preferred provider Yes   List of appropriate post-acute services provided to patient/family with quality data Yes     SW spoke with pt's dtr who states pt has hx with [facility] and would like to go there for DONTA if possible. Referrals sent in aidin - will need 3 inpatient midnight stays prior to DC to SNF    Will need to email SNF list to dtr IF [facility] accept - [email or web address]    PLAN: DONTA - pending accepting facility (1. [facility] )     Cesar Louise, [place], MSW ext.  [phone]

## 2023-05-12 NOTE — CDS QUERY
Dr. Alfredo Palmer: . To answer this query: DON'T [id number] [facility], 1st click on 3 dots to the RIGHT OF CO-SIGN Button AND CLICK EDIT.   2nd type an \"X\" in the bracket for the diagnosis that applies. (You may type in any additional clinical details you feel necessary to substantiate your response). 3rd Then Click on the Sign Button to complete your response. Thank you. Arcola Bloch  Dear Doctor Alfredo Palmer: For accurate ICD-10-CM code assignment to reflect severity of illness and risk of mortality,   PLEASE (X) ALL DIAGNOSES THAT APPLY. SELECTION BY PROVIDER ONLY    (   )  Traumatic Hematoma     ( x  )  Nontraumatic Hematoma due to Coumadin     (   )  Other Explanation,  (please specify):_______________________________________________    Documentation from the Medical Record      05/11/23 ED MD Note: 70-year-old male on long-term Coumadin therapy with A-fib who presents after he is noticed increased swelling some pain and discoloration to his right lower Leg . He was at good Per May 5 there where he had an x-ray of the knee and ultrasound the leg showing evidence of a sizable hematoma in the area of concern. He was sent home and encouraged to ice and elevate this area. It is continued to progress. No new trauma or fall but at this point because of the swelling and pain he is unable to ambulate. PT 35.0 INR 3.59  US RLE: 2 moderate to large hematomas in the anterior right lower leg soft tissues. ED MD: Clinical Impression:   Hematoma, nontraumatic, soft tissue Supratherapeutic INR    05/12/23 H&P: 80year old male with atrial fibrillation on warfarin who developed hematoma to right lower leg about 10 days ago. He presented to [facility] to [facility] on 5/5/23. ultrasound of right leg showed Large  heterogeneous collection within the deep subcutaneous fat abutting the deep fascia measuring 15.1 cm x 1.5 cm x 6.6 cm is likely a hematoma. He was discharged home with Norco prn pain. He has been failing at home with increased pain. He lives alone at [facility]. He presented to [address] [place] [address] ED yesterday and repeat ultrasound RLE showed two moderate to large hematomas in the anterior right lower leg soft tissues, measuring approximately 12 by 2.3 x 6.6 cm and a similar slightly more anterolateral collection measuring 7.0 x 2.3 5.5 cm. There was no clinical concern for compartment syndrome/ Additional PMH:  Chronic pulmonary aspiration, ARF 6/16 BiPAP, CHD, COPD, CAD, COVID, Dysphagia, Esophageal reflux, IBS, Malnutrition,   Pneumonia, Malabsorption, Pud, Vitamin B12 Deficiency, White Matter Disease      05/12/23 PT 24.6 INR 2.27    Treatment: Vitamin K 5mg given in ED, Coumadin stopped, Monitoring PT levels and INR levels    ____________________________________________________________________________________________________  If you have any questions, please contact Clinical :  Moisés Sanchez RN at [phone] Thank You.      THIS FORM IS A PERMANENT PART OF THE MEDICAL RECORD

## 2023-05-12 NOTE — PLAN OF CARE
Patient alert and oriented X4. Hematoma to R leg. Leg elevated with ice in place. Voiding with urinal. Pain management with norco and flexeril prn. General diet. Encouraged to cough and deep breathe and patient is compliant. Fall precautions in place including bed in lowest position, call light and personal belongings within reach, non-skid socks. Frequent rounding by nursing staff. Plan is pending PT/OT eval.    Problem: Patient Centered Care  Goal: Patient preferences are identified and integrated in the patient's plan of care  Description: Interventions:  - What would you like us to know as we care for you?  I am from [facility]  - Provide timely, complete, and accurate information to patient/family  - Incorporate patient and family knowledge, values, beliefs, and cultural backgrounds into the planning and delivery of care  - Encourage patient/family to participate in care and decision-making at the level they choose  - Honor patient and family perspectives and choices  Outcome: Progressing      Problem: PAIN - ADULT  Goal: Verbalizes/displays adequate comfort level or patient's stated pain goal  Description: INTERVENTIONS:  - Encourage pt to monitor pain and request assistance  - Assess pain using appropriate pain scale  - Administer analgesics based on type and severity of pain and evaluate response  - Implement non-pharmacological measures as appropriate and evaluate response  - Consider cultural and social influences on pain and pain management  - Manage/alleviate anxiety  - Utilize distraction and/or relaxation techniques  - Monitor for opioid side effects  - Notify MD/LIP if interventions unsuccessful or patient reports new pain  - Anticipate increased pain with activity and pre-medicate as appropriate  Outcome: Progressing     Problem: RISK FOR INFECTION - ADULT  Goal: Absence of fever/infection during anticipated neutropenic period  Description: INTERVENTIONS  - Monitor WBC  - Administer growth factors as ordered  - Implement neutropenic guidelines  Outcome: Progressing     Problem: SAFETY ADULT - FALL  Goal: Free from fall injury  Description: INTERVENTIONS:  - Assess pt frequently for physical needs  - Identify cognitive and physical deficits and behaviors that affect risk of falls.   - Firth fall precautions as indicated by assessment.  - Educate pt/family on patient safety including physical limitations  - Instruct pt to call for assistance with activity based on assessment  - Modify environment to reduce risk of injury  - Provide assistive devices as appropriate  - Consider OT/PT consult to assist with strengthening/mobility  - Encourage toileting schedule  Outcome: Progressing     Problem: DISCHARGE PLANNING  Goal: Discharge to home or other facility with appropriate resources  Description: INTERVENTIONS:  - Identify barriers to discharge w/pt and caregiver  - Include patient/family/discharge partner in discharge planning  - Arrange for needed discharge resources and transportation as appropriate  - Identify discharge learning needs (meds, wound care, etc)  - Arrange for interpreters to assist at discharge as needed  - Consider post-discharge preferences of patient/family/discharge partner  - Complete POLST form as appropriate  - Assess patient's ability to be responsible for managing their own health  - Refer to Case Management Department for coordinating discharge planning if the patient needs post-hospital services based on physician/LIP order or complex needs related to functional status, cognitive ability or social support system  Outcome: Progressing

## 2023-05-12 NOTE — CDS QUERY
Dr. Roberto Ko: . To answer this query: DON'T [id number] [facility], 1st click on 3 dots to the RIGHT OF CO-SIGN Button AND CLICK EDIT.   2nd type an \"X\" in the bracket for the diagnosis that applies. (You may type in any additional clinical details you feel necessary to substantiate your response). 3rd Then Click on the Sign Button to complete your response. Thank you. Romulo Cota .Heart Failure  CLINICAL DOCUMENTATION CLARIFICATION FORM  Dear Doctor Samantha Gonzales information (provided below) includes a diagnosis of Heart Failure. For accurate ICD-10-CM code assignment to reflect severity of illness and risk of mortality,  PLEASE (X) ALL DIAGNOSES THAT APPLY. (    ) Chronic Systolic Heart Failure              (    ) Chronic Diastolic Heart Failure                (    ) Chronic combined systolic and diastolic Heart Failure   (  x  ) Other Explanation, please specify: __no heart failure_________________________      Documentation from the Medical Record:     05/11/23 H&P: 80year old male who developed hematoma to right lower leg about 10 days ago. 80year old male with atrial fibrillation on warfarin who developed hematoma to right lower leg about 10 days ago. He presented to ED to  [place] [address] on 5/5/23. ultrasound of right leg showed Large heterogeneous collection within the deep subcutaneous fat abutting the deep fascia measuring 15.1 cm x 1.5 cm x 6.6 cm is likely a hematoma. He  was discharged home with Norco prn pain. He has been failing at home with increased pain. He lives alone at [facility]. He presented to [address] [place] [address] ED yesterday and repeat ultrasound RLE showed two moderate to large hematomas in the anterior right lower leg soft tissues, measuring approximately 12 by 2.3 x 6.6 cm and a similar slightly more anterolateral collection measuring 7.0 x 2.3 5.5 cm. There was no clinical concern for compartment syndrome.  Additional PMH: Congestive heart disease, COPD, COVID,  Depression, Dysphagia, Esophageal reflux, IBS, Malabsorption, Malnutrition, Pneumonia,    Included in Home Medications List: On Admission: Lasix, Digoxin     Treatment: Home Medications Lasix and Digoxin continued, Monitoring Cardiovascular and Respiratory Status      If you have any questions, please contact Clinical :  Zain Herrera RN at [phone]     Thank You.      THIS FORM IS A PERMANENT PART OF THE MEDICAL RECORD

## 2023-05-12 NOTE — PLAN OF CARE
Patient has been transferring stand and pivot to rolling chair. Pain is being controlled w/ Norco. Is tolerating general diet. Is incontinent, was able to have bowel movement today. Plan is for pt to dc to possible rehab, pending choice/progress. Problem: Patient Centered Care  Goal: Patient preferences are identified and integrated in the patient's plan of care  Description: Interventions:  - What would you like us to know as we care for you?  My daughter is at the bedside   - Provide timely, complete, and accurate information to patient/family  - Incorporate patient and family knowledge, values, beliefs, and cultural backgrounds into the planning and delivery of care  - Encourage patient/family to participate in care and decision-making at the level they choose  - Honor patient and family perspectives and choices  Outcome: Progressing     Problem: Patient/Family Goals  Goal: Patient/Family Long Term Goal  Description: Patient's Long Term Goal: To no longer have redness/swelling to RLE     Interventions:  - stopping anticoagulation   - elevation   - pt/ot  - See additional Care Plan goals for specific interventions  Outcome: Progressing  Goal: Patient/Family Short Term Goal  Description: Patient's Short Term Goal: to go home    Interventions:   - follow plan of care  - See additional Care Plan goals for specific interventions  Outcome: Progressing     Problem: PAIN - ADULT  Goal: Verbalizes/displays adequate comfort level or patient's stated pain goal  Description: INTERVENTIONS:  - Encourage pt to monitor pain and request assistance  - Assess pain using appropriate pain scale  - Administer analgesics based on type and severity of pain and evaluate response  - Implement non-pharmacological measures as appropriate and evaluate response  - Consider cultural and social influences on pain and pain management  - Manage/alleviate anxiety  - Utilize distraction and/or relaxation techniques  - Monitor for opioid side effects  - Notify MD/LIP if interventions unsuccessful or patient reports new pain  - Anticipate increased pain with activity and pre-medicate as appropriate  Outcome: Progressing     Problem: RISK FOR INFECTION - ADULT  Goal: Absence of fever/infection during anticipated neutropenic period  Description: INTERVENTIONS  - Monitor WBC  - Administer growth factors as ordered  - Implement neutropenic guidelines  Outcome: Progressing     Problem: SAFETY ADULT - FALL  Goal: Free from fall injury  Description: INTERVENTIONS:  - Assess pt frequently for physical needs  - Identify cognitive and physical deficits and behaviors that affect risk of falls.   - [place] fall precautions as indicated by assessment.  - Educate pt/family on patient safety including physical limitations  - Instruct pt to call for assistance with activity based on assessment  - Modify environment to reduce risk of injury  - Provide assistive devices as appropriate  - Consider OT/PT consult to assist with strengthening/mobility  - Encourage toileting schedule  Outcome: Progressing     Problem: DISCHARGE PLANNING  Goal: Discharge to home or other facility with appropriate resources  Description: INTERVENTIONS:  - Identify barriers to discharge w/pt and caregiver  - Include patient/family/discharge partner in discharge planning  - Arrange for needed discharge resources and transportation as appropriate  - Identify discharge learning needs (meds, wound care, etc)  - Arrange for interpreters to assist at discharge as needed  - Consider post-discharge preferences of patient/family/discharge partner  - Complete POLST form as appropriate  - Assess patient's ability to be responsible for managing their own health  - Refer to Case Management Department for coordinating discharge planning if the patient needs post-hospital services based on physician/LIP order or complex needs related to functional status, cognitive ability or social support system  Outcome: Progressing

## 2023-05-13 LAB
BASOPHILS # BLD AUTO: 0 X10(3) UL (ref 0–0.2)
BASOPHILS NFR BLD AUTO: 0 %
DEPRECATED RDW RBC AUTO: 49.9 FL (ref 35.1–46.3)
EOSINOPHIL # BLD AUTO: 0.11 X10(3) UL (ref 0–0.7)
EOSINOPHIL NFR BLD AUTO: 1.4 %
ERYTHROCYTE [DISTWIDTH] IN BLOOD BY AUTOMATED COUNT: 15.2 % (ref 11–15)
HCT VFR BLD AUTO: 29.2 %
HGB BLD-MCNC: 9 G/DL
IMM GRANULOCYTES # BLD AUTO: 0.03 X10(3) UL (ref 0–1)
IMM GRANULOCYTES NFR BLD: 0.4 %
INR BLD: 1.18 (ref 0.85–1.16)
LYMPHOCYTES # BLD AUTO: 0.98 X10(3) UL (ref 1–4)
LYMPHOCYTES NFR BLD AUTO: 12.9 %
MCH RBC QN AUTO: 27.9 PG (ref 26–34)
MCHC RBC AUTO-ENTMCNC: 30.8 G/DL (ref 31–37)
MCV RBC AUTO: 90.4 FL
MONOCYTES # BLD AUTO: 1.82 X10(3) UL (ref 0.1–1)
MONOCYTES NFR BLD AUTO: 24 %
NEUTROPHILS # BLD AUTO: 4.65 X10 (3) UL (ref 1.5–7.7)
NEUTROPHILS # BLD AUTO: 4.65 X10(3) UL (ref 1.5–7.7)
NEUTROPHILS NFR BLD AUTO: 61.3 %
PLATELET # BLD AUTO: 201 10(3)UL (ref 150–450)
PROTHROMBIN TIME: 14.8 SECONDS (ref 11.6–14.8)
RBC # BLD AUTO: 3.23 X10(6)UL
WBC # BLD AUTO: 7.6 X10(3) UL (ref 4–11)

## 2023-05-13 PROCEDURE — 99232 SBSQ HOSP IP/OBS MODERATE 35: CPT | Performed by: INTERNAL MEDICINE

## 2023-05-13 RX ORDER — MELATONIN
325
Qty: 30 TABLET | Refills: 5 | Status: SHIPPED | OUTPATIENT
Start: 2023-05-13

## 2023-05-13 RX ORDER — HYDROCODONE BITARTRATE AND ACETAMINOPHEN 7.5; 325 MG/1; MG/1
1 TABLET ORAL EVERY 6 HOURS PRN
Qty: 90 TABLET | Refills: 0 | Status: SHIPPED | OUTPATIENT
Start: 2023-05-13 | End: 2023-06-12

## 2023-05-13 NOTE — PHYSICAL THERAPY NOTE
PHYSICAL THERAPY TREATMENT NOTE - INPATIENT     Room Number: 403/403-A       Presenting Problem: hematoma Right leg       Problem List  Principal Problem:    Hematoma, nontraumatic, soft tissue  Active Problems:    Atrial fibrillation, chronic (HCC)    Supratherapeutic INR    Anemia, blood loss      PHYSICAL THERAPY ASSESSMENT     RN approved PT session and pt working with therapist. Therapist donned PPE: mask and gloves. Pt in in supine and instructed on supine thera exs with ble;s to promote functional mobility. Pt needs AAROM with ble;s. Supine to sit with mod A x2 and sitting beau on EOB for 5-6 min. Pt needs cues for leaning forward due pt has sever posterior lean. Sit to stand with mod a x2 with elevated bed level. Upon standing pt leaning posterior and needs heavy cues for leaning on RW. Pt taking 3-4 small steps with RW mod A x2/max A and heavy cues to maintain upright posture. Pt sit down and reposition with mod Ax2. All needs in reach and bed on alarm. Elevated RLE over pillow. Pt The patient's Approx Degree of Impairment: 61.29% has been calculated based on documentation in the [facility] '6 clicks' Inpatient Basic Mobility Short Form. Research supports that patients with this level of impairment may benefit from sub-acute rehabilitation. DISCHARGE RECOMMENDATIONS  PT Discharge Recommendations: Sub-acute rehabilitation     PLAN  PT Treatment Plan: Bed mobility; Body mechanics; Patient education;Gait training;Strengthening;Transfer training;Balance training  Frequency (Obs): 3-5x/week    SUBJECTIVE  I am not hungry    OBJECTIVE  Precautions: Limb alert - right    WEIGHT BEARING RESTRICTION        R Lower Extremity: Weight Bearing as Tolerated       PAIN ASSESSMENT   Ratin  Location: R leg  Management Techniques: Activity promotion; Body mechanics; Relaxation;Repositioning    BALANCE  Static Sitting: Good  Dynamic Sitting: Fair +  Static Standing: Poor  Dynamic Standing: Poor -    ACTIVITY TOLERANCE O2 WALK       AM-PAC '6-Clicks' INPATIENT SHORT FORM - BASIC MOBILITY  How much difficulty does the patient currently have. .. Patient Difficulty: Turning over in bed (including adjusting bedclothes, sheets and blankets)?: A Little   Patient Difficulty: Sitting down on and standing up from a chair with arms (e.g., wheelchair, bedside commode, etc.): A Lot   Patient Difficulty: Moving from lying on back to sitting on the side of the bed?: A Little   How much help from another person does the patient currently need. .. Help from Another: Moving to and from a bed to a chair (including a wheelchair)?: A Lot   Help from Another: Need to walk in hospital room?: A Lot   Help from Another: Climbing 3-5 steps with a railing?: A Lot     AM-PAC Score:  Raw Score: 14   Approx Degree of Impairment: 61.29%   Standardized Score (AM-PAC Scale): 38.1   CMS Modifier (G-Code): CL    FUNCTIONAL ABILITY STATUS  Functional Mobility/Gait Assessment  Gait Assistance: Moderate assistance  Distance (ft): side stepping  Assistive Device: Rolling walker  Pattern: Shuffle  Stairs: Other (comment)    Additional information:     THERAPEUTIC EXERCISES  Lower Extremity Ankle pumps  Hip AB/AD  Heel slides  Knee extension  Leg slides  Quad sets     Position Supine       Patient End of Session: In bed;Needs met;Call light within reach;RN aware of session/findings; All patient questions and concerns addressed; Alarm set    CURRENT GOALS     Goals to be met by: 5/25/23  Patient Goal Patient's self-stated goal is: to go home   Goal #1 Patient is able to demonstrate supine - sit EOB @ level: supervision     Goal #1   Current Status    Goal #2 Patient is able to demonstrate transfers Sit to/from Stand at assistance level: supervision with walker - rolling     Goal #2  Current Status    Goal #3 Patient is able to ambulate 100 feet with assist device: walker - rolling at assistance level: supervision   Goal #3   Current Status    Goal #4 Goal #4   Current Status    Goal #5 Patient to demonstrate independence with home activity/exercise instructions provided to patient in preparation for discharge.    Goal #5   Current Status    Goal #6    Goal #6  Current Status        PT Session Time: 28 minutes  Therapeutic Activity: 28 minutes

## 2023-05-13 NOTE — PLAN OF CARE
Patient has been transferring stand and pivot to rolling chair. Pain is being controlled w/ Norco. Is tolerating general diet. Is using urinal for voiding. Was able to have bowel movement today. Plan is for pt to dc to possible rehab, pending choice/progress. Problem: Patient Centered Care  Goal: Patient preferences are identified and integrated in the patient's plan of care  Description: Interventions:  - What would you like us to know as we care for you?  My daughter lives in [place]  - Provide timely, complete, and accurate information to patient/family  - Incorporate patient and family knowledge, values, beliefs, and cultural backgrounds into the planning and delivery of care  - Encourage patient/family to participate in care and decision-making at the level they choose  - Honor patient and family perspectives and choices  Outcome: Progressing     Problem: Patient/Family Goals  Goal: Patient/Family Long Term Goal  Description: Patient's Long Term Goal: To no longer have swelling to LLE     Interventions:  - elevation  - holding anticoagulation     - See additional Care Plan goals for specific interventions  Outcome: Progressing  Goal: Patient/Family Short Term Goal  Description: Patient's Short Term Goal: to go home     Interventions:   - follow plan of care  - See additional Care Plan goals for specific interventions  Outcome: Progressing     Problem: PAIN - ADULT  Goal: Verbalizes/displays adequate comfort level or patient's stated pain goal  Description: INTERVENTIONS:  - Encourage pt to monitor pain and request assistance  - Assess pain using appropriate pain scale  - Administer analgesics based on type and severity of pain and evaluate response  - Implement non-pharmacological measures as appropriate and evaluate response  - Consider cultural and social influences on pain and pain management  - Manage/alleviate anxiety  - Utilize distraction and/or relaxation techniques  - Monitor for opioid side effects  - Notify MD/LIP if interventions unsuccessful or patient reports new pain  - Anticipate increased pain with activity and pre-medicate as appropriate  Outcome: Progressing     Problem: RISK FOR INFECTION - ADULT  Goal: Absence of fever/infection during anticipated neutropenic period  Description: INTERVENTIONS  - Monitor WBC  - Administer growth factors as ordered  - Implement neutropenic guidelines  Outcome: Progressing     Problem: SAFETY ADULT - FALL  Goal: Free from fall injury  Description: INTERVENTIONS:  - Assess pt frequently for physical needs  - Identify cognitive and physical deficits and behaviors that affect risk of falls.   - [place] fall precautions as indicated by assessment.  - Educate pt/family on patient safety including physical limitations  - Instruct pt to call for assistance with activity based on assessment  - Modify environment to reduce risk of injury  - Provide assistive devices as appropriate  - Consider OT/PT consult to assist with strengthening/mobility  - Encourage toileting schedule  Outcome: Progressing     Problem: DISCHARGE PLANNING  Goal: Discharge to home or other facility with appropriate resources  Description: INTERVENTIONS:  - Identify barriers to discharge w/pt and caregiver  - Include patient/family/discharge partner in discharge planning  - Arrange for needed discharge resources and transportation as appropriate  - Identify discharge learning needs (meds, wound care, etc)  - Arrange for interpreters to assist at discharge as needed  - Consider post-discharge preferences of patient/family/discharge partner  - Complete POLST form as appropriate  - Assess patient's ability to be responsible for managing their own health  - Refer to Case Management Department for coordinating discharge planning if the patient needs post-hospital services based on physician/LIP order or complex needs related to functional status, cognitive ability or social support system  Outcome: Progressing

## 2023-05-13 NOTE — CM/SW NOTE
[facility] has not yet responded to [facility] referral. Message sent via [employer] to [facility] requesting update on acceptance. 346pm  Patient accepted to [facility]. SW called and LVM for daughter, Karen Wasserman to notify. PLAN: [facility] pending med clear.          SANDRA Oconnor, [facility]    [id number]

## 2023-05-13 NOTE — PLAN OF CARE
Patient alert and oriented X4. Hematoma to the R leg. R leg elevated with ice packs in place prn. Up with 2 assist, stand and pivot to chair. Voiding with primofit. Pain management with norco prn. General diet. Encouraged to cough and deep breathe with incentive spirometer and patient is compliant. Fall precautions in place including bed in lowest position, call light and personal belongings within reach, non-skid socks. Frequent rounding by nursing staff. Plan is [facility], possibly [facility], pending auth and clearance. Problem: Patient Centered Care  Goal: Patient preferences are identified and integrated in the patient's plan of care  Description: Interventions:  - What would you like us to know as we care for you?  My daughter helps me a lot  - Provide timely, complete, and accurate information to patient/family  - Incorporate patient and family knowledge, values, beliefs, and cultural backgrounds into the planning and delivery of care  - Encourage patient/family to participate in care and decision-making at the level they choose  - Honor patient and family perspectives and choices  Outcome: Progressing     Problem: PAIN - ADULT  Goal: Verbalizes/displays adequate comfort level or patient's stated pain goal  Description: INTERVENTIONS:  - Encourage pt to monitor pain and request assistance  - Assess pain using appropriate pain scale  - Administer analgesics based on type and severity of pain and evaluate response  - Implement non-pharmacological measures as appropriate and evaluate response  - Consider cultural and social influences on pain and pain management  - Manage/alleviate anxiety  - Utilize distraction and/or relaxation techniques  - Monitor for opioid side effects  - Notify MD/LIP if interventions unsuccessful or patient reports new pain  - Anticipate increased pain with activity and pre-medicate as appropriate  Outcome: Progressing     Problem: RISK FOR INFECTION - ADULT  Goal: Absence of fever/infection during anticipated neutropenic period  Description: INTERVENTIONS  - Monitor WBC  - Administer growth factors as ordered  - Implement neutropenic guidelines  Outcome: Progressing     Problem: SAFETY ADULT - FALL  Goal: Free from fall injury  Description: INTERVENTIONS:  - Assess pt frequently for physical needs  - Identify cognitive and physical deficits and behaviors that affect risk of falls.   - [place] fall precautions as indicated by assessment.  - Educate pt/family on patient safety including physical limitations  - Instruct pt to call for assistance with activity based on assessment  - Modify environment to reduce risk of injury  - Provide assistive devices as appropriate  - Consider OT/PT consult to assist with strengthening/mobility  - Encourage toileting schedule  Outcome: Progressing     Problem: DISCHARGE PLANNING  Goal: Discharge to home or other facility with appropriate resources  Description: INTERVENTIONS:  - Identify barriers to discharge w/pt and caregiver  - Include patient/family/discharge partner in discharge planning  - Arrange for needed discharge resources and transportation as appropriate  - Identify discharge learning needs (meds, wound care, etc)  - Arrange for interpreters to assist at discharge as needed  - Consider post-discharge preferences of patient/family/discharge partner  - Complete POLST form as appropriate  - Assess patient's ability to be responsible for managing their own health  - Refer to Case Management Department for coordinating discharge planning if the patient needs post-hospital services based on physician/LIP order or complex needs related to functional status, cognitive ability or social support system  Outcome: Progressing

## 2023-05-14 PROCEDURE — 99232 SBSQ HOSP IP/OBS MODERATE 35: CPT | Performed by: INTERNAL MEDICINE

## 2023-05-14 NOTE — PLAN OF CARE
Patient has been transferring stand and pivot to rolling chair. Pain is being controlled w/ Norco. Is tolerating general diet. Is using urinal for voiding. Was able to have bowel movement today. Plan is for pt to dc to [facility] tomorrow pending bed availability. Problem: Patient Centered Care  Goal: Patient preferences are identified and integrated in the patient's plan of care  Description: Interventions:  - What would you like us to know as we care for you?  My daughter lives in [place]   - Provide timely, complete, and accurate information to patient/family  - Incorporate patient and family knowledge, values, beliefs, and cultural backgrounds into the planning and delivery of care  - Encourage patient/family to participate in care and decision-making at the level they choose  - Honor patient and family perspectives and choices  Outcome: Progressing     Problem: Patient/Family Goals  Goal: Patient/Family Long Term Goal  Description: Patient's Long Term Goal: To no longer have swelling to RLE    Interventions:  - elevation  - hold anticoagulation    - See additional Care Plan goals for specific interventions  Outcome: Progressing  Goal: Patient/Family Short Term Goal  Description: Patient's Short Term Goal: to go to rehab    Interventions:   - follow plan of care  - See additional Care Plan goals for specific interventions  Outcome: Progressing     Problem: PAIN - ADULT  Goal: Verbalizes/displays adequate comfort level or patient's stated pain goal  Description: INTERVENTIONS:  - Encourage pt to monitor pain and request assistance  - Assess pain using appropriate pain scale  - Administer analgesics based on type and severity of pain and evaluate response  - Implement non-pharmacological measures as appropriate and evaluate response  - Consider cultural and social influences on pain and pain management  - Manage/alleviate anxiety  - Utilize distraction and/or relaxation techniques  - Monitor for opioid side effects  - Notify MD/LIP if interventions unsuccessful or patient reports new pain  - Anticipate increased pain with activity and pre-medicate as appropriate  Outcome: Progressing     Problem: RISK FOR INFECTION - ADULT  Goal: Absence of fever/infection during anticipated neutropenic period  Description: INTERVENTIONS  - Monitor WBC  - Administer growth factors as ordered  - Implement neutropenic guidelines  Outcome: Progressing     Problem: SAFETY ADULT - FALL  Goal: Free from fall injury  Description: INTERVENTIONS:  - Assess pt frequently for physical needs  - Identify cognitive and physical deficits and behaviors that affect risk of falls.   - [place] fall precautions as indicated by assessment.  - Educate pt/family on patient safety including physical limitations  - Instruct pt to call for assistance with activity based on assessment  - Modify environment to reduce risk of injury  - Provide assistive devices as appropriate  - Consider OT/PT consult to assist with strengthening/mobility  - Encourage toileting schedule  Outcome: Progressing     Problem: DISCHARGE PLANNING  Goal: Discharge to home or other facility with appropriate resources  Description: INTERVENTIONS:  - Identify barriers to discharge w/pt and caregiver  - Include patient/family/discharge partner in discharge planning  - Arrange for needed discharge resources and transportation as appropriate  - Identify discharge learning needs (meds, wound care, etc)  - Arrange for interpreters to assist at discharge as needed  - Consider post-discharge preferences of patient/family/discharge partner  - Complete POLST form as appropriate  - Assess patient's ability to be responsible for managing their own health  - Refer to Case Management Department for coordinating discharge planning if the patient needs post-hospital services based on physician/LIP order or complex needs related to functional status, cognitive ability or social support system  Outcome: Progressing

## 2023-05-14 NOTE — CM/SW NOTE
SW notified by RN of likely DC today. SW notified [facility], requested bed avail. Awaiting review and response. 1127am  Notified by Marne Dandy at [facility] that [facility], Will notify SW with updates. 200pm  Notified by Marne Dandy at [facility] that bed is not available today, will be avail tomorrow 5/15. Notified RN. PLAN: [facility] pending bed avail. Bed ready tomorrow 5/15.     SANDRA Arzate, [facility] [place]    [id number]

## 2023-05-14 NOTE — PLAN OF CARE
Patient alert and oriented X4. Hematoma to the R leg. RLE elevated with ice packs in place. Up with 2 assist, stand and pivot to chair. Voiding with primofit. Incontinent BM episode over night. Pain management with norco prn. General diet. Encouraged to cough and deep breathe with incentive spirometer and patient is compliant. Fall precautions in place including bed in lowest position, call light and personal belongings within reach, non-skid socks. Frequent rounding by nursing staff. Plan for [facility] pending med clearance. Problem: Patient Centered Care  Goal: Patient preferences are identified and integrated in the patient's plan of care  Description: Interventions:  - What would you like us to know as we care for you?  My daughter helps me a lot  - Provide timely, complete, and accurate information to patient/family  - Incorporate patient and family knowledge, values, beliefs, and cultural backgrounds into the planning and delivery of care  - Encourage patient/family to participate in care and decision-making at the level they choose  - Honor patient and family perspectives and choices  Outcome: Progressing     Problem: PAIN - ADULT  Goal: Verbalizes/displays adequate comfort level or patient's stated pain goal  Description: INTERVENTIONS:  - Encourage pt to monitor pain and request assistance  - Assess pain using appropriate pain scale  - Administer analgesics based on type and severity of pain and evaluate response  - Implement non-pharmacological measures as appropriate and evaluate response  - Consider cultural and social influences on pain and pain management  - Manage/alleviate anxiety  - Utilize distraction and/or relaxation techniques  - Monitor for opioid side effects  - Notify MD/LIP if interventions unsuccessful or patient reports new pain  - Anticipate increased pain with activity and pre-medicate as appropriate  Outcome: Progressing     Problem: RISK FOR INFECTION - ADULT  Goal: Absence of fever/infection during anticipated neutropenic period  Description: INTERVENTIONS  - Monitor WBC  - Administer growth factors as ordered  - Implement neutropenic guidelines  Outcome: Progressing     Problem: SAFETY ADULT - FALL  Goal: Free from fall injury  Description: INTERVENTIONS:  - Assess pt frequently for physical needs  - Identify cognitive and physical deficits and behaviors that affect risk of falls.   - [place] fall precautions as indicated by assessment.  - Educate pt/family on patient safety including physical limitations  - Instruct pt to call for assistance with activity based on assessment  - Modify environment to reduce risk of injury  - Provide assistive devices as appropriate  - Consider OT/PT consult to assist with strengthening/mobility  - Encourage toileting schedule  Outcome: Progressing     Problem: DISCHARGE PLANNING  Goal: Discharge to home or other facility with appropriate resources  Description: INTERVENTIONS:  - Identify barriers to discharge w/pt and caregiver  - Include patient/family/discharge partner in discharge planning  - Arrange for needed discharge resources and transportation as appropriate  - Identify discharge learning needs (meds, wound care, etc)  - Arrange for interpreters to assist at discharge as needed  - Consider post-discharge preferences of patient/family/discharge partner  - Complete POLST form as appropriate  - Assess patient's ability to be responsible for managing their own health  - Refer to Case Management Department for coordinating discharge planning if the patient needs post-hospital services based on physician/LIP order or complex needs related to functional status, cognitive ability or social support system  Outcome: Progressing

## 2023-05-15 ENCOUNTER — PATIENT OUTREACH (OUTPATIENT)
Dept: CASE MANAGEMENT | Age: 88
End: 2023-05-15

## 2023-05-15 VITALS
SYSTOLIC BLOOD PRESSURE: 108 MMHG | BODY MASS INDEX: 20.23 KG/M2 | TEMPERATURE: 98 F | HEIGHT: 75 IN | RESPIRATION RATE: 14 BRPM | OXYGEN SATURATION: 95 % | WEIGHT: 162.69 LBS | DIASTOLIC BLOOD PRESSURE: 67 MMHG | HEART RATE: 96 BPM

## 2023-05-15 LAB
BASOPHILS # BLD AUTO: 0.01 X10(3) UL (ref 0–0.2)
BASOPHILS NFR BLD AUTO: 0.1 %
DEPRECATED RDW RBC AUTO: 50.6 FL (ref 35.1–46.3)
EOSINOPHIL # BLD AUTO: 0.14 X10(3) UL (ref 0–0.7)
EOSINOPHIL NFR BLD AUTO: 1.5 %
ERYTHROCYTE [DISTWIDTH] IN BLOOD BY AUTOMATED COUNT: 15.4 % (ref 11–15)
HCT VFR BLD AUTO: 29.8 %
HGB BLD-MCNC: 9.2 G/DL
IMM GRANULOCYTES # BLD AUTO: 0.05 X10(3) UL (ref 0–1)
IMM GRANULOCYTES NFR BLD: 0.5 %
LYMPHOCYTES # BLD AUTO: 1.17 X10(3) UL (ref 1–4)
LYMPHOCYTES NFR BLD AUTO: 12.1 %
MCH RBC QN AUTO: 28 PG (ref 26–34)
MCHC RBC AUTO-ENTMCNC: 30.9 G/DL (ref 31–37)
MCV RBC AUTO: 90.9 FL
MONOCYTES # BLD AUTO: 2.41 X10(3) UL (ref 0.1–1)
MONOCYTES NFR BLD AUTO: 25 %
NEUTROPHILS # BLD AUTO: 5.87 X10 (3) UL (ref 1.5–7.7)
NEUTROPHILS # BLD AUTO: 5.87 X10(3) UL (ref 1.5–7.7)
NEUTROPHILS NFR BLD AUTO: 60.8 %
PLATELET # BLD AUTO: 245 10(3)UL (ref 150–450)
PLATELET MORPHOLOGY: NORMAL
RBC # BLD AUTO: 3.28 X10(6)UL
WBC # BLD AUTO: 9.7 X10(3) UL (ref 4–11)

## 2023-05-15 PROCEDURE — 99239 HOSP IP/OBS DSCHRG MGMT >30: CPT | Performed by: INTERNAL MEDICINE

## 2023-05-15 RX ORDER — CYCLOBENZAPRINE HCL 10 MG
10 TABLET ORAL 3 TIMES DAILY PRN
Qty: 90 TABLET | Refills: 0 | Status: SHIPPED | OUTPATIENT
Start: 2023-05-15

## 2023-05-15 NOTE — PLAN OF CARE
Patient alert and oriented X4. R leg hematoma. Up with 2 assist, stand and pivot to rolling chair. Voiding with primofit. Pain management with norco and flexeril prn. General diet. Encouraged to cough and deep breathe with incentive spirometer and patient is compliant. Fall precautions in place including bed in lowest position, call light and personal belongings within reach, non-skid socks. Frequent rounding by nursing staff. Plan for [facility] today. Problem: Patient Centered Care  Goal: Patient preferences are identified and integrated in the patient's plan of care  Description: Interventions:  - What would you like us to know as we care for you?  My daughter has been very helpful  - Provide timely, complete, and accurate information to patient/family  - Incorporate patient and family knowledge, values, beliefs, and cultural backgrounds into the planning and delivery of care  - Encourage patient/family to participate in care and decision-making at the level they choose  - Honor patient and family perspectives and choices  Outcome: Progressing     Problem: PAIN - ADULT  Goal: Verbalizes/displays adequate comfort level or patient's stated pain goal  Description: INTERVENTIONS:  - Encourage pt to monitor pain and request assistance  - Assess pain using appropriate pain scale  - Administer analgesics based on type and severity of pain and evaluate response  - Implement non-pharmacological measures as appropriate and evaluate response  - Consider cultural and social influences on pain and pain management  - Manage/alleviate anxiety  - Utilize distraction and/or relaxation techniques  - Monitor for opioid side effects  - Notify MD/LIP if interventions unsuccessful or patient reports new pain  - Anticipate increased pain with activity and pre-medicate as appropriate  Outcome: Progressing     Problem: RISK FOR INFECTION - ADULT  Goal: Absence of fever/infection during anticipated neutropenic period  Description: INTERVENTIONS  - Monitor WBC  - Administer growth factors as ordered  - Implement neutropenic guidelines  Outcome: Progressing     Problem: SAFETY ADULT - FALL  Goal: Free from fall injury  Description: INTERVENTIONS:  - Assess pt frequently for physical needs  - Identify cognitive and physical deficits and behaviors that affect risk of falls.   - [place] fall precautions as indicated by assessment.  - Educate pt/family on patient safety including physical limitations  - Instruct pt to call for assistance with activity based on assessment  - Modify environment to reduce risk of injury  - Provide assistive devices as appropriate  - Consider OT/PT consult to assist with strengthening/mobility  - Encourage toileting schedule  Outcome: Progressing     Problem: DISCHARGE PLANNING  Goal: Discharge to home or other facility with appropriate resources  Description: INTERVENTIONS:  - Identify barriers to discharge w/pt and caregiver  - Include patient/family/discharge partner in discharge planning  - Arrange for needed discharge resources and transportation as appropriate  - Identify discharge learning needs (meds, wound care, etc)  - Arrange for interpreters to assist at discharge as needed  - Consider post-discharge preferences of patient/family/discharge partner  - Complete POLST form as appropriate  - Assess patient's ability to be responsible for managing their own health  - Refer to Case Management Department for coordinating discharge planning if the patient needs post-hospital services based on physician/LIP order or complex needs related to functional status, cognitive ability or social support system  Outcome: Progressing

## 2023-05-15 NOTE — DISCHARGE INSTRUCTIONS
Diet- general; PT/OT eval and treat; Activity- up with assist; CBC in 2 weeks  Follow up Visits:  Follow-up with DR Denice Saavedra  in 1 month   Follow up Labs: CBC in 2 weeks

## 2023-05-15 NOTE — CM/SW NOTE
SW confirmed with RN that pt is medically ready for discharge today. SW discussed with    to arrange a time for discharge. RN is aware of discharge time and location and will inform patient/ family. RN to attach IP Transfer Report to After Visit Summary packet for transfer to [facility]. Pt requires an ambulance according to the RN due to being a max assist. SW called and ordered an Ambulance from [facility] to transport pt to [facility] at 1:00 PM.  PCS flow sheet completed. RN to attached to AVS and print out. SW confirmed PASR screen was completed. SW/CM to remain available for support and/or discharge planning. PLAN: DC to [facility] via [facility] at 1:00 PM     RN to call report to [facility] at [phone]. Stephen Tate, ALEX, MSW ext.  [phone]

## 2023-05-15 NOTE — PLAN OF CARE
Problem: Patient Centered Care  Goal: Patient preferences are identified and integrated in the patient's plan of care  Description: Interventions:  - What would you like us to know as we care for you?   - Provide timely, complete, and accurate information to patient/family  - Incorporate patient and family knowledge, values, beliefs, and cultural backgrounds into the planning and delivery of care  - Encourage patient/family to participate in care and decision-making at the level they choose  - Honor patient and family perspectives and choices  Outcome: Adequate for Discharge     Problem: Patient/Family Goals  Goal: Patient/Family Long Term Goal  Description: Patient's Long Term Goal:     Interventions:  -   - See additional Care Plan goals for specific interventions  Outcome: Adequate for Discharge  Goal: Patient/Family Short Term Goal  Description: Patient's Short Term Goal:     Interventions:   -   - See additional Care Plan goals for specific interventions  Outcome: Adequate for Discharge     Problem: PAIN - ADULT  Goal: Verbalizes/displays adequate comfort level or patient's stated pain goal  Description: INTERVENTIONS:  - Encourage pt to monitor pain and request assistance  - Assess pain using appropriate pain scale  - Administer analgesics based on type and severity of pain and evaluate response  - Implement non-pharmacological measures as appropriate and evaluate response  - Consider cultural and social influences on pain and pain management  - Manage/alleviate anxiety  - Utilize distraction and/or relaxation techniques  - Monitor for opioid side effects  - Notify MD/LIP if interventions unsuccessful or patient reports new pain  - Anticipate increased pain with activity and pre-medicate as appropriate  Outcome: Adequate for Discharge     Problem: RISK FOR INFECTION - ADULT  Goal: Absence of fever/infection during anticipated neutropenic period  Description: INTERVENTIONS  - Monitor WBC  - Administer growth factors as ordered  - Implement neutropenic guidelines  Outcome: Adequate for Discharge     Problem: SAFETY ADULT - FALL  Goal: Free from fall injury  Description: INTERVENTIONS:  - Assess pt frequently for physical needs  - Identify cognitive and physical deficits and behaviors that affect risk of falls.   - [place] fall precautions as indicated by assessment.  - Educate pt/family on patient safety including physical limitations  - Instruct pt to call for assistance with activity based on assessment  - Modify environment to reduce risk of injury  - Provide assistive devices as appropriate  - Consider OT/PT consult to assist with strengthening/mobility  - Encourage toileting schedule  Outcome: Adequate for Discharge     Problem: DISCHARGE PLANNING  Goal: Discharge to home or other facility with appropriate resources  Description: INTERVENTIONS:  - Identify barriers to discharge w/pt and caregiver  - Include patient/family/discharge partner in discharge planning  - Arrange for needed discharge resources and transportation as appropriate  - Identify discharge learning needs (meds, wound care, etc)  - Arrange for interpreters to assist at discharge as needed  - Consider post-discharge preferences of patient/family/discharge partner  - Complete POLST form as appropriate  - Assess patient's ability to be responsible for managing their own health  - Refer to Case Management Department for coordinating discharge planning if the patient needs post-hospital services based on physician/LIP order or complex needs related to functional status, cognitive ability or social support system  Outcome: Adequate for Discharge

## 2023-05-16 ENCOUNTER — EXTERNAL FACILITY (OUTPATIENT)
Dept: INTERNAL MEDICINE CLINIC | Facility: CLINIC | Age: 88
End: 2023-05-16

## 2023-05-16 DIAGNOSIS — M25.512 LEFT SHOULDER PAIN, UNSPECIFIED CHRONICITY: ICD-10-CM

## 2023-05-16 DIAGNOSIS — R26.9 GAIT ABNORMALITY: ICD-10-CM

## 2023-05-16 DIAGNOSIS — Z87.01 HISTORY OF PNEUMONIA: ICD-10-CM

## 2023-05-16 DIAGNOSIS — R15.9 INCONTINENCE OF FECES, UNSPECIFIED FECAL INCONTINENCE TYPE: ICD-10-CM

## 2023-05-16 DIAGNOSIS — I27.20 PULMONARY HYPERTENSION (HCC): ICD-10-CM

## 2023-05-16 DIAGNOSIS — S80.11XA HEMATOMA OF RIGHT LOWER EXTREMITY, INITIAL ENCOUNTER: Primary | ICD-10-CM

## 2023-05-16 DIAGNOSIS — Z86.19 HISTORY OF SEPSIS: ICD-10-CM

## 2023-05-16 DIAGNOSIS — M47.816 OSTEOARTHRITIS OF LUMBAR SPINE, UNSPECIFIED SPINAL OSTEOARTHRITIS COMPLICATION STATUS: ICD-10-CM

## 2023-05-16 DIAGNOSIS — D62 ANEMIA DUE TO ACUTE BLOOD LOSS: ICD-10-CM

## 2023-05-16 DIAGNOSIS — N13.30 HYDRONEPHROSIS, UNSPECIFIED HYDRONEPHROSIS TYPE: ICD-10-CM

## 2023-05-16 DIAGNOSIS — I73.9 PVD (PERIPHERAL VASCULAR DISEASE) (HCC): ICD-10-CM

## 2023-05-16 DIAGNOSIS — I25.10 CORONARY ARTERY DISEASE, UNSPECIFIED VESSEL OR LESION TYPE, UNSPECIFIED WHETHER ANGINA PRESENT, UNSPECIFIED WHETHER NATIVE OR TRANSPLANTED HEART: ICD-10-CM

## 2023-05-16 DIAGNOSIS — I48.20 CHRONIC ATRIAL FIBRILLATION (HCC): ICD-10-CM

## 2023-05-16 DIAGNOSIS — L89.159 PRESSURE INJURY OF SKIN OF SACRAL REGION, UNSPECIFIED INJURY STAGE: ICD-10-CM

## 2023-05-16 DIAGNOSIS — N20.0 NEPHROLITHIASIS: ICD-10-CM

## 2023-05-16 DIAGNOSIS — Z87.39 HISTORY OF OSTEOMYELITIS: ICD-10-CM

## 2023-05-16 PROCEDURE — 99306 1ST NF CARE HIGH MDM 50: CPT | Performed by: INTERNAL MEDICINE

## 2023-05-16 PROCEDURE — 1111F DSCHRG MED/CURRENT MED MERGE: CPT | Performed by: INTERNAL MEDICINE

## 2023-05-18 ENCOUNTER — EXTERNAL FACILITY (OUTPATIENT)
Dept: INTERNAL MEDICINE CLINIC | Facility: CLINIC | Age: 88
End: 2023-05-18

## 2023-05-18 DIAGNOSIS — I48.91 ATRIAL FIBRILLATION, UNSPECIFIED TYPE (HCC): ICD-10-CM

## 2023-05-18 DIAGNOSIS — D62 ANEMIA DUE TO ACUTE BLOOD LOSS: ICD-10-CM

## 2023-05-18 DIAGNOSIS — S80.11XA HEMATOMA OF RIGHT LOWER EXTREMITY, INITIAL ENCOUNTER: Primary | ICD-10-CM

## 2023-05-18 DIAGNOSIS — Z87.01 HISTORY OF PNEUMONIA: ICD-10-CM

## 2023-05-18 PROCEDURE — 99309 SBSQ NF CARE MODERATE MDM 30: CPT | Performed by: INTERNAL MEDICINE

## 2023-05-18 PROCEDURE — 1111F DSCHRG MED/CURRENT MED MERGE: CPT | Performed by: INTERNAL MEDICINE

## 2023-05-22 ENCOUNTER — EXTERNAL FACILITY (OUTPATIENT)
Dept: INTERNAL MEDICINE CLINIC | Facility: CLINIC | Age: 88
End: 2023-05-22

## 2023-05-22 ENCOUNTER — TELEPHONE (OUTPATIENT)
Dept: INTERNAL MEDICINE CLINIC | Facility: CLINIC | Age: 88
End: 2023-05-22

## 2023-05-22 DIAGNOSIS — L85.3 DRY SKIN: ICD-10-CM

## 2023-05-22 DIAGNOSIS — D62 ANEMIA DUE TO ACUTE BLOOD LOSS: ICD-10-CM

## 2023-05-22 DIAGNOSIS — I48.91 ATRIAL FIBRILLATION, UNSPECIFIED TYPE (HCC): ICD-10-CM

## 2023-05-22 DIAGNOSIS — S80.11XA HEMATOMA OF RIGHT LOWER EXTREMITY, INITIAL ENCOUNTER: Primary | ICD-10-CM

## 2023-05-22 PROCEDURE — 99309 SBSQ NF CARE MODERATE MDM 30: CPT | Performed by: INTERNAL MEDICINE

## 2023-05-22 PROCEDURE — 1111F DSCHRG MED/CURRENT MED MERGE: CPT | Performed by: INTERNAL MEDICINE

## 2023-05-22 NOTE — TELEPHONE ENCOUNTER
Called patient who is at [facility] and wants to be discharged today - RN explained that he needs to speak with staff and DR/D - to DR. ALLEN

## 2023-05-22 NOTE — TELEPHONE ENCOUNTER
Pt. Is in [facility] for rehab on his right leg. Pt. States he wants to get out of there today.

## 2023-05-23 ENCOUNTER — EXTERNAL FACILITY (OUTPATIENT)
Dept: INTERNAL MEDICINE CLINIC | Facility: CLINIC | Age: 88
End: 2023-05-23

## 2023-05-23 DIAGNOSIS — D62 ANEMIA DUE TO ACUTE BLOOD LOSS: ICD-10-CM

## 2023-05-23 DIAGNOSIS — L85.3 DRY SKIN: ICD-10-CM

## 2023-05-23 DIAGNOSIS — S80.11XA HEMATOMA OF RIGHT LOWER EXTREMITY, INITIAL ENCOUNTER: Primary | ICD-10-CM

## 2023-05-23 DIAGNOSIS — I48.91 ATRIAL FIBRILLATION, UNSPECIFIED TYPE (HCC): ICD-10-CM

## 2023-05-23 PROCEDURE — 1111F DSCHRG MED/CURRENT MED MERGE: CPT | Performed by: INTERNAL MEDICINE

## 2023-05-23 PROCEDURE — 99309 SBSQ NF CARE MODERATE MDM 30: CPT | Performed by: INTERNAL MEDICINE

## 2023-05-25 ENCOUNTER — EXTERNAL FACILITY (OUTPATIENT)
Dept: INTERNAL MEDICINE CLINIC | Facility: CLINIC | Age: 88
End: 2023-05-25

## 2023-05-25 DIAGNOSIS — D62 ANEMIA DUE TO ACUTE BLOOD LOSS: ICD-10-CM

## 2023-05-25 DIAGNOSIS — S80.11XA HEMATOMA OF RIGHT LOWER EXTREMITY, INITIAL ENCOUNTER: Primary | ICD-10-CM

## 2023-05-25 DIAGNOSIS — I48.91 ATRIAL FIBRILLATION, UNSPECIFIED TYPE (HCC): ICD-10-CM

## 2023-05-25 DIAGNOSIS — L85.3 DRY SKIN: ICD-10-CM

## 2023-05-25 PROCEDURE — 1111F DSCHRG MED/CURRENT MED MERGE: CPT | Performed by: INTERNAL MEDICINE

## 2023-05-25 PROCEDURE — 99316 NF DSCHRG MGMT 30 MIN+: CPT | Performed by: INTERNAL MEDICINE

## 2023-05-30 ENCOUNTER — TELEPHONE (OUTPATIENT)
Dept: INTERNAL MEDICINE CLINIC | Facility: CLINIC | Age: 88
End: 2023-05-30

## 2023-05-30 NOTE — TELEPHONE ENCOUNTER
Kiki Shanks / Dr Anton Gauthier office is requesting the office visit notes from 1/27 virtual visit be faxed    Fax [phone]

## 2023-06-07 RX ORDER — HYDROCODONE BITARTRATE AND ACETAMINOPHEN 7.5; 325 MG/1; MG/1
1 TABLET ORAL EVERY 6 HOURS PRN
Qty: 90 TABLET | Refills: 0 | OUTPATIENT
Start: 2023-06-07 | End: 2023-07-07

## 2023-06-07 NOTE — TELEPHONE ENCOUNTER
Pt called back, he does not have the written norco prescription   Pt asks if on call doctor can send rx to [place]

## 2023-06-08 RX ORDER — HYDROCODONE BITARTRATE AND ACETAMINOPHEN 7.5; 325 MG/1; MG/1
1 TABLET ORAL EVERY 6 HOURS PRN
Qty: 90 TABLET | Refills: 0 | Status: SHIPPED | OUTPATIENT
Start: 2023-06-08 | End: 2023-07-08

## 2023-06-08 NOTE — PROGRESS NOTES
History and physical    [facility] health care note transcribed by Dr. Pawan Taveras date: 5/15/2023  Date seen: 5/16/2023    Chief Complaint: Status post fall with lower extremity hematoma    HPI: Patient is a 61-year-old male frail from extensive medical history, follows with Dr. Gianna Agudelo as an outpatient, who fell injuring the leg, noted to have large hematoma, presented to the emergency room, there he was seen and evaluated, taken off his anticoagulants admitted, treated for pneumonia as well seen by multiple specialist, anticoagulation reversed. His blood counts stabilized, he stabilized, had pain control and was transitioned here to [address] [place] [address] in stable condition for rehab. Patient seen and examined by me has been known to me by the previous admissions here at [facility]. Normally lives in assisted living. Medications continued as recommended by the discharging hospital physician.     Past Medical History:  Diagnosis Date  AF (atrial fibrillation) (HCC)  Arrhythmia  Cataract  Chronic pulmonary aspiration  ARF 6/16 BiPAP  Congestive heart disease (HCC)  COPD (chronic obstructive pulmonary disease) ([address]  Coronary atherosclerosis  COVID  Depression  Dysphagia  Esophageal reflux  Fracture, radius 2014  Hip fracture, left ([address] 01/2017  s/p IM fixation 1/11/17; Ortho Dr Alcantara  IBS (irritable bowel syndrome)  Lipid screening 10/04/2010  Malabsorption  Malnutrition (HCC)  severe  Osteoarthrosis, unspecified whether generalized or localized, unspecified site  hands  Pneumonia  Pneumonia due to organism 2016  PUD (peptic ulcer disease)  Pulmonary hypertension ([address]  Thrombocytosis 2013  Villous adenoma  Vitamin B12 deficiency  White matter disease    Past Surgical History:  Procedure Laterality Date  CARDIOVERSION 2007  CATARACT 2006  CATH PERCUTANEOUS TRANSLUMINAL CORONARY ANGIOPLASTY 1990  CHOLECYSTECTOMY  COLON SURGERY Right 10/11  FAT, FECAL QUALITATIVE 12/10  FRACTURE SURGERY  Left femur 2016  HIP SURGERY 01/11/2017  INTERMEDULLARY FIXATION OF LEFT HIP FRACTURE  HLA B 27 DISEASE ASSOCIATION 2006  INTRAOCULAR LENS IMPLANT, SECONDARY 2006  LEXISCAN NUC STRESS TST, CARDIO ([facility]) 9/11  OTHER SURGICAL HISTORY 1970  Billroth anastomosis  OTHER SURGICAL HISTORY 2011  dental implants  SKIN SURGERY 02/11/2019  Mohs/R Dorsal hand/SCC/done by MM      Allergies:    Dronedarone  Comment:Other reaction(s): extreme dizzinss    Home Medications:  Medications  Current Medications as of May 12, 2023  Generic Name Brand Name Strength Route/ Site Freq. Acetaminophen (Tab) Tylenol Extra Strength 500 MG Oral Take 1 tablet (500 mg total) by mouth every 6 (six) hours as needed for Pain. Cefdinir (Cap) Omnicef 300 MG Oral Take 1 capsule (300 mg total) by mouth 2 (two) times daily. Cholecalciferol (Tab) Vitamin D 1000 units Oral Take 1 tablet by mouth daily. Cyanocobalamin (Solution) Vitamin B12 1000 MCG/ML INJECT 1 ML(1000 MCG) IN THE MUSCLE EVERY 30 DAYS    Digoxin (Tab) Lanoxin 0.125 MG TAKE 1 TABLET(125 MCG) BY MOUTH EVERY DAY    dilTIAZem HCl Coated Beads (Capsule SR 24 Hr) CARDIZEM  MG TAKE 1 CAPSULE(180 MG) BY MOUTH DAILY    Furosemide (Tab) Lasix 20 MG Oral Take 1 tablet (20 mg total) by mouth daily as needed. Prn leg swelling    guaiFENesin Robitussin 100 MG/5 ML Oral Take 10 mL (200 mg total) by mouth every 4 (four) hours as needed. HYDROcodone-Acetaminophen (Tab) Norco 7.5-325 MG Oral Take 1 tablet by mouth every 6 (six) hours as needed for Pain. Loperamide HCl (Cap) Imodium 2 MG Oral Take 1 capsule (2 mg total) by mouth 4 (four) times daily as needed for Diarrhea. Melatonin (Tab) melatonin 3 MG Oral Take 1 tablet (3 mg total) by mouth nightly. Multiple Vitamins-Minerals (Tab) ICaps AREDS Formula Oral Take 1 tablet by mouth daily. Nepafenac (Suspension) Ilevro 0.3 % Each eye 1 drop by Each eye route daily.     Potassium Chloride (Tab CR) K-DUR 10 MEQ Oral Take 1 tablet (10 mEq total) by mouth daily as needed. (take only when taking furosemide)    predniSONE (Tab) Deltasone 2.5 MG TAKE 1 TABLET(2.5 MG) BY MOUTH DAILY    Syringe/Needle (Disp) (Misc) BD Luer-Ruben Syringe 23G X 1\" 3 ML AS DIRECTED    Warfarin Sodium (Tab) Coumadin 6 MG Oral Take 1 tablet (6 mg total) by mouth daily. Pt. Takes 6mg Tuesday and Friday    Warfarin Sodium (Tab) Coumadin 4 MG Oral Take 1-1.5 tablets (4-6 mg total) by mouth daily with dinner. As directed by coumadin clinic      SOCIAL HISTORY  reports that he quit smoking about 29 years ago. His smoking use included cigarettes. He has a 60.00 pack-year smoking history. He quit smokeless tobacco use about 7 years ago. His smokeless tobacco use included chew. He reports current alcohol use. He reports that he does not use drugs. Family History  Problem Relation Age of Onset  Other (old age) Father 80  cause of death  Other (Other) Mother 80  cause of death  Cancer Paternal Aunt  Diabetes Paternal Aunt    Current medications: See chart    REVIEW OF SYSTEMS:  Constitutional: Negative for Chills, fatigue, fever, malaise, weight gain and weight loss. ENMT: Negative for Nasal drainage and sinus pressure. Eyes: Negative for Vision changes. Respiratory: Negative for Cough, dyspnea and wheezing. Cardio: Negative Chest pain and irregular heartbeat/palpitations. GI: Negative for Abdominal pain, constipation, diarrhea, heartburn, nausea and vomiting. : Negative for Dysuria and urinary frequency. Endocrine: Negative for Cold intolerance and heat intolerance. Neuro: Negative for Gait disturbance and memory impairment. Psych: Negative for Anxiety and depression. Integumentary: Negative for Hives and rash. MS: Negative muscle weakness. pos for joint pain  Hema/Lymph: Negative Easy bleeding and easy bruising. Allergic/Immuno: Negative Environmental allergies and food allergies.     PHYSICAL EXAMINATION: Vital signs: See chart  Gen. exam: Alert and awake, mental status at baseline, in no acute distress, thin appearing man  HEENT: Pupils equal and reactive to light and accommodation, moist mucous membranes  Neck exam: Supple with baseline range of motion. Normal thyroid trachea midline, no JVD  Heart exam: Regular rate and rhythm no murmurs no S3 no S4  Lung exam: No rales no rhonchi no wheezes, distant breath sounds  Abdominal exam: Soft nontender, nondistended positive bowel sounds are normoactive  Extremities exam: no clubbing no cyanosis no edema  Skin exam: see wound notes for details, no rashes, right lower extremity was grotesque appearing hematoma area, bulging on the outside of the lower right leg, flaking skin surrounding it. Neurological exam: Cranial nerves II through XII intact, no gross deficits  Musculoskeletal exam: Advanced bilateral generalized hand arthritis appreciated, no obvious deformity    Labs/imaging: See chart    DVT prophylaxis: Contraindicated with acute blood loss anemia    Ambulatory status: Per hospital discharge recommendations, specialist involvement/recommendations and physical therapy evaluation    Assessment and plan: We have a 70-year-old male status post fall with hematoma on Coumadin, now held admitted for rehab  RLE hematoma: ultrasound RLE shows two large hematomas to RLE: no evidence for compartment syndrome; no need for surgical intervention, holding anticoagulation, pain control.  Physical therapy, occupational therapy, physiatry to evaluate and treat, further orders pending clinical course  Anemia due to acute blood loss: Appears stable, continue current monitoring  Atrial fibrillation: Stable, continue current monitoring management, anticoagulation on hold for now, rate control  History of Pneumonia: Stable, completed IV ceftriaxone, day 3 of azithromycin, continue current medications monitoring management, offer in-house pulmonology consult, serial imaging per them  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 Gino Aparicio  Stool incontinence: Work-up negative continue current observation  Left shoulder pain: Status post work-up and x-ray, therapy recommended, frozen shoulder diagnosis  Pulmonary HTN: Stable continue current monitoring management, breathing treatments, pulmonary involvement  Sacral decubitus ulcer: Resolved, continue current monitor management, wound involvement  Coronary artery disease: Stable continue current medication management    CODE STATUS: Full  admit condition: Stable    Over 60 minutes spent in direct patient contact reviewing and creating admission orders, obtaining history, evaluating patient, discussing treatment options, family/staff communication, review of available labs and radiology reports, completing documentation, and coordinating care

## 2023-06-08 NOTE — TELEPHONE ENCOUNTER
Patient is calling checking on his prescription for [facility]. Per patient he does not have a written order.     Patient states he only has 1 pill left    Please send script to [place]

## 2023-06-08 NOTE — TELEPHONE ENCOUNTER
To MD:  The above refill request is for a controlled substance. Please review pended medication order. Print and sign for staff to fax to pharmacy or prescribe electronically. Last office visit: 9/28/20 (seen by EMA in hospital during admission in May)  Last time refill sent and quantity/refills: 5/13/23 # 90 -print script from during hospitalization that patient reports he does not have. Per [place] , last dispensed #90 5/11/23 off rx written 5/10/23. TO Dr. REES to please advise as Dr. Trice García is out of office until 6/12. Please see below. Per Epic record, it looks like patient may have been given a print script while hospitalized but patient does not have written script.

## 2023-06-08 NOTE — PROGRESS NOTES
[facility] care note transcribed by Dr. Ritesh Gonzales    Date seen: 5/18/2023    Subjective: Patient seen and examined for right lower extremity hematoma, anticoagulation, peripheral vascular disease, anemia. Patient seems stable, doing well, no changes overnight, does improving, the right lower extremity does seem flaky, but he continues to improve, work with physical therapy, getting stronger very quickly. PHYSICAL EXAMINATION: Vital signs: See chart  Gen. exam: Alert and awake, mental status at baseline, in no acute distress, thin appearing man  HEENT: Pupils equal and reactive to light and accommodation, moist mucous membranes  Neck exam: Supple with baseline range of motion. Normal thyroid trachea midline, no JVD  Heart exam: Regular rate and rhythm no murmurs no S3 no S4  Lung exam: No rales no rhonchi no wheezes, distant breath sounds  Abdominal exam: Soft nontender, nondistended positive bowel sounds are normoactive  Extremities exam: no clubbing no cyanosis no edema  Skin exam: see wound notes for details, no rashes, right lower extremity was grotesque appearing hematoma area, bulging on the outside of the lower right leg, flaking skin surrounding it. No change in size  Neurological exam: Cranial nerves II through XII intact, no gross deficits  Musculoskeletal exam: Advanced bilateral generalized hand arthritis appreciated, no obvious deformity    Labs/imaging: See chart    DVT prophylaxis: Contraindicated with acute blood loss anemia    Ambulatory status: Per hospital discharge recommendations, specialist involvement/recommendations and physical therapy evaluation    Assessment and plan: We have a 80-year-old male status post fall with hematoma on Coumadin, now held admitted for rehab  RLE hematoma: ultrasound RLE shows two large hematomas to RLE: no evidence for compartment syndrome; no need for surgical intervention, holding anticoagulation, pain control.  Physical therapy, occupational therapy, physiatry to evaluate and treat, further orders pending clinical course  Anemia due to acute blood loss: Appears stable, continue current monitoring, off anticoagulation  Atrial fibrillation: Stable, continue current monitoring management, anticoagulation on hold for now, rate control  History of Pneumonia: Stable, completed IV ceftriaxone, day 3 of azithromycin, continue current medications monitoring management, offer in-house pulmonology consult, serial imaging per them    Stable problem list:  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 Mariposa Snowden  Stool incontinence: Work-up negative continue current observation  Left shoulder pain: Status post work-up and x-ray, therapy recommended, frozen shoulder diagnosis  Pulmonary HTN: Stable continue current monitoring management, breathing treatments, pulmonary involvement  Sacral decubitus ulcer: Resolved, continue current monitor management, wound involvement  Coronary artery disease: Stable continue current medication management    CODE STATUS: Full

## 2023-06-08 NOTE — TELEPHONE ENCOUNTER
Telephone call to patient and message left. I reviewed the patient's record to see how long he has been taking the Dennise Ryan however he should continue to take it. I refilled the patient's medication as requested.

## 2023-06-09 NOTE — PROGRESS NOTES
[facility] care note transcribed by Dr. Robbin Young    Date seen: 5/22/2023    Subjective: Patient seen and examined for right lower extremity hematoma, anticoagulation, peripheral vascular disease, anemia. Patient seen and examined seems stable, lab work reviewed, and anemia levels continue to improve, his right lower extremity is much improved, the area is getting smaller, flaky skin in the area discussed at length the risk of infection he verbalized understanding. PHYSICAL EXAMINATION: Vital signs: See chart  Gen. exam: Alert and awake, mental status at baseline, in no acute distress, thin appearing man  HEENT: Pupils equal and reactive to light and accommodation, moist mucous membranes  Neck exam: Supple with baseline range of motion. Normal thyroid trachea midline, no JVD  Heart exam: Regular rate and rhythm no murmurs no S3 no S4  Lung exam: No rales no rhonchi no wheezes, distant breath sounds  Abdominal exam: Soft nontender, nondistended positive bowel sounds are normoactive  Extremities exam: no clubbing no cyanosis no edema  Skin exam: see wound notes for details, no rashes, right lower extremity was grotesque appearing hematoma area, bulging on the outside of the lower right leg, flaking skin surrounding it.  No change in size  Neurological exam: Cranial nerves II through XII intact, no gross deficits  Musculoskeletal exam: Advanced bilateral generalized hand arthritis appreciated, no obvious deformity    Labs/imaging: See chart    DVT prophylaxis: Contraindicated with acute blood loss anemia    Ambulatory status: Per hospital discharge recommendations, specialist involvement/recommendations and physical therapy evaluation    Assessment and plan: We have a 80-year-old male status post fall with hematoma on Coumadin, now held admitted for rehab  RLE hematoma: ultrasound RLE shows two large hematomas to RLE: no evidence for compartment syndrome; no need for surgical intervention, holding anticoagulation, pain control.  Physical therapy, occupational therapy, physiatry to evaluate and treat, further orders pending clinical course  Anemia due to acute blood loss: Appears stable, continue current monitoring, off anticoagulation  Atrial fibrillation: Stable, continue current monitoring management, anticoagulation on hold for now, rate control  Dry skin: We will use Eucerin cream twice daily to the area    Stable problem list:  History of Pneumonia: Stable, completed IV ceftriaxone, day 3 of azithromycin, continue current medications monitoring management, offer in-house pulmonology consult, serial imaging per them  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 Jermain Tirado  Stool incontinence: Work-up negative continue current observation  Left shoulder pain: Status post work-up and x-ray, therapy recommended, frozen shoulder diagnosis  Pulmonary HTN: Stable continue current monitoring management, breathing treatments, pulmonary involvement  Sacral decubitus ulcer: Resolved, continue current monitor management, wound involvement  Coronary artery disease: Stable continue current medication management    CODE STATUS: Full

## 2023-06-12 RX ORDER — CYANOCOBALAMIN 1000 UG/ML
INJECTION INTRAMUSCULAR; SUBCUTANEOUS
Qty: 3 ML | Refills: 3 | Status: SHIPPED | OUTPATIENT
Start: 2023-06-12

## 2023-06-12 NOTE — PROGRESS NOTES
[facility] care note transcribed by Dr. Carmelita Xavier    Date seen: 5/23/2023    Subjective: Patient seen and examined for right lower extremity hematoma, anticoagulation, peripheral vascular disease, anemia, flaking skin. Patient seems stable, doing well, the skin flaking of the right lower extremity seems better, patient is contemplating early discharge, wants to go back to his previous assisted living arrangements, his right lower extremity is much improving, is doing very well with physical therapy. This does seem reasonable, I did message the social service team to communicate with his assisted living environment, they will need to come evaluate him before he was readmitted. I did discuss with his primary care doctor the restart of his anticoagulants, we have decided to keep him off his anticoagulants until he is discharged home for a few weeks and study before he sees Dr. Fernanda Kawasaki in the office, and we will decide on the restart of anticoagulation at that time    PHYSICAL EXAMINATION: Vital signs: See chart  Gen. exam: Alert and awake, mental status at baseline, in no acute distress, thin appearing man  HEENT: Pupils equal and reactive to light and accommodation, moist mucous membranes  Neck exam: Supple with baseline range of motion. Normal thyroid trachea midline, no JVD  Heart exam: Regular rate and rhythm no murmurs no S3 no S4  Lung exam: No rales no rhonchi no wheezes, distant breath sounds  Abdominal exam: Soft nontender, nondistended positive bowel sounds are normoactive  Extremities exam: no clubbing no cyanosis no edema  Skin exam: see wound notes for details, no rashes, right lower extremity was grotesque appearing hematoma area, bulging on the outside of the lower right leg, flaking skin surrounding it.  No change in size  Neurological exam: Cranial nerves II through XII intact, no gross deficits  Musculoskeletal exam: Advanced bilateral generalized hand arthritis appreciated, no obvious deformity    Labs/imaging: See chart    DVT prophylaxis: Contraindicated with acute blood loss anemia    Ambulatory status: Per hospital discharge recommendations, specialist involvement/recommendations and physical therapy evaluation    Assessment and plan: We have a 35-year-old male status post fall with hematoma on Coumadin, now held admitted for rehab  RLE hematoma: ultrasound RLE shows two large hematomas to RLE: no evidence for compartment syndrome; no need for surgical intervention, holding anticoagulation, pain control.  Physical therapy, occupational therapy, physiatry to evaluate and treat, further orders pending clinical course, discharge planning  Anemia due to acute blood loss: Appears stable, continue current monitoring, off anticoagulation, patient will remain off anticoagulation until he is seen by his outpatient physician 2 to 4 weeks after discharge  Atrial fibrillation: Stable, continue current monitoring management, anticoagulation on hold for now, rate control  Dry skin: We will use Eucerin cream twice daily to the area    Stable problem list:  History of Pneumonia: Stable, completed IV ceftriaxone, day 3 of azithromycin, continue current medications monitoring management, offer in-house pulmonology consult, serial imaging per them  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 Jenny Carroll  Stool incontinence: Work-up negative continue current observation  Left shoulder pain: Status post work-up and x-ray, therapy recommended, frozen shoulder diagnosis  Pulmonary HTN: Stable continue current monitoring management, breathing treatments, pulmonary involvement  Sacral decubitus ulcer: Resolved, continue current monitor management, wound involvement  Coronary artery disease: Stable continue current medication management    CODE STATUS: Full

## 2023-06-12 NOTE — PROGRESS NOTES
Discharge summary:    [facility] care note transcribed by Dr. Scot Mckeon    Date seen: 5/25/2023    Subjective: Patient seen and examined for right lower extremity hematoma, anticoagulation, peripheral vascular disease, anemia, flaking skin, discharge planning. Patient seems stable, doing well, is planning on discharging home to his assisted living environment in the near future, did discuss staying off the anticoagulants, home services maximized by social service he was discharged home in stable condition with follow-up with his primary care physician in 5 to 7 days. PHYSICAL EXAMINATION: Vital signs: See chart  Gen. exam: Alert and awake, mental status at baseline, in no acute distress, thin appearing man  HEENT: Pupils equal and reactive to light and accommodation, moist mucous membranes  Neck exam: Supple with baseline range of motion.  Normal thyroid trachea midline, no JVD  Heart exam: Regular rate and rhythm no murmurs no S3 no S4  Lung exam: No rales no rhonchi no wheezes, distant breath sounds  Abdominal exam: Soft nontender, nondistended positive bowel sounds are normoactive  Extremities exam: no clubbing no cyanosis no edema  Skin exam: see wound notes for details, no rashes, right lower extremity was grotesque appearing hematoma area much improved but still present, flaking skin minimal at this point  Neurological exam: Cranial nerves II through XII intact, no gross deficits  Musculoskeletal exam: Advanced bilateral generalized hand arthritis appreciated, no obvious deformity    Labs/imaging: See chart    DVT prophylaxis: Contraindicated with acute blood loss anemia    Ambulatory status: Per hospital discharge recommendations, specialist involvement/recommendations and physical therapy evaluation    Assessment and plan: We have a 60-year-old male status post fall with hematoma on Coumadin, now held admitted for rehab  RLE hematoma: ultrasound RLE shows two large hematomas to RLE: no evidence for compartment syndrome; no need for surgical intervention, holding anticoagulation, pain control.  Physical therapy, occupational therapy, physiatry to evaluate and treat, further orders pending clinical course, discharge planning  Anemia due to acute blood loss: Appears stable, continue current monitoring, off anticoagulation, patient will remain off anticoagulation until he is seen by his outpatient physician 2 to 4 weeks after discharge  Atrial fibrillation: Stable, continue current monitoring management, anticoagulation on hold for now, rate control  Dry skin: We will use Eucerin cream twice daily to the area    Stable problem list:  History of Pneumonia: Stable, completed IV ceftriaxone, day 3 of azithromycin, continue current medications monitoring management, offer in-house pulmonology consult, serial imaging per them  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 Leidy Keith  Stool incontinence: Work-up negative continue current observation  Left shoulder pain: Status post work-up and x-ray, therapy recommended, frozen shoulder diagnosis  Pulmonary HTN: Stable continue current monitoring management, breathing treatments, pulmonary involvement  Sacral decubitus ulcer: Resolved, continue current monitor management, wound involvement  Coronary artery disease: Stable continue current medication management    CODE STATUS: Full    Discharge summary: Patient had an uneventful stay at [facility] which improved with gait, strength, balance, endurance, he had a reduction of the right lower extremity hematoma, remain off his anticoagulation remained very stable, tolerated medications, tolerated [facility] rehab course, will be transition back to his assisted living facility in stable condition. Medication list rectified and transmitted to his local pharmacy. Patient will follow-up with his primary care physician in the next 5 to 7 days will remain off anticoagulation until seen as an outpatient. He verbalized understanding will be discharged home in stable condition. Home services maximized by  who are following closely.     Discharge medication list:  acetaminophen 500 mg tablet  SIG: give 1 tablet (500 mg) by oral route every 6 hours as needed for pain  cyanocobalamin (vit B-12) 1,000 mcg/mL injection solution  SIG: inject 1 milliliter (1000 mcg) by injection route to the arm every month after meals  cyclobenzaprine 5 mg tablet  SIG: give 1 tablet (5 mg) by oral route 3 times per day as needed for spasm for that pain  digoxin 125 mcg (0.125 mg) tablet  SIG: give 1 tablet (125 mcg) by oral route once daily  diltiazem  mg capsule,extended release 24 hr  SIG: give 1 capsule (180 mg) by oral route once daily  Eucerin topical cream  SIG: applied by topical route to Bilateral legs once daily for dry skin  ferrous sulfate 325 mg (65 mg iron) tablet  SIG: give 1 tablet (325 mg) by oral route once daily  furosemide 20 mg tablet  SIG: give 1 tablet (20 mg) by oral route once daily as needed  guaifenesin 100 mg/5 mL oral liquid  SIG: give 10 milliliters (200 mg) by oral route every 4 hours as needed  hydrocodone 7.5 mg-acetaminophen 325 mg tablet  SIG: give 1 tablet by oral route every 6 hours as needed  loperamide 2 mg capsule  SIG: give 1 capsule (2 mg) by oral route 4 times per day as needed  melatonin 3 mg tablet  SIG: give 1 TABLET by oral route once daily at bedtime  potassium chloride ER 10 mEq capsule,extended release  SIG: give 1 capsule (10 meq) by oral route once daily as needed Take when taking Furosemide  prednisone 2.5 mg tablet  SIG: give 1 tablet (2.5 mg) by oral route once daily  PreserVision AREDS 2,148 mcg-113 mg-45 mg-17.4 mg tablet  SIG: give 1 TABLET by oral route once daily  Vitamin D3 25 mcg (1,000 unit) capsule  SIG: give 1 TABLET by oral route once daily

## 2023-06-30 ENCOUNTER — TELEPHONE (OUTPATIENT)
Dept: INTERNAL MEDICINE CLINIC | Facility: CLINIC | Age: 88
End: 2023-06-30

## 2023-07-02 RX ORDER — HYDROCODONE BITARTRATE AND ACETAMINOPHEN 7.5; 325 MG/1; MG/1
1 TABLET ORAL EVERY 6 HOURS PRN
Qty: 90 TABLET | Refills: 0 | Status: CANCELLED | OUTPATIENT
Start: 2023-07-02 | End: 2023-08-01

## 2023-07-02 RX ORDER — DILTIAZEM HYDROCHLORIDE 180 MG/1
CAPSULE, COATED, EXTENDED RELEASE ORAL
Qty: 90 CAPSULE | Refills: 3 | OUTPATIENT
Start: 2023-07-02

## 2023-07-03 ENCOUNTER — TELEPHONE (OUTPATIENT)
Dept: INTERNAL MEDICINE CLINIC | Facility: CLINIC | Age: 88
End: 2023-07-03

## 2023-07-03 RX ORDER — HYDROCODONE BITARTRATE AND ACETAMINOPHEN 7.5; 325 MG/1; MG/1
1 TABLET ORAL EVERY 6 HOURS PRN
Qty: 90 TABLET | Refills: 0 | Status: SHIPPED | OUTPATIENT
Start: 2023-07-03 | End: 2023-08-02

## 2023-07-13 ENCOUNTER — OFFICE VISIT (OUTPATIENT)
Dept: WOUND CARE | Facility: HOSPITAL | Age: 88
End: 2023-07-13
Attending: NURSE PRACTITIONER
Payer: MEDICARE

## 2023-07-13 VITALS
OXYGEN SATURATION: 94 % | HEART RATE: 68 BPM | HEIGHT: 76 IN | WEIGHT: 160 LBS | SYSTOLIC BLOOD PRESSURE: 113 MMHG | BODY MASS INDEX: 19.48 KG/M2 | DIASTOLIC BLOOD PRESSURE: 55 MMHG | RESPIRATION RATE: 18 BRPM | TEMPERATURE: 98 F

## 2023-07-13 DIAGNOSIS — L97.521 NEUROPATHIC ULCER OF LEFT FOOT, LIMITED TO BREAKDOWN OF SKIN (HCC): ICD-10-CM

## 2023-07-13 DIAGNOSIS — L97.529 NON-HEALING ULCER OF LEFT FOOT, UNSPECIFIED ULCER STAGE (HCC): Primary | ICD-10-CM

## 2023-07-13 DIAGNOSIS — L89.893 PRESSURE INJURY OF LEFT FOOT, STAGE 3 (HCC): ICD-10-CM

## 2023-07-13 DIAGNOSIS — I73.9 PVD (PERIPHERAL VASCULAR DISEASE) (HCC): ICD-10-CM

## 2023-07-13 DIAGNOSIS — L89.622 PRESSURE ULCER OF LEFT HEEL, STAGE 2 (HCC): ICD-10-CM

## 2023-07-13 PROCEDURE — 99215 OFFICE O/P EST HI 40 MIN: CPT | Performed by: NURSE PRACTITIONER

## 2023-07-17 ENCOUNTER — TELEPHONE (OUTPATIENT)
Dept: INTERNAL MEDICINE CLINIC | Facility: CLINIC | Age: 88
End: 2023-07-17

## 2023-07-17 NOTE — TELEPHONE ENCOUNTER
Shira Belcher, RN with [facility]    Requesting order for physical therapy evaluation. Patient went to wound clinic and has a tovar-shoe to walk. Also requesting the okay to add Nurse Practitioner, Anita Machado at the / [id number] to the patient's chart to except orders from her. Ph # [phone], confidential voicemail.

## 2023-07-20 NOTE — TELEPHONE ENCOUNTER
To Dr Mirlande Loaiza advise on orders to re-certify patient for another episode of nursing care and PT services spouse

## 2023-07-20 NOTE — TELEPHONE ENCOUNTER
OK for physical therapy  OK  to add Nurse Practitioner, Santana Epps at the [facility] [address] to the patient's chart     Please call [facility] [address]

## 2023-07-20 NOTE — TELEPHONE ENCOUNTER
[facility] called to check status on request    [phone], confidential voicemail - ok to leave message

## 2023-07-21 NOTE — TELEPHONE ENCOUNTER
I spoke to Armaan Peck and relayed Dr Gayathri horner to her. She verbalized understanding with no further questions at this time. 0

## 2023-07-26 ENCOUNTER — OFFICE VISIT (OUTPATIENT)
Dept: WOUND CARE | Facility: HOSPITAL | Age: 88
End: 2023-07-26
Attending: NURSE PRACTITIONER
Payer: MEDICARE

## 2023-07-26 VITALS
RESPIRATION RATE: 20 BRPM | HEART RATE: 88 BPM | TEMPERATURE: 98 F | SYSTOLIC BLOOD PRESSURE: 147 MMHG | DIASTOLIC BLOOD PRESSURE: 80 MMHG | OXYGEN SATURATION: 99 %

## 2023-07-26 DIAGNOSIS — L97.521 NEUROPATHIC ULCER OF LEFT FOOT, LIMITED TO BREAKDOWN OF SKIN (HCC): Primary | ICD-10-CM

## 2023-07-26 PROCEDURE — 99213 OFFICE O/P EST LOW 20 MIN: CPT | Performed by: NURSE PRACTITIONER

## 2023-08-02 ENCOUNTER — TELEMEDICINE (OUTPATIENT)
Dept: INTERNAL MEDICINE CLINIC | Facility: CLINIC | Age: 88
End: 2023-08-02

## 2023-08-02 ENCOUNTER — TELEPHONE (OUTPATIENT)
Dept: INTERNAL MEDICINE CLINIC | Facility: CLINIC | Age: 88
End: 2023-08-02

## 2023-08-02 DIAGNOSIS — I48.91 ATRIAL FIBRILLATION, UNSPECIFIED TYPE (HCC): ICD-10-CM

## 2023-08-02 DIAGNOSIS — I35.0 AORTIC VALVE STENOSIS, ETIOLOGY OF CARDIAC VALVE DISEASE UNSPECIFIED: ICD-10-CM

## 2023-08-02 DIAGNOSIS — S80.11XA HEMATOMA OF RIGHT LOWER EXTREMITY, INITIAL ENCOUNTER: Primary | ICD-10-CM

## 2023-08-02 DIAGNOSIS — D62 ANEMIA DUE TO ACUTE BLOOD LOSS: ICD-10-CM

## 2023-08-02 DIAGNOSIS — I73.9 PVD (PERIPHERAL VASCULAR DISEASE) (HCC): ICD-10-CM

## 2023-08-02 DIAGNOSIS — Z87.39 HISTORY OF OSTEOMYELITIS: ICD-10-CM

## 2023-08-02 DIAGNOSIS — M47.816 OSTEOARTHRITIS OF LUMBAR SPINE, UNSPECIFIED SPINAL OSTEOARTHRITIS COMPLICATION STATUS: ICD-10-CM

## 2023-08-02 PROCEDURE — 99214 OFFICE O/P EST MOD 30 MIN: CPT | Performed by: INTERNAL MEDICINE

## 2023-08-02 RX ORDER — HYDROCODONE BITARTRATE AND ACETAMINOPHEN 7.5; 325 MG/1; MG/1
1 TABLET ORAL EVERY 6 HOURS PRN
Qty: 90 TABLET | Refills: 0 | Status: SHIPPED | OUTPATIENT
Start: 2023-08-02

## 2023-08-02 NOTE — TELEPHONE ENCOUNTER
Charmaine / [facility] is calling with updated plan of care  1 time 3 week  0 time 1 week  1 time 1 week

## 2023-08-02 NOTE — TELEPHONE ENCOUNTER
Charmaine at [facility]    Physical therapy evaluation was completed today:   Plan of care: one time a week for one week, zero times a week for one week, and one time a week for three weeks    Patient's heart rate today was 56. No symptoms  Patient also states he can walk short distances to the chair, etc. Is his correct?       # [phone], confidential voicemail

## 2023-08-09 ENCOUNTER — OFFICE VISIT (OUTPATIENT)
Dept: WOUND CARE | Facility: HOSPITAL | Age: 88
End: 2023-08-09
Attending: NURSE PRACTITIONER
Payer: MEDICARE

## 2023-08-09 VITALS
HEART RATE: 59 BPM | TEMPERATURE: 98 F | DIASTOLIC BLOOD PRESSURE: 72 MMHG | OXYGEN SATURATION: 98 % | SYSTOLIC BLOOD PRESSURE: 145 MMHG | RESPIRATION RATE: 20 BRPM

## 2023-08-09 DIAGNOSIS — L89.622 PRESSURE ULCER OF LEFT HEEL, STAGE 2 (HCC): ICD-10-CM

## 2023-08-09 DIAGNOSIS — L89.893 PRESSURE INJURY OF LEFT FOOT, STAGE 3 (HCC): ICD-10-CM

## 2023-08-09 DIAGNOSIS — L97.529 NON-HEALING ULCER OF LEFT FOOT, UNSPECIFIED ULCER STAGE (HCC): ICD-10-CM

## 2023-08-09 DIAGNOSIS — L97.521 NON-HEALING ULCER OF LEFT FOOT, LIMITED TO BREAKDOWN OF SKIN (HCC): Primary | ICD-10-CM

## 2023-08-09 DIAGNOSIS — I73.9 PVD (PERIPHERAL VASCULAR DISEASE) (HCC): ICD-10-CM

## 2023-08-09 DIAGNOSIS — L97.521 NEUROPATHIC ULCER OF LEFT FOOT, LIMITED TO BREAKDOWN OF SKIN (HCC): ICD-10-CM

## 2023-08-09 PROCEDURE — 99213 OFFICE O/P EST LOW 20 MIN: CPT | Performed by: NURSE PRACTITIONER

## 2023-08-24 ENCOUNTER — APPOINTMENT (OUTPATIENT)
Dept: WOUND CARE | Facility: HOSPITAL | Age: 88
End: 2023-08-24
Attending: NURSE PRACTITIONER
Payer: MEDICARE

## 2023-08-31 ENCOUNTER — TELEPHONE (OUTPATIENT)
Dept: INTERNAL MEDICINE CLINIC | Facility: CLINIC | Age: 88
End: 2023-08-31

## 2023-08-31 RX ORDER — HYDROCODONE BITARTRATE AND ACETAMINOPHEN 7.5; 325 MG/1; MG/1
1 TABLET ORAL EVERY 6 HOURS PRN
Qty: 90 TABLET | Refills: 0 | Status: SHIPPED | OUTPATIENT
Start: 2023-08-31

## 2023-09-18 ENCOUNTER — TELEPHONE (OUTPATIENT)
Dept: INTERNAL MEDICINE CLINIC | Facility: CLINIC | Age: 88
End: 2023-09-18

## 2023-09-18 NOTE — TELEPHONE ENCOUNTER
Kavon Deal a nurse from [facility] is calling requesting orders for:     Orders for 2 nursing visits    Orders to re certify    Ursula Wise to leave a message on her confidential voice mail    Phone [phone]

## 2023-09-18 NOTE — TELEPHONE ENCOUNTER
Left detailed voicemail to [facility] with okay for orders verbalized , howsoever asked to call back to clarify.

## 2023-09-21 ENCOUNTER — OFFICE VISIT (OUTPATIENT)
Dept: WOUND CARE | Facility: HOSPITAL | Age: 88
End: 2023-09-21
Attending: NURSE PRACTITIONER
Payer: MEDICARE

## 2023-09-21 VITALS
OXYGEN SATURATION: 96 % | HEART RATE: 99 BPM | TEMPERATURE: 98 F | DIASTOLIC BLOOD PRESSURE: 65 MMHG | SYSTOLIC BLOOD PRESSURE: 135 MMHG | RESPIRATION RATE: 18 BRPM

## 2023-09-21 DIAGNOSIS — L89.893 PRESSURE INJURY OF LEFT FOOT, STAGE 3 (HCC): Primary | ICD-10-CM

## 2023-09-21 PROCEDURE — 97597 DBRDMT OPN WND 1ST 20 CM/<: CPT | Performed by: NURSE PRACTITIONER

## 2023-09-26 ENCOUNTER — TELEPHONE (OUTPATIENT)
Dept: INTERNAL MEDICINE CLINIC | Facility: CLINIC | Age: 88
End: 2023-09-26

## 2023-09-26 NOTE — TELEPHONE ENCOUNTER
To MD:  The above refill request is for a controlled substance. Please review pended medication order. Print and sign for staff to fax to pharmacy or prescribe electronically.     Last office visit: virtual visit on 8/2/23  Last time refill sent and quantity/refills: 8/31/23 #90  [place] 8/31/23 #90

## 2023-09-27 RX ORDER — HYDROCODONE BITARTRATE AND ACETAMINOPHEN 7.5; 325 MG/1; MG/1
1 TABLET ORAL EVERY 6 HOURS PRN
Qty: 90 TABLET | Refills: 0 | Status: SHIPPED | OUTPATIENT
Start: 2023-09-27

## 2023-09-29 NOTE — TELEPHONE ENCOUNTER
Pt requests refill for Diltiazem  Please send to [facility] in [place]  Pt will be out of medication on Monday

## 2023-10-02 RX ORDER — DILTIAZEM HYDROCHLORIDE 180 MG/1
180 CAPSULE, COATED, EXTENDED RELEASE ORAL DAILY
Qty: 90 CAPSULE | Refills: 3 | Status: SHIPPED | OUTPATIENT
Start: 2023-10-02

## 2023-10-02 NOTE — TELEPHONE ENCOUNTER
Refill request is for a maintenance medication and has met the criteria specified in the Ambulatory Medication Refill Standing Order for eligibility, visits, laboratory, alerts and was sent to the requested pharmacy. Requested Prescriptions     Signed Prescriptions Disp Refills    dilTIAZem HCl ER Coated Beads 180 MG Oral Capsule SR 24 Hr 90 capsule 3     Sig: Take 1 capsule (180 mg total) by mouth daily. Authorizing Provider:  Ruben Perkins     Ordering User: Ilan Hull

## 2023-10-06 RX ORDER — CYANOCOBALAMIN 1000 UG/ML
1000 INJECTION, SOLUTION INTRAMUSCULAR; SUBCUTANEOUS
Qty: 3 ML | Refills: 3 | OUTPATIENT
Start: 2023-10-06

## 2023-10-06 NOTE — TELEPHONE ENCOUNTER
Current refill request refused due to refill is either a duplicate request or has active refills at the pharmacy. Check previous templates. Requested Prescriptions     Refused Prescriptions Disp Refills    cyanocobalamin (DODEX) 1000 MCG/ML Injection Solution 3 mL 3     Sig: Inject 1 mL (1,000 mcg total) into the muscle every 30 (thirty) days.      Refused By: Bautista Thomson     Reason for Refusal: Request already responded to by other means (e.g. phone or fax)

## 2023-10-11 ENCOUNTER — TELEPHONE (OUTPATIENT)
Dept: INTERNAL MEDICINE CLINIC | Facility: CLINIC | Age: 88
End: 2023-10-11

## 2023-10-20 ENCOUNTER — OFFICE VISIT (OUTPATIENT)
Dept: WOUND CARE | Facility: HOSPITAL | Age: 88
End: 2023-10-20
Attending: NURSE PRACTITIONER
Payer: MEDICARE

## 2023-10-20 VITALS
TEMPERATURE: 98 F | SYSTOLIC BLOOD PRESSURE: 116 MMHG | DIASTOLIC BLOOD PRESSURE: 68 MMHG | RESPIRATION RATE: 17 BRPM | HEART RATE: 53 BPM | OXYGEN SATURATION: 97 %

## 2023-10-20 DIAGNOSIS — M86.9 OSTEOMYELITIS OF LEFT FOOT, UNSPECIFIED TYPE (HCC): ICD-10-CM

## 2023-10-20 DIAGNOSIS — L97.521 NON-HEALING ULCER OF LEFT FOOT, LIMITED TO BREAKDOWN OF SKIN (HCC): ICD-10-CM

## 2023-10-20 DIAGNOSIS — L97.521 NEUROPATHIC ULCER OF LEFT FOOT, LIMITED TO BREAKDOWN OF SKIN (HCC): ICD-10-CM

## 2023-10-20 DIAGNOSIS — L89.893 PRESSURE INJURY OF LEFT FOOT, STAGE 3 (HCC): Primary | ICD-10-CM

## 2023-10-20 PROBLEM — L89.622 PRESSURE ULCER OF LEFT HEEL, STAGE 2 (HCC): Status: RESOLVED | Noted: 2023-07-13 | Resolved: 2023-10-20

## 2023-10-20 PROCEDURE — 99213 OFFICE O/P EST LOW 20 MIN: CPT | Performed by: NURSE PRACTITIONER

## 2023-10-25 ENCOUNTER — TELEPHONE (OUTPATIENT)
Dept: INTERNAL MEDICINE CLINIC | Facility: CLINIC | Age: 88
End: 2023-10-25

## 2023-10-26 ENCOUNTER — TELEPHONE (OUTPATIENT)
Dept: INTERNAL MEDICINE CLINIC | Facility: CLINIC | Age: 88
End: 2023-10-26

## 2023-10-26 RX ORDER — HYDROCODONE BITARTRATE AND ACETAMINOPHEN 7.5; 325 MG/1; MG/1
1 TABLET ORAL EVERY 6 HOURS PRN
Qty: 90 TABLET | Refills: 0 | Status: SHIPPED | OUTPATIENT
Start: 2023-10-26

## 2023-10-26 NOTE — TELEPHONE ENCOUNTER
To MD:  The above refill request is for a controlled substance. Please review pended medication order. Print and sign for staff to fax to pharmacy or prescribe electronically.     Last office visit: virtual visit on 8/2/23  Last time refill sent and quantity/refills:  Per [place] [facility]  last dispensed 9/28/23, #90/0

## 2023-10-26 NOTE — TELEPHONE ENCOUNTER
Patient called  Requesting refill for B12  Home health nurse provides injection for patient monthly  Tasked to RX

## 2023-10-27 RX ORDER — CYANOCOBALAMIN 1000 UG/ML
1000 INJECTION, SOLUTION INTRAMUSCULAR; SUBCUTANEOUS
Qty: 3 ML | Refills: 3 | Status: SHIPPED | OUTPATIENT
Start: 2023-10-27

## 2023-10-27 NOTE — TELEPHONE ENCOUNTER
Refill request is for a maintenance medication and has met the criteria specified in the Ambulatory Medication Refill Standing Order for eligibility, visits, laboratory, alerts and was sent to the requested pharmacy. Requested Prescriptions     Signed Prescriptions Disp Refills    cyanocobalamin (DODEX) 1000 MCG/ML Injection Solution 3 mL 3     Sig: Inject 1 mL (1,000 mcg total) into the muscle every 30 (thirty) days. Authorizing Provider:  Barb Jansen     Ordering User: Dayan Mccann

## 2023-11-13 ENCOUNTER — OFFICE VISIT (OUTPATIENT)
Dept: WOUND CARE | Facility: HOSPITAL | Age: 88
End: 2023-11-13
Attending: NURSE PRACTITIONER
Payer: MEDICARE

## 2023-11-13 ENCOUNTER — TELEPHONE (OUTPATIENT)
Dept: INTERNAL MEDICINE CLINIC | Facility: CLINIC | Age: 88
End: 2023-11-13

## 2023-11-13 VITALS
TEMPERATURE: 98 F | RESPIRATION RATE: 18 BRPM | OXYGEN SATURATION: 94 % | DIASTOLIC BLOOD PRESSURE: 65 MMHG | HEART RATE: 64 BPM | SYSTOLIC BLOOD PRESSURE: 130 MMHG

## 2023-11-13 DIAGNOSIS — L97.521 NON-HEALING ULCER OF LEFT FOOT, LIMITED TO BREAKDOWN OF SKIN (HCC): ICD-10-CM

## 2023-11-13 DIAGNOSIS — L89.893 PRESSURE INJURY OF LEFT FOOT, STAGE 3 (HCC): ICD-10-CM

## 2023-11-13 DIAGNOSIS — L97.521 NEUROPATHIC ULCER OF LEFT FOOT, LIMITED TO BREAKDOWN OF SKIN (HCC): ICD-10-CM

## 2023-11-13 DIAGNOSIS — L03.032 CELLULITIS OF LEFT TOE: Primary | ICD-10-CM

## 2023-11-13 PROCEDURE — 99213 OFFICE O/P EST LOW 20 MIN: CPT | Performed by: NURSE PRACTITIONER

## 2023-11-13 RX ORDER — CEFADROXIL 500 MG/1
500 CAPSULE ORAL 2 TIMES DAILY
Qty: 14 CAPSULE | Refills: 0 | Status: SHIPPED | OUTPATIENT
Start: 2023-11-13 | End: 2023-11-20

## 2023-11-13 NOTE — TELEPHONE ENCOUNTER
Spoke with patient to relay MD message below; Patient verbalized understanding. Pt did not have any questions or concerns at this time.

## 2023-11-13 NOTE — TELEPHONE ENCOUNTER
Leonre/[facility] called requesting verbal order to re-certify patient for another episode of Home Health  for wound care to left foot, management of congestive heart failure, B12 injections  Ok to leave order on confidential voice mail  Tasked to nursing

## 2023-11-17 ENCOUNTER — APPOINTMENT (OUTPATIENT)
Dept: WOUND CARE | Facility: HOSPITAL | Age: 88
End: 2023-11-17
Attending: NURSE PRACTITIONER
Payer: MEDICARE

## 2023-11-20 ENCOUNTER — TELEPHONE (OUTPATIENT)
Dept: INTERNAL MEDICINE CLINIC | Facility: CLINIC | Age: 88
End: 2023-11-20

## 2023-11-22 NOTE — TELEPHONE ENCOUNTER
Pt called to check status on refill    He is out of medication and [facility] is closed tomorrow     Please send refill today

## 2023-11-27 NOTE — TELEPHONE ENCOUNTER
Patient is calling back today to check the status of the refill on his norco. Patient is now completely out of the medication. Pain in hands and lower back is a seven out of ten currently.

## 2023-11-28 NOTE — TELEPHONE ENCOUNTER
Pt called back to check status  completely out of hydrocodone since Saturday   Tylenol doesn't help   Pt asks if this can be sent in today

## 2023-11-29 RX ORDER — HYDROCODONE BITARTRATE AND ACETAMINOPHEN 5; 325 MG/1; MG/1
1.5 TABLET ORAL EVERY 6 HOURS PRN
Qty: 135 TABLET | Refills: 0 | Status: SHIPPED | OUTPATIENT
Start: 2023-11-29

## 2023-11-29 RX ORDER — HYDROCODONE BITARTRATE AND ACETAMINOPHEN 7.5; 325 MG/1; MG/1
1 TABLET ORAL EVERY 6 HOURS PRN
Qty: 90 TABLET | Refills: 0 | Status: SHIPPED | OUTPATIENT
Start: 2023-11-29

## 2023-11-29 NOTE — TELEPHONE ENCOUNTER
[facility], [place] called  Do not have 7.5MG in stock only has 5MG and 10MG in stock  Please call to advise  Tasked to Dr Soraya Walker

## 2023-11-29 NOTE — TELEPHONE ENCOUNTER
ERx sent for Norco 5 mg 1.5 tabs (=7.5mg) po q6hrs prn #135       Please call pt and notify him of  tablet size change

## 2023-11-30 ENCOUNTER — TELEPHONE (OUTPATIENT)
Dept: INTERNAL MEDICINE CLINIC | Facility: CLINIC | Age: 88
End: 2023-11-30

## 2023-11-30 NOTE — TELEPHONE ENCOUNTER
Please call patient daughter, Macie   She faxed over forms that require completion for patient for [facility]  Please call Macie with status  Macie had asked that these forms be mailed to her once completed  Please call Macie with status  Tasked to nursing

## 2023-12-11 ENCOUNTER — APPOINTMENT (OUTPATIENT)
Dept: WOUND CARE | Facility: HOSPITAL | Age: 88
End: 2023-12-11
Attending: NURSE PRACTITIONER
Payer: MEDICARE

## 2023-12-18 ENCOUNTER — TELEPHONE (OUTPATIENT)
Dept: INTERNAL MEDICINE CLINIC | Facility: CLINIC | Age: 88
End: 2023-12-18

## 2023-12-18 NOTE — TELEPHONE ENCOUNTER
Hawa Bergman / Parag is calling to report HR 52, patient is not dizzy, lungs has crackles through the back, he does have a cold  B/P 106/60  Oxygen 96%    Phone [phone]

## 2023-12-24 NOTE — TELEPHONE ENCOUNTER
Imp- OA; Rec- Refilled Norco 7.5 po q6hrs, #90, no RF; Rx at window for pick-up; please call pt To get better and follow your care plan as instructed.

## 2023-12-27 ENCOUNTER — TELEPHONE (OUTPATIENT)
Dept: INTERNAL MEDICINE CLINIC | Facility: CLINIC | Age: 88
End: 2023-12-27

## 2023-12-27 RX ORDER — CEPHALEXIN 500 MG/1
500 CAPSULE ORAL 3 TIMES DAILY
Qty: 30 CAPSULE | Refills: 0 | Status: SHIPPED | OUTPATIENT
Start: 2023-12-27

## 2023-12-27 NOTE — TELEPHONE ENCOUNTER
Imp- left 4th toe ulcer    Left 4th toe wound--> advanced dressing Hydrofera ready secure with tape, change dressing 2-3 times a week    Rec- Keflex 500 mg po TID x 10d    ERx sent; [facility] called

## 2023-12-27 NOTE — TELEPHONE ENCOUNTER
Please advise - called Lenore from [facility] who states patient has new wound on left 3rd toe- swollen and red- he is to weak to go to wound clinic  wants antibiotic prescribed - would go to [facility] [place] - to

## 2023-12-27 NOTE — TELEPHONE ENCOUNTER
Lenore / [facility] is calling to request order to increase wound care to twice weekly.  Patient has a new wound on left 3rd toe from his shoe rubbing, possible infection starting  Requesting the same antibiotic that was prescribed for his wound.  Patient is unable to walk now due to weakness.    Phone [phone], confidential VM

## 2023-12-29 ENCOUNTER — TELEPHONE (OUTPATIENT)
Dept: INTERNAL MEDICINE CLINIC | Facility: CLINIC | Age: 88
End: 2023-12-29

## 2023-12-29 RX ORDER — HYDROCODONE BITARTRATE AND ACETAMINOPHEN 7.5; 325 MG/1; MG/1
1 TABLET ORAL EVERY 6 HOURS PRN
Qty: 90 TABLET | Refills: 0 | Status: SHIPPED | OUTPATIENT
Start: 2023-12-29

## 2023-12-29 NOTE — TELEPHONE ENCOUNTER
To Dr. Jessica Finney-    The above refill request is for a controlled substance. Please review pended medication order.   LOV: 9/23  Prescribed: 90 11/29  : \"

## 2024-01-03 ENCOUNTER — TELEPHONE (OUTPATIENT)
Dept: INTERNAL MEDICINE CLINIC | Facility: CLINIC | Age: 89
End: 2024-01-03

## 2024-01-03 NOTE — TELEPHONE ENCOUNTER
Lenore/[facility] called  Calling to report irregular heart rate of 54/patient has no issues   Lenore has called about this before, and Dr Zhou said it is fine      Requesting order to have [facility] to see patient for wounds on left foot, callused over but not sure if they need to be debrided  Patient not able to get to wound clinic    Please call to advise  Tasked to nursing

## 2024-01-08 ENCOUNTER — APPOINTMENT (OUTPATIENT)
Dept: WOUND CARE | Facility: HOSPITAL | Age: 89
End: 2024-01-08
Attending: NURSE PRACTITIONER
Payer: MEDICARE

## 2024-01-22 ENCOUNTER — TELEPHONE (OUTPATIENT)
Dept: INTERNAL MEDICINE CLINIC | Facility: CLINIC | Age: 89
End: 2024-01-22

## 2024-01-22 NOTE — TELEPHONE ENCOUNTER
Lenore / [facility] is calling to request order to recertify patient for wound care, managing congestive heart failure & B12 Injections    Phone [phone], confidential VM

## 2024-01-22 NOTE — TELEPHONE ENCOUNTER
Lenore called and left detailed voice mail giving verbal order as requested to recertify patient. No further action needed.    Primary Defect Length In Cm (Final Defect Size - Required For Flaps/Grafts): 0.8

## 2024-01-26 ENCOUNTER — TELEPHONE (OUTPATIENT)
Dept: INTERNAL MEDICINE CLINIC | Facility: CLINIC | Age: 89
End: 2024-01-26

## 2024-01-26 DIAGNOSIS — M47.816 OSTEOARTHRITIS OF LUMBAR SPINE, UNSPECIFIED SPINAL OSTEOARTHRITIS COMPLICATION STATUS: Primary | ICD-10-CM

## 2024-01-26 RX ORDER — DIGOXIN 125 MCG
TABLET ORAL
Qty: 90 TABLET | Refills: 3 | Status: SHIPPED | OUTPATIENT
Start: 2024-01-26

## 2024-01-26 RX ORDER — PREDNISONE 2.5 MG/1
TABLET ORAL
Qty: 90 TABLET | Refills: 3 | Status: SHIPPED | OUTPATIENT
Start: 2024-01-26

## 2024-01-26 RX ORDER — HYDROCODONE BITARTRATE AND ACETAMINOPHEN 7.5; 325 MG/1; MG/1
1 TABLET ORAL EVERY 6 HOURS PRN
Qty: 90 TABLET | Refills: 0 | Status: SHIPPED | OUTPATIENT
Start: 2024-01-26

## 2024-01-26 NOTE — TELEPHONE ENCOUNTER
To MD:  The above refill request is for a controlled substance.  Please review pended medication order.   Print and sign for staff to fax to pharmacy or prescribe electronically.    Last office visit: Telehealth visit on 8/2/23  Last time refill sent and quantity/refills:  Last ordered 12/29/23, #90/0    Per [place]  last dispensed 12/29/23, #90/0

## 2024-01-26 NOTE — TELEPHONE ENCOUNTER
Discussed with DR.O Nelsy robb with current video visits /labs  Refill request is for a maintenance medication and has met the criteria specified in the Ambulatory Medication Refill Standing Order for eligibility, visits, laboratory, alerts and was sent to the requested pharmacy.    Requested Prescriptions     Signed Prescriptions Disp Refills    PREDNISONE 2.5 MG Oral Tab 90 tablet 3     Sig: TAKE 1 TABLET(2.5 MG) BY MOUTH DAILY     Authorizing Provider: MOISES WANG     Ordering User: GINGER JAIN    DIGOXIN 0.125 MG Oral Tab 90 tablet 3     Sig: TAKE 1 TABLET(125 MCG) BY MOUTH EVERY DAY     Authorizing Provider: MOISES WANG     Ordering User: GINGER JAIN

## 2024-02-02 ENCOUNTER — TELEPHONE (OUTPATIENT)
Dept: INTERNAL MEDICINE CLINIC | Facility: CLINIC | Age: 89
End: 2024-02-02

## 2024-02-02 NOTE — TELEPHONE ENCOUNTER
Called Lenore from [facility] and gave verbal approval for plan of mcdonald per protocol

## 2024-02-02 NOTE — TELEPHONE ENCOUNTER
Lenore from [facility] is calling requesting Physical Therapy orders.    Nurse is requesting orders for a PT evaluation.  Patients left foot is pretty much healed but since the patient was not walking on it for about 7 months physical therapy is needed,    Please leave message on confidential voicemail  [phone]

## 2024-02-26 ENCOUNTER — TELEPHONE (OUTPATIENT)
Dept: INTERNAL MEDICINE CLINIC | Facility: CLINIC | Age: 89
End: 2024-02-26

## 2024-02-26 DIAGNOSIS — M47.816 OSTEOARTHRITIS OF LUMBAR SPINE, UNSPECIFIED SPINAL OSTEOARTHRITIS COMPLICATION STATUS: ICD-10-CM

## 2024-02-26 RX ORDER — HYDROCODONE BITARTRATE AND ACETAMINOPHEN 7.5; 325 MG/1; MG/1
1 TABLET ORAL EVERY 6 HOURS PRN
Qty: 90 TABLET | Refills: 0 | Status: SHIPPED | OUTPATIENT
Start: 2024-02-26

## 2024-02-26 NOTE — TELEPHONE ENCOUNTER
To MD:  The above refill request is for a controlled substance.  Please review pended medication order.   Print and sign for staff to fax to pharmacy or prescribe electronically.    Last office visit: 8/2/23 virtual visit   Last time refill sent and quantity/refills: 1/26/24 #90

## 2024-03-04 ENCOUNTER — TELEPHONE (OUTPATIENT)
Dept: INTERNAL MEDICINE CLINIC | Facility: CLINIC | Age: 89
End: 2024-03-04

## 2024-03-04 NOTE — TELEPHONE ENCOUNTER
Meagan from [facility] called     Patient will be seen this week for physical therapy evaluation

## 2024-03-11 ENCOUNTER — TELEPHONE (OUTPATIENT)
Dept: INTERNAL MEDICINE CLINIC | Facility: CLINIC | Age: 89
End: 2024-03-11

## 2024-03-11 NOTE — TELEPHONE ENCOUNTER
Please advise - called anoop who will re certify patient with HH- she states he is losing more weight , depression is worse  and poor appetite - he would be agreeable to start a medication like Lexapro

## 2024-03-12 ENCOUNTER — TELEPHONE (OUTPATIENT)
Dept: INTERNAL MEDICINE CLINIC | Facility: CLINIC | Age: 89
End: 2024-03-12

## 2024-03-12 NOTE — TELEPHONE ENCOUNTER
Meagan/[facility] called with [facility] for   Dr Alexandre    Pt will be seen next week 3/17 for his physical therapy evaluation

## 2024-03-20 ENCOUNTER — TELEPHONE (OUTPATIENT)
Dept: INTERNAL MEDICINE CLINIC | Facility: CLINIC | Age: 89
End: 2024-03-20

## 2024-03-20 NOTE — TELEPHONE ENCOUNTER
Patient requests call back from Dr Alexandre today     nurse Lenore saw patient today  recommended pt call to discuss depression he's been experiencing     [phone]

## 2024-03-20 NOTE — TELEPHONE ENCOUNTER
Please advise - called patient who states he is feeling tired , some residents passed away - he doesn,t feel like that every day - he has no suicidal ideation - he wants to talk to  about it  - to

## 2024-03-27 ENCOUNTER — TELEPHONE (OUTPATIENT)
Dept: INTERNAL MEDICINE CLINIC | Facility: CLINIC | Age: 89
End: 2024-03-27

## 2024-03-27 DIAGNOSIS — M47.816 OSTEOARTHRITIS OF LUMBAR SPINE, UNSPECIFIED SPINAL OSTEOARTHRITIS COMPLICATION STATUS: ICD-10-CM

## 2024-03-27 RX ORDER — HYDROCODONE BITARTRATE AND ACETAMINOPHEN 7.5; 325 MG/1; MG/1
1 TABLET ORAL EVERY 6 HOURS PRN
Qty: 90 TABLET | Refills: 0 | Status: SHIPPED | OUTPATIENT
Start: 2024-03-27

## 2024-03-27 NOTE — TELEPHONE ENCOUNTER
To MD:  The above refill request is for a controlled substance.  Please review pended medication order.   Print and sign for staff to fax to pharmacy or prescribe electronically.    Last office visit: 8/2/23 virtual visit  Last time refill sent and quantity/refills: 2/26/24 #90

## 2024-04-22 ENCOUNTER — TELEPHONE (OUTPATIENT)
Dept: INTERNAL MEDICINE CLINIC | Facility: CLINIC | Age: 89
End: 2024-04-22

## 2024-04-22 DIAGNOSIS — M47.816 OSTEOARTHRITIS OF LUMBAR SPINE, UNSPECIFIED SPINAL OSTEOARTHRITIS COMPLICATION STATUS: ICD-10-CM

## 2024-04-22 RX ORDER — HYDROCODONE BITARTRATE AND ACETAMINOPHEN 7.5; 325 MG/1; MG/1
1 TABLET ORAL EVERY 6 HOURS PRN
Qty: 90 TABLET | Refills: 0 | Status: CANCELLED | OUTPATIENT
Start: 2024-04-22

## 2024-04-23 RX ORDER — HYDROCODONE BITARTRATE AND ACETAMINOPHEN 7.5; 325 MG/1; MG/1
1 TABLET ORAL EVERY 6 HOURS PRN
Qty: 90 TABLET | Refills: 0 | Status: SHIPPED | OUTPATIENT
Start: 2024-05-24 | End: 2024-06-23

## 2024-04-23 RX ORDER — HYDROCODONE BITARTRATE AND ACETAMINOPHEN 7.5; 325 MG/1; MG/1
1 TABLET ORAL EVERY 6 HOURS PRN
Qty: 90 TABLET | Refills: 0 | Status: SHIPPED | OUTPATIENT
Start: 2024-04-23 | End: 2024-05-23

## 2024-04-23 RX ORDER — HYDROCODONE BITARTRATE AND ACETAMINOPHEN 7.5; 325 MG/1; MG/1
1 TABLET ORAL EVERY 6 HOURS PRN
Qty: 90 TABLET | Refills: 0 | Status: SHIPPED | OUTPATIENT
Start: 2024-06-24 | End: 2024-07-24

## 2024-04-23 NOTE — TELEPHONE ENCOUNTER
Prescriptions sent in error to [facility], should have been sent to:   Mika George  Tasked to nursing on [employer]
Pt. States prednisone was sent to the wrong pharmacy it should be sent to [facility].  # [phone]   Ph. # [phone]    Routed to Rx
The hydrocodone prescription was printed for the patient at today's office visit and I have re-sent the prednisone 10mg Rx to [facility] in [address] [place] [address] and cancelled it at [facility].
GENERAL:  Awake, alert and in NAD, non-toxic appearing  ENMT: Airway patent  EYES: conjunctiva clear  CARDIAC: Regular rate, regular rhythm.  Heart sounds S1, S2, no S3, S4. No murmurs, rubs or gallops.  RESPIRATORY: Breath sounds clear and equal in bilateral anterior lung fields, no wheezes/ronchi/crackles/stridor; pt breathing and speaking comfortably with no increased WOB, no accessory mm. use, no intercostal retractions, no nasal flaring  GI: abdomen soft, non-distended, non-tender, no rebound or guarding in ruq/luq/rlq/llq/epigastrium/suprapubic region, no cvat bilat   (chaperone tech): scant while creamy vaginal discharge, no fb/vaginal bleeding, pt states she does not want bimanual exam, not performed  rectal exam (chaperone tech): no hemorrhoids/dieter blood/lesions, NORBERT not performed as pt does not want it   SKIN: warm and dry, no rashes  PSYCH: awake, alert, calm and cooperative  EXTREMITIES: no ble edema  NEURO: gcs 15

## 2024-04-23 NOTE — TELEPHONE ENCOUNTER
To Dr. OTTO-    The above refill request is for a controlled substance.  Please review pended medication order.  LOV: 8/23 virtual.   Prescribed:90 3/27  : 90 3/28

## 2024-05-05 NOTE — TELEPHONE ENCOUNTER
OT consult called; RN called Patient is postop day 1 total hip arthroplasty with copious amounts of antibiotic cement.  She is partial weightbearing.  She will likely require revision to definitive implants in the future, but will be 2 to 3 months from now.  Plan IV antibiotics and possible discharge to rehab versus home depending on patient progress.  She is follow-up in the office in 3 weeks

## 2024-05-06 ENCOUNTER — TELEPHONE (OUTPATIENT)
Dept: INTERNAL MEDICINE CLINIC | Facility: CLINIC | Age: 89
End: 2024-05-06

## 2024-05-06 NOTE — TELEPHONE ENCOUNTER
Lenore/ [facility] requests order to re certify patient for Home Health nursing    Call back # [phone], confidential VM ok to leave a message

## 2024-05-06 NOTE — TELEPHONE ENCOUNTER
Called anoop with [facility] and gave verbal approval for plan of care per protocol - left on confidential VM

## 2024-05-21 RX ORDER — SYRINGE WITH NEEDLE, 1 ML 25GX5/8"
SYRINGE, EMPTY DISPOSABLE MISCELLANEOUS
Qty: 12 EACH | Refills: 0 | Status: SHIPPED | OUTPATIENT
Start: 2024-05-21

## 2024-05-21 NOTE — TELEPHONE ENCOUNTER
Refill request is for a maintenance medication and has met the criteria specified in the Ambulatory Medication Refill Standing Order for eligibility, visits, laboratory, alerts and was sent to the requested pharmacy.    Requested Prescriptions     Signed Prescriptions Disp Refills    Syringe/Needle, Disp, (BD LUER-NAZ SYRINGE) 23G X 1\" 3 ML Does not apply Misc 12 each 0     Sig: USE AS DIRECTED EVERY MONTH     Authorizing Provider: MOISES WANG     Ordering User: WENDY RUIZ

## 2024-05-24 DIAGNOSIS — M47.816 OSTEOARTHRITIS OF LUMBAR SPINE, UNSPECIFIED SPINAL OSTEOARTHRITIS COMPLICATION STATUS: ICD-10-CM

## 2024-05-24 RX ORDER — HYDROCODONE BITARTRATE AND ACETAMINOPHEN 7.5; 325 MG/1; MG/1
1 TABLET ORAL EVERY 6 HOURS PRN
Qty: 90 TABLET | Refills: 0 | Status: CANCELLED | OUTPATIENT
Start: 2024-05-24

## 2024-05-28 NOTE — TELEPHONE ENCOUNTER
Pt has another refill on file    Current refill request refused due to refill is either a duplicate request or has active refills at the pharmacy.  Check previous templates.    Requested Prescriptions     Pending Prescriptions Disp Refills    HYDROcodone-acetaminophen (NORCO) 7.5-325 MG Oral Tab 90 tablet 0     Sig: Take 1 tablet by mouth every 6 (six) hours as needed for Pain.     .

## 2024-06-25 DIAGNOSIS — M47.816 OSTEOARTHRITIS OF LUMBAR SPINE, UNSPECIFIED SPINAL OSTEOARTHRITIS COMPLICATION STATUS: ICD-10-CM

## 2024-06-25 RX ORDER — HYDROCODONE BITARTRATE AND ACETAMINOPHEN 7.5; 325 MG/1; MG/1
1 TABLET ORAL EVERY 6 HOURS PRN
Qty: 90 TABLET | Refills: 0 | OUTPATIENT
Start: 2024-06-25

## 2024-06-25 NOTE — TELEPHONE ENCOUNTER
Current refill request refused due to refill is either a duplicate request or has active refills at the pharmacy.  Check previous templates.    Requested Prescriptions     Refused Prescriptions Disp Refills    HYDROcodone-acetaminophen (NORCO) 7.5-325 MG Oral Tab 90 tablet 0     Sig: Take 1 tablet by mouth every 6 (six) hours as needed for Pain.     Refused By: WENDY RUIZ     Reason for Refusal: Duplicate refill request      90-day panel sent-in on 4/23/24

## 2024-06-27 NOTE — ED PROVIDER NOTES
Patient Seen in: [facility] AND [facility] Emergency Department     History   Patient presents with:  Arrythmia/Palpitations (cardiovascular)    Stated Complaint: \"Not feeling well\" sent by Kathe\"    HPI    80year old male presents to the ER after an episode Virtual Regular Visit    Verification of patient location:    Patient is located at Home in the following state in which I hold an active license [place]      Assessment/Plan:    Problem List Items Addressed This Visit    None  Visit Diagnoses       Generalized anxiety disorder    -  Primary            Goals addressed in session: Goal 1          Reason for visit is   Chief Complaint   Patient presents with    Virtual Regular Visit          Encounter provider Evelyn Cooley      Recent Visits  No visits were found meeting these conditions.  Showing recent visits within past 7 days and meeting all other requirements  Today's Visits  Date Type Provider Dept   06/27/24 Telemedicine Evelyn Cooley [facility] Therapist [facility]   Showing today's visits and meeting all other requirements  Future Appointments  No visits were found meeting these conditions.  Showing future appointments within next 150 days and meeting all other requirements       The patient was identified by name and date of birth. Erika Mendez was informed that this is a telemedicine visit and that the visit is being conducted throughthe Epic Embedded platform. She agrees to proceed..  My office door was closed. No one else was in the room.  She acknowledged consent and understanding of privacy and security of the video platform. The patient has agreed to participate and understands they can discontinue the visit at any time.    Patient is aware this is a billable service.     Subjective  Erika Mendez is a 59 y.o. female .      HPI     No past medical history on file.    No past surgical history on file.    No current outpatient medications on file.     No current facility-administered medications for this visit.        Not on File    Review of Systems    Video Exam    There were no vitals filed for this visit.    Physical Exam     Behavioral Health Psychotherapy Progress Note    Psychotherapy Provided: Individual Psychotherapy     1. Generalized anxiety  SURGICAL HISTORY      Comment: Billroth anastomosis  2011: OTHER SURGICAL HISTORY      Comment: dental implants    Medications :   BALSAM PERU-CASTOR OIL External Ointment,  APPLY TO THE AFFECTED AREA TWICE DAILY   hydrocodone-acetaminophen 7.5-325 MG Oral disorder            Goals addressed in session: Goal 1     DATA: Client presented for a telehealth session via [employer]. Client reported that the last couple weeks have been chaotic at work. She shared that her hours are finally picking up, but she is feeling the physical effects of her body. She noted that they were completely swamped last week. Client highlighted that she got a pay raise at work and the new  has been trying to accommodate her schedule (giving her Wednesdays off as requested). Client mentioned that she has been reaching out to her family more, even if conversations are brief. Client voiced that her area participates in third Friday and how the event was this past week. Client addressed that she had planned on going, but due to the heat wave; she ended up staying home and catching up on sleep. She noted that she was unsure if they even held the vent due to the heat, stating that she can typically hear the hustle and bustle of the street/bands playing. Clinician actively listened, provided emotional support, validated client's feelings, addressed and processed current events, was encouraged that she was doing well overall and to be mindful of what she can manage at work (to not overdue it).  During this session, this clinician used the following therapeutic modalities: Client-centered Therapy, Cognitive Behavioral Therapy, and Supportive Psychotherapy    Substance Abuse was not addressed during this session. If the client is diagnosed with a co-occurring substance use disorder, please indicate any changes in the frequency or amount of use: N/A. Stage of change for addressing substance use diagnoses: No substance use/Not applicable    ASSESSMENT:  Erika Mendez presents with a Euthymic/ normal mood.     her affect is Normal range and intensity, which is congruent, with her mood and the content of the session. The client has made progress on their goals.     Erika Mendez presents  "with a low risk of suicide, low risk of self-harm, and low risk of harm to others.    For any risk assessment that surpasses a \"low\" rating, a safety plan must be developed.    A safety plan was indicated: no  If yes, describe in detail N/A    PLAN: Between sessions, Erika Mendez will continue to utilize coping skills to manage stress/anxiety. At the next session, the therapist will use Client-centered Therapy, Cognitive Behavioral Therapy, and Supportive Psychotherapy to address anxiety.    Behavioral Health Treatment Plan and Discharge Planning: Erika Mendez is aware of and agrees to continue to work on their treatment plan. They have identified and are working toward their discharge goals. yes    Visit start and stop times:    06/27/24  Start Time: 0900  Stop Time: 0930  Total Visit Time: 30 minutes        " Yes              Comment: 2 beers every night      Review of Systems    Positive for stated complaint: \"Not feeling well\" sent by O'Shmuel\"  Other systems are as noted in HPI. Constitutional and vital signs reviewed.       All other systems reviewed and Nursing note and vitals reviewed.                 ED Course   Nursing notes and Triage vitals reviewed  Labs Reviewed   BASIC METABOLIC PANEL (8) - Abnormal; Notable for the following:        Result Value    Glucose 122 (*)     BUN 7 (*)     BUN/CREA Rati ROUTINE     EKG    Rate, intervals and axes as noted on EKG Report.   Rate: 147  Rhythm: Atrial Fibrillation  Reading: Nonspecific ST changes           Imaging Results Available and Reviewed while in ED: XR CHEST PA + LAT CHEST (CPT=71046)   Final Result sodium chloride 0.9% IV bolus 2,286 mL (2,286 mL Intravenous New Bag 3/29/18 8463)   Piperacillin Sod-Tazobactam So (ZOSYN) 3.375 g in dextrose 5 % 100 mL ADD-vantage (not administered)   Vancomycin HCl (VANCOCIN) 1,000 mg in sodium chloride 0.9 % 250 mL procedures and reviewed those reports. Radiology Interpretation: Radiology reports and images reviewed personally and are persistent left lower lobe infiltrate.  I discussed these results, their acute management issues, and the need for follow-up, with th 8/17/2015          ICD-10-CM Noted POA    Blood infection ([facility] [address] A41.9 3/29/2018 Unknown          Anticipatory guidance, discharge instructions upon discharge, disease/injury specific precautions, return instructions, and follow up information were provided

## 2024-07-03 ENCOUNTER — TELEPHONE (OUTPATIENT)
Dept: INTERNAL MEDICINE CLINIC | Facility: CLINIC | Age: 89
End: 2024-07-03

## 2024-07-03 NOTE — TELEPHONE ENCOUNTER
Called Lenore from [facility] and gave verbal approval for plan of care per protocol

## 2024-07-03 NOTE — TELEPHONE ENCOUNTER
Lenore / [facility] called    Requests order to re certify patient for Home Health services -  for B12 injections, cardiac and respiratory assessments & peripheral vascular disease    [phone]

## 2024-07-15 ENCOUNTER — TELEPHONE (OUTPATIENT)
Dept: INTERNAL MEDICINE CLINIC | Facility: CLINIC | Age: 89
End: 2024-07-15

## 2024-07-15 NOTE — TELEPHONE ENCOUNTER
Lenore / [facility] is calling to request an order for a PRN Visit     Patient called and said his legs look darker and his calf has a bruise on it.    Phone [phone]

## 2024-07-15 NOTE — TELEPHONE ENCOUNTER
Lenore/[facility] contacted and provided with verbal order (per protocol) for the PRN HH visit. Lenore will provide us with update on the patient condition once he is seen.

## 2024-07-23 ENCOUNTER — TELEPHONE (OUTPATIENT)
Dept: INTERNAL MEDICINE CLINIC | Facility: CLINIC | Age: 89
End: 2024-07-23

## 2024-07-23 DIAGNOSIS — M47.816 OSTEOARTHRITIS OF LUMBAR SPINE, UNSPECIFIED SPINAL OSTEOARTHRITIS COMPLICATION STATUS: ICD-10-CM

## 2024-07-23 NOTE — TELEPHONE ENCOUNTER
To Dr. OTTO-    The above refill request is for a controlled substance.  Please review pended medication order.  LOV: 2020  Prescribed: 90 6/24  : 6/26 90

## 2024-07-24 RX ORDER — HYDROCODONE BITARTRATE AND ACETAMINOPHEN 7.5; 325 MG/1; MG/1
1 TABLET ORAL EVERY 6 HOURS PRN
Qty: 90 TABLET | Refills: 0 | Status: SHIPPED | OUTPATIENT
Start: 2024-07-24

## 2024-07-24 NOTE — TELEPHONE ENCOUNTER
Patient called to check status of refill.     Advised patient Dr. Camargo was out of office until tomorrow 7/25/24.     Patient was appreciative of the information.

## 2024-08-19 ENCOUNTER — TELEPHONE (OUTPATIENT)
Dept: INTERNAL MEDICINE CLINIC | Facility: CLINIC | Age: 89
End: 2024-08-19

## 2024-08-19 RX ORDER — CYANOCOBALAMIN 1000 UG/ML
1000 INJECTION, SOLUTION INTRAMUSCULAR; SUBCUTANEOUS
Qty: 3 ML | Refills: 3 | Status: SHIPPED | OUTPATIENT
Start: 2024-08-19

## 2024-08-19 RX ORDER — DILTIAZEM HYDROCHLORIDE 180 MG/1
180 CAPSULE, COATED, EXTENDED RELEASE ORAL DAILY
Qty: 90 CAPSULE | Refills: 3 | Status: SHIPPED | OUTPATIENT
Start: 2024-08-19

## 2024-08-26 ENCOUNTER — TELEPHONE (OUTPATIENT)
Dept: INTERNAL MEDICINE CLINIC | Facility: CLINIC | Age: 89
End: 2024-08-26

## 2024-08-26 DIAGNOSIS — M47.816 OSTEOARTHRITIS OF LUMBAR SPINE, UNSPECIFIED SPINAL OSTEOARTHRITIS COMPLICATION STATUS: ICD-10-CM

## 2024-08-26 RX ORDER — HYDROCODONE BITARTRATE AND ACETAMINOPHEN 7.5; 325 MG/1; MG/1
1 TABLET ORAL EVERY 6 HOURS PRN
Qty: 90 TABLET | Refills: 0 | Status: SHIPPED | OUTPATIENT
Start: 2024-08-26

## 2024-08-26 NOTE — TELEPHONE ENCOUNTER
To MD:  The above refill request is for a controlled substance.  Please review pended medication order.   Print and sign for staff to fax to pharmacy or prescribe electronically.    Last office visit: 8/2/2023 (virtual visit)  Last time refill sent and quantity/refills: 7/24/2024 #90

## 2024-09-09 ENCOUNTER — TELEPHONE (OUTPATIENT)
Dept: INTERNAL MEDICINE CLINIC | Facility: CLINIC | Age: 89
End: 2024-09-09

## 2024-09-09 DIAGNOSIS — I48.91 ATRIAL FIBRILLATION, UNSPECIFIED TYPE (HCC): Primary | ICD-10-CM

## 2024-09-09 DIAGNOSIS — D62 ANEMIA DUE TO ACUTE BLOOD LOSS: ICD-10-CM

## 2024-09-09 NOTE — TELEPHONE ENCOUNTER
Lenore CARBALLO from [facility] called, requesting order for re-certification, nursing services, and wound care.      Nurse is with patient, and his heart rate is 48.  At last weeks visit, it was 54.  Do you want to order a dig level? Discontinue some medications?

## 2024-09-09 NOTE — TELEPHONE ENCOUNTER
Nurse is with patient, and his heart rate is 48. At last weeks visit, it was 54. Do you want to order a dig level? Discontinue some medications? -to

## 2024-09-09 NOTE — TELEPHONE ENCOUNTER
Lab orders faxed to:  [phone]  Fax confirmation received.     Spoke with HARIS Grover. Labs will be drawn as soon as possible.

## 2024-09-09 NOTE — TELEPHONE ENCOUNTER
Called Lenore from [facility] and gave verbal approval for plan of care per protocol

## 2024-09-11 ENCOUNTER — LAB REQUISITION (OUTPATIENT)
Dept: LAB | Facility: HOSPITAL | Age: 89
End: 2024-09-11
Payer: MEDICARE

## 2024-09-11 DIAGNOSIS — Z01.89 ENCOUNTER FOR OTHER SPECIFIED SPECIAL EXAMINATIONS: ICD-10-CM

## 2024-09-11 LAB
ALBUMIN SERPL-MCNC: 4.5 G/DL (ref 3.2–4.8)
ALBUMIN/GLOB SERPL: 1.2 {RATIO} (ref 1–2)
ALP LIVER SERPL-CCNC: 79 U/L
ALT SERPL-CCNC: 10 U/L
ANION GAP SERPL CALC-SCNC: 6 MMOL/L (ref 0–18)
AST SERPL-CCNC: 18 U/L (ref ?–34)
BASOPHILS # BLD AUTO: 0.02 X10(3) UL (ref 0–0.2)
BASOPHILS NFR BLD AUTO: 0.3 %
BILIRUB SERPL-MCNC: 0.5 MG/DL (ref 0.2–0.9)
BNP SERPL-MCNC: 215 PG/ML
BUN BLD-MCNC: 11 MG/DL (ref 9–23)
BUN/CREAT SERPL: 12.2 (ref 10–20)
CALCIUM BLD-MCNC: 9.7 MG/DL (ref 8.7–10.4)
CHLORIDE SERPL-SCNC: 106 MMOL/L (ref 98–112)
CO2 SERPL-SCNC: 27 MMOL/L (ref 21–32)
CREAT BLD-MCNC: 0.9 MG/DL
DEPRECATED RDW RBC AUTO: 51.1 FL (ref 35.1–46.3)
DIGOXIN SERPL-MCNC: 1.05 NG/ML (ref 0.8–2)
EGFRCR SERPLBLD CKD-EPI 2021: 80 ML/MIN/1.73M2 (ref 60–?)
EOSINOPHIL # BLD AUTO: 0.03 X10(3) UL (ref 0–0.7)
EOSINOPHIL NFR BLD AUTO: 0.4 %
ERYTHROCYTE [DISTWIDTH] IN BLOOD BY AUTOMATED COUNT: 14.9 % (ref 11–15)
GLOBULIN PLAS-MCNC: 3.8 G/DL (ref 2–3.5)
GLUCOSE BLD-MCNC: 71 MG/DL (ref 70–99)
HCT VFR BLD AUTO: 42.9 %
HGB BLD-MCNC: 13.5 G/DL
IMM GRANULOCYTES # BLD AUTO: 0.03 X10(3) UL (ref 0–1)
IMM GRANULOCYTES NFR BLD: 0.4 %
LYMPHOCYTES # BLD AUTO: 1.06 X10(3) UL (ref 1–4)
LYMPHOCYTES NFR BLD AUTO: 14.3 %
MCH RBC QN AUTO: 29.2 PG (ref 26–34)
MCHC RBC AUTO-ENTMCNC: 31.5 G/DL (ref 31–37)
MCV RBC AUTO: 92.9 FL
MONOCYTES # BLD AUTO: 1.31 X10(3) UL (ref 0.1–1)
MONOCYTES NFR BLD AUTO: 17.7 %
NEUTROPHILS # BLD AUTO: 4.97 X10 (3) UL (ref 1.5–7.7)
NEUTROPHILS # BLD AUTO: 4.97 X10(3) UL (ref 1.5–7.7)
NEUTROPHILS NFR BLD AUTO: 66.9 %
OSMOLALITY SERPL CALC.SUM OF ELEC: 286 MOSM/KG (ref 275–295)
PLATELET # BLD AUTO: 145 10(3)UL (ref 150–450)
POTASSIUM SERPL-SCNC: 4.5 MMOL/L (ref 3.5–5.1)
PROT SERPL-MCNC: 8.3 G/DL (ref 5.7–8.2)
RBC # BLD AUTO: 4.62 X10(6)UL
SODIUM SERPL-SCNC: 139 MMOL/L (ref 136–145)
TSI SER-ACNC: 1.62 MIU/ML (ref 0.55–4.78)
WBC # BLD AUTO: 7.4 X10(3) UL (ref 4–11)

## 2024-09-11 PROCEDURE — 85025 COMPLETE CBC W/AUTO DIFF WBC: CPT | Performed by: INTERNAL MEDICINE

## 2024-09-11 PROCEDURE — 84443 ASSAY THYROID STIM HORMONE: CPT | Performed by: INTERNAL MEDICINE

## 2024-09-11 PROCEDURE — 83880 ASSAY OF NATRIURETIC PEPTIDE: CPT | Performed by: INTERNAL MEDICINE

## 2024-09-11 PROCEDURE — 80162 ASSAY OF DIGOXIN TOTAL: CPT | Performed by: INTERNAL MEDICINE

## 2024-09-11 PROCEDURE — 80053 COMPREHEN METABOLIC PANEL: CPT | Performed by: INTERNAL MEDICINE

## 2024-09-23 ENCOUNTER — TELEPHONE (OUTPATIENT)
Dept: INTERNAL MEDICINE CLINIC | Facility: CLINIC | Age: 89
End: 2024-09-23

## 2024-09-23 DIAGNOSIS — M47.816 OSTEOARTHRITIS OF LUMBAR SPINE, UNSPECIFIED SPINAL OSTEOARTHRITIS COMPLICATION STATUS: ICD-10-CM

## 2024-09-23 RX ORDER — HYDROCODONE BITARTRATE AND ACETAMINOPHEN 7.5; 325 MG/1; MG/1
1 TABLET ORAL EVERY 6 HOURS PRN
Qty: 90 TABLET | Refills: 0 | Status: SHIPPED | OUTPATIENT
Start: 2024-09-23 | End: 2024-10-25

## 2024-09-23 NOTE — TELEPHONE ENCOUNTER
To MD:  The above refill request is for a controlled substance.  Please review pended medication order.   Print and sign for staff to fax to pharmacy or prescribe electronically.    Last office visit: 8/2/23 (virtual visit)  Last time refill sent and quantity/refills: 8/26/24 #90

## 2024-10-25 ENCOUNTER — TELEPHONE (OUTPATIENT)
Dept: INTERNAL MEDICINE CLINIC | Facility: CLINIC | Age: 89
End: 2024-10-25

## 2024-10-25 DIAGNOSIS — M47.816 OSTEOARTHRITIS OF LUMBAR SPINE, UNSPECIFIED SPINAL OSTEOARTHRITIS COMPLICATION STATUS: ICD-10-CM

## 2024-10-25 RX ORDER — HYDROCODONE BITARTRATE AND ACETAMINOPHEN 7.5; 325 MG/1; MG/1
1 TABLET ORAL EVERY 6 HOURS PRN
Qty: 90 TABLET | Refills: 0 | Status: SHIPPED | OUTPATIENT
Start: 2024-10-25 | End: 2024-11-25

## 2024-10-25 RX ORDER — CEPHALEXIN 500 MG/1
500 CAPSULE ORAL 2 TIMES DAILY
Qty: 14 CAPSULE | Refills: 0 | Status: SHIPPED | OUTPATIENT
Start: 2024-10-25 | End: 2025-01-02

## 2024-10-25 NOTE — TELEPHONE ENCOUNTER
Mona CARBALLO from [facility], [phone]  called to obtain provider's recommendation.  Mona CARBALLO states that both patient and facility he resides at are concerned about black hard spots on bottom of left foot that appeared approximately 3 days ago.   1 spot is near big toe and other is down the middle of the foot.   4th toe has a callus, is not open, dark and hard and is swollen.  Spots are not open and patient has been staying off feet. Patient has had previous issues with feet.

## 2024-10-25 NOTE — TELEPHONE ENCOUNTER
Mona / [facility] is calling back patient is requesting an antibiotic for the 4th toe due to the swelling.  Nursing is concerned of infection due to the swelling

## 2024-10-25 NOTE — TELEPHONE ENCOUNTER
To MD:  The above refill request is for a controlled substance.  Please review pended medication order.   Print and sign for staff to fax to pharmacy or prescribe electronically.    Last office visit: 8/2/23 virtual  Last time refill sent and quantity/refills:  09/23/2024 qty 90 plus 0

## 2024-10-25 NOTE — TELEPHONE ENCOUNTER
To Dr. OTTO     Please advise     Spoke to Mona CARBALLO from [facility] - reports that about 3 days ago patient and staff noticed large dark raised bumps on bottom of patients left food- hard to touch nut no painful, mild swelling noted. No drainage on any of the spots. Also reports a callus like formation on the top of 4th toe on left foot ( has had callus previously on another toe that had to be amputated). Black spots are a first time occurrence.

## 2024-11-01 NOTE — TELEPHONE ENCOUNTER
Lenore/[facility] called    INR 2.2  Dosage:  6MG of Warfarin every Tues and Friday   4MG all other days        FYI:  Patient has hematoma on right shin, not open, better than it was     Heart rate of 56/BP 98/44, no dizziness, patient says he is not drinking enough non-caffeinated liquids, nurse encouraged more liquids     Tasked to Coumadin and nursing 31-Oct-2024 23:07

## 2024-11-14 ENCOUNTER — TELEPHONE (OUTPATIENT)
Dept: INTERNAL MEDICINE CLINIC | Facility: CLINIC | Age: 89
End: 2024-11-14

## 2024-11-14 RX ORDER — DIGOXIN 125 MCG
TABLET ORAL
Qty: 90 TABLET | Refills: 3 | Status: SHIPPED | OUTPATIENT
Start: 2024-11-14

## 2024-11-14 NOTE — TELEPHONE ENCOUNTER
To  to please advise on the pending refill request as patient has not been seen since 8/2/23 (video visit)

## 2024-11-14 NOTE — TELEPHONE ENCOUNTER
ERx sent for digoxin 0.125 mg po every day #90, 3 RF    Pt is due for video visit (he is home-bound); please call pt to schedule video visit; to

## 2024-11-15 NOTE — TELEPHONE ENCOUNTER
Lenore CARBALLO, from [facility] called and scheduled Erikadoe Visit for 12/18/24 at 5pm per Dr. Camargo's request of patient.     Lenore is also requesting a verbal order for new episode of care.     Please call Lenore at [phone]

## 2024-11-15 NOTE — TELEPHONE ENCOUNTER
Called and spoke to patient. He would prefer his Home Health nurse Lenore Sargent from [facility] call to schedule Video Visit as he would like her present also.     Patient gave verbal to discuss appointments and schedule with Lenore.     Patient will reach out to Lenore today and ask her to call today.

## 2024-11-15 NOTE — TELEPHONE ENCOUNTER
Attempted to reach Lenore to give verbal ok for new episode of care  Left message to call back--Voicemail is not secure

## 2024-11-25 ENCOUNTER — TELEPHONE (OUTPATIENT)
Dept: INTERNAL MEDICINE CLINIC | Facility: CLINIC | Age: 89
End: 2024-11-25

## 2024-11-25 DIAGNOSIS — M47.816 OSTEOARTHRITIS OF LUMBAR SPINE, UNSPECIFIED SPINAL OSTEOARTHRITIS COMPLICATION STATUS: ICD-10-CM

## 2024-11-25 RX ORDER — HYDROCODONE BITARTRATE AND ACETAMINOPHEN 7.5; 325 MG/1; MG/1
1 TABLET ORAL EVERY 6 HOURS PRN
Qty: 90 TABLET | Refills: 0 | Status: SHIPPED | OUTPATIENT
Start: 2024-11-25 | End: 2024-12-20

## 2024-11-25 NOTE — TELEPHONE ENCOUNTER
To MD:  The above refill request is for a controlled substance.  Please review pended medication order.   Print and sign for staff to fax to pharmacy or prescribe electronically.    Last office visit: 8/2/23 (virtual)  Last time refill sent and quantity/refills: 10/25/24 #90

## 2024-11-25 NOTE — TELEPHONE ENCOUNTER
Patient called for [place] refill that he will need for the holiday weekend    Pharmacy - [facility] [place], [address]

## 2024-12-20 ENCOUNTER — TELEPHONE (OUTPATIENT)
Dept: INTERNAL MEDICINE CLINIC | Facility: CLINIC | Age: 89
End: 2024-12-20

## 2024-12-20 DIAGNOSIS — M47.816 OSTEOARTHRITIS OF LUMBAR SPINE, UNSPECIFIED SPINAL OSTEOARTHRITIS COMPLICATION STATUS: ICD-10-CM

## 2024-12-20 RX ORDER — HYDROCODONE BITARTRATE AND ACETAMINOPHEN 7.5; 325 MG/1; MG/1
1 TABLET ORAL EVERY 6 HOURS PRN
Qty: 90 TABLET | Refills: 0 | Status: SHIPPED | OUTPATIENT
Start: 2024-12-20

## 2024-12-20 NOTE — TELEPHONE ENCOUNTER
To MD:  The above refill request is for a controlled substance.  Please review pended medication order.   Print and sign for staff to fax to pharmacy or prescribe electronically.    Last office visit: 8/2/23 (virtual)  Last time refill sent and quantity/refills: 11/25/2024 #90

## 2024-12-26 ENCOUNTER — TELEPHONE (OUTPATIENT)
Dept: INTERNAL MEDICINE CLINIC | Facility: CLINIC | Age: 89
End: 2024-12-26

## 2024-12-26 NOTE — TELEPHONE ENCOUNTER
Lenore / Parag is calling to request order to recertify patient for nursing and physical therapy.    To administer B12 injections and help manage his peripheral cardiovascular disease.    Phone [phone], confidential VM

## 2024-12-30 ENCOUNTER — TELEMEDICINE (OUTPATIENT)
Dept: INTERNAL MEDICINE CLINIC | Facility: CLINIC | Age: 89
End: 2024-12-30

## 2024-12-30 ENCOUNTER — TELEPHONE (OUTPATIENT)
Dept: INTERNAL MEDICINE CLINIC | Facility: CLINIC | Age: 89
End: 2024-12-30

## 2024-12-30 DIAGNOSIS — I48.91 ATRIAL FIBRILLATION, UNSPECIFIED TYPE (HCC): ICD-10-CM

## 2024-12-30 DIAGNOSIS — D62 ANEMIA DUE TO ACUTE BLOOD LOSS: ICD-10-CM

## 2024-12-30 DIAGNOSIS — R55 NEAR SYNCOPE: Primary | ICD-10-CM

## 2024-12-30 DIAGNOSIS — I35.0 AORTIC VALVE STENOSIS, ETIOLOGY OF CARDIAC VALVE DISEASE UNSPECIFIED: ICD-10-CM

## 2024-12-30 DIAGNOSIS — I73.9 PVD (PERIPHERAL VASCULAR DISEASE) (HCC): ICD-10-CM

## 2024-12-30 PROCEDURE — 99442 PHONE E/M BY PHYS 11-20 MIN: CPT | Performed by: INTERNAL MEDICINE

## 2024-12-30 RX ORDER — DILTIAZEM HYDROCHLORIDE 120 MG/1
120 CAPSULE, COATED, EXTENDED RELEASE ORAL DAILY
Qty: 90 CAPSULE | Refills: 3 | Status: ON HOLD | OUTPATIENT
Start: 2024-12-30

## 2024-12-30 NOTE — PROGRESS NOTES
Virtual Telephone Check-In    Shoaib Alan verbally consents to a Virtual/Telephone Check-In visit on 12/30/24.  Patient has been referred to the [facility] website at [email or web address] to review the yearly Consent to Treat document.    Patient understands and accepts financial responsibility for any deductible, co-insurance and/or co-pays associated with this service.    Duration of the service: 20 minutes    89 y/o M here for Telemedicine visit; has has intermittent episodes of near-syncope associated with low heart rates in 40s; he has been taking diltiazem  mg po every day and digoxin 0.125 mg po every day; labs in Sept 2024 shows digoxin level of 1.05;  no CP; no SOB; no headaches; no palpitation        Summary of topics discussed:     Near-syncope  /60, HR 47; labs in Sept 2024 unremarkable; on diltiazem  mg every day  -decrease diltiazem  mg po every day  -check CBC, BMP, digoxin level    History of RLE hematoma  In May 2023; ultrasound RLE shows two large hematomas to RLE: no evidence for compartment syndrome; no need for surgical intervention  -stopped warfarin  -Norco 5 mg prn pain  -mobilize with PT/OT  -s/p discharge to [facility] for DONTA;      Anemia due to acute blood loss  Hgb 10.0> 9.5> 9.0> 9.2>> 13.5; on ferrous sulfate 325 mg po qD     Atrial fibrillation   sees Dr. Thomas as outpatient; was on warfarin 4 mg po at bedtime and 6 mg po at bedtime on Fri at bedtime at home; Rx per [facility]; EKG shows Afib with old septal infarct; last ECHO in Nov 2021 shows EF 40-45%; calcified aortic annulus; Lexiscan GXT 11/29/21 negative for ischemia  - on diltiazem  mg qD and digoxin 0.125 mg po qD;   -stopped warfarin in 2023 due to leg hematomas; INR 3.59> 2.27> 1.18 after Vitamin K 5 mg po   -due to bradycardia, decrease diltiazem ER to 120 mg po qD     Aortic stenosis  ECHO in Nov 2021 shows mild aortic stenosis     History of COVID infection  In Dec 2022;    -nares positive COVID 12/1/22  -s/p remdesivir 100 mg IV [id number] x 4d  -s/p Decadron 6 mg daily x 4 days; discontinued 12/4     History of Pneumonia  In Dec 2022; PCXR shows Patchy medial bibasilar opacities, which are new since prior chest radiographs from October, 2021. Findings could relate to pneumonia/aspiration in the appropriate clinical setting.  Also identified is a new 4 cm right perihilar nodular opacity.     -CT chest shows Airspace opacities within the bilateral upper and lower lobes, most pronounced within the right lower lobe suspicious for multifocal pneumonia.  -s/p ceftriaxone 1 gm IV [id number], d#5, and s/p  azithromycin 500 mg po daily x 3d     History of Osteomyelitis left 2nd toe   -MRI left foot 11/27/21 shows underlying acute osteomyelitis involving the entirety of the 2nd digit middle phalanx and the distal half of the 2nd digit proximal phalanx.   -s/p left 2nd toe amputation 12/3/21 per podiatrist, Dr Gutierrez; pathology with acute and chronic osteomyelitis   -has home visits by podiatrist Dr Josefina Fraga [phone] and advised sleeve to right 2nd toe      PVD  -CTA 11/29/21 reveals atherosclerosis of the SFA with 65-70% stenosis just beyond the adductor canal  -s/p angioplasty of tight focal left popliteal stenosis; unable to open distally occluded left post tib artery; left ant tib artery-dorsalis pedis widely patent (procedure on 12/1/21 by Dr. Lynch).      Constipation  On Miralax 17 gm po daily prn, and Dulcolax 10 mg po daily prn; takes prune juice     Osteoarthritis lumbar spine and cervical spine  As seen on CT June 2018; Scoliosis and degenerative changes in spine. Mild chronic anterior wedging of lower thoracic vertebral bodies; XR L-spine in Aug 2019 shows 5 lumbar segments with 20° levoconvex scoliosis, slight flattening of the lordotic curvature.  Endplate compression at L3.  Moderate loss of disc heights throughout the lumbar spine.  No spondylolisthesis.  No  destructive lesions.  Mild calcification of the abdominal aorta; has seen physiatrist Dr Reilly Overton  -the patient had been taking Norco 7.5 mg q. 6 hours p.r.n. Pain; pt requests #60 with next refill     Gait abnormality  Pt has high fall risk; using manual wheelchair or electric scooter, though independent use is limited due to bilateral hand osteoarthritis;      Right hand basal cell skin cancer  S/p biopsy in Dec 2018; Pt awaits formal excision by dermatologist in [place]     History of Sepsis due to Klebsiella UTI  In June 2018; Due to urine septicemia; BCx and UCx showing growing Klebsiella pneumoniae, sensitive to pip/ tazo; s/p Zosyn 3.375 gm IV q8hrs, x6d, then Keflex for 8 more days, along with prophylactic po Vanco 125mg qd.        Nephrolithiasis with moderate left hydronephrosis  CT abdomen/ pelvis 6/29 revealed moderate left hydroureteronephrosis related to a 4 x 2 mm left ureterovesical junction calculus; pt seen in consultation with urologist Dr Linder; s/p cystoscopy, left retrograde pyelogram, and insertion of left ureteral stent by Dr Linder.   Follow up with Dr. Linder     Stool incontinence  On Imodium 2 mg po q4hrs prn.  GI panel negative.      Left shoulder pain  XR both shoulders neg for DJD; DDx includes frozen shoulder vs PMR;  s/p home physical therapy for adhesive capsulitis with [facility]; on prednisone to 2.5 mg daily; now with mild right shoulder pain x 2d--> advise stretching exercises for now; call if sxs persists     Pulmonary HTN  seen on ECHO in Jan 2017 with PA pressure 54 (previous PA pressure 44 in June 2016); thought to be multifactorial     Left hip fracture  s/p IM fixation in Jan 2017; on  Norco 7.5 mg po q4hrs prn     Sacral decubitus ulcer  uses Medihoney wound dressing gel daily prn     Coronary artery disease   Had PTCA in 1990s; last Lexiscan in 2011;  -the patient currently takes aspirin 81 mg daily; EKG shows Afib with old inferior anterior infarcts  (seen in 2018); ECHO shows EF 40-45%; calcified aortic annulus; will obtain Lexiscan cardiac stress test before procedure     History of villous adenoma   the patient reports occasional loose stools.  The patient has been advised to see gastroenterologist, Dr. Hopper, as an outpatient for colonoscopy though he has declined repeat colonoscopy     Vitamin B12 deficiency  the patient had been receiving vitamin B12 1,000 mcg IM q. Month     RTC 6 mos  Telemedicine video via [facility]     Spent 30 minutes obtaining history, evaluating patient, discussing treatment options, diet, exercise, review of available labs and radiology reports, and completing documentation.             Mars Camargo MD

## 2024-12-30 NOTE — TELEPHONE ENCOUNTER
Lenore from [facility] looking for lab orders from Dr. Camargo.  Also asking about the medication change.  OK to leave a message on her confidential voicemail.

## 2024-12-30 NOTE — TELEPHONE ENCOUNTER
Shine, a Physical Therapist from [facility] called.  The pt. Is experiencing some Bradycardia.  The patient will be seeing Dr. Mary Jo ardon 1/6/25 and Daniel wanted Dr. Camargo to be aware.  Daniel recommended to the patient and his family that he needs to follow up with his Cardiologist.

## 2024-12-30 NOTE — TELEPHONE ENCOUNTER
To OTIS Dumont..    I called and spoke with patient who resides at [facility] and is evaluated by staff daily he stated.    Patient aware to inform staff if he has any further episodes of dizziness.    Patient will call Dr Esteban Thomas's office today to schedule an appointment since he has not seen him in about 15 years

## 2024-12-30 NOTE — TELEPHONE ENCOUNTER
Lenore Bejarano  called and left detailed voice message of lab orders for CBC and BMP and Diltiazem  mg daily. Lenore to call office for any further questions noted.

## 2024-12-31 ENCOUNTER — LAB REQUISITION (OUTPATIENT)
Dept: LAB | Facility: HOSPITAL | Age: 89
End: 2024-12-31
Payer: MEDICARE

## 2024-12-31 DIAGNOSIS — E53.8 DEFICIENCY OF OTHER SPECIFIED B GROUP VITAMINS: ICD-10-CM

## 2024-12-31 LAB
ANION GAP SERPL CALC-SCNC: 9 MMOL/L (ref 0–18)
BASOPHILS # BLD AUTO: 0.03 X10(3) UL (ref 0–0.2)
BASOPHILS NFR BLD AUTO: 0.3 %
BUN BLD-MCNC: 15 MG/DL (ref 9–23)
BUN/CREAT SERPL: 16.3 (ref 10–20)
CALCIUM BLD-MCNC: 9.3 MG/DL (ref 8.7–10.4)
CHLORIDE SERPL-SCNC: 105 MMOL/L (ref 98–112)
CO2 SERPL-SCNC: 27 MMOL/L (ref 21–32)
CREAT BLD-MCNC: 0.92 MG/DL
DEPRECATED RDW RBC AUTO: 50.6 FL (ref 35.1–46.3)
EGFRCR SERPLBLD CKD-EPI 2021: 78 ML/MIN/1.73M2 (ref 60–?)
EOSINOPHIL # BLD AUTO: 0.07 X10(3) UL (ref 0–0.7)
EOSINOPHIL NFR BLD AUTO: 0.8 %
ERYTHROCYTE [DISTWIDTH] IN BLOOD BY AUTOMATED COUNT: 14.8 % (ref 11–15)
GLUCOSE BLD-MCNC: 100 MG/DL (ref 70–99)
HCT VFR BLD AUTO: 37.7 %
HGB BLD-MCNC: 11.9 G/DL
IMM GRANULOCYTES # BLD AUTO: 0.05 X10(3) UL (ref 0–1)
IMM GRANULOCYTES NFR BLD: 0.6 %
LYMPHOCYTES # BLD AUTO: 1.49 X10(3) UL (ref 1–4)
LYMPHOCYTES NFR BLD AUTO: 16.7 %
MCH RBC QN AUTO: 29.2 PG (ref 26–34)
MCHC RBC AUTO-ENTMCNC: 31.6 G/DL (ref 31–37)
MCV RBC AUTO: 92.6 FL
MONOCYTES # BLD AUTO: 1.34 X10(3) UL (ref 0.1–1)
MONOCYTES NFR BLD AUTO: 15 %
NEUTROPHILS # BLD AUTO: 5.96 X10 (3) UL (ref 1.5–7.7)
NEUTROPHILS # BLD AUTO: 5.96 X10(3) UL (ref 1.5–7.7)
NEUTROPHILS NFR BLD AUTO: 66.6 %
OSMOLALITY SERPL CALC.SUM OF ELEC: 293 MOSM/KG (ref 275–295)
PLATELET # BLD AUTO: 159 10(3)UL (ref 150–450)
POTASSIUM SERPL-SCNC: 4.2 MMOL/L (ref 3.5–5.1)
RBC # BLD AUTO: 4.07 X10(6)UL
SODIUM SERPL-SCNC: 141 MMOL/L (ref 136–145)
WBC # BLD AUTO: 8.9 X10(3) UL (ref 4–11)

## 2024-12-31 PROCEDURE — 85025 COMPLETE CBC W/AUTO DIFF WBC: CPT | Performed by: INTERNAL MEDICINE

## 2024-12-31 PROCEDURE — 80048 BASIC METABOLIC PNL TOTAL CA: CPT | Performed by: INTERNAL MEDICINE

## 2025-01-02 ENCOUNTER — HOSPITAL ENCOUNTER (INPATIENT)
Facility: HOSPITAL | Age: OVER 89
LOS: 6 days | Discharge: NURSING, REHAB OR LONG-TERM CARE | End: 2025-01-09
Attending: EMERGENCY MEDICINE | Admitting: INTERNAL MEDICINE
Payer: MEDICARE

## 2025-01-02 ENCOUNTER — APPOINTMENT (OUTPATIENT)
Dept: GENERAL RADIOLOGY | Facility: HOSPITAL | Age: OVER 89
End: 2025-01-02
Attending: EMERGENCY MEDICINE
Payer: MEDICARE

## 2025-01-02 ENCOUNTER — APPOINTMENT (OUTPATIENT)
Dept: CV DIAGNOSTICS | Facility: HOSPITAL | Age: OVER 89
End: 2025-01-02
Attending: INTERNAL MEDICINE
Payer: MEDICARE

## 2025-01-02 DIAGNOSIS — M47.816 OSTEOARTHRITIS OF LUMBAR SPINE, UNSPECIFIED SPINAL OSTEOARTHRITIS COMPLICATION STATUS: ICD-10-CM

## 2025-01-02 DIAGNOSIS — I48.91 ATRIAL FIBRILLATION WITH RVR (HCC): Primary | ICD-10-CM

## 2025-01-02 LAB
ANION GAP SERPL CALC-SCNC: 7 MMOL/L (ref 0–18)
APTT PPP: 31.1 SECONDS (ref 23–36)
APTT PPP: 62.1 SECONDS (ref 23–36)
APTT PPP: 81.3 SECONDS (ref 23–36)
BASOPHILS # BLD AUTO: 0.03 X10(3) UL (ref 0–0.2)
BASOPHILS NFR BLD AUTO: 0.3 %
BUN BLD-MCNC: 11 MG/DL (ref 9–23)
BUN/CREAT SERPL: 11.3 (ref 10–20)
CALCIUM BLD-MCNC: 9.7 MG/DL (ref 8.7–10.4)
CHLORIDE SERPL-SCNC: 104 MMOL/L (ref 98–112)
CO2 SERPL-SCNC: 28 MMOL/L (ref 21–32)
CREAT BLD-MCNC: 0.97 MG/DL
DEPRECATED RDW RBC AUTO: 50 FL (ref 35.1–46.3)
EGFRCR SERPLBLD CKD-EPI 2021: 73 ML/MIN/1.73M2 (ref 60–?)
EOSINOPHIL # BLD AUTO: 0.12 X10(3) UL (ref 0–0.7)
EOSINOPHIL NFR BLD AUTO: 1.1 %
ERYTHROCYTE [DISTWIDTH] IN BLOOD BY AUTOMATED COUNT: 14.9 % (ref 11–15)
GLUCOSE BLD-MCNC: 95 MG/DL (ref 70–99)
GLUCOSE BLDC GLUCOMTR-MCNC: 98 MG/DL (ref 70–99)
HCT VFR BLD AUTO: 40.9 %
HGB BLD-MCNC: 12.9 G/DL
IMM GRANULOCYTES # BLD AUTO: 0.04 X10(3) UL (ref 0–1)
IMM GRANULOCYTES NFR BLD: 0.4 %
INR BLD: 0.98 (ref 0.8–1.2)
LYMPHOCYTES # BLD AUTO: 1.56 X10(3) UL (ref 1–4)
LYMPHOCYTES NFR BLD AUTO: 14.9 %
MCH RBC QN AUTO: 28.9 PG (ref 26–34)
MCHC RBC AUTO-ENTMCNC: 31.5 G/DL (ref 31–37)
MCV RBC AUTO: 91.5 FL
MONOCYTES # BLD AUTO: 2 X10(3) UL (ref 0.1–1)
MONOCYTES NFR BLD AUTO: 19.1 %
MRSA DNA SPEC QL NAA+PROBE: NEGATIVE
NEUTROPHILS # BLD AUTO: 6.71 X10 (3) UL (ref 1.5–7.7)
NEUTROPHILS # BLD AUTO: 6.71 X10(3) UL (ref 1.5–7.7)
NEUTROPHILS NFR BLD AUTO: 64.2 %
OSMOLALITY SERPL CALC.SUM OF ELEC: 287 MOSM/KG (ref 275–295)
PLATELET # BLD AUTO: 154 10(3)UL (ref 150–450)
POTASSIUM SERPL-SCNC: 4.5 MMOL/L (ref 3.5–5.1)
PROTHROMBIN TIME: 13.6 SECONDS (ref 11.6–14.8)
Q-T INTERVAL: 328 MS
Q-T INTERVAL: 408 MS
QRS DURATION: 128 MS
QRS DURATION: 142 MS
QTC CALCULATION (BEZET): 379 MS
QTC CALCULATION (BEZET): 530 MS
R AXIS: -39 DEGREES
R AXIS: -61 DEGREES
RBC # BLD AUTO: 4.47 X10(6)UL
SODIUM SERPL-SCNC: 139 MMOL/L (ref 136–145)
T AXIS: -15 DEGREES
T AXIS: 128 DEGREES
TROPONIN I SERPL HS-MCNC: 49 NG/L
VENTRICULAR RATE: 157 BPM
VENTRICULAR RATE: 52 BPM
WBC # BLD AUTO: 10.5 X10(3) UL (ref 4–11)

## 2025-01-02 PROCEDURE — 93306 TTE W/DOPPLER COMPLETE: CPT | Performed by: INTERNAL MEDICINE

## 2025-01-02 PROCEDURE — 71045 X-RAY EXAM CHEST 1 VIEW: CPT | Performed by: EMERGENCY MEDICINE

## 2025-01-02 RX ORDER — HEPARIN SODIUM AND DEXTROSE 10000; 5 [USP'U]/100ML; G/100ML
12 INJECTION INTRAVENOUS ONCE
Status: COMPLETED | OUTPATIENT
Start: 2025-01-02 | End: 2025-01-03

## 2025-01-02 RX ORDER — DOXEPIN HYDROCHLORIDE 50 MG/1
1 CAPSULE ORAL DAILY
Status: DISCONTINUED | OUTPATIENT
Start: 2025-01-02 | End: 2025-01-09

## 2025-01-02 RX ORDER — DILTIAZEM HYDROCHLORIDE 5 MG/ML
INJECTION INTRAVENOUS
Status: DISCONTINUED
Start: 2025-01-02 | End: 2025-01-02 | Stop reason: WASHOUT

## 2025-01-02 RX ORDER — CHOLECALCIFEROL (VITAMIN D3) 25 MCG
1000 TABLET ORAL DAILY
Status: DISCONTINUED | OUTPATIENT
Start: 2025-01-02 | End: 2025-01-09

## 2025-01-02 RX ORDER — FERROUS SULFATE 325(65) MG
325 TABLET, DELAYED RELEASE (ENTERIC COATED) ORAL
Status: DISCONTINUED | OUTPATIENT
Start: 2025-01-03 | End: 2025-01-09

## 2025-01-02 RX ORDER — DILTIAZEM HYDROCHLORIDE 120 MG/1
120 CAPSULE, EXTENDED RELEASE ORAL DAILY
Status: DISCONTINUED | OUTPATIENT
Start: 2025-01-02 | End: 2025-01-02

## 2025-01-02 RX ORDER — DIGOXIN 125 MCG
125 TABLET ORAL DAILY
Status: DISCONTINUED | OUTPATIENT
Start: 2025-01-02 | End: 2025-01-02

## 2025-01-02 RX ORDER — HEPARIN SODIUM 1000 [USP'U]/ML
60 INJECTION, SOLUTION INTRAVENOUS; SUBCUTANEOUS ONCE
Status: COMPLETED | OUTPATIENT
Start: 2025-01-02 | End: 2025-01-02

## 2025-01-02 RX ORDER — HYDROCODONE BITARTRATE AND ACETAMINOPHEN 7.5; 325 MG/1; MG/1
1 TABLET ORAL EVERY 6 HOURS PRN
Status: DISCONTINUED | OUTPATIENT
Start: 2025-01-02 | End: 2025-01-09

## 2025-01-02 RX ORDER — LOPERAMIDE HYDROCHLORIDE 2 MG/1
2 CAPSULE ORAL 4 TIMES DAILY PRN
Status: DISCONTINUED | OUTPATIENT
Start: 2025-01-02 | End: 2025-01-09

## 2025-01-02 RX ORDER — ACETAMINOPHEN 500 MG
500 TABLET ORAL EVERY 6 HOURS PRN
Status: DISCONTINUED | OUTPATIENT
Start: 2025-01-02 | End: 2025-01-09

## 2025-01-02 RX ORDER — HEPARIN SODIUM AND DEXTROSE 10000; 5 [USP'U]/100ML; G/100ML
INJECTION INTRAVENOUS CONTINUOUS
Status: DISCONTINUED | OUTPATIENT
Start: 2025-01-02 | End: 2025-01-03

## 2025-01-02 RX ORDER — DILTIAZEM HYDROCHLORIDE 5 MG/ML
10 INJECTION INTRAVENOUS ONCE
Status: DISCONTINUED | OUTPATIENT
Start: 2025-01-02 | End: 2025-01-02

## 2025-01-02 RX ORDER — VIT A/VIT C/VIT E/ZINC/COPPER 7160-113
1 TABLET, DELAYED RELEASE (ENTERIC COATED) ORAL DAILY
Status: DISCONTINUED | OUTPATIENT
Start: 2025-01-02 | End: 2025-01-02

## 2025-01-02 RX ORDER — PREDNISONE 2.5 MG/1
2.5 TABLET ORAL DAILY
Status: DISCONTINUED | OUTPATIENT
Start: 2025-01-02 | End: 2025-01-09

## 2025-01-02 RX ORDER — WARFARIN SODIUM 5 MG/1
5 TABLET ORAL NIGHTLY
Status: DISCONTINUED | OUTPATIENT
Start: 2025-01-02 | End: 2025-01-03

## 2025-01-02 NOTE — ED QUICK NOTES
Patient continues to be in afib with rvr, this rn spoke to md mauricio nurse practitioner, who stated to restart diltiazem drip at this time.

## 2025-01-02 NOTE — ED INITIAL ASSESSMENT (HPI)
Via [facility] from [facility] for dizziness that started around 0720 am. Patient aox4, denies pain, denies palpitations. Patient noted to be tachycardic in 150s upon arrival. Patient has no complaints

## 2025-01-02 NOTE — CONSULTS
[facility]                                                            CONSULT NOTE      Patient Name: Shoaib Alan MRN: [id number]   : 10/22/1931 CSN: [id number]       Reason for consultation:   Asked by Joao Jenkins MD to provide evaluation and management of arrhythmia.    Assessment and Recommendations:     Atrial fibrillation with rapid rates  -RBBB/LAFB  -History of AF for decades, remembers cardioversion 10-15 years ago.  -As outpt, on diltiazem and digoxin.  Followed by Dr Thomas, no f/u for many years.  -He stopped warfarin in May 2023 due to right leg hematomas in May 2023.  Previously was on warfarin for ~10 years, followed by Dr [facility], and he did his own daily INRs at home.    History of sinus bradycardia  -Episode of presyncope  -Diltiazem dose was decreased after that    CAD  -S/p MI and PTCA, ~30 years ago    Mild aortic stenosis      RECS:  -Check echocardiogram  -Continue diltiazem  mg once daily  -Continue digoxin 0.125 mg once daily  -Start IV diltiazem per AF protocol to better control rates  -If AF does not spontaneously terminate in next 2-3 days, will consider SABRINA-guided cardioversion.  Would hold digoxin before that to avoid post-conversion bradycardia.  -Start IV heparin, AF dosing.  Continue until INR >2  -Start warfarin 5 mg PO once nightly, then adjust dosing based on INR response      Thank you for the consultation.    Rao Lozano MD  Cardiac EP  [facility]  2025  C5      History of present illness:   The patient is a 89 year old with history of AF decades ago, was cardioverted, and has been maintained on diltiazem and digoxin for years.  He was on warfarin until May 2023 when he had right leg hematomas.  He currently has been having lightheadedness and was found to be in AF with rapid rates.  He has no chest pain, dyspnea, edema, or  syncope.    ROS:   Constitutional: No fevers, chills, night sweats, fatigue  ENT: No mouth pain, neck pain, running nose, headaches or swollen glands.  Skin: No rashes, pruritus or skin changes,  Respiratory: No cough, wheezing, no shortness of breath.  CV: No chest pain or claudication.  Musculoskeletal: No joint pain, stiffness or swelling.  : No dysuria or hematuria.  GI: No nausea, vomiting or diarrhea. No blood in stools.  Neurologic: No seizures, tremors, weakness or numbness.  Psych: Mood is good, not sad or anxious    Past Medical, Surgical, Family, and Social Histories:     Past Medical History:    AF (atrial fibrillation) (HCC)    Arrhythmia    Cataract    Chronic pulmonary aspiration    ARF 6/16 BiPAP    Congestive heart disease (HCC)    COPD (chronic obstructive pulmonary disease) ([facility])    Coronary atherosclerosis    COVID    Depression    Dysphagia    Esophageal reflux    Fracture, radius    Hip fracture, left (HCC)    s/p IM fixation 1/11/17; Ortho Dr Alcantara    IBS (irritable bowel syndrome)    Lipid screening    Malabsorption ([facility])    Malnutrition ([facility])    severe     Osteoarthrosis, unspecified whether generalized or localized, unspecified site    hands    Pneumonia    Pneumonia due to organism    Pressure ulcer of left heel, stage 2 (HCC)    PUD (peptic ulcer disease)    Pulmonary hypertension (HCC)    Sacral decubitus ulcer    Thrombocytosis    Villous adenoma    Vitamin B12 deficiency    White matter disease     Past Surgical History:   Procedure Laterality Date    Cardioversion  2007    Cataract  2006    Cath percutaneous  transluminal coronary angioplasty  1990    Cholecystectomy      Colon surgery Right 10/11    Fat, fecal qualitative  12/10    Fracture surgery      Left femur 2016    Hip surgery  01/11/2017    INTERMEDULLARY FIXATION OF LEFT HIP FRACTURE    Hla b 27 disease association  2006    Intraocular lens implant, secondary  2006    Lexiscan nuc stress tst, cardio (dmg)  9/11    Other  surgical history  1970    Billroth anastomosis    Other surgical history  2011    dental implants    Skin surgery  02/11/2019    Mohs/R Dorsal hand/SCC/done by MM     Family History   Problem Relation Age of Onset    Other (old age) Father 89        cause of death    Other (Other) Mother 96        cause of death    Cancer Paternal Aunt     Diabetes Paternal Aunt        SHX:  The patient reports that he quit smoking about 31 years ago. His smoking use included cigarettes. He started smoking about 51 years ago. He has a 60 pack-year smoking history. He quit smokeless tobacco use about 9 years ago.  His smokeless tobacco use included chew. He reports current alcohol use. He reports that he does not use drugs.    Allergies:     Allergies   Allergen Reactions    Dronedarone      Other reaction(s): extreme dizzinss       Medications:     Current Outpatient Medications   Medication Instructions    acetaminophen (TYLENOL EXTRA STRENGTH) 500 mg, Oral, Every 6 hours PRN    cephALEXin (KEFLEX) 500 mg, Oral, 3 times daily    cephALEXin (KEFLEX) 500 mg, Oral, 2 times daily    Cholecalciferol (VITAMIN D) 1000 units Oral Tab 1 tablet, Oral, Daily    cyanocobalamin (VITAMIN B12) 1,000 mcg, Intramuscular, Every 30 days    cyclobenzaprine (FLEXERIL) 10 mg, Oral, 3 times daily PRN    DIGOXIN 0.125 MG Oral Tab TAKE 1 TABLET(125 MCG) BY MOUTH EVERY DAY    dilTIAZem ER (CARDIZEM CD) 120 mg, Oral, Daily    ferrous sulfate 325 mg, Oral, Daily with breakfast    furosemide (LASIX) 20 mg, Oral, DAILY PRN, Prn leg swelling    guaiFENesin (ROBITUSSIN) 200 mg, Oral, Every 4 hours PRN    HYDROcodone-acetaminophen (NORCO) 5-325 MG Oral Tab 1.5 tablets, Oral, Every 6 hours PRN    HYDROcodone-acetaminophen (NORCO) 7.5-325 MG Oral Tab 1 tablet, Oral, Every 6 hours PRN    loperamide (IMODIUM) 2 mg, Oral, 4 times daily PRN    melatonin 3 mg, Oral, Nightly    Multiple Vitamins-Minerals (ICAPS AREDS FORMULA) Oral Tab 1 tablet, Oral, Daily    Potassium  Chloride ER 10 MEQ Oral Tab CR 10 mEq, Oral, DAILY PRN, (take only when taking furosemide)    PREDNISONE 2.5 MG Oral Tab TAKE 1 TABLET(2.5 MG) BY MOUTH DAILY    Syringe/Needle, Disp, (BD LUER-NAZ SYRINGE) 23G X 1\" 3 ML Does not apply Misc USE AS DIRECTED EVERY MONTH         ED initial dose heparin  12 Units/kg/hr Intravenous Once      dilTIAZem Stopped (01/02/25 0905)    continuous dose heparin         PHYSICAL EXAM:   Blood pressure 117/88, pulse (!) 152, temperature 97.9 °F (36.6 °C), temperature source Temporal, resp. rate 22, height 6' 3\" (1.905 m), weight 161 lb (73 kg), SpO2 98%.  GEN - no distress  HEENT - normal conjunctiva, moist mucosa  Neck - supple, no lymphadenopathy  Lungs - nonlabored, clear bilaterally  Heart - JVP 14 cm H2O, carotids normal; irreg irreg, tachy, nl S1/S2; no edema  Abd - soft, nontender  Ext - arthritic changes  Neuro - A+Ox3, no facial droop or gross deficits  Psych - cooperative, calm    LABS AND DATA:     TELE: Atrial fibrillation, and atypical flutter, with rapid rates    Lab Results   Component Value Date    WBC 10.5 01/02/2025    HGB 12.9 (L) 01/02/2025    HCT 40.9 01/02/2025    .0 01/02/2025    CREATSERUM 0.97 01/02/2025    BUN 11 01/02/2025     01/02/2025    K 4.5 01/02/2025     01/02/2025    CO2 28.0 01/02/2025    GLU 95 01/02/2025    CA 9.7 01/02/2025    ALB 4.5 09/11/2024    ALKPHO 79 09/11/2024    BILT 0.5 09/11/2024    TP 8.3 (H) 09/11/2024    AST 18 09/11/2024    ALT 10 09/11/2024    PTT 31.1 01/02/2025    INR 0.98 01/02/2025    PT 41.8 (H) 06/01/2016    TSH 1.621 09/11/2024    LIP 18 (L) 06/29/2018    DDIMER <0.27 12/01/2022    ESRML 23 11/26/2021    MG 1.8 07/05/2018    PHOS 2.9 12/04/2022    TROP <0.045 08/06/2019    B12 681 09/28/2020       Imaging: I independently visualized all relevant chest imaging in PACS, agree with radiology interpretation except where noted.    XR CHEST AP PORTABLE  (CPT=71045)    Result Date: 1/2/2025  CONCLUSION:  1.  Subsegmental atelectasis and/or scarring in both lower lungs, improved compared to 2022. 2. Otherwise no acute cardiopulmonary process.     Dictated by (CST): Johan Hair MD on 1/02/2025 at 8:47 AM     Finalized by (CST): Johan Hair MD on 1/02/2025 at 8:49 AM             ------------------------------------------------------------------------------------------------------------------------------

## 2025-01-02 NOTE — ED PROVIDER NOTES
Patient Seen in: [facility] Emergency Department      History     Chief Complaint   Patient presents with    Dizziness     Stated Complaint: dizzy    Subjective:   HPI      89-year-old male with history of hypertension, atrial fibrillation presently on warfarin, pulmonary hypertension, COPD, and mild cognitive impairment presents with complaints of tachycardia.  The patient reports that he was feeling lightheaded this morning.  His vital signs were checked and he was found to be tachycardic at which time the paramedics were called to bring him to the emergency department.  He presently lives in an assisted living center.  He states that his lightheadedness has resolved and he is without complaints at present.  He denies any palpitations.  No complaints of any chest pain.  No dyspnea.  He states that his medications were recently changed and it was found that his diltiazem was decreased from 180 mg daily to 140 mg daily.  The change was made 2 days ago as the patient was bradycardic and complained of near syncope.    Objective:     No pertinent past medical history.            No pertinent past surgical history.              No pertinent social history.                Physical Exam     ED Triage Vitals   BP 01/02/25 0811 (!) 125/93   Pulse 01/02/25 0811 (!) 155   Resp 01/02/25 0811 24   Temp 01/02/25 0812 97.9 °F (36.6 °C)   Temp src 01/02/25 0811 Temporal   SpO2 01/02/25 0811 96 %   O2 Device 01/02/25 0811 None (Room air)       Current Vitals:   Vital Signs  BP: 112/90  Pulse: (!) 153  Resp: 24  Temp: 97.9 °F (36.6 °C)  Temp src: Temporal  MAP (mmHg): 99    Oxygen Therapy  SpO2: 94 %  O2 Device: None (Room air)        Physical Exam    General Appearance: alert, no distress  Eyes: pupils equal and round no pallor or injection  ENT, Mouth: mucous membranes moist  Respiratory: there are no retractions, lungs are clear to auscultation  Cardiovascular: Tachycardic, irregular rhythm  Gastrointestinal:  abdomen is  soft and non tender, no masses, bowel sounds normal  Neurological: Speech normal.  Moving extremities x 4.  Skin: warm and dry, no rashes.  Musculoskeletal: neck is supple non tender        Extremities are symmetrical, full range of motion.  No leg edema or tenderness noted.  Psychiatric: patient is oriented X 3, there is no agitation    ED Course     Labs Reviewed   CBC WITH DIFFERENTIAL WITH PLATELET - Abnormal; Notable for the following components:       Result Value    HGB 12.9 (*)     RDW-SD 50.0 (*)     Monocyte Absolute 2.00 (*)     All other components within normal limits   BASIC METABOLIC PANEL (8) - Normal   TROPONIN I HIGH SENSITIVITY - Normal   PROTHROMBIN TIME (PT) - Normal   PTT, ACTIVATED - Normal   POCT GLUCOSE - Normal   RAINBOW DRAW LAVENDER   RAINBOW DRAW LIGHT GREEN   RAINBOW DRAW BLUE   ED/MRSA SCREEN BY PCR-CC     EKG    Rate, intervals and axes as noted on EKG Report.  Rate: 157  Rhythm: Wide-complex tachycardia  Axis: Left  Reading: Abnormal rhythm with ST depressions laterally                     MDM      Patient with wide-complex tachycardia but hemodynamically stable and without complaints.  Discussed with [facility] [place].  The patient was evaluated in the ED by Dr. Lozano, cardiology.  He recommends admission to cardiac telemetry and that IV heparin be started for anticoagulation.  He will write for rate control medications.  Also discussed with Dr. Camargo, the patient's primary physician.    Admission disposition: 1/2/2025 10:06 AM           Medical Decision Making      Disposition and Plan     Clinical Impression:  1. Atrial fibrillation with RVR (HCC)         Disposition:  Admit  1/2/2025 10:06 am    Follow-up:  No follow-up provider specified.  We recommend that you schedule follow up care with a primary care provider within the next three months to obtain basic health screening including reassessment of your blood pressure.      Medications Prescribed:  Current  Discharge Medication List              Supplementary Documentation:         Hospital Problems       Present on Admission  Date Reviewed: 12/31/2024            ICD-10-CM Noted POA    * (Principal) Atrial fibrillation with RVR (HCC) I48.91 1/2/2025 Unknown

## 2025-01-02 NOTE — PLAN OF CARE
Pt noted to be in junctional rhythm, HR 56. BP stable. Pt asymptomatic. Dr. Lozano notified - plan to dc diltiazem gtt, po diltiazem, & po digoxin      Ruma Ricks, MSN, FPA-APRN, FNP-BC, CCK   1/2/2025  4:15 PM  Ph [phone] ([place])  Ph [phone] ([place])

## 2025-01-02 NOTE — ED QUICK NOTES
Orders for admission, patient is aware of plan and ready to go upstairs. Any questions, please call ED RN tasha  at extension [phone].   Chief Complaint   Patient presents with    Dizziness       Patient AO x 3  Ambulation: ambulatory with walker/wheelchair  Belongings: accompanying patient  Medications: see mar   IV: 20g hane  Language: english  COVID-19 suspicion level/status: na  CIWA SCORE: na  NIH: na  Other pertinent information:  na

## 2025-01-02 NOTE — PLAN OF CARE
Problem: Patient Centered Care  Goal: Patient preferences are identified and integrated in the patient's plan of care  Description: Interventions:  - What would you like us to know as we care for you? Lives in an assisted living alone.  - Provide timely, complete, and accurate information to patient/family  - Incorporate patient and family knowledge, values, beliefs, and cultural backgrounds into the planning and delivery of care  - Encourage patient/family to participate in care and decision-making at the level they choose  - Honor patient and family perspectives and choices  Note: Received from ER with complaint of dizziness, was noted to be on A. Fib with RVR. Patient alert and oriented . Yuri any complaint of pain. Received with diltiazem drip going at 20mg/hr and heparin drip at 9ml/hr. HR noted to be at high 50's to low 60 by tele, JESIKA APn notified, diltiazem drip discontinued at 1453 as ordered. Will continue to monitor. Plan of care discussed with patient. Verbalized understanding. Bed on low and locked position, call light within reach.

## 2025-01-03 PROBLEM — I35.0 NONRHEUMATIC AORTIC VALVE STENOSIS: Status: ACTIVE | Noted: 2025-01-03

## 2025-01-03 LAB
ALBUMIN SERPL-MCNC: 3.7 G/DL (ref 3.2–4.8)
ALBUMIN/GLOB SERPL: 1.4 {RATIO} (ref 1–2)
ALP LIVER SERPL-CCNC: 59 U/L
ALT SERPL-CCNC: <7 U/L
ANION GAP SERPL CALC-SCNC: 6 MMOL/L (ref 0–18)
APTT PPP: 64.4 SECONDS (ref 23–36)
AST SERPL-CCNC: 15 U/L (ref ?–34)
BASOPHILS # BLD AUTO: 0.03 X10(3) UL (ref 0–0.2)
BASOPHILS NFR BLD AUTO: 0.4 %
BILIRUB SERPL-MCNC: 0.6 MG/DL (ref 0.2–0.9)
BUN BLD-MCNC: 15 MG/DL (ref 9–23)
BUN/CREAT SERPL: 16.5 (ref 10–20)
CALCIUM BLD-MCNC: 9.5 MG/DL (ref 8.7–10.4)
CHLORIDE SERPL-SCNC: 104 MMOL/L (ref 98–112)
CO2 SERPL-SCNC: 29 MMOL/L (ref 21–32)
CREAT BLD-MCNC: 0.91 MG/DL
DEPRECATED RDW RBC AUTO: 49.1 FL (ref 35.1–46.3)
EGFRCR SERPLBLD CKD-EPI 2021: 79 ML/MIN/1.73M2 (ref 60–?)
EOSINOPHIL # BLD AUTO: 0.09 X10(3) UL (ref 0–0.7)
EOSINOPHIL NFR BLD AUTO: 1.1 %
ERYTHROCYTE [DISTWIDTH] IN BLOOD BY AUTOMATED COUNT: 15 % (ref 11–15)
GLOBULIN PLAS-MCNC: 2.7 G/DL (ref 2–3.5)
GLUCOSE BLD-MCNC: 92 MG/DL (ref 70–99)
HCT VFR BLD AUTO: 35.8 %
HGB BLD-MCNC: 11.6 G/DL
IMM GRANULOCYTES # BLD AUTO: 0.05 X10(3) UL (ref 0–1)
IMM GRANULOCYTES NFR BLD: 0.6 %
INR BLD: 1.01 (ref 0.8–1.2)
LYMPHOCYTES # BLD AUTO: 1.77 X10(3) UL (ref 1–4)
LYMPHOCYTES NFR BLD AUTO: 21.3 %
MAGNESIUM SERPL-MCNC: 1.9 MG/DL (ref 1.6–2.6)
MCH RBC QN AUTO: 29.1 PG (ref 26–34)
MCHC RBC AUTO-ENTMCNC: 32.4 G/DL (ref 31–37)
MCV RBC AUTO: 89.7 FL
MONOCYTES # BLD AUTO: 1.81 X10(3) UL (ref 0.1–1)
MONOCYTES NFR BLD AUTO: 21.8 %
NEUTROPHILS # BLD AUTO: 4.55 X10 (3) UL (ref 1.5–7.7)
NEUTROPHILS # BLD AUTO: 4.55 X10(3) UL (ref 1.5–7.7)
NEUTROPHILS NFR BLD AUTO: 54.8 %
OSMOLALITY SERPL CALC.SUM OF ELEC: 288 MOSM/KG (ref 275–295)
PLATELET # BLD AUTO: 139 10(3)UL (ref 150–450)
POTASSIUM SERPL-SCNC: 4 MMOL/L (ref 3.5–5.1)
PROT SERPL-MCNC: 6.4 G/DL (ref 5.7–8.2)
PROTHROMBIN TIME: 13.9 SECONDS (ref 11.6–14.8)
Q-T INTERVAL: 334 MS
QRS DURATION: 126 MS
QTC CALCULATION (BEZET): 511 MS
R AXIS: -70 DEGREES
RBC # BLD AUTO: 3.99 X10(6)UL
SODIUM SERPL-SCNC: 139 MMOL/L (ref 136–145)
T AXIS: 147 DEGREES
TSI SER-ACNC: 2.79 UIU/ML (ref 0.55–4.78)
VENTRICULAR RATE: 141 BPM
WBC # BLD AUTO: 8.3 X10(3) UL (ref 4–11)

## 2025-01-03 PROCEDURE — 99223 1ST HOSP IP/OBS HIGH 75: CPT | Performed by: INTERNAL MEDICINE

## 2025-01-03 RX ORDER — METOPROLOL TARTRATE 25 MG/1
25 TABLET, FILM COATED ORAL
Status: DISCONTINUED | OUTPATIENT
Start: 2025-01-03 | End: 2025-01-08

## 2025-01-03 RX ORDER — METOPROLOL TARTRATE 25 MG/1
25 TABLET, FILM COATED ORAL
Status: DISCONTINUED | OUTPATIENT
Start: 2025-01-03 | End: 2025-01-03

## 2025-01-03 NOTE — H&P
[facility]  part of [facility]    History & Physical    Shoaib Alan Patient Status:  Inpatient    10/22/1931 MRN [id number]   Location [facility] 1W Attending Mars Camargo MD   Hosp Day # 0 PCP Mars Camargo MD     Date:  1/3/2025  Date of Admission:  2025    History of Present Illness:  Shoaib Alan is a(n) 89 year old male with PMHx Atrial fibrillation who had been on diltiazem  mg qD and digoxin 0.125 mg po every day until 24 when he reported near-syncope with 47.   Diltiazem ER was decreased to 120 mg daily at that time.  He presents to ED 24 with tachycardia; EKG shows Afib with   and WCT.  HR decreased with Cadizem 10 mg IVP and Cardizem gtt at 10 mg/hour, and he was admitted for further care;  PCXR neg infiltrate; no CP; no SOB; no headaches; no palpitation      History:  Past Medical History:    AF (atrial fibrillation) ([facility])    Arrhythmia    Cataract    Chronic pulmonary aspiration    ARF  BiPAP    Congestive heart disease ([facility])    COPD (chronic obstructive pulmonary disease) ([facility])    Coronary atherosclerosis    COVID    Depression    Dysphagia    Esophageal reflux    Fracture, radius    Hip fracture, left ([facility])    s/p IM fixation 17; Ortho Dr Alcantara    IBS (irritable bowel syndrome)    Lipid screening    Malabsorption ([facility])    Malnutrition ([facility])    severe     Osteoarthrosis, unspecified whether generalized or localized, unspecified site    hands    Pneumonia    Pneumonia due to organism    Pressure ulcer of left heel, stage 2 ([facility])    PUD (peptic ulcer disease)    Pulmonary hypertension ([facility])    Sacral decubitus ulcer    Thrombocytosis    Villous adenoma    Vitamin B12 deficiency    White matter disease     Past Surgical History:   Procedure Laterality Date    Cardioversion  2007    Cataract  2006    Cath percutaneous  transluminal coronary angioplasty      Cholecystectomy      Colon surgery Right 10/11    Fat, fecal  qualitative  12/10    Fracture surgery      Left femur 2016    Hip surgery  01/11/2017    INTERMEDULLARY FIXATION OF LEFT HIP FRACTURE    Hla b 27 disease association  2006    Intraocular lens implant, secondary  2006    Lexiscan nuc stress tst, cardio (dmg)  9/11    Other surgical history  1970    Billroth anastomosis    Other surgical history  2011    dental implants    Skin surgery  02/11/2019    Mohs/R Dorsal hand/SCC/done by MM       Allergies:  Allergies[1]    Home Medications:  Current Medications as of January 03, 2025  Generic Name Brand Name Strength Route/ Site Freq.   Acetaminophen (Tab) Tylenol Extra Strength 500 MG Oral Take 1 tablet (500 mg total) by mouth every 6 (six) hours as needed for Pain.      Cholecalciferol (Tab) Vitamin D 1000 units Oral Take 1 tablet by mouth daily.      Cyanocobalamin (Solution) Vitamin B12 1000 MCG/ML Intramuscular Inject 1 mL (1,000 mcg total) into the muscle every 30 (thirty) days.      Cyclobenzaprine HCl (Tab) Flexeril 10 MG Oral Take 1 tablet (10 mg total) by mouth 3 (three) times daily as needed for Muscle spasms.      Digoxin (Tab) Lanoxin 0.125 MG  TAKE 1 TABLET(125 MCG) BY MOUTH EVERY DAY      dilTIAZem HCl Coated Beads (Capsule SR 24 Hr) Cardizem  MG Oral Take 1 capsule (120 mg total) by mouth daily.      Ferrous Sulfate (Tab EC) ferrous sulfate 325 (65 FE) MG Oral Take 1 tablet (325 mg total) by mouth daily with breakfast.      Furosemide (Tab) Lasix 20 MG Oral Take 1 tablet (20 mg total) by mouth daily as needed. Prn leg swelling      guaiFENesin Robitussin 100 MG/5 ML Oral Take 10 mL (200 mg total) by mouth every 4 (four) hours as needed.      HYDROcodone-Acetaminophen (Tab) (Discontinued) Norco 5-325 MG Oral Take 1.5 tablets by mouth every 6 (six) hours as needed for Pain.      HYDROcodone-Acetaminophen (Tab) Norco 7.5-325 MG Oral Take 1 tablet by mouth every 6 (six) hours as needed for Pain.      Loperamide HCl (Cap) Imodium 2 MG Oral Take 1 capsule  (2 mg total) by mouth 4 (four) times daily as needed for Diarrhea.      Melatonin (Tab) melatonin 3 MG Oral Take 1 tablet (3 mg total) by mouth nightly.      Multiple Vitamins-Minerals (Tab) ICaps AREDS Formula  Oral Take 1 tablet by mouth daily.      Potassium Chloride (Tab CR) Potassium Chloride ER 10 MEQ Oral Take 1 tablet (10 mEq total) by mouth daily as needed. (take only when taking furosemide)      predniSONE (Tab) Deltasone 2.5 MG  TAKE 1 TABLET(2.5 MG) BY MOUTH DAILY      Rivaroxaban (Tab) Xarelto 20 MG Oral Take 1 tablet (20 mg total) by mouth daily with food.           SOCIAL HISTORY   reports that he quit smoking about 31 years ago. His smoking use included cigarettes. He started smoking about 51 years ago. He has a 60 pack-year smoking history. He quit smokeless tobacco use about 9 years ago.  His smokeless tobacco use included chew. He reports current alcohol use. He reports that he does not use drugs.    Family History   Problem Relation Age of Onset    Other (old age) Father 89        cause of death    Other (Other) Mother 96        cause of death    Cancer Paternal Aunt     Diabetes Paternal Aunt              Review of Systems:    ROS:   Constitutional: no weight loss; no fatigue  ENMT:  Negative for ear drainage, hearing loss and nasal drainage  Eyes:  Negative for eye discharge and vision loss  Cardiovascular:  Negative for chest pain; negative palpitations  Respiratory:  Negative for cough, dyspnea and wheezing  Endocrine:  Negative for abnormal sleep patterns, increased activity, polydipsia and polyphagia  Gastrointestinal:  Negative for abdominal pain, constipation, decreased appetite, diarrhea and vomiting; no melena or hematochezia  Musculoskeletal:  Negative for arthralgias or myalgias  Genitourinary:  Negative for dysuria or polyuria  Hema/Lymph:  Negative for easy bleeding and easy bruising  Integumentary:  Negative for pruritus and rash  Neurological:  Negative for gait disturbance;  negative for paresthesias   All other review of systems are negative.          Physical Exam:  PHYSICAL EXAM:   Blood pressure 114/74, pulse 56, temperature 98 °F (36.7 °C), temperature source Oral, resp. rate 20, height 6' 3\" (1.905 m), weight 145 lb 1.6 oz (65.8 kg), SpO2 93%.  Constitutional: alert and oriented x3 in no acute distress  HEENT- EOMI, PERRL  Nose/Mouth/Throat: pharynx without erythema  Neck/Thyroid: neck supple; no thyromegaly  Lymphatics: no lymphadenopathy of neck or groin  Cardiovascular: Irr, Irr, S1, S2, no S3 or murmur  Respiratory: lungs without crackles or wheezes  Abdomen: normoactive bowel sounds, soft, non-tender and non-distended  Extremities: no clubbing, cyanosis or edema  Vascular: no carotid bruits; DP/PT 2/2  Musculoskeletal: Motor 5/5 upper and lower extremities  Neurological: cranial nerves II-XII intact; light touch and proprioception intact  Skin: no rash or ulcerations            Laboratory Data:     Lab Results   Component Value Date    GLU 92 01/03/2025     01/03/2025    K 4.0 01/03/2025     01/03/2025    CO2 29.0 01/03/2025    BUN 15 01/03/2025    CREATSERUM 0.91 01/03/2025    CA 9.5 01/03/2025    ALB 3.7 01/03/2025    ALKPHO 59 01/03/2025    BILT 0.6 01/03/2025    TP 6.4 01/03/2025    AST 15 01/03/2025    ALT <7 01/03/2025    PTT 64.4 01/03/2025    INR 1.01 01/03/2025    TSH 2.791 01/03/2025       Recent Labs   Lab 01/03/25  0616   WBC 8.3   HGB 11.6*   HCT 35.8*   MCV 89.7   MCH 29.1   MCHC 32.4   RDW 15.0   NEPRELIM 4.55   .0*         Imaging:  XR CHEST AP PORTABLE  (CPT=71045)    Result Date: 1/2/2025  CONCLUSION:  1. Subsegmental atelectasis and/or scarring in both lower lungs, improved compared to 2022. 2. Otherwise no acute cardiopulmonary process.     Dictated by (CST): Johan Hair MD on 1/02/2025 at 8:47 AM     Finalized by (CST): Johan Hair MD on 1/02/2025 at 8:49 AM           EKG 12 Lead    Result Date: 1/2/2025  Junctional rhythm  = Left axis deviation Right bundle branch block Inferior infarct , age undetermined Abnormal ECG When compared with ECG of 02-JAN-2025 08:16, Junctional rhythm has replaced Wide QRS tachycardia Vent. rate has decreased  BPM Confirmed by FRANCIS VALDEZ ELMER ([id number]) on 1/2/2025 3:46:51 PM    EKG 12 Lead    Result Date: 1/2/2025  Wide QRS tachycardia Left axis deviation Right bundle branch block T wave abnormality, consider lateral ischemia Abnormal ECG When compared with ECG of 01-DEC-2022 10:02, Wide QRS tachycardia has replaced Atrial fibrillation Confirmed by FRANCIS VALDEZ ELMER ([id number]) on 1/2/2025 11:07:38 AM     Assessment and Plan:    Atrial fibrillation with RVR  -was on diltiazem  mg qD and digoxin 0.125 mg po every day until 12/30/24 when he reported near-syncope with 47, so diltiazem ER was decreased to 120 mg daily  -presents to ED 12/2/24 with tachycardia; EKG shows Afib with   and WCT  -HR decreased with Cadizem 10 mg IVP and Cardizem gtt at 10 mg/hour  -PTT 64 on heparin gtt at 800 units/hour  -Dr Lozano's consult noted; pt advised to stop diltiazem and stop digoxin; if HR accelerates, then beta-blocker is favored  -advised for 1 week MCT at [facility] office next week  -change heparin to Xarelto 20 mg po qD  -F.U Dr. Thomas as outpatient after completion of ECG monitor     History of RLE hematoma  In May 2023; ultrasound RLE shows two large hematomas to RLE: no evidence for compartment syndrome; no need for surgical intervention  -stopped warfarin in May 2023     History of anemia  Hgb 11.9; on ferrous sulfate 325 mg po qD     Aortic stenosis  last ECHO in Jan 2025 shows EF 50-55% with mild aortic stenosis; last Lexiscan GXT 11/29/21 negative for ischemia     History of COVID infection  In Dec 2022;   -nares positive COVID 12/1/22  -s/p remdesivir 100 mg IV [id number] x 4d  -s/p Decadron 6 mg daily x 4 days; discontinued 12/4     History of Pneumonia  In Dec 2022; PCXR shows Patchy medial bibasilar  opacities, which are new since prior chest radiographs from October, 2021. Findings could relate to pneumonia/aspiration in the appropriate clinical setting.  Also identified is a new 4 cm right perihilar nodular opacity.     -CT chest shows Airspace opacities within the bilateral upper and lower lobes, most pronounced within the right lower lobe suspicious for multifocal pneumonia.  -s/p ceftriaxone 1 gm IV [id number], d#5, and s/p  azithromycin 500 mg po daily x 3d     History of Osteomyelitis left 2nd toe   -MRI left foot 11/27/21 shows underlying acute osteomyelitis involving the entirety of the 2nd digit middle phalanx and the distal half of the 2nd digit proximal phalanx.   -s/p left 2nd toe amputation 12/3/21 per podiatrist, Dr Gutierrez; pathology with acute and chronic osteomyelitis   -has home visits by podiatrist Dr Josefina Fraga [phone] and advised sleeve to right 2nd toe      PVD  -CTA 11/29/21 reveals atherosclerosis of the SFA with 65-70% stenosis just beyond the adductor canal  -s/p angioplasty of tight focal left popliteal stenosis; unable to open distally occluded left post tib artery; left ant tib artery-dorsalis pedis widely patent (procedure on 12/1/21 by Dr. Lynch).      Constipation  On Miralax 17 gm po daily prn, and Dulcolax 10 mg po daily prn; takes prune juice     Osteoarthritis lumbar spine and cervical spine  As seen on CT June 2018; Scoliosis and degenerative changes in spine. Mild chronic anterior wedging of lower thoracic vertebral bodies; XR L-spine in Aug 2019 shows 5 lumbar segments with 20° levoconvex scoliosis, slight flattening of the lordotic curvature.  Endplate compression at L3.  Moderate loss of disc heights throughout the lumbar spine.  No spondylolisthesis.  No destructive lesions.  Mild calcification of the abdominal aorta; has seen physiatrist Dr Reilly Overton  -the patient had been taking Norco 7.5 mg q. 6 hours p.r.n. Pain; pt requests #60 with next refill      Gait abnormality  Pt has high fall risk; using manual wheelchair or electric scooter, though independent use is limited due to bilateral hand osteoarthritis;      Right hand basal cell skin cancer  S/p biopsy in Dec 2018; Pt awaits formal excision by dermatologist in [place]     History of Sepsis due to Klebsiella UTI  In June 2018; Due to urine septicemia; BCx and UCx showing growing Klebsiella pneumoniae, sensitive to pip/ tazo; s/p Zosyn 3.375 gm IV q8hrs, x6d, then Keflex for 8 more days, along with prophylactic po Vanco 125mg qd.        Nephrolithiasis with moderate left hydronephrosis  CT abdomen/ pelvis 6/29 revealed moderate left hydroureteronephrosis related to a 4 x 2 mm left ureterovesical junction calculus; pt seen in consultation with urologist Dr Linder; s/p cystoscopy, left retrograde pyelogram, and insertion of left ureteral stent by Dr Linder.   Follow up with Dr. Linder     Stool incontinence  On Imodium 2 mg po q4hrs prn.  GI panel negative.      Left shoulder pain  XR both shoulders neg for DJD; DDx includes frozen shoulder vs PMR;  s/p home physical therapy for adhesive capsulitis with [facility]; on prednisone to 2.5 mg daily; now with mild right shoulder pain x 2d--> advise stretching exercises for now; call if sxs persists     Pulmonary HTN  seen on ECHO in Jan 2017 with PA pressure 54 (previous PA pressure 44 in June 2016); thought to be multifactorial     Left hip fracture  s/p IM fixation in Jan 2017; on  Norco 7.5 mg po q4hrs prn     Sacral decubitus ulcer  uses Medihoney wound dressing gel daily prn     Coronary artery disease   Had PTCA in 1990s;   -the patient currently takes aspirin 81 mg daily; EKG shows Afib with old inferior anterior infarcts (seen in 2018);   -ECHO in Jan 2025 shows EF 50-55% with mild aortic stenosis; last Lexiscan GXT 11/29/21 negative for ischemia     History of villous adenoma   the patient reports occasional loose stools.  The patient has been  advised to see gastroenterologist, Dr. Hopper, as an outpatient for colonoscopy though he has declined repeat colonoscopy     Vitamin B12 deficiency  the patient had been receiving vitamin B12 1,000 mcg IM q. Month       Mars Camargo MD  1/3/2025  12:57 PM         [1]   Allergies  Allergen Reactions    Dronedarone      Other reaction(s): extreme dizzinss

## 2025-01-03 NOTE — PROGRESS NOTES
Progress Note  Shoaib Alan Patient Status:  Observation    10/22/1931 MRN [id number]   Location [facility] 1W Attending Mars Camargo MD   Hosp Day # 0 PCP Mars Camargo MD     CARDIOLOGIST ADDENDUM:    I agree with the findings below.  I have personally performed the Assessment and Plan in its entirety, as detailed below with my edits.    Atrial fibrillation, persistent  -Rates are now controlled, 60-90 bpm.  Asymptomatic.  -Recommend staying off diltiazem and digoxin at this time.  -May need to re-introduce low-dose BB or CCB as outpt  - 1-week ECG monitor at our office next week  -See Dr Thomas after that to review results and progress with symptoms  -Also reviewed stroke prevention options.  Rather than warfarin, he prefers DOAC, and he will start Xarelto 20 mg once daily with food.    CAD  -S/p MI and PTCA, ~30 years ago  -Asymptomatic     Mild aortic stenosis  -Stable      Rao Lozano M.D.   Cardiology/Electrophysiology   1/3/2025  L3    -----------------------------------------    Subjective:  Rates 30s-40s overnight, currently 50s  Diltiazem gtt stopped  Denies dizziness, lightheadedness currently but states he felt \"faint\" prior to admission    Objective:  /74 (BP Location: Right arm)   Pulse 56   Temp 98 °F (36.7 °C) (Oral)   Resp 20   Ht 6' 3\" (1.905 m)   Wt 145 lb 1.6 oz (65.8 kg)   SpO2 93%   BMI 18.14 kg/m²     Telemetry: AFSVR, frequent 1-2 second pauses overnight, sylwia into 30s-40s overnight, currently 50s-60s    Intake/Output:    Intake/Output Summary (Last 24 hours) at 1/3/2025 0915  Last data filed at 1/3/2025 0540  Gross per 24 hour   Intake 402.7 ml   Output 550 ml   Net -147.3 ml     Last 3 Weights   25 1349 145 lb 1.6 oz (65.8 kg)   25 0811 161 lb (73 kg)   23 1129 160 lb (72.6 kg)   23 1852 162 lb 11.2 oz (73.8 kg)   23 1531 160 lb (72.6 kg)     Labs:  Recent Labs   Lab 24  1300 25  0814 25  0616    * 95 92   BUN 15 11 15   CREATSERUM 0.92 0.97 0.91   EGFRCR 78 73 79   CA 9.3 9.7 9.5    139 139   K 4.2 4.5 4.0    104 104   CO2 27.0 28.0 29.0     Recent Labs   Lab 12/31/24  1300 01/02/25  0814   RBC 4.07 4.47   HGB 11.9* 12.9*   HCT 37.7* 40.9   MCV 92.6 91.5   MCH 29.2 28.9   MCHC 31.6 31.5   RDW 14.8 14.9   NEPRELIM 5.96 6.71   WBC 8.9 10.5   .0 154.0     Recent Labs   Lab 01/02/25  0814   TROPHS 49     Lab Results   Component Value Date/Time    HDL 33 (L) 05/25/2015 05:00 AM    LDL 72 05/25/2015 05:00 AM    TRIG 74 05/25/2015 05:00 AM     Lab Results   Component Value Date    DDIMER <0.27 12/01/2022     Lab Results   Component Value Date    TSH 2.791 01/03/2025     Review of Systems:   Constitutional: No fevers, chills, fatigue or night sweats.  Respiratory: Denies cough, wheezing or shortness of breath.  CV: Denies chest pain, palpitations, orthopnea, PND or dizziness.  GI: No nausea, vomiting or diarrhea. No blood in stools.    Physical Exam:  General: Alert, cooperative, no distress, appears stated age.  Neck: no JVD.  Lungs: Clear to auscultation bilaterally.  Chest wall: No tenderness or deformity.  Heart: Irregularly irregular rate and rhythm, S1, S2 normal, no murmur, click, rub or gallop.  Abdomen: Soft, non-tender. Bowel sounds normal. No masses,  No organomegaly.  Extremities: Extremities normal, atraumatic, no cyanosis or edema.  Pulses: 2+ and symmetric all extremities.  Neurologic: Grossly intact.    Diagnostics:  Echo: 1/2/25  Conclusions:     1. Left ventricle: The cavity size was normal. Wall thickness was mildly      increased. Systolic function was at the lower limits of normal. The      estimated ejection fraction was 50-55%, by visual assessment. Technical      limitations of the study preclude regional wall motion analysis. Unable      to assess LV diastolic function due to heart rhythm.   2. Right ventricle: The cavity size was increased. Systolic function was       reduced.   3. Left atrium: The left atrial volume was markedly increased.   4. Right atrium: The atrium was dilated.   5. Aortic valve: Cusp separation was reduced. The findings were consistent      with mild stenosis. The planimetered valve area was 1.61cm^2. The valve      area index by planimetry was 0.8cm^2/m^2. The peak systolic velocity was      1.72m/sec. The mean systolic gradient was 6mm Hg.   6. Ascending aorta: The ascending aorta was dilated and 3.6cm diameter.   7. Mitral valve: The annulus was mildly to moderately calcified.   8. Pulmonary arteries: Systolic pressure was at the upper limits of normal      to, at most, mildly increased; in the range of 30 mm Hg to 35 mm Hg,      estimated to be 33mm Hg.   Impressions:  No previous study was available for comparison.   *   Medications:   [Held by provider] warfarin  5 mg Oral Nightly    ferrous sulfate  325 mg Oral Daily with breakfast    melatonin  3 mg Oral Nightly    predniSONE  2.5 mg Oral Daily    cholecalciferol  1,000 Units Oral Daily    multivitamin  1 tablet Oral Daily      continuous dose heparin 800 Units/hr (01/02/25 4457)     Assessment:    89 year old sex with PMH of PAF s/p DCCV, on digoxin and diltiazem, HFpEF, COPD, CAD, GERD, pulmonary HTN who presented with lightheadedness and was found to be in AFRVR.     Atrial fibrillation with RVR/SVR  Rates up to 140s on admission, now slow in 50s-60s  -started on dilitiazem gtt and CR, home digoxin continued  -had junctional rhythm in 50s on gtt, diltiazem PO and gtt discontinued   -AC with heparin gtt, historically on warfarin but was stopped in May, 2023 due to hematoma  -heparin gtt infusing, will resume warfarin if no plans for PPM, goal INR >2  -echo this admission revealed normal LV function 50-55% with biatrial dilation, mild aortic stenosis, moderate mitral stenosis, PASP 33mmHg  -TSH unremarkable 2.791  -[id number]= 5 for age, htn, HF, CAD    Bradycardia  Hx sinus bradycardia  -hx  episode of presyncope, home diltiazem dose decreased at that time with improvement in symptoms   -currently AFSVR with rates 50s-60, noted to be in 40s overnight  -off diltiazem, digoxin  -consider possible PPM for tachy-sylwia syndrome, holding warfarin and heparin at this time until decision made by EP    CAD s/p remote PCI  Not on asa, statin, BB PTA    HFimpEF  LVEF 50-55%, historically 40-45% on 11/2021 echo  -volume compensated on exam  -takes lasix and digoxin daily PTA  -not on additional GDMT, has not followed with cardiology for many years    Pulmonary HTN  PASP slightly elevated 30-35mmHg on echo  -volume compensated on exam  -will resume daily lasix to prevent volume overload    COPD    GERD    Plan:  -Hold heparin gtt pending decision if he needs PPM, will resume if no plan for procedure  -will resume warfarin tonight if no plan for procedure, keep warfarin and heparin until INR >2  -Resume home lasix dose to prevent volume overload, can resume after PPM if determined to proceed  -Continue to hold digoxin and diltiazem until HR  stable  -NPO until determined if he needs PPM, will discuss with EP      Plan of care discussed with patient, RN.        Jaci Del Rio, APRN  1/3/2025  9:15 AM  [phone] [place]  [phone] [place]

## 2025-01-03 NOTE — PROGRESS NOTES
[facility]                                                            PROGRESS NOTE      Patient Name: Shoaib Alan MRN: [id number]   : 10/22/1931 CSN: [id number]       FOLLOW-UP NOTE    Atrial fibrillation, persistent  -Note that the patient's HR continued to increase through course of day, and now >120 bpm  -Will add metoprolol tartrate 25 mg PO BID  -I do not recommend further med changes or up-titration for 3-4 days  -If he is asymptomatic and HRs better (<100 at rest), he can be discharged with f/u as previously outlined next week.  -However, if symptomatic, then he should stay on tele- as drug effects are monitored.  If not edvin to find adequate rate control with cautious use of meds, he may require a pacemaker.    Rao Lozano M.D.   Cardiac Electrophysiology   1/3/2025  -----------------------------------------

## 2025-01-03 NOTE — CM/SW NOTE
Anticipate discharge back to [facility] at [facility] in [place]. Per Dana OSBORN, no needs but will need a medicar. Arranged [facility] ([phone]) for 3pm, PCS complete. Confirmed plan w/ Dana Osborn.    Pickup changed to 6pm.    ALEX Fields [id number]

## 2025-01-04 LAB
ANION GAP SERPL CALC-SCNC: 5 MMOL/L (ref 0–18)
ANION GAP SERPL CALC-SCNC: 8 MMOL/L (ref 0–18)
APTT PPP: 33.4 SECONDS (ref 23–36)
APTT PPP: 37.7 SECONDS (ref 23–36)
BASOPHILS # BLD AUTO: 0.02 X10(3) UL (ref 0–0.2)
BASOPHILS NFR BLD AUTO: 0.2 %
BUN BLD-MCNC: 16 MG/DL (ref 9–23)
BUN BLD-MCNC: 19 MG/DL (ref 9–23)
BUN/CREAT SERPL: 17.4 (ref 10–20)
BUN/CREAT SERPL: 20 (ref 10–20)
CALCIUM BLD-MCNC: 9.7 MG/DL (ref 8.7–10.4)
CALCIUM BLD-MCNC: 9.8 MG/DL (ref 8.7–10.4)
CHLORIDE SERPL-SCNC: 101 MMOL/L (ref 98–112)
CHLORIDE SERPL-SCNC: 102 MMOL/L (ref 98–112)
CO2 SERPL-SCNC: 29 MMOL/L (ref 21–32)
CO2 SERPL-SCNC: 29 MMOL/L (ref 21–32)
CREAT BLD-MCNC: 0.92 MG/DL
CREAT BLD-MCNC: 0.95 MG/DL
DEPRECATED RDW RBC AUTO: 47.9 FL (ref 35.1–46.3)
EGFRCR SERPLBLD CKD-EPI 2021: 75 ML/MIN/1.73M2 (ref 60–?)
EGFRCR SERPLBLD CKD-EPI 2021: 78 ML/MIN/1.73M2 (ref 60–?)
EOSINOPHIL # BLD AUTO: 0.1 X10(3) UL (ref 0–0.7)
EOSINOPHIL NFR BLD AUTO: 1 %
ERYTHROCYTE [DISTWIDTH] IN BLOOD BY AUTOMATED COUNT: 14.9 % (ref 11–15)
GLUCOSE BLD-MCNC: 106 MG/DL (ref 70–99)
GLUCOSE BLD-MCNC: 93 MG/DL (ref 70–99)
HCT VFR BLD AUTO: 36.8 %
HGB BLD-MCNC: 12.2 G/DL
IMM GRANULOCYTES # BLD AUTO: 0.05 X10(3) UL (ref 0–1)
IMM GRANULOCYTES NFR BLD: 0.5 %
LYMPHOCYTES # BLD AUTO: 1.43 X10(3) UL (ref 1–4)
LYMPHOCYTES NFR BLD AUTO: 14.3 %
MAGNESIUM SERPL-MCNC: 1.9 MG/DL (ref 1.6–2.6)
MCH RBC QN AUTO: 29 PG (ref 26–34)
MCHC RBC AUTO-ENTMCNC: 33.2 G/DL (ref 31–37)
MCV RBC AUTO: 87.4 FL
MONOCYTES # BLD AUTO: 2.01 X10(3) UL (ref 0.1–1)
MONOCYTES NFR BLD AUTO: 20.1 %
NEUTROPHILS # BLD AUTO: 6.37 X10 (3) UL (ref 1.5–7.7)
NEUTROPHILS # BLD AUTO: 6.37 X10(3) UL (ref 1.5–7.7)
NEUTROPHILS NFR BLD AUTO: 63.9 %
OSMOLALITY SERPL CALC.SUM OF ELEC: 283 MOSM/KG (ref 275–295)
OSMOLALITY SERPL CALC.SUM OF ELEC: 289 MOSM/KG (ref 275–295)
PLATELET # BLD AUTO: 162 10(3)UL (ref 150–450)
POTASSIUM SERPL-SCNC: 4.2 MMOL/L (ref 3.5–5.1)
POTASSIUM SERPL-SCNC: 4.5 MMOL/L (ref 3.5–5.1)
RBC # BLD AUTO: 4.21 X10(6)UL
SODIUM SERPL-SCNC: 136 MMOL/L (ref 136–145)
SODIUM SERPL-SCNC: 138 MMOL/L (ref 136–145)
WBC # BLD AUTO: 10 X10(3) UL (ref 4–11)

## 2025-01-04 PROCEDURE — 99232 SBSQ HOSP IP/OBS MODERATE 35: CPT | Performed by: INTERNAL MEDICINE

## 2025-01-04 RX ORDER — HEPARIN SODIUM AND DEXTROSE 10000; 5 [USP'U]/100ML; G/100ML
INJECTION INTRAVENOUS CONTINUOUS
Status: DISCONTINUED | OUTPATIENT
Start: 2025-01-04 | End: 2025-01-06

## 2025-01-04 RX ORDER — HEPARIN SODIUM AND DEXTROSE 10000; 5 [USP'U]/100ML; G/100ML
12 INJECTION INTRAVENOUS ONCE
Status: COMPLETED | OUTPATIENT
Start: 2025-01-04 | End: 2025-01-04

## 2025-01-04 RX ORDER — HEPARIN SODIUM 1000 [USP'U]/ML
60 INJECTION, SOLUTION INTRAVENOUS; SUBCUTANEOUS ONCE
Status: COMPLETED | OUTPATIENT
Start: 2025-01-04 | End: 2025-01-04

## 2025-01-04 NOTE — PROGRESS NOTES
Progress Note  Shoaib Alan Patient Status:  Observation    10/22/1931 MRN [id number]   Location [facility] 1W Attending Mras Camargo MD   Hosp Day # 1 PCP Mars Camargo MD     Subjective:  Rates 30s-40s overnight, currently 50s with multiple pauses, longest 3.59 seconds, asymptomatic  Denies dizziness, lightheadedness     Objective:  /66 (BP Location: Left arm)   Pulse 55   Temp 97.5 °F (36.4 °C) (Oral)   Resp 20   Ht 6' 3\" (1.905 m)   Wt 145 lb 1.6 oz (65.8 kg)   SpO2 94%   BMI 18.14 kg/m²     Telemetry: AFSVR, frequent 3 second pauses overnight, sylwia into 30s-40s, lowest HR 28 per NOC RN, currently 50s-60s,HR max overnight 140 briefly    Intake/Output:    Intake/Output Summary (Last 24 hours) at 2025 0708  Last data filed at 2025 0659  Gross per 24 hour   Intake 340 ml   Output 1650 ml   Net -1310 ml     Last 3 Weights   25 1349 145 lb 1.6 oz (65.8 kg)   25 0811 161 lb (73 kg)   23 1129 160 lb (72.6 kg)   23 1852 162 lb 11.2 oz (73.8 kg)   23 1531 160 lb (72.6 kg)     Labs:  Recent Labs   Lab 24  1300 25  0814 25  0616   * 95 92   BUN 15 11 15   CREATSERUM 0.92 0.97 0.91   EGFRCR 78 73 79   CA 9.3 9.7 9.5    139 139   K 4.2 4.5 4.0    104 104   CO2 27.0 28.0 29.0     Recent Labs   Lab 24  1300 25  0814 25  0616   RBC 4.07 4.47 3.99   HGB 11.9* 12.9* 11.6*   HCT 37.7* 40.9 35.8*   MCV 92.6 91.5 89.7   MCH 29.2 28.9 29.1   MCHC 31.6 31.5 32.4   RDW 14.8 14.9 15.0   NEPRELIM 5.96 6.71 4.55   WBC 8.9 10.5 8.3   .0 154.0 139.0*     Recent Labs   Lab 25  0814   TROPHS 49     Lab Results   Component Value Date/Time    HDL 33 (L) 2015 05:00 AM    LDL 72 2015 05:00 AM    TRIG 74 2015 05:00 AM     Lab Results   Component Value Date    DDIMER <0.27 2022     Lab Results   Component Value Date    TSH 2.791 2025     Review of Systems:   Constitutional: No  fevers, chills, fatigue or night sweats.  Respiratory: Denies cough, wheezing or shortness of breath.  CV: Denies chest pain, palpitations, orthopnea, PND or dizziness.  GI: No nausea, vomiting or diarrhea. No blood in stools.    Physical Exam:  General: Alert, cooperative, no distress, appears stated age.  Neck: no JVD.  Lungs: Clear to auscultation bilaterally.  Chest wall: No tenderness or deformity.  Heart: Irregularly irregular rate and rhythm, S1, S2 normal, no murmur, click, rub or gallop.  Abdomen: Soft, non-tender. Bowel sounds normal. No masses,  No organomegaly.  Extremities: Extremities normal, atraumatic, no cyanosis or edema.  Pulses: 2+ and symmetric all extremities.  Neurologic: Grossly intact.    Diagnostics:  Echo: 1/2/25  Conclusions:     1. Left ventricle: The cavity size was normal. Wall thickness was mildly      increased. Systolic function was at the lower limits of normal. The      estimated ejection fraction was 50-55%, by visual assessment. Technical      limitations of the study preclude regional wall motion analysis. Unable      to assess LV diastolic function due to heart rhythm.   2. Right ventricle: The cavity size was increased. Systolic function was      reduced.   3. Left atrium: The left atrial volume was markedly increased.   4. Right atrium: The atrium was dilated.   5. Aortic valve: Cusp separation was reduced. The findings were consistent      with mild stenosis. The planimetered valve area was 1.61cm^2. The valve      area index by planimetry was 0.8cm^2/m^2. The peak systolic velocity was      1.72m/sec. The mean systolic gradient was 6mm Hg.   6. Ascending aorta: The ascending aorta was dilated and 3.6cm diameter.   7. Mitral valve: The annulus was mildly to moderately calcified.   8. Pulmonary arteries: Systolic pressure was at the upper limits of normal      to, at most, mildly increased; in the range of 30 mm Hg to 35 mm Hg,      estimated to be 33mm Hg.   Impressions:   No previous study was available for comparison.   *   Medications:   rivaroxaban  15 mg Oral Daily with food    metoprolol tartrate  25 mg Oral 2x Daily(Beta Blocker)    ferrous sulfate  325 mg Oral Daily with breakfast    melatonin  3 mg Oral Nightly    predniSONE  2.5 mg Oral Daily    cholecalciferol  1,000 Units Oral Daily    multivitamin  1 tablet Oral Daily     Assessment:    89 year old sex with PMH of PAF s/p DCCV, on digoxin and diltiazem, HFpEF, COPD, CAD, GERD, pulmonary HTN who presented with lightheadedness and was found to be in AFRVR.     Atrial fibrillation with RVR/SVR  Rates up to 140s on admission, now slow in 50s-60s with multiple pauses and drop to 30s  -diltiazem started on admission, stopped due to bradycardia  -AC with heparin gtt, historically on warfarin but was stopped in May, 2023 due to hematoma  -transitioned to xarelto this admission for stroke prophylaxis  -echo this admission revealed normal LV function 50-55% with biatrial dilation, mild aortic stenosis, moderate mitral stenosis, PASP 33mmHg  -TSH unremarkable 2.791  -[id number]= 5 for age, htn, HF, CAD    Bradycardia  Hx sinus bradycardia  -hx episode of presyncope, home diltiazem dose decreased at that time with improvement in symptoms   -currently AFSVR with rates 50s-60, noted to be in 30s overnight with multiple 3 second pauses  -off diltiazem, digoxin, started on low dose lopressor due to HR 140s, dropped to 40s within <1 hour of administration with multiple pauses, DO NOT INCREASE DOSE FOR 3-4 DAYS PER EP  -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, discussed with patient yesterday who is agreeable, will monitor HR this weekend, hopefully plan for insertion on Monday if HR remains stable    CAD s/p remote PCI  Not on asa, statin, BB PTA    HFimpEF  LVEF 50-55%, historically 40-45% on 11/2021 echo  -volume compensated on exam  -takes lasix PRN and digoxin daily PTA  -not on additional GDMT, has not followed with  cardiology for many years    Pulmonary HTN  PASP slightly elevated 30-35mmHg on echo  -volume compensated on exam  -will resume daily lasix to prevent volume overload    COPD    GERD    Plan:  -Continue lopressor 25mg BID, do NOT increase dose as above  -Continue xarelto, hold Sunday night for potential PPM placement on Monday with Dr. Thomas  -Monitor for volume overload, will resume home lasix if neede    Plan of care discussed with patient, RN.    Jaci Del Rio, APRN  01/04/25  7:08 AM  [phone] [place]  [phone] [place]      Cardiology attending    Note reviewed and patient examined independently.    Patient having tacky bradycardia syndrome with atrial fibrillation.  Tachycardia rates in the 140s, bradycardia rates in the 30s.  Patient agreeable to permanent pacemaker on Monday.  Will resume IV heparin until pacemaker.  Hold Xarelto.

## 2025-01-04 NOTE — PLAN OF CARE
Problem: Patient Centered Care  Goal: Patient preferences are identified and integrated in the patient's plan of care  Description: Interventions:  - What would you like us to know as we care for you? Lives in an assisted living alone.  - Provide timely, complete, and accurate information to patient/family  - Incorporate patient and family knowledge, values, beliefs, and cultural backgrounds into the planning and delivery of care  - Encourage patient/family to participate in care and decision-making at the level they choose  - Honor patient and family perspectives and choices  Outcome: Progressing     Problem: Patient/Family Goals  Goal: Patient/Family Long Term Goal  Description: Patient's Long Term Goal: go home    Interventions:  - Heparin drip  - Diltiazem drip  - monitor vital signs  - monitor heart rate  - telemetry  -cardiology on consult  - See additional Care Plan goals for specific interventions  Outcome: Progressing  Goal: Patient/Family Short Term Goal  Description: Patient's Short Term Goal: feel better    Interventions:   - cardiology on consult  -telemetry  - monitor heart rhythm  - monitor vital signs  - monitor lab result  - See additional Care Plan goals for specific interventions  Outcome: Progressing     Problem: PAIN - ADULT  Goal: Verbalizes/displays adequate comfort level or patient's stated pain goal  Description: INTERVENTIONS:  - Encourage pt to monitor pain and request assistance  - Assess pain using appropriate pain scale  - Administer analgesics based on type and severity of pain and evaluate response  - Implement non-pharmacological measures as appropriate and evaluate response  - Consider cultural and social influences on pain and pain management  - Manage/alleviate anxiety  - Utilize distraction and/or relaxation techniques  - Monitor for opioid side effects  - Notify MD/LIP if interventions unsuccessful or patient reports new pain  - Anticipate increased pain with activity and  pre-medicate as appropriate  Outcome: Progressing     Problem: RISK FOR INFECTION - ADULT  Goal: Absence of fever/infection during anticipated neutropenic period  Description: INTERVENTIONS  - Monitor WBC  - Administer growth factors as ordered  - Implement neutropenic guidelines  Outcome: Progressing     Problem: SAFETY ADULT - FALL  Goal: Free from fall injury  Description: INTERVENTIONS:  - Assess pt frequently for physical needs  - Identify cognitive and physical deficits and behaviors that affect risk of falls.  - [place] fall precautions as indicated by assessment.  - Educate pt/family on patient safety including physical limitations  - Instruct pt to call for assistance with activity based on assessment  - Modify environment to reduce risk of injury  - Provide assistive devices as appropriate  - Consider OT/PT consult to assist with strengthening/mobility  - Encourage toileting schedule  Outcome: Progressing     Problem: DISCHARGE PLANNING  Goal: Discharge to home or other facility with appropriate resources  Description: INTERVENTIONS:  - Identify barriers to discharge w/pt and caregiver  - Include patient/family/discharge partner in discharge planning  - Arrange for needed discharge resources and transportation as appropriate  - Identify discharge learning needs (meds, wound care, etc)  - Arrange for interpreters to assist at discharge as needed  - Consider post-discharge preferences of patient/family/discharge partner  - Complete POLST form as appropriate  - Assess patient's ability to be responsible for managing their own health  - Refer to Case Management Department for coordinating discharge planning if the patient needs post-hospital services based on physician/LIP order or complex needs related to functional status, cognitive ability or social support system  Outcome: Progressing     Problem: CARDIOVASCULAR - ADULT  Goal: Maintains optimal cardiac output and hemodynamic stability  Description:  INTERVENTIONS:  - Monitor vital signs, rhythm, and trends  - Monitor for bleeding, hypotension and signs of decreased cardiac output  - Evaluate effectiveness of vasoactive medications to optimize hemodynamic stability  - Monitor arterial and/or venous puncture sites for bleeding and/or hematoma  - Assess quality of pulses, skin color and temperature  - Assess for signs of decreased coronary artery perfusion - ex. Angina  - Evaluate fluid balance, assess for edema, trend weights  Outcome: Progressing  Goal: Absence of cardiac arrhythmias or at baseline  Description: INTERVENTIONS:  - Continuous cardiac monitoring, monitor vital signs, obtain 12 lead EKG if indicated  - Evaluate effectiveness of antiarrhythmic and heart rate control medications as ordered  - Initiate emergency measures for life threatening arrhythmias  - Monitor electrolytes and administer replacement therapy as ordered  Outcome: Progressing     Problem: METABOLIC/FLUID AND ELECTROLYTES - ADULT  Goal: Electrolytes maintained within normal limits  Description: INTERVENTIONS:  - Monitor labs and rhythm and assess patient for signs and symptoms of electrolyte imbalances  - Administer electrolyte replacement as ordered  - Monitor response to electrolyte replacements, including rhythm and repeat lab results as appropriate  - Fluid restriction as ordered  - Instruct patient on fluid and nutrition restrictions as appropriate  Outcome: Progressing

## 2025-01-04 NOTE — PLAN OF CARE
Problem: Patient Centered Care  Goal: Patient preferences are identified and integrated in the patient's plan of care  Description: Interventions:  - What would you like us to know as we care for you? Lives in an assisted living alone.  - Provide timely, complete, and accurate information to patient/family  - Incorporate patient and family knowledge, values, beliefs, and cultural backgrounds into the planning and delivery of care  - Encourage patient/family to participate in care and decision-making at the level they choose  - Honor patient and family perspectives and choices  Outcome: Progressing     Problem: Patient/Family Goals  Goal: Patient/Family Long Term Goal  Description: Patient's Long Term Goal: go home    Interventions:  - Heparin drip  - Diltiazem drip  - monitor vital signs  - monitor heart rate  - telemetry  -cardiology on consult  - See additional Care Plan goals for specific interventions  Outcome: Progressing  Goal: Patient/Family Short Term Goal  Description: Patient's Short Term Goal: feel better    Interventions:   - cardiology on consult  -telemetry  - monitor heart rhythm  - monitor vital signs  - monitor lab result  - See additional Care Plan goals for specific interventions  Outcome: Progressing     Problem: PAIN - ADULT  Goal: Verbalizes/displays adequate comfort level or patient's stated pain goal  Description: INTERVENTIONS:  - Encourage pt to monitor pain and request assistance  - Assess pain using appropriate pain scale  - Administer analgesics based on type and severity of pain and evaluate response  - Implement non-pharmacological measures as appropriate and evaluate response  - Consider cultural and social influences on pain and pain management  - Manage/alleviate anxiety  - Utilize distraction and/or relaxation techniques  - Monitor for opioid side effects  - Notify MD/LIP if interventions unsuccessful or patient reports new pain  - Anticipate increased pain with activity and  pre-medicate as appropriate  Outcome: Progressing     Problem: RISK FOR INFECTION - ADULT  Goal: Absence of fever/infection during anticipated neutropenic period  Description: INTERVENTIONS  - Monitor WBC  - Administer growth factors as ordered  - Implement neutropenic guidelines  Outcome: Progressing     Problem: SAFETY ADULT - FALL  Goal: Free from fall injury  Description: INTERVENTIONS:  - Assess pt frequently for physical needs  - Identify cognitive and physical deficits and behaviors that affect risk of falls.  - [place] fall precautions as indicated by assessment.  - Educate pt/family on patient safety including physical limitations  - Instruct pt to call for assistance with activity based on assessment  - Modify environment to reduce risk of injury  - Provide assistive devices as appropriate  - Consider OT/PT consult to assist with strengthening/mobility  - Encourage toileting schedule  Outcome: Progressing     Problem: DISCHARGE PLANNING  Goal: Discharge to home or other facility with appropriate resources  Description: INTERVENTIONS:  - Identify barriers to discharge w/pt and caregiver  - Include patient/family/discharge partner in discharge planning  - Arrange for needed discharge resources and transportation as appropriate  - Identify discharge learning needs (meds, wound care, etc)  - Arrange for interpreters to assist at discharge as needed  - Consider post-discharge preferences of patient/family/discharge partner  - Complete POLST form as appropriate  - Assess patient's ability to be responsible for managing their own health  - Refer to Case Management Department for coordinating discharge planning if the patient needs post-hospital services based on physician/LIP order or complex needs related to functional status, cognitive ability or social support system  Outcome: Progressing     Problem: CARDIOVASCULAR - ADULT  Goal: Maintains optimal cardiac output and hemodynamic stability  Description:  INTERVENTIONS:  - Monitor vital signs, rhythm, and trends  - Monitor for bleeding, hypotension and signs of decreased cardiac output  - Evaluate effectiveness of vasoactive medications to optimize hemodynamic stability  - Monitor arterial and/or venous puncture sites for bleeding and/or hematoma  - Assess quality of pulses, skin color and temperature  - Assess for signs of decreased coronary artery perfusion - ex. Angina  - Evaluate fluid balance, assess for edema, trend weights  Outcome: Progressing  Goal: Absence of cardiac arrhythmias or at baseline  Description: INTERVENTIONS:  - Continuous cardiac monitoring, monitor vital signs, obtain 12 lead EKG if indicated  - Evaluate effectiveness of antiarrhythmic and heart rate control medications as ordered  - Initiate emergency measures for life threatening arrhythmias  - Monitor electrolytes and administer replacement therapy as ordered  Outcome: Progressing     Problem: METABOLIC/FLUID AND ELECTROLYTES - ADULT  Goal: Electrolytes maintained within normal limits  Description: INTERVENTIONS:  - Monitor labs and rhythm and assess patient for signs and symptoms of electrolyte imbalances  - Administer electrolyte replacement as ordered  - Monitor response to electrolyte replacements, including rhythm and repeat lab results as appropriate  - Fluid restriction as ordered  - Instruct patient on fluid and nutrition restrictions as appropriate  Outcome: Progressing   No complaint of pain. Vital signs stable. Heart rate noted to be greater shayne 120 this afternoon MD notified. HR now at 40's. JESIKA FERNANDES notified. Discharge on hold for tonight. Continue to monitor. Bed on low and locked position, call light within reach.

## 2025-01-04 NOTE — PROGRESS NOTES
[facility]  part of [facility]    Progress Note    Shoaib Alan Patient Status:  Inpatient    10/22/1931 MRN [id number]   Location [facility] 1W Attending Mars Camargo MD   Hosp Day # 1 PCP Mars Camargo MD       SUBJECTIVE:  Overall feeling well; no complaints  HR down to 30s overnight    OBJECTIVE:  /66 (BP Location: Left arm)   Pulse 55   Temp 97.5 °F (36.4 °C) (Oral)   Resp 20   Ht 6' 3\" (1.905 m)   Wt 145 lb 1.6 oz (65.8 kg)   SpO2 94%   BMI 18.14 kg/m²     Intake/Output:  I/O last 3 completed shifts:  In: 340 [P.O.:340]  Out:  [Urine:]    Wt Readings from Last 3 Encounters:   25 145 lb 1.6 oz (65.8 kg)   23 160 lb (72.6 kg)   23 162 lb 11.2 oz (73.8 kg)       Exam  Gen: No acute distress, alert   Pulm: Lungs CTAB, no rhonchi or wheezing  CV: Heart irregular, sylwia  Abd: Abdomen soft, nontender, nondistended, no organomegaly, bowel sounds present  MSK: Full range of motion in extremities, no clubbing, no cyanosis, no edema    Labs:   Recent Labs   Lab 24  1300 25  0814 25  0616   RBC 4.07 4.47 3.99   HGB 11.9* 12.9* 11.6*   HCT 37.7* 40.9 35.8*   MCV 92.6 91.5 89.7   MCH 29.2 28.9 29.1   MCHC 31.6 31.5 32.4   RDW 14.8 14.9 15.0   NEPRELIM 5.96 6.71 4.55   WBC 8.9 10.5 8.3   .0 154.0 139.0*         Recent Labs   Lab 24  1300 25  0814 25  0616   * 95 92   BUN 15 11 15   CREATSERUM 0.92 0.97 0.91   EGFRCR 78 73 79   CA 9.3 9.7 9.5   ALB  --   --  3.7    139 139   K 4.2 4.5 4.0    104 104   CO2 27.0 28.0 29.0   ALKPHO  --   --  59   AST  --   --  15   ALT  --   --  <7*   BILT  --   --  0.6   TP  --   --  6.4         Imaging:  XR CHEST AP PORTABLE  (CPT=71045)    Result Date: 2025  CONCLUSION:  1. Subsegmental atelectasis and/or scarring in both lower lungs, improved compared to . 2. Otherwise no acute cardiopulmonary process.     Dictated by (CST): Reginaldo  MD Johan on 1/02/2025 at 8:47 AM     Finalized by (CST): Johan Hair MD on 1/02/2025 at 8:49 AM                 Assessment and Plan:         Atrial fibrillation with RVR  -was on diltiazem  mg qD and digoxin 0.125 mg po every day until 12/30/24 when he reported near-syncope with 47, so diltiazem ER was decreased to 120 mg daily  -presents to ED 12/2/24 with tachycardia; EKG shows Afib with   and WCT  -HR decreased with Cadizem 10 mg IVP and Cardizem gtt at 10 mg/hour  -PTT 64 on heparin gtt at 800 units/hour  -Dr Lozano's consult noted; pt advised to stop diltiazem and stop digoxin; if HR accelerates, then beta-blocker is favored  -advised for 1 week MCT at [facility] office next week  -change heparin to Xarelto 20 mg po qD  -F.U Dr. Thomas as outpatient after completion of ECG monitor  -had tachycardia yesterday-metoprolol added; asymptomatic sylwia overnight--await cardiology recommendations     History of RLE hematoma  In May 2023; ultrasound RLE shows two large hematomas to RLE: no evidence for compartment syndrome; no need for surgical intervention  -stopped warfarin in May 2023     History of anemia  Hgb 11.9; on ferrous sulfate 325 mg po qD     Aortic stenosis  last ECHO in Jan 2025 shows EF 50-55% with mild aortic stenosis; last Lexiscan GXT 11/29/21 negative for ischemia     History of COVID infection  In Dec 2022;   -nares positive COVID 12/1/22  -s/p remdesivir 100 mg IV [id number] x 4d  -s/p Decadron 6 mg daily x 4 days; discontinued 12/4     History of Pneumonia  In Dec 2022; PCXR shows Patchy medial bibasilar opacities, which are new since prior chest radiographs from October, 2021. Findings could relate to pneumonia/aspiration in the appropriate clinical setting.  Also identified is a new 4 cm right perihilar nodular opacity.     -CT chest shows Airspace opacities within the bilateral upper and lower lobes, most pronounced within the right lower lobe suspicious for multifocal pneumonia.  -s/p  ceftriaxone 1 gm IV [id number], d#5, and s/p  azithromycin 500 mg po daily x 3d     History of Osteomyelitis left 2nd toe   -MRI left foot 11/27/21 shows underlying acute osteomyelitis involving the entirety of the 2nd digit middle phalanx and the distal half of the 2nd digit proximal phalanx.   -s/p left 2nd toe amputation 12/3/21 per podiatrist, Dr Gutierrez; pathology with acute and chronic osteomyelitis   -has home visits by podiatrist Dr Josefina Fraga [phone] and advised sleeve to right 2nd toe      PVD  -CTA 11/29/21 reveals atherosclerosis of the SFA with 65-70% stenosis just beyond the adductor canal  -s/p angioplasty of tight focal left popliteal stenosis; unable to open distally occluded left post tib artery; left ant tib artery-dorsalis pedis widely patent (procedure on 12/1/21 by Dr. Lynch).      Constipation  On Miralax 17 gm po daily prn, and Dulcolax 10 mg po daily prn; takes prune juice     Osteoarthritis lumbar spine and cervical spine  As seen on CT June 2018; Scoliosis and degenerative changes in spine. Mild chronic anterior wedging of lower thoracic vertebral bodies; XR L-spine in Aug 2019 shows 5 lumbar segments with 20° levoconvex scoliosis, slight flattening of the lordotic curvature.  Endplate compression at L3.  Moderate loss of disc heights throughout the lumbar spine.  No spondylolisthesis.  No destructive lesions.  Mild calcification of the abdominal aorta; has seen physiatrist Dr Reilly Overton  -the patient had been taking Norco 7.5 mg q. 6 hours p.r.n. Pain; pt requests #60 with next refill     Gait abnormality  Pt has high fall risk; using manual wheelchair or electric scooter, though independent use is limited due to bilateral hand osteoarthritis;      Right hand basal cell skin cancer  S/p biopsy in Dec 2018; Pt awaits formal excision by dermatologist in [place]     History of Sepsis due to Klebsiella UTI  In June 2018; Due to urine septicemia; BCx and UCx showing growing  Klebsiella pneumoniae, sensitive to pip/ tazo; s/p Zosyn 3.375 gm IV q8hrs, x6d, then Keflex for 8 more days, along with prophylactic po Vanco 125mg qd.        Nephrolithiasis with moderate left hydronephrosis  CT abdomen/ pelvis 6/29 revealed moderate left hydroureteronephrosis related to a 4 x 2 mm left ureterovesical junction calculus; pt seen in consultation with urologist Dr Linder; s/p cystoscopy, left retrograde pyelogram, and insertion of left ureteral stent by Dr Linder.   Follow up with Dr. Linder     Stool incontinence  On Imodium 2 mg po q4hrs prn.  GI panel negative.      Left shoulder pain  XR both shoulders neg for DJD; DDx includes frozen shoulder vs PMR;  s/p home physical therapy for adhesive capsulitis with [facility]; on prednisone to 2.5 mg daily; now with mild right shoulder pain x 2d--> advise stretching exercises for now; call if sxs persists     Pulmonary HTN  seen on ECHO in Jan 2017 with PA pressure 54 (previous PA pressure 44 in June 2016); thought to be multifactorial     Left hip fracture  s/p IM fixation in Jan 2017; on  Norco 7.5 mg po q4hrs prn     Sacral decubitus ulcer  uses Medihoney wound dressing gel daily prn     Coronary artery disease   Had PTCA in 1990s;   -the patient currently takes aspirin 81 mg daily; EKG shows Afib with old inferior anterior infarcts (seen in 2018);   -ECHO in Jan 2025 shows EF 50-55% with mild aortic stenosis; last Lexiscan GXT 11/29/21 negative for ischemia     History of villous adenoma   the patient reports occasional loose stools.  The patient has been advised to see gastroenterologist, Dr. Hopper, as an outpatient for colonoscopy though he has declined repeat colonoscopy     Vitamin B12 deficiency  the patient had been receiving vitamin B12 1,000 mcg IM q. Month        Chiara Potts DO  1/4/2025  7:22 AM

## 2025-01-04 NOTE — PLAN OF CARE
Update sent to Zora FERNANDES of pt status during the shift. Pt heart rate remains irregular A-fib to Junctional. The lowest heart rate 28 and the highest 80's. The longest pause 3.56 seconds. Pt heart rate more stable through out the shift, not increasing above 100

## 2025-01-05 LAB
ANION GAP SERPL CALC-SCNC: 9 MMOL/L (ref 0–18)
APTT PPP: 54.9 SECONDS (ref 23–36)
APTT PPP: 75.9 SECONDS (ref 23–36)
BASOPHILS # BLD AUTO: 0.03 X10(3) UL (ref 0–0.2)
BASOPHILS NFR BLD AUTO: 0.3 %
BUN BLD-MCNC: 20 MG/DL (ref 9–23)
BUN/CREAT SERPL: 22.2 (ref 10–20)
CALCIUM BLD-MCNC: 9.5 MG/DL (ref 8.7–10.4)
CHLORIDE SERPL-SCNC: 100 MMOL/L (ref 98–112)
CO2 SERPL-SCNC: 29 MMOL/L (ref 21–32)
CREAT BLD-MCNC: 0.9 MG/DL
DEPRECATED RDW RBC AUTO: 47.9 FL (ref 35.1–46.3)
EGFRCR SERPLBLD CKD-EPI 2021: 80 ML/MIN/1.73M2 (ref 60–?)
EOSINOPHIL # BLD AUTO: 0.06 X10(3) UL (ref 0–0.7)
EOSINOPHIL NFR BLD AUTO: 0.6 %
ERYTHROCYTE [DISTWIDTH] IN BLOOD BY AUTOMATED COUNT: 14.8 % (ref 11–15)
GLUCOSE BLD-MCNC: 88 MG/DL (ref 70–99)
HCT VFR BLD AUTO: 36.5 %
HGB BLD-MCNC: 12.4 G/DL
IMM GRANULOCYTES # BLD AUTO: 0.05 X10(3) UL (ref 0–1)
IMM GRANULOCYTES NFR BLD: 0.5 %
LYMPHOCYTES # BLD AUTO: 1.63 X10(3) UL (ref 1–4)
LYMPHOCYTES NFR BLD AUTO: 15.8 %
MAGNESIUM SERPL-MCNC: 1.9 MG/DL (ref 1.6–2.6)
MCH RBC QN AUTO: 30 PG (ref 26–34)
MCHC RBC AUTO-ENTMCNC: 34 G/DL (ref 31–37)
MCV RBC AUTO: 88.2 FL
MONOCYTES # BLD AUTO: 1.92 X10(3) UL (ref 0.1–1)
MONOCYTES NFR BLD AUTO: 18.7 %
NEUTROPHILS # BLD AUTO: 6.6 X10 (3) UL (ref 1.5–7.7)
NEUTROPHILS # BLD AUTO: 6.6 X10(3) UL (ref 1.5–7.7)
NEUTROPHILS NFR BLD AUTO: 64.1 %
OSMOLALITY SERPL CALC.SUM OF ELEC: 288 MOSM/KG (ref 275–295)
PLATELET # BLD AUTO: 143 10(3)UL (ref 150–450)
POTASSIUM SERPL-SCNC: 4.3 MMOL/L (ref 3.5–5.1)
RBC # BLD AUTO: 4.14 X10(6)UL
SODIUM SERPL-SCNC: 138 MMOL/L (ref 136–145)
WBC # BLD AUTO: 10.3 X10(3) UL (ref 4–11)

## 2025-01-05 PROCEDURE — 99232 SBSQ HOSP IP/OBS MODERATE 35: CPT | Performed by: INTERNAL MEDICINE

## 2025-01-05 RX ORDER — SODIUM CHLORIDE 9 MG/ML
INJECTION, SOLUTION INTRAVENOUS
Status: ACTIVE | OUTPATIENT
Start: 2025-01-06 | End: 2025-01-06

## 2025-01-05 RX ORDER — CHLORHEXIDINE GLUCONATE 40 MG/ML
SOLUTION TOPICAL
Status: ACTIVE | OUTPATIENT
Start: 2025-01-06 | End: 2025-01-06

## 2025-01-05 NOTE — PLAN OF CARE
A&Ox4. On room air. Heparin drip continued. Pacemaker placement tomorrow   Problem: Patient Centered Care  Goal: Patient preferences are identified and integrated in the patient's plan of care  Description: Interventions:  - What would you like us to know as we care for you? Lives in an assisted living alone.  - Provide timely, complete, and accurate information to patient/family  - Incorporate patient and family knowledge, values, beliefs, and cultural backgrounds into the planning and delivery of care  - Encourage patient/family to participate in care and decision-making at the level they choose  - Honor patient and family perspectives and choices  Outcome: Progressing     Problem: Patient/Family Goals  Goal: Patient/Family Long Term Goal  Description: Patient's Long Term Goal: go home    Interventions:  - Heparin drip  - Diltiazem drip  - monitor vital signs  - monitor heart rate  - telemetry  -cardiology on consult  - See additional Care Plan goals for specific interventions  Outcome: Progressing  Goal: Patient/Family Short Term Goal  Description: Patient's Short Term Goal: feel better    Interventions:   - cardiology on consult  -telemetry  - monitor heart rhythm  - monitor vital signs  - monitor lab result  - See additional Care Plan goals for specific interventions  Outcome: Progressing     Problem: PAIN - ADULT  Goal: Verbalizes/displays adequate comfort level or patient's stated pain goal  Description: INTERVENTIONS:  - Encourage pt to monitor pain and request assistance  - Assess pain using appropriate pain scale  - Administer analgesics based on type and severity of pain and evaluate response  - Implement non-pharmacological measures as appropriate and evaluate response  - Consider cultural and social influences on pain and pain management  - Manage/alleviate anxiety  - Utilize distraction and/or relaxation techniques  - Monitor for opioid side effects  - Notify MD/LIP if interventions unsuccessful or  patient reports new pain  - Anticipate increased pain with activity and pre-medicate as appropriate  Outcome: Progressing     Problem: RISK FOR INFECTION - ADULT  Goal: Absence of fever/infection during anticipated neutropenic period  Description: INTERVENTIONS  - Monitor WBC  - Administer growth factors as ordered  - Implement neutropenic guidelines  Outcome: Progressing     Problem: SAFETY ADULT - FALL  Goal: Free from fall injury  Description: INTERVENTIONS:  - Assess pt frequently for physical needs  - Identify cognitive and physical deficits and behaviors that affect risk of falls.  - [place] fall precautions as indicated by assessment.  - Educate pt/family on patient safety including physical limitations  - Instruct pt to call for assistance with activity based on assessment  - Modify environment to reduce risk of injury  - Provide assistive devices as appropriate  - Consider OT/PT consult to assist with strengthening/mobility  - Encourage toileting schedule  Outcome: Progressing     Problem: DISCHARGE PLANNING  Goal: Discharge to home or other facility with appropriate resources  Description: INTERVENTIONS:  - Identify barriers to discharge w/pt and caregiver  - Include patient/family/discharge partner in discharge planning  - Arrange for needed discharge resources and transportation as appropriate  - Identify discharge learning needs (meds, wound care, etc)  - Arrange for interpreters to assist at discharge as needed  - Consider post-discharge preferences of patient/family/discharge partner  - Complete POLST form as appropriate  - Assess patient's ability to be responsible for managing their own health  - Refer to Case Management Department for coordinating discharge planning if the patient needs post-hospital services based on physician/LIP order or complex needs related to functional status, cognitive ability or social support system  Outcome: Progressing     Problem: CARDIOVASCULAR - ADULT  Goal:  Maintains optimal cardiac output and hemodynamic stability  Description: INTERVENTIONS:  - Monitor vital signs, rhythm, and trends  - Monitor for bleeding, hypotension and signs of decreased cardiac output  - Evaluate effectiveness of vasoactive medications to optimize hemodynamic stability  - Monitor arterial and/or venous puncture sites for bleeding and/or hematoma  - Assess quality of pulses, skin color and temperature  - Assess for signs of decreased coronary artery perfusion - ex. Angina  - Evaluate fluid balance, assess for edema, trend weights  Outcome: Progressing  Goal: Absence of cardiac arrhythmias or at baseline  Description: INTERVENTIONS:  - Continuous cardiac monitoring, monitor vital signs, obtain 12 lead EKG if indicated  - Evaluate effectiveness of antiarrhythmic and heart rate control medications as ordered  - Initiate emergency measures for life threatening arrhythmias  - Monitor electrolytes and administer replacement therapy as ordered  Outcome: Progressing     Problem: METABOLIC/FLUID AND ELECTROLYTES - ADULT  Goal: Electrolytes maintained within normal limits  Description: INTERVENTIONS:  - Monitor labs and rhythm and assess patient for signs and symptoms of electrolyte imbalances  - Administer electrolyte replacement as ordered  - Monitor response to electrolyte replacements, including rhythm and repeat lab results as appropriate  - Fluid restriction as ordered  - Instruct patient on fluid and nutrition restrictions as appropriate  Outcome: Progressing

## 2025-01-05 NOTE — PLAN OF CARE
Notified by RN that patient was experiencing frequent runs of VT with rates up to 140s.     On review of chart/EKG/tele, it is likely AF with aberrancy, discussed with Dr. Newton.   Stable electrolytes and vitals,  normal LV function, asymptomatic.    Plan:  -continue to monitor tele  -continue current dose of lopressor, do not increase dose  -plan for PPM insertion tomorrow, NPO at MN, hold heparin 0400  -no need to page NOC APN if pt is stable and asymptomatic      Jaci Del Rio, APRN  01/05/25  4:22 PM  [phone] [place]  [phone] [place]

## 2025-01-05 NOTE — PROGRESS NOTES
[facility]  part of [facility]    Progress Note    Shoaib Alan Patient Status:  Inpatient    10/22/1931 MRN [id number]   Location [facility] 3W/SW Attending Mars Camargo MD   Hosp Day # 2 PCP Mars Camargo MD       SUBJECTIVE:  Doing well; no complaints today    OBJECTIVE:  BP 96/66 (BP Location: Right arm)   Pulse 71   Temp 97.5 °F (36.4 °C) (Oral)   Resp 18   Ht 6' 3\" (1.905 m)   Wt 143 lb 3.2 oz (65 kg)   SpO2 95%   BMI 17.90 kg/m²     Intake/Output:  I/O last 3 completed shifts:  In: 469 [P.O.:380; I.V.:89]  Out: 1700 [Urine:1700]    Wt Readings from Last 3 Encounters:   25 143 lb 3.2 oz (65 kg)   23 160 lb (72.6 kg)   23 162 lb 11.2 oz (73.8 kg)       Exam  Gen: No acute distress, alert and oriented x3  Pulm: Lungs CTAB, no rhonchi or wheezing  CV: Heart irr, sylwia  Abd: Abdomen soft, nontender, nondistended, no organomegaly, bowel sounds present  MSK: Full range of motion in extremities, no clubbing, no cyanosis, no edema  Skin: no rashes or lesions  Neuro: A&O x 3, 5/5 strength in all 4 extremities.     Labs:   Recent Labs   Lab 25  0616 25  0716 25  0653   RBC 3.99 4.21 4.14   HGB 11.6* 12.2* 12.4*   HCT 35.8* 36.8* 36.5*   MCV 89.7 87.4 88.2   MCH 29.1 29.0 30.0   MCHC 32.4 33.2 34.0   RDW 15.0 14.9 14.8   NEPRELIM 4.55 6.37 6.60   WBC 8.3 10.0 10.3   .0* 162.0 143.0*         Recent Labs   Lab 25  0616 25  0716 25  1909 25  0653   GLU 92 93 106* 88   BUN 15 16 19 20   CREATSERUM 0.91 0.92 0.95 0.90   EGFRCR 79 78 75 80   CA 9.5 9.8 9.7 9.5   ALB 3.7  --   --   --     136 138 138   K 4.0 4.2 4.5 4.3    102 101 100   CO2 29.0 29.0 29.0 29.0   ALKPHO 59  --   --   --    AST 15  --   --   --    ALT <7*  --   --   --    BILT 0.6  --   --   --    TP 6.4  --   --   --          Imaging:  No results found.          Assessment and Plan:         Atrial fibrillation with RVR  -was on  diltiazem  mg qD and digoxin 0.125 mg po every day until 12/30/24 when he reported near-syncope with 47, so diltiazem ER was decreased to 120 mg daily  -presents to ED 12/2/24 with tachycardia; EKG shows Afib with   and WCT  -HR decreased with Cadizem 10 mg IVP and Cardizem gtt at 10 mg/hour  -PTT 64 on heparin gtt at 800 units/hour  -Dr Lozano's consult noted; pt advised to stop diltiazem and stop digoxin; if HR accelerates, then beta-blocker is favored  -advised for 1 week MCT at [facility] office next week  -change heparin to Xarelto 20 mg po qD  -F.U Dr. Thomas as outpatient after completion of ECG monitor  -tachy-sylwia; plan for PM tomorrow am per cardiology; npo after midnight; xarelto held; pt on heparin gtt     History of RLE hematoma  In May 2023; ultrasound RLE shows two large hematomas to RLE: no evidence for compartment syndrome; no need for surgical intervention  -stopped warfarin in May 2023     History of anemia  Hgb 11.9; on ferrous sulfate 325 mg po qD     Aortic stenosis  last ECHO in Jan 2025 shows EF 50-55% with mild aortic stenosis; last Lexiscan GXT 11/29/21 negative for ischemia     History of COVID infection  In Dec 2022;   -nares positive COVID 12/1/22  -s/p remdesivir 100 mg IV [id number] x 4d  -s/p Decadron 6 mg daily x 4 days; discontinued 12/4     History of Pneumonia  In Dec 2022; PCXR shows Patchy medial bibasilar opacities, which are new since prior chest radiographs from October, 2021. Findings could relate to pneumonia/aspiration in the appropriate clinical setting.  Also identified is a new 4 cm right perihilar nodular opacity.     -CT chest shows Airspace opacities within the bilateral upper and lower lobes, most pronounced within the right lower lobe suspicious for multifocal pneumonia.  -s/p ceftriaxone 1 gm IV [id number], d#5, and s/p  azithromycin 500 mg po daily x 3d     History of Osteomyelitis left 2nd toe   -MRI left foot 11/27/21 shows underlying acute osteomyelitis  involving the entirety of the 2nd digit middle phalanx and the distal half of the 2nd digit proximal phalanx.   -s/p left 2nd toe amputation 12/3/21 per podiatrist, Dr Gutierrez; pathology with acute and chronic osteomyelitis   -has home visits by podiatrist Dr Josefina Fraga [phone] and advised sleeve to right 2nd toe      PVD  -CTA 11/29/21 reveals atherosclerosis of the SFA with 65-70% stenosis just beyond the adductor canal  -s/p angioplasty of tight focal left popliteal stenosis; unable to open distally occluded left post tib artery; left ant tib artery-dorsalis pedis widely patent (procedure on 12/1/21 by Dr. Lynch).      Constipation  On Miralax 17 gm po daily prn, and Dulcolax 10 mg po daily prn; takes prune juice     Osteoarthritis lumbar spine and cervical spine  As seen on CT June 2018; Scoliosis and degenerative changes in spine. Mild chronic anterior wedging of lower thoracic vertebral bodies; XR L-spine in Aug 2019 shows 5 lumbar segments with 20° levoconvex scoliosis, slight flattening of the lordotic curvature.  Endplate compression at L3.  Moderate loss of disc heights throughout the lumbar spine.  No spondylolisthesis.  No destructive lesions.  Mild calcification of the abdominal aorta; has seen physiatrist Dr Reilly Overton  -the patient had been taking Norco 7.5 mg q. 6 hours p.r.n. Pain; pt requests #60 with next refill     Gait abnormality  Pt has high fall risk; using manual wheelchair or electric scooter, though independent use is limited due to bilateral hand osteoarthritis;      Right hand basal cell skin cancer  S/p biopsy in Dec 2018; Pt awaits formal excision by dermatologist in [place]     History of Sepsis due to Klebsiella UTI  In June 2018; Due to urine septicemia; BCx and UCx showing growing Klebsiella pneumoniae, sensitive to pip/ tazo; s/p Zosyn 3.375 gm IV q8hrs, x6d, then Keflex for 8 more days, along with prophylactic po Vanco 125mg qd.        Nephrolithiasis with  moderate left hydronephrosis  CT abdomen/ pelvis 6/29 revealed moderate left hydroureteronephrosis related to a 4 x 2 mm left ureterovesical junction calculus; pt seen in consultation with urologist Dr Linder; s/p cystoscopy, left retrograde pyelogram, and insertion of left ureteral stent by Dr Linder.   Follow up with Dr. Linder     Stool incontinence  On Imodium 2 mg po q4hrs prn.  GI panel negative.      Left shoulder pain  XR both shoulders neg for DJD; DDx includes frozen shoulder vs PMR;  s/p home physical therapy for adhesive capsulitis with [facility]; on prednisone to 2.5 mg daily; now with mild right shoulder pain x 2d--> advise stretching exercises for now; call if sxs persists     Pulmonary HTN  seen on ECHO in Jan 2017 with PA pressure 54 (previous PA pressure 44 in June 2016); thought to be multifactorial     Left hip fracture  s/p IM fixation in Jan 2017; on  Norco 7.5 mg po q4hrs prn     Sacral decubitus ulcer  uses Medihoney wound dressing gel daily prn     Coronary artery disease   Had PTCA in 1990s;   -the patient currently takes aspirin 81 mg daily; EKG shows Afib with old inferior anterior infarcts (seen in 2018);   -ECHO in Jan 2025 shows EF 50-55% with mild aortic stenosis; last Lexiscan GXT 11/29/21 negative for ischemia     History of villous adenoma   the patient reports occasional loose stools.  The patient has been advised to see gastroenterologist, Dr. Hopper, as an outpatient for colonoscopy though he has declined repeat colonoscopy     Vitamin B12 deficiency  the patient had been receiving vitamin B12 1,000 mcg IM q. Month          Chiara Potts DO  1/5/2025  10:47 AM

## 2025-01-05 NOTE — PLAN OF CARE
Call from nurse re: NSVT multiple episodes bp 96/44 HR 35-75 dec to 27 earlier today.  Given 24-26 NSVT beats checking bmp and mag and recheck on bp 109/49 HR currently 72 to give metop tart 25 mg and check lytes replete IV as needed

## 2025-01-05 NOTE — PLAN OF CARE
Problem: PAIN - ADULT  Goal: Verbalizes/displays adequate comfort level or patient's stated pain goal  Description: INTERVENTIONS:  - Encourage pt to monitor pain and request assistance  - Assess pain using appropriate pain scale  - Administer analgesics based on type and severity of pain and evaluate response  - Implement non-pharmacological measures as appropriate and evaluate response  - Consider cultural and social influences on pain and pain management  - Manage/alleviate anxiety  - Utilize distraction and/or relaxation techniques  - Monitor for opioid side effects  - Notify MD/LIP if interventions unsuccessful or patient reports new pain  - Anticipate increased pain with activity and pre-medicate as appropriate  Outcome: Progressing     Problem: RISK FOR INFECTION - ADULT  Goal: Absence of fever/infection during anticipated neutropenic period  Description: INTERVENTIONS  - Monitor WBC  - Administer growth factors as ordered  - Implement neutropenic guidelines  Outcome: Progressing     Problem: SAFETY ADULT - FALL  Goal: Free from fall injury  Description: INTERVENTIONS:  - Assess pt frequently for physical needs  - Identify cognitive and physical deficits and behaviors that affect risk of falls.  - [place] fall precautions as indicated by assessment.  - Educate pt/family on patient safety including physical limitations  - Instruct pt to call for assistance with activity based on assessment  - Modify environment to reduce risk of injury  - Provide assistive devices as appropriate  - Consider OT/PT consult to assist with strengthening/mobility  - Encourage toileting schedule  Outcome: Progressing     Problem: CARDIOVASCULAR - ADULT  Goal: Maintains optimal cardiac output and hemodynamic stability  Description: INTERVENTIONS:  - Monitor vital signs, rhythm, and trends  - Monitor for bleeding, hypotension and signs of decreased cardiac output  - Evaluate effectiveness of vasoactive medications to optimize  hemodynamic stability  - Monitor arterial and/or venous puncture sites for bleeding and/or hematoma  - Assess quality of pulses, skin color and temperature  - Assess for signs of decreased coronary artery perfusion - ex. Angina  - Evaluate fluid balance, assess for edema, trend weights  Outcome: Progressing   Patient having multiple runs of vtach and also runs of bradycardia  - [facility] notified and DOM Blakely notified.  Per Dr Newton, ok to give scheduled metoprolol now - dose given.  Pt comfortable, asymptomatic, denies pain, denies dizziness, denies SOB.  Heparin drip started @ 8ml/hr - next PTT @ midnight

## 2025-01-05 NOTE — PLAN OF CARE
Problem: PAIN - ADULT  Goal: Verbalizes/displays adequate comfort level or patient's stated pain goal  Description: INTERVENTIONS:  - Encourage pt to monitor pain and request assistance  - Assess pain using appropriate pain scale  - Administer analgesics based on type and severity of pain and evaluate response  - Implement non-pharmacological measures as appropriate and evaluate response  - Consider cultural and social influences on pain and pain management  - Manage/alleviate anxiety  - Utilize distraction and/or relaxation techniques  - Monitor for opioid side effects  - Notify MD/LIP if interventions unsuccessful or patient reports new pain  - Anticipate increased pain with activity and pre-medicate as appropriate  Outcome: Progressing     Problem: RISK FOR INFECTION - ADULT  Goal: Absence of fever/infection during anticipated neutropenic period  Description: INTERVENTIONS  - Monitor WBC  - Administer growth factors as ordered  - Implement neutropenic guidelines  Outcome: Progressing     Problem: SAFETY ADULT - FALL  Goal: Free from fall injury  Description: INTERVENTIONS:  - Assess pt frequently for physical needs  - Identify cognitive and physical deficits and behaviors that affect risk of falls.  - [place] fall precautions as indicated by assessment.  - Educate pt/family on patient safety including physical limitations  - Instruct pt to call for assistance with activity based on assessment  - Modify environment to reduce risk of injury  - Provide assistive devices as appropriate  - Consider OT/PT consult to assist with strengthening/mobility  - Encourage toileting schedule  Outcome: Progressing     Problem: CARDIOVASCULAR - ADULT  Goal: Maintains optimal cardiac output and hemodynamic stability  Description: INTERVENTIONS:  - Monitor vital signs, rhythm, and trends  - Monitor for bleeding, hypotension and signs of decreased cardiac output  - Evaluate effectiveness of vasoactive medications to optimize  hemodynamic stability  - Monitor arterial and/or venous puncture sites for bleeding and/or hematoma  - Assess quality of pulses, skin color and temperature  - Assess for signs of decreased coronary artery perfusion - ex. Angina  - Evaluate fluid balance, assess for edema, trend weights  Outcome: Not Progressing  Goal: Absence of cardiac arrhythmias or at baseline  Description: INTERVENTIONS:  - Continuous cardiac monitoring, monitor vital signs, obtain 12 lead EKG if indicated  - Evaluate effectiveness of antiarrhythmic and heart rate control medications as ordered  - Initiate emergency measures for life threatening arrhythmias  - Monitor electrolytes and administer replacement therapy as ordered  Outcome: Not Progressing     Problem: METABOLIC/FLUID AND ELECTROLYTES - ADULT  Goal: Electrolytes maintained within normal limits  Description: INTERVENTIONS:  - Monitor labs and rhythm and assess patient for signs and symptoms of electrolyte imbalances  - Administer electrolyte replacement as ordered  - Monitor response to electrolyte replacements, including rhythm and repeat lab results as appropriate  - Fluid restriction as ordered  - Instruct patient on fluid and nutrition restrictions as appropriate  Outcome: Progressing    Received awake,oriented.Patient had  a few runs of v-tach during the night and HR went down to 27.VALERIE Blakely notified. SBP this morning in the 80's,patient asymptomatic. notified-no need for IV bolus at this time. Heparin drip continued.

## 2025-01-05 NOTE — PLAN OF CARE
Called for runs nsvt K and Mag ok continue to monitor bp 95/68 patient asymptomatic K 4.5 Mg 1.9  continue to monitor texted on call

## 2025-01-05 NOTE — PROGRESS NOTES
Progress Note  Shoaib Alan Patient Status:  Observation    10/22/1931 MRN [id number]   Location [facility] 1W Attending Mars Camargo MD   Hosp Day # 2 PCP Mars Camargo MD     Subjective:  Denies cardiac complaints    Objective:  BP (!) 89/67 (BP Location: Right arm)   Pulse 69   Temp 97.5 °F (36.4 °C) (Oral)   Resp 20   Ht 6' 3\" (1.905 m)   Wt 143 lb 3.2 oz (65 kg)   SpO2 92%   BMI 17.90 kg/m²     Telemetry: AFSVR, frequent 3 second pauses overnight, sylwia into 30s-40s, lowest HR 28 currently 50s-60s, 31 beats NSVT vs AF with aberrancy    Intake/Output:    Intake/Output Summary (Last 24 hours) at 2025 0702  Last data filed at 2025 0636  Gross per 24 hour   Intake 469.04 ml   Output 1100 ml   Net -630.96 ml     Last 3 Weights   25 0532 143 lb 3.2 oz (65 kg)   25 1349 145 lb 1.6 oz (65.8 kg)   25 0811 161 lb (73 kg)   23 1129 160 lb (72.6 kg)   23 1852 162 lb 11.2 oz (73.8 kg)   23 1531 160 lb (72.6 kg)     Labs:  Recent Labs   Lab 25  0616 25  0716 25  1909   GLU 92 93 106*   BUN 15 16 19   CREATSERUM 0.91 0.92 0.95   EGFRCR 79 78 75   CA 9.5 9.8 9.7    136 138   K 4.0 4.2 4.5    102 101   CO2 29.0 29.0 29.0     Recent Labs   Lab 25  0814 25  0616 25  0716   RBC 4.47 3.99 4.21   HGB 12.9* 11.6* 12.2*   HCT 40.9 35.8* 36.8*   MCV 91.5 89.7 87.4   MCH 28.9 29.1 29.0   MCHC 31.5 32.4 33.2   RDW 14.9 15.0 14.9   NEPRELIM 6.71 4.55 6.37   WBC 10.5 8.3 10.0   .0 139.0* 162.0     Recent Labs   Lab 25  0814   TROPHS 49     Lab Results   Component Value Date/Time    HDL 33 (L) 2015 05:00 AM    LDL 72 2015 05:00 AM    TRIG 74 2015 05:00 AM     Lab Results   Component Value Date    DDIMER <0.27 2022     Lab Results   Component Value Date    TSH 2.791 2025     Review of Systems:   Constitutional: No fevers, chills, fatigue or night sweats.  Respiratory: Denies  cough, wheezing or shortness of breath.  CV: Denies chest pain, palpitations, orthopnea, PND or dizziness.  GI: No nausea, vomiting or diarrhea. No blood in stools.    Physical Exam:  General: Alert, cooperative, no distress, appears stated age.  Neck: no JVD.  Lungs: Clear to auscultation bilaterally.  Chest wall: No tenderness or deformity.  Heart: Irregularly irregular rate and rhythm, S1, S2 normal, no murmur, click, rub or gallop.  Abdomen: Soft, non-tender. Bowel sounds normal. No masses,  No organomegaly.  Extremities: Extremities normal, atraumatic, no cyanosis or edema.  Pulses: 2+ and symmetric all extremities.  Neurologic: Grossly intact.    Diagnostics:  Echo: 1/2/25  Conclusions:     1. Left ventricle: The cavity size was normal. Wall thickness was mildly      increased. Systolic function was at the lower limits of normal. The      estimated ejection fraction was 50-55%, by visual assessment. Technical      limitations of the study preclude regional wall motion analysis. Unable      to assess LV diastolic function due to heart rhythm.   2. Right ventricle: The cavity size was increased. Systolic function was      reduced.   3. Left atrium: The left atrial volume was markedly increased.   4. Right atrium: The atrium was dilated.   5. Aortic valve: Cusp separation was reduced. The findings were consistent      with mild stenosis. The planimetered valve area was 1.61cm^2. The valve      area index by planimetry was 0.8cm^2/m^2. The peak systolic velocity was      1.72m/sec. The mean systolic gradient was 6mm Hg.   6. Ascending aorta: The ascending aorta was dilated and 3.6cm diameter.   7. Mitral valve: The annulus was mildly to moderately calcified.   8. Pulmonary arteries: Systolic pressure was at the upper limits of normal      to, at most, mildly increased; in the range of 30 mm Hg to 35 mm Hg,      estimated to be 33mm Hg.   Impressions:  No previous study was available for comparison.   *    Medications:   metoprolol tartrate  25 mg Oral 2x Daily(Beta Blocker)    ferrous sulfate  325 mg Oral Daily with breakfast    melatonin  3 mg Oral Nightly    predniSONE  2.5 mg Oral Daily    cholecalciferol  1,000 Units Oral Daily    multivitamin  1 tablet Oral Daily      continuous dose heparin 700 Units/hr (01/05/25 0050)   Assessment:    89 year old sex with PMH of PAF s/p DCCV, on digoxin and diltiazem, HFpEF, COPD, CAD, GERD, pulmonary HTN who presented with lightheadedness and was found to be in AFRVR.     Atrial fibrillation with RVR/SVR  Rates up to 140s on admission, now slow in 50s-60s with multiple pauses and drop to 30s  -diltiazem started on admission, stopped due to bradycardia  -AC with heparin gtt, historically on warfarin but was stopped in May, 2023 due to hematoma  -transitioned to xarelto this admission for stroke prophylaxis, currently on heparin gtt for PPM tomorrow, will resume xarelto post procedure per protocol  -echo this admission revealed normal LV function 50-55% with biatrial dilation, mild aortic stenosis, moderate mitral stenosis, PASP 33mmHg  -TSH unremarkable 2.791  -[id number]= 5 for age, htn, HF, CAD  -31 beat run of aberrancy overnight    Bradycardia  Hx sinus bradycardia  -hx episode of presyncope, home diltiazem dose decreased at that time with improvement in symptoms   -currently AFSVR with rates 50s-60, noted to be in 30s overnight with multiple 3 second pauses  -off diltiazem, digoxin, started on low dose lopressor due to HR 140s, dropped to 40s within <1 hour of administration with multiple pauses, DO NOT INCREASE DOSE FOR 3-4 DAYS PER EP  -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, discussed with patient yesterday who is agreeable, will monitor HR this weekend, plan for insertion on Monday if HR remains stable    CAD s/p remote PCI  Not on asa, statin, BB PTA    HFimpEF  LVEF 50-55%, historically 40-45% on 11/2021 echo  -volume compensated on exam  -takes lasix PRN and  digoxin daily PTA  -not on additional GDMT, has not followed with cardiology for many years    Pulmonary HTN  PASP slightly elevated 30-35mmHg on echo  -volume compensated on exam  -will resume daily lasix to prevent volume overload    COPD    GERD    Plan:  -Continue lopressor 25mg BID, do NOT increase dose as above  -Continue heparin, hold Monday morning at 0400 for PPM placement on Monday with Dr. Thomas,   -NPO  at [place] for procedure  -Monitor for volume overload, will resume home lasix if needed      Plan of care discussed with patient, RN.      Jaci Del Rio, APRN  01/05/25  7:06 AM  [phone] [place]  [phone] [place]      Cardiology attending agree with assessment and plan as above.    The above note was reviewed and the patient examined independently.  Anticipate placement of pacemaker tomorrow.

## 2025-01-06 ENCOUNTER — APPOINTMENT (OUTPATIENT)
Dept: GENERAL RADIOLOGY | Facility: HOSPITAL | Age: OVER 89
End: 2025-01-06
Attending: INTERNAL MEDICINE
Payer: MEDICARE

## 2025-01-06 ENCOUNTER — APPOINTMENT (OUTPATIENT)
Dept: INTERVENTIONAL RADIOLOGY/VASCULAR | Facility: HOSPITAL | Age: OVER 89
End: 2025-01-06
Payer: MEDICARE

## 2025-01-06 LAB
ANION GAP SERPL CALC-SCNC: 9 MMOL/L (ref 0–18)
APTT PPP: 33.8 SECONDS (ref 23–36)
ATRIAL RATE: 64 BPM
BASOPHILS # BLD AUTO: 0.03 X10(3) UL (ref 0–0.2)
BASOPHILS NFR BLD AUTO: 0.2 %
BUN BLD-MCNC: 22 MG/DL (ref 9–23)
BUN/CREAT SERPL: 21.8 (ref 10–20)
CALCIUM BLD-MCNC: 9.7 MG/DL (ref 8.7–10.4)
CHLORIDE SERPL-SCNC: 98 MMOL/L (ref 98–112)
CO2 SERPL-SCNC: 29 MMOL/L (ref 21–32)
CREAT BLD-MCNC: 1.01 MG/DL
DEPRECATED RDW RBC AUTO: 49.3 FL (ref 35.1–46.3)
EGFRCR SERPLBLD CKD-EPI 2021: 69 ML/MIN/1.73M2 (ref 60–?)
EOSINOPHIL # BLD AUTO: 0.03 X10(3) UL (ref 0–0.7)
EOSINOPHIL NFR BLD AUTO: 0.2 %
ERYTHROCYTE [DISTWIDTH] IN BLOOD BY AUTOMATED COUNT: 14.8 % (ref 11–15)
GLUCOSE BLD-MCNC: 89 MG/DL (ref 70–99)
HCT VFR BLD AUTO: 41.8 %
HGB BLD-MCNC: 13 G/DL
IMM GRANULOCYTES # BLD AUTO: 0.05 X10(3) UL (ref 0–1)
IMM GRANULOCYTES NFR BLD: 0.4 %
LYMPHOCYTES # BLD AUTO: 1.53 X10(3) UL (ref 1–4)
LYMPHOCYTES NFR BLD AUTO: 11.1 %
MAGNESIUM SERPL-MCNC: 1.9 MG/DL (ref 1.6–2.6)
MCH RBC QN AUTO: 28.2 PG (ref 26–34)
MCHC RBC AUTO-ENTMCNC: 31.1 G/DL (ref 31–37)
MCV RBC AUTO: 90.7 FL
MONOCYTES # BLD AUTO: 3 X10(3) UL (ref 0.1–1)
MONOCYTES NFR BLD AUTO: 21.8 %
NEUTROPHILS # BLD AUTO: 9.11 X10 (3) UL (ref 1.5–7.7)
NEUTROPHILS # BLD AUTO: 9.11 X10(3) UL (ref 1.5–7.7)
NEUTROPHILS NFR BLD AUTO: 66.3 %
OSMOLALITY SERPL CALC.SUM OF ELEC: 285 MOSM/KG (ref 275–295)
PLATELET # BLD AUTO: 162 10(3)UL (ref 150–450)
POTASSIUM SERPL-SCNC: 4 MMOL/L (ref 3.5–5.1)
Q-T INTERVAL: 342 MS
QRS DURATION: 132 MS
QTC CALCULATION (BEZET): 471 MS
R AXIS: -27 DEGREES
RBC # BLD AUTO: 4.61 X10(6)UL
SODIUM SERPL-SCNC: 136 MMOL/L (ref 136–145)
T AXIS: -42 DEGREES
VENTRICULAR RATE: 114 BPM
WBC # BLD AUTO: 13.8 X10(3) UL (ref 4–11)

## 2025-01-06 PROCEDURE — 0JH604Z INSERTION OF PACEMAKER, SINGLE CHAMBER INTO CHEST SUBCUTANEOUS TISSUE AND FASCIA, OPEN APPROACH: ICD-10-PCS | Performed by: INTERNAL MEDICINE

## 2025-01-06 PROCEDURE — 99232 SBSQ HOSP IP/OBS MODERATE 35: CPT | Performed by: INTERNAL MEDICINE

## 2025-01-06 PROCEDURE — 71045 X-RAY EXAM CHEST 1 VIEW: CPT | Performed by: INTERNAL MEDICINE

## 2025-01-06 PROCEDURE — 02HK3JZ INSERTION OF PACEMAKER LEAD INTO RIGHT VENTRICLE, PERCUTANEOUS APPROACH: ICD-10-PCS | Performed by: INTERNAL MEDICINE

## 2025-01-06 RX ORDER — CHLORHEXIDINE GLUCONATE 40 MG/ML
SOLUTION TOPICAL
Status: ACTIVE | OUTPATIENT
Start: 2025-01-07 | End: 2025-01-07

## 2025-01-06 RX ORDER — SODIUM CHLORIDE 9 MG/ML
INJECTION, SOLUTION INTRAVENOUS
Status: ACTIVE | OUTPATIENT
Start: 2025-01-07 | End: 2025-01-07

## 2025-01-06 RX ORDER — PROTAMINE SULFATE 10 MG/ML
INJECTION, SOLUTION INTRAVENOUS
Status: COMPLETED
Start: 2025-01-06 | End: 2025-01-06

## 2025-01-06 RX ORDER — MIDAZOLAM HYDROCHLORIDE 1 MG/ML
INJECTION INTRAMUSCULAR; INTRAVENOUS
Status: COMPLETED
Start: 2025-01-06 | End: 2025-01-06

## 2025-01-06 RX ORDER — IOPAMIDOL 612 MG/ML
30 INJECTION, SOLUTION INTRAVASCULAR
Status: COMPLETED | OUTPATIENT
Start: 2025-01-06 | End: 2025-01-06

## 2025-01-06 RX ORDER — ONDANSETRON 2 MG/ML
4 INJECTION INTRAMUSCULAR; INTRAVENOUS EVERY 6 HOURS PRN
Status: DISCONTINUED | OUTPATIENT
Start: 2025-01-06 | End: 2025-01-09

## 2025-01-06 RX ORDER — LIDOCAINE HYDROCHLORIDE AND EPINEPHRINE 10; 10 MG/ML; UG/ML
INJECTION, SOLUTION INFILTRATION; PERINEURAL
Status: COMPLETED
Start: 2025-01-06 | End: 2025-01-06

## 2025-01-06 NOTE — DIETARY NOTE
ADULT NUTRITION INITIAL ASSESSMENT    Pt is at high nutrition risk.  Pt meets severe malnutrition criteria.      CRITERIA FOR MALNUTRITION DIAGNOSIS:  Criteria for severe malnutrition diagnosis: chronic illness related to wt loss greater than 10% in 6 months, energy intake less than 75% for greater than 1 month, body fat severe depletion, and muscle mass severe depletion.    RECOMMENDATIONS TO MD: See nutrition intervention for ONS (oral nutrition supplements)    ADMITTING DIAGNOSIS:  Atrial fibrillation with RVR ([facility]) [I48.91]  PERTINENT PAST MEDICAL HISTORY:   Past Medical History:    AF (atrial fibrillation) ([facility])    Arrhythmia    Cataract    Chronic pulmonary aspiration    ARF 6/16 BiPAP    Congestive heart disease ([facility])    COPD (chronic obstructive pulmonary disease) ([facility])    Coronary atherosclerosis    COVID    Depression    Dysphagia    Esophageal reflux    Fracture, radius    Hip fracture, left ([facility])    s/p IM fixation 1/11/17; Ortho Dr Alcantara    IBS (irritable bowel syndrome)    Lipid screening    Malabsorption ([facility])    Malnutrition ([facility])    severe     Osteoarthrosis, unspecified whether generalized or localized, unspecified site    hands    Pneumonia    Pneumonia due to organism    Pressure ulcer of left heel, stage 2 ([facility])    PUD (peptic ulcer disease)    Pulmonary hypertension ([facility])    Sacral decubitus ulcer    Thrombocytosis    Villous adenoma    Vitamin B12 deficiency    White matter disease       PATIENT STATUS: Initial 01/06/25: Pt assessed due to screening at risk for low BMI of 17.72 kg/m2. Pt is s/t pacemaker placement today. Here with Afib. [facility] at [facility]. Pt reports poor appetite, not really eating very much for the past ~2 month. Pt drinks 1 Boost shake daily and agreeable to have Ensure daily and magic cup daily during admission. Pt reports long history of weight loss. States he actually lost weight down to ~120# but gained some back when he moved into a senior  living facility. Pt has since lost ~19% of his weight over the past 6 months which pt states is accurate. Orders updated per preferences. Also liberalized diet to general to promote and encourage improved PO intakes. Overall it seems pt has been eating well, encouraged pt to continue.     FOOD/NUTRITION RELATED HISTORY:  Appetite: Poor PTA  Intake:  fairly good during admission   Intake Meeting Needs: Marginal, added oral nutrition supplements (ONS)  Percent Meals Eaten (last 6 days)       Date/Time Percent Meals Eaten (%)    01/02/25 1453 100 %    01/02/25 1719 100 %    01/03/25 1404 80 %    01/03/25 1704 30 %    01/04/25 1300 90 %    01/05/25 0955 100 %    01/05/25 1300 10 %    01/05/25 1742 15 %    01/06/25 0811 0 %     Percent Meals Eaten (%): npo at 01/06/25 0811    01/06/25 1500 0 %     Percent Meals Eaten (%): pt encouraged to eat x3 at 01/06/25 1500            Food Allergies: No Known Food Allergies (NKFA)  Cultural/Ethnic/Jewish Preferences: None    GASTROINTESTINAL: +BM 1/2    MEDICATIONS: reviewed   [START ON 1/7/2025] chlorhexidine   Topical On Call    [START ON 1/7/2025] sodium chloride   Intravenous On Call    ceFAZolin  2 g Intravenous Q8H    [START ON 1/7/2025] rivaroxaban  15 mg Oral Daily with food    metoprolol tartrate  25 mg Oral 2x Daily(Beta Blocker)    ferrous sulfate  325 mg Oral Daily with breakfast    melatonin  3 mg Oral Nightly    predniSONE  2.5 mg Oral Daily    cholecalciferol  1,000 Units Oral Daily    multivitamin  1 tablet Oral Daily     LABS: reviewed  Recent Labs     01/04/25  1909 01/05/25  0653 01/06/25  0629   * 88 89   BUN 19 20 22   CREATSERUM 0.95 0.90 1.01   CA 9.7 9.5 9.7   MG 1.9 1.9 1.9    138 136   K 4.5 4.3 4.0    100 98   CO2 29.0 29.0 29.0   OSMOCALC 289 288 285     NUTRITION RELATED PHYSICAL FINDINGS:  - Nutrition Focused Physical Exam (NFPE): severe depletion body fat orbital region, triceps region, and fat overlying rib cage region and severe  depletion muscle mass temple region, clavicle region, scapular region, shoulder region, and dorsal calderon region; unable to assess legs at this time.   - Fluid Accumulation: none  ---> See RN documentation for details  - Skin Integrity: intact ---> See RN documentation for details    ANTHROPOMETRICS:  HT: 190.5 cm (6' 3\")  WT: 64.3 kg (141 lb 12.8 oz)   BMI: Body mass index is 17.72 kg/m².  BMI CLASSIFICATION: less than 18.5 kg/m2 - underweight  IBW: 196 lbs        72% IBW  Usual Body Wt: ~200 lbs pt's highest/healthiest weight in the past per pt report      70% UBW    WEIGHT HISTORY:  Patient Weight(s) for the past 336 hrs:   Weight   01/06/25 0607 64.3 kg (141 lb 12.8 oz)   01/05/25 0532 65 kg (143 lb 3.2 oz)   01/02/25 1349 65.8 kg (145 lb 1.6 oz)   01/02/25 0811 73 kg (161 lb)     Wt Readings from Last 10 Encounters:   01/06/25 64.3 kg (141 lb 12.8 oz)   07/13/23 72.6 kg (160 lb)   05/11/23 73.8 kg (162 lb 11.2 oz)   12/01/22 69 kg (152 lb 1.6 oz)   11/27/21 70.8 kg (156 lb)   10/29/21 74.4 kg (164 lb)   09/30/20 78 kg (171 lb 15.3 oz)   09/28/20 78 kg (172 lb)   01/27/20 74.8 kg (164 lb 12.8 oz)   12/02/19 77.1 kg (170 lb)     NUTRITION DIAGNOSIS/PROBLEM:   Severe Malnutrition related to Chronic - Physiological causes resulting in anorexia or diminished intake as evidenced by wt loss greater than 10% in 6 months, energy intake less than 75% for greater than 1 month, body fat severe depletion, and muscle mass severe depletion.    NUTRITION INTERVENTION:     NUTRITION PRESCRIPTION:   Estimated Nutrition needs: --dosing wt of 64.3 kg - wt taken on 1/6  Calories: 3966-5111 calories/day (35-40 calories per kg Dosing wt)  Protein:  g protein/day (1.5-2 g protein/kg Dosing wt)    - Diet:       Procedures    Regular/General diet Is Patient on Accuchecks? No      - RD Malnutrition Care Plan: Liberalized diet, Encouraged increased PO intake, Encouraged small frequent meals with emphasis on high calorie/high  protein, and Initiated ONS (oral nutritional supplements)  - Medical Food Supplements-RD added Ensure Enlive (350 calories/ 20 g protein each) Daily Strawberry and Magic Cup (290 calories/ 9 g protein each) Daily Chocolate. Rational/use of oral supplements discussed.  - Vitamin and mineral supplements: multivitamin/mineral and vitamin D  - Feeding assistance: meal set up  - Nutrition education: Discussed importance of adequate energy and protein intake     - Coordination of nutrition care: collaboration with other providers  - Discharge and transfer of nutrition care to new setting or provider: monitor plans    MONITOR AND EVALUATE/NUTRITION GOALS:  - Food and Nutrient Intake:      Monitor: adequacy of PO intake and adequacy of supplement intake  - Food and Nutrient Administration:      Monitor: N/A  - Anthropometric Measurement:    Monitor weight  - Nutrition Goals:      halt wt loss, regain wt as able, PO and supplement greater than 75% of needs, minimize lean body mass loss, and support body systems    DIETITIAN FOLLOW UP: RD to follow and monitor nutrition status       Audelia Su RD, LDN  Clinical Dietitian  P: [phone]

## 2025-01-06 NOTE — PROGRESS NOTES
[facility]  part of [facility]    Progress Note    Shoaib Alan Patient Status:  Inpatient    10/22/1931 MRN [id number]   Location [facility] INTERVENTIONAL SUITES Attending Mars Camargo MD   Hosp Day # 3 PCP Mars Camargo MD         Assessment and Plan:     Atrial fibrillation with RVR  -was on diltiazem  mg qD and digoxin 0.125 mg po every day until 24 when he reported near-syncope with 47, so diltiazem ER was decreased to 120 mg daily  -presents to ED 24 with tachycardia; EKG shows Afib with   and WCT  -HR decreased with Cadizem 10 mg IVP and Cardizem gtt at 10 mg/hour  -was on heparin gtt at 800 units/hour; now held procedure  -Dr Lozano's consult noted; pt advised to stop diltiazem and stop digoxin; if HR accelerates, then beta-blocker is favored  -advised for 1 week MCT at [facility] office next week  -change heparin to Xarelto 20 mg po qD  -F.U Dr. Thomas as outpatient after completion of ECG monitor  -tachy-sylwia; plan for PPM today per cardiology; npo after midnight; Xarelto held; heparin gtt held; PTT 33.8     History of RLE hematoma  In May 2023; ultrasound RLE shows two large hematomas to RLE: no evidence for compartment syndrome; no need for surgical intervention  -stopped warfarin in May 2023     History of anemia  Hgb 11.9; on ferrous sulfate 325 mg po qD     Aortic stenosis  last ECHO in 2025 shows EF 50-55% with mild aortic stenosis; last Lexiscan GXT 21 negative for ischemia     History of COVID infection  In Dec 2022;   -nares positive COVID 22  -s/p remdesivir 100 mg IV [id number] x 4d  -s/p Decadron 6 mg daily x 4 days; discontinued      History of Pneumonia  In Dec 2022; PCXR shows Patchy medial bibasilar opacities, which are new since prior chest radiographs from . Findings could relate to pneumonia/aspiration in the appropriate clinical setting.  Also identified is a new 4 cm right perihilar nodular  opacity.     -CT chest shows Airspace opacities within the bilateral upper and lower lobes, most pronounced within the right lower lobe suspicious for multifocal pneumonia.  -s/p ceftriaxone 1 gm IV [id number], d#5, and s/p  azithromycin 500 mg po daily x 3d     History of Osteomyelitis left 2nd toe   -MRI left foot 11/27/21 shows underlying acute osteomyelitis involving the entirety of the 2nd digit middle phalanx and the distal half of the 2nd digit proximal phalanx.   -s/p left 2nd toe amputation 12/3/21 per podiatrist, Dr Gutierrez; pathology with acute and chronic osteomyelitis   -has home visits by podiatrist Dr Josefina Fraga [phone] and advised sleeve to right 2nd toe      PVD  -CTA 11/29/21 reveals atherosclerosis of the SFA with 65-70% stenosis just beyond the adductor canal  -s/p angioplasty of tight focal left popliteal stenosis; unable to open distally occluded left post tib artery; left ant tib artery-dorsalis pedis widely patent (procedure on 12/1/21 by Dr. Lynch).      Constipation  On Miralax 17 gm po daily prn, and Dulcolax 10 mg po daily prn; takes prune juice     Osteoarthritis lumbar spine and cervical spine  As seen on CT June 2018; Scoliosis and degenerative changes in spine. Mild chronic anterior wedging of lower thoracic vertebral bodies; XR L-spine in Aug 2019 shows 5 lumbar segments with 20° levoconvex scoliosis, slight flattening of the lordotic curvature.  Endplate compression at L3.  Moderate loss of disc heights throughout the lumbar spine.  No spondylolisthesis.  No destructive lesions.  Mild calcification of the abdominal aorta; has seen physiatrist Dr Reilly Overton  -the patient had been taking Norco 7.5 mg q. 6 hours p.r.n. Pain; pt requests #60 with next refill     Gait abnormality  Pt has high fall risk; using manual wheelchair or electric scooter, though independent use is limited due to bilateral hand osteoarthritis;      Right hand basal cell skin cancer  S/p biopsy in Dec  2018; Pt awaits formal excision by dermatologist in [place]     History of Sepsis due to Klebsiella UTI  In June 2018; Due to urine septicemia; BCx and UCx showing growing Klebsiella pneumoniae, sensitive to pip/ tazo; s/p Zosyn 3.375 gm IV q8hrs, x6d, then Keflex for 8 more days, along with prophylactic po Vanco 125mg qd.        Nephrolithiasis with moderate left hydronephrosis  CT abdomen/ pelvis 6/29 revealed moderate left hydroureteronephrosis related to a 4 x 2 mm left ureterovesical junction calculus; pt seen in consultation with urologist Dr Linder; s/p cystoscopy, left retrograde pyelogram, and insertion of left ureteral stent by Dr Linder.   Follow up with Dr. Linder     Stool incontinence  On Imodium 2 mg po q4hrs prn.  GI panel negative.      Left shoulder pain  XR both shoulders neg for DJD; DDx includes frozen shoulder vs PMR;  s/p home physical therapy for adhesive capsulitis with [facility]; on prednisone to 2.5 mg daily; now with mild right shoulder pain x 2d--> advise stretching exercises for now; call if sxs persists     Pulmonary HTN  seen on ECHO in Jan 2017 with PA pressure 54 (previous PA pressure 44 in June 2016); thought to be multifactorial     Left hip fracture  s/p IM fixation in Jan 2017; on  Norco 7.5 mg po q4hrs prn     Sacral decubitus ulcer  uses Medihoney wound dressing gel daily prn     Coronary artery disease   Had PTCA in 1990s;   -the patient currently takes aspirin 81 mg daily; EKG shows Afib with old inferior anterior infarcts (seen in 2018);   -ECHO in Jan 2025 shows EF 50-55% with mild aortic stenosis; last Lexiscan GXT 11/29/21 negative for ischemia     History of villous adenoma   the patient reports occasional loose stools.  The patient has been advised to see gastroenterologist, Dr. Hopper, as an outpatient for colonoscopy though he has declined repeat colonoscopy     Vitamin B12 deficiency  the patient had been receiving vitamin B12 1,000 mcg IM q. Month            SUBJECTIVE:    No CP; no SOB; HR         OBJECTIVE:  PHYSICAL EXAM:     Vital signs in last 24 hours:  BP 91/57 (BP Location: Right arm)   Pulse 82   Temp 98.2 °F (36.8 °C) (Oral)   Resp 20   Ht 6' 3\" (1.905 m)   Wt 141 lb 12.8 oz (64.3 kg)   SpO2 95%   BMI 17.72 kg/m²     Intake/Output:    Intake/Output Summary (Last 24 hours) at 1/6/2025 1019  Last data filed at 1/6/2025 0811  Gross per 24 hour   Intake 389.8 ml   Output 550 ml   Net -160.2 ml       Wt Readings from Last 3 Encounters:   01/06/25 141 lb 12.8 oz (64.3 kg)   07/13/23 160 lb (72.6 kg)   05/11/23 162 lb 11.2 oz (73.8 kg)         Constitutional: alert and oriented x3 in no acute distress  HEENT- EOMI, PERRL  Nose/Mouth/Throat: pharynx without erythema  Neck/Thyroid: neck supple; no thyromegaly  Cardiovascular: RRR, S1, S2, no S3 or murmur  Respiratory: lungs without crackles or wheezes  Abdomen: normoactive bowel sounds, soft, non-tender and non-distended  Extremities: no clubbing, cyanosis or edema          Meds:   Current Facility-Administered Medications   Medication Dose Route Frequency    [START ON 1/7/2025] chlorhexidine (Hibiclens) 4 % external liquid   Topical On Call    [START ON 1/7/2025] sodium chloride 0.9% infusion   Intravenous On Call    chlorhexidine (Hibiclens) 4 % external liquid   Topical On Call    sodium chloride 0.9% infusion   Intravenous On Call    benzocaine-menthol (Cepacol) lozenge 1 lozenge  1 lozenge Oral Q1H PRN    metoprolol tartrate (Lopressor) tab 25 mg  25 mg Oral 2x Daily(Beta Blocker)    acetaminophen (Tylenol Extra Strength) tab 500 mg  500 mg Oral Q6H PRN    ferrous sulfate DR tab 325 mg  325 mg Oral Daily with breakfast    HYDROcodone-acetaminophen (Norco) 7.5-325 MG per tab 1 tablet  1 tablet Oral Q6H PRN    loperamide (Imodium) cap 2 mg  2 mg Oral QID PRN    melatonin tab 3 mg  3 mg Oral Nightly    predniSONE (Deltasone) tab 2.5 mg  2.5 mg Oral Daily    cholecalciferol (Vitamin D3) tab 1,000  Units  1,000 Units Oral Daily    multivitamin (Tab-A-Curtis/Beta Carotene) tab 1 tablet  1 tablet Oral Daily            Data Review:     Labs:   Lab Results   Component Value Date    GLU 89 01/06/2025     01/06/2025    K 4.0 01/06/2025    CL 98 01/06/2025    CO2 29.0 01/06/2025    BUN 22 01/06/2025    CREATSERUM 1.01 01/06/2025    CA 9.7 01/06/2025    PTT 33.8 01/06/2025    MG 1.9 01/06/2025       Recent Labs   Lab 01/06/25  0629   WBC 13.8*   HGB 13.0   HCT 41.8   MCV 90.7   MCH 28.2   MCHC 31.1   RDW 14.8   NEPRELIM 9.11*   .0       No results for input(s): \"COLORUR\", \"CLARITY\", \"SPECGRAVITY\", \"GLUUR\", \"BILUR\", \"KETUR\", \"BLOODURINE\", \"PHURINE\", \"PROUR\", \"UROBILINOGEN\", \"NITRITE\", \"LEUUR\", \"WBCUR\", \"RBCUR\", \"BACUR\", \"EPIUR\" in the last 168 hours.    No results for input(s): \"URINE\", \"CULTI\", \"BLDSMR\" in the last 168 hours.      Imaging:  No results found.                     Mars Camargo MD

## 2025-01-06 NOTE — PROCEDURES
OPERATION(S) PERFORMED:   1. Single-chamber pacemaker implant, [place]   2. Conscious sedation     : Esteban Thomas MD    INDICATION: Atrial fibrillation, SSS, tachybrady and symptomatic bradycardia/SVR, no reversible cause    COMPLICATIONS: None   Moderate conscious sedation:  IV was maintained by RN and moderate conscious sedation of versed and fentanyl was given. Patient was assessed and monitoring of oxygen, heart rate and blood pressure by nurse and myself during the exam from 1000 to 1033.    ESTIMATED BLOOD LOSS: Minimal.    METHODS: The patient was brought to the outpatient cardiac telemetry unit in a fasting and nonsedated state after providing informed consent. IV sedation was administered during continuous ECG, pulse oximeter, and noninvasive hemodynamic monitoring. After administering 1% lidocaine for local anesthesia, an incision was made parallel to the left deltopectoral groove. The plane of the incision was extended to the prepectoral fascia. A pocket was formed.  Access to the cephalic vein was not achieved via cutdown. Micropuncture venogram guided access to the axillary vein performed and a sheath was placed over a J wire which was advanced to the IVC. The RV lead was placed in the RV apex. Local electrograms and pacing thresholds were measured.    Pacing was performed at 10 v to rule out phrenic nerve stimulation. The first position had stim at 10V but not 5V but we moved it to a apical septal location as seen on Thai. The pocket was irrigated with antibiotic solution. Bleeding was sought for until hemostasis was achieved. The lead was sutured to the pectoralis muscle over the suture collar. The lead was connected to a pulse generator. The lead was coiled and placed into the pocket along with the pulse generator. The incision was closed in 2 layers using Vlock 3-0 and 4-0 Vicryl. Steri-Strips were applied followed by a sterile dressing. The left arm was placed in a sling and the patient  was transported to telemetry in stable condition. There were no apparent intraoperative complications.     MEASURED DATA: RV lead impedence, sensing and thresholds all stable.    CONCLUSIONS:   1. Status post successful implant of a single-chamber pacemaker with a active fixation right ventricular (RV) lead. [place]   2. Adequate RV pacing, impedence and sensing thresholds.    3. Conscious sedation    Esteban Thomas MD  [facility]  Cardiac Electrophysiology  1/6/2025

## 2025-01-06 NOTE — PLAN OF CARE
POD # 0 s/p single chamber pacer  Dc heparin  Start xarelto 15 mg tomorrow morning (CrCl ~ 41.8)  Cont metoprolol for rate control, can increase if better rate control needed  Post op care  Home tomorrow if ok

## 2025-01-06 NOTE — PLAN OF CARE
No complaints overnight. NPO since MN for PPM placement today 1/6. Consent in chart. Heparin gtt held @ 0400 per order. Call light within reach, safety precautions in place  Problem: Patient Centered Care  Goal: Patient preferences are identified and integrated in the patient's plan of care  Description: Interventions:  - What would you like us to know as we care for you? Lives in an assisted living alone.  - Provide timely, complete, and accurate information to patient/family  - Incorporate patient and family knowledge, values, beliefs, and cultural backgrounds into the planning and delivery of care  - Encourage patient/family to participate in care and decision-making at the level they choose  - Honor patient and family perspectives and choices  Outcome: Progressing     Problem: PAIN - ADULT  Goal: Verbalizes/displays adequate comfort level or patient's stated pain goal  Description: INTERVENTIONS:  - Encourage pt to monitor pain and request assistance  - Assess pain using appropriate pain scale  - Administer analgesics based on type and severity of pain and evaluate response  - Implement non-pharmacological measures as appropriate and evaluate response  - Consider cultural and social influences on pain and pain management  - Manage/alleviate anxiety  - Utilize distraction and/or relaxation techniques  - Monitor for opioid side effects  - Notify MD/LIP if interventions unsuccessful or patient reports new pain  - Anticipate increased pain with activity and pre-medicate as appropriate  Outcome: Progressing     Problem: SAFETY ADULT - FALL  Goal: Free from fall injury  Description: INTERVENTIONS:  - Assess pt frequently for physical needs  - Identify cognitive and physical deficits and behaviors that affect risk of falls.  - [place] fall precautions as indicated by assessment.  - Educate pt/family on patient safety including physical limitations  - Instruct pt to call for assistance with activity based on  assessment  - Modify environment to reduce risk of injury  - Provide assistive devices as appropriate  - Consider OT/PT consult to assist with strengthening/mobility  - Encourage toileting schedule  Outcome: Progressing

## 2025-01-07 ENCOUNTER — APPOINTMENT (OUTPATIENT)
Dept: GENERAL RADIOLOGY | Facility: HOSPITAL | Age: OVER 89
End: 2025-01-07
Attending: INTERNAL MEDICINE
Payer: MEDICARE

## 2025-01-07 LAB
ANION GAP SERPL CALC-SCNC: 9 MMOL/L (ref 0–18)
BASOPHILS # BLD AUTO: 0.03 X10(3) UL (ref 0–0.2)
BASOPHILS NFR BLD AUTO: 0.2 %
BUN BLD-MCNC: 26 MG/DL (ref 9–23)
BUN/CREAT SERPL: 26.3 (ref 10–20)
CALCIUM BLD-MCNC: 9.6 MG/DL (ref 8.7–10.4)
CHLORIDE SERPL-SCNC: 99 MMOL/L (ref 98–112)
CO2 SERPL-SCNC: 27 MMOL/L (ref 21–32)
CREAT BLD-MCNC: 0.99 MG/DL
DEPRECATED RDW RBC AUTO: 47.8 FL (ref 35.1–46.3)
EGFRCR SERPLBLD CKD-EPI 2021: 71 ML/MIN/1.73M2 (ref 60–?)
EOSINOPHIL # BLD AUTO: 0.02 X10(3) UL (ref 0–0.7)
EOSINOPHIL NFR BLD AUTO: 0.1 %
ERYTHROCYTE [DISTWIDTH] IN BLOOD BY AUTOMATED COUNT: 15 % (ref 11–15)
GLUCOSE BLD-MCNC: 100 MG/DL (ref 70–99)
HCT VFR BLD AUTO: 35.4 %
HGB BLD-MCNC: 12.1 G/DL
IMM GRANULOCYTES # BLD AUTO: 0.07 X10(3) UL (ref 0–1)
IMM GRANULOCYTES NFR BLD: 0.5 %
LYMPHOCYTES # BLD AUTO: 1.28 X10(3) UL (ref 1–4)
LYMPHOCYTES NFR BLD AUTO: 8.6 %
MCH RBC QN AUTO: 30.3 PG (ref 26–34)
MCHC RBC AUTO-ENTMCNC: 34.2 G/DL (ref 31–37)
MCV RBC AUTO: 88.5 FL
MONOCYTES # BLD AUTO: 4.18 X10(3) UL (ref 0.1–1)
MONOCYTES NFR BLD AUTO: 28.1 %
NEUTROPHILS # BLD AUTO: 9.31 X10 (3) UL (ref 1.5–7.7)
NEUTROPHILS # BLD AUTO: 9.31 X10(3) UL (ref 1.5–7.7)
NEUTROPHILS NFR BLD AUTO: 62.5 %
OSMOLALITY SERPL CALC.SUM OF ELEC: 285 MOSM/KG (ref 275–295)
PLATELET # BLD AUTO: 135 10(3)UL (ref 150–450)
POTASSIUM SERPL-SCNC: 4.2 MMOL/L (ref 3.5–5.1)
RBC # BLD AUTO: 4 X10(6)UL
SODIUM SERPL-SCNC: 135 MMOL/L (ref 136–145)
WBC # BLD AUTO: 14.9 X10(3) UL (ref 4–11)

## 2025-01-07 PROCEDURE — 99232 SBSQ HOSP IP/OBS MODERATE 35: CPT | Performed by: INTERNAL MEDICINE

## 2025-01-07 PROCEDURE — 71046 X-RAY EXAM CHEST 2 VIEWS: CPT | Performed by: INTERNAL MEDICINE

## 2025-01-07 RX ORDER — METOPROLOL TARTRATE 25 MG/1
25 TABLET, FILM COATED ORAL
Qty: 60 TABLET | Refills: 5 | Status: SHIPPED | OUTPATIENT
Start: 2025-01-07 | End: 2025-01-08

## 2025-01-07 RX ORDER — BISACODYL 10 MG
10 SUPPOSITORY, RECTAL RECTAL
Status: DISCONTINUED | OUTPATIENT
Start: 2025-01-07 | End: 2025-01-09

## 2025-01-07 RX ORDER — DOCUSATE SODIUM 100 MG/1
100 CAPSULE, LIQUID FILLED ORAL 2 TIMES DAILY
Status: DISCONTINUED | OUTPATIENT
Start: 2025-01-07 | End: 2025-01-09

## 2025-01-07 RX ORDER — POLYETHYLENE GLYCOL 3350 17 G/17G
17 POWDER, FOR SOLUTION ORAL DAILY PRN
Status: DISCONTINUED | OUTPATIENT
Start: 2025-01-07 | End: 2025-01-09

## 2025-01-07 RX ORDER — DIGOXIN 125 MCG
125 TABLET ORAL DAILY
Qty: 90 TABLET | Refills: 3 | Status: ON HOLD | OUTPATIENT
Start: 2025-01-07

## 2025-01-07 RX ORDER — SODIUM PHOSPHATE, DIBASIC AND SODIUM PHOSPHATE, MONOBASIC 7; 19 G/230ML; G/230ML
1 ENEMA RECTAL ONCE AS NEEDED
Status: DISCONTINUED | OUTPATIENT
Start: 2025-01-07 | End: 2025-01-09

## 2025-01-07 RX ORDER — DIGOXIN 125 MCG
125 TABLET ORAL DAILY
Status: DISCONTINUED | OUTPATIENT
Start: 2025-01-07 | End: 2025-01-09

## 2025-01-07 NOTE — PROGRESS NOTES
[facility]  part of [facility]    Progress Note    Shoaib Alan Patient Status:  Inpatient    10/22/1931 MRN [id number]   Location [facility] 3W/SW Attending Mars Camargo MD   Hosp Day # 4 PCP Mars Camargo MD         Assessment and Plan:     Atrial fibrillation with RVR  -was on diltiazem  mg qD and digoxin 0.125 mg po every day until 24 when he reported near-syncope with 47, so diltiazem ER was decreased to 120 mg daily  -presents to ED 24 with tachycardia; EKG shows Afib with   and WCT  -HR decreased with Cadizem 10 mg IVP and Cardizem gtt at 10 mg/hour  -was on heparin gtt at 800 units/hour; now held procedure  -Dr Lozano's consult noted; pt advised to stop diltiazem and stop digoxin; if HR accelerates, then beta-blocker is favored  -advised for 1 week MCT at [facility] office next week  -change heparin to Xarelto 15 mg po qD  -F.U Dr. Thomas as outpatient after completion of ECG monitor  -tachy-sylwia syndrome; POD #1 s/p PPM per Dr Thomas; HR  on metoprolol 25 mg po BID; resuming digoxin 0.125 mg po every day per Dr Thomas  -on Xarelto 15 mg po every day  -PT/OT recommends DONTA; pt agreeable; SW assisting     History of RLE hematoma  In May 2023; ultrasound RLE shows two large hematomas to RLE: no evidence for compartment syndrome; no need for surgical intervention  -stopped warfarin in May 2023     History of anemia  Hgb 11.9; on ferrous sulfate 325 mg po qD     Aortic stenosis  last ECHO in 2025 shows EF 50-55% with mild aortic stenosis; last Lexiscan GXT 21 negative for ischemia     History of COVID infection  In Dec 2022;   -nares positive COVID 22  -s/p remdesivir 100 mg IV [id number] x 4d  -s/p Decadron 6 mg daily x 4 days; discontinued      History of Pneumonia  In Dec 2022; PCXR shows Patchy medial bibasilar opacities, which are new since prior chest radiographs from . Findings could relate to pneumonia/aspiration  in the appropriate clinical setting.  Also identified is a new 4 cm right perihilar nodular opacity.     -CT chest shows Airspace opacities within the bilateral upper and lower lobes, most pronounced within the right lower lobe suspicious for multifocal pneumonia.  -s/p ceftriaxone 1 gm IV [id number], d#5, and s/p  azithromycin 500 mg po daily x 3d     History of Osteomyelitis left 2nd toe   -MRI left foot 11/27/21 shows underlying acute osteomyelitis involving the entirety of the 2nd digit middle phalanx and the distal half of the 2nd digit proximal phalanx.   -s/p left 2nd toe amputation 12/3/21 per podiatrist, Dr Gutierrez; pathology with acute and chronic osteomyelitis   -has home visits by podiatrist Dr Josefina Fraga [phone] and advised sleeve to right 2nd toe      PVD  -CTA 11/29/21 reveals atherosclerosis of the SFA with 65-70% stenosis just beyond the adductor canal  -s/p angioplasty of tight focal left popliteal stenosis; unable to open distally occluded left post tib artery; left ant tib artery-dorsalis pedis widely patent (procedure on 12/1/21 by Dr. Lynch).      Constipation  On Miralax 17 gm po daily prn, and Dulcolax 10 mg po daily prn; takes prune juice     Osteoarthritis lumbar spine and cervical spine  As seen on CT June 2018; Scoliosis and degenerative changes in spine. Mild chronic anterior wedging of lower thoracic vertebral bodies; XR L-spine in Aug 2019 shows 5 lumbar segments with 20° levoconvex scoliosis, slight flattening of the lordotic curvature.  Endplate compression at L3.  Moderate loss of disc heights throughout the lumbar spine.  No spondylolisthesis.  No destructive lesions.  Mild calcification of the abdominal aorta; has seen physiatrist Dr Reilly Overton  -the patient had been taking Norco 7.5 mg q. 6 hours p.r.n. Pain; pt requests #60 with next refill     Gait abnormality  Pt has high fall risk; using manual wheelchair or electric scooter, though independent use is limited due  to bilateral hand osteoarthritis;      Right hand basal cell skin cancer  S/p biopsy in Dec 2018; Pt awaits formal excision by dermatologist in [place]     History of Sepsis due to Klebsiella UTI  In June 2018; Due to urine septicemia; BCx and UCx showing growing Klebsiella pneumoniae, sensitive to pip/ tazo; s/p Zosyn 3.375 gm IV q8hrs, x6d, then Keflex for 8 more days, along with prophylactic po Vanco 125mg qd.        Nephrolithiasis with moderate left hydronephrosis  CT abdomen/ pelvis 6/29 revealed moderate left hydroureteronephrosis related to a 4 x 2 mm left ureterovesical junction calculus; pt seen in consultation with urologist Dr Linder; s/p cystoscopy, left retrograde pyelogram, and insertion of left ureteral stent by Dr Linder.   Follow up with Dr. Linder     Stool incontinence  On Imodium 2 mg po q4hrs prn.  GI panel negative.      Left shoulder pain  XR both shoulders neg for DJD; DDx includes frozen shoulder vs PMR;  s/p home physical therapy for adhesive capsulitis with [facility]; on prednisone to 2.5 mg daily; now with mild right shoulder pain x 2d--> advise stretching exercises for now; call if sxs persists     Pulmonary HTN  seen on ECHO in Jan 2017 with PA pressure 54 (previous PA pressure 44 in June 2016); thought to be multifactorial     Left hip fracture  s/p IM fixation in Jan 2017; on  Norco 7.5 mg po q4hrs prn     Sacral decubitus ulcer  uses Medihoney wound dressing gel daily prn     Coronary artery disease   Had PTCA in 1990s;   -the patient currently takes aspirin 81 mg daily; EKG shows Afib with old inferior anterior infarcts (seen in 2018);   -ECHO in Jan 2025 shows EF 50-55% with mild aortic stenosis; last Lexiscan GXT 11/29/21 negative for ischemia     History of villous adenoma   the patient reports occasional loose stools.  The patient has been advised to see gastroenterologist, Dr. Hopper, as an outpatient for colonoscopy though he has declined repeat colonoscopy      Vitamin B12 deficiency  the patient had been receiving vitamin B12 1,000 mcg IM q. Month              SUBJECTIVE:     No CP; no SOB; HR          OBJECTIVE:  PHYSICAL EXAM:     Vital signs in last 24 hours:  BP 93/72 (BP Location: Right arm)   Pulse 86   Temp 97.7 °F (36.5 °C) (Oral)   Resp 20   Ht 6' 4\" (1.93 m)   Wt 141 lb 12.8 oz (64.3 kg)   SpO2 95%   BMI 17.26 kg/m²     Intake/Output:    Intake/Output Summary (Last 24 hours) at 1/7/2025 1352  Last data filed at 1/7/2025 0953  Gross per 24 hour   Intake 300 ml   Output 400 ml   Net -100 ml       Wt Readings from Last 3 Encounters:   01/06/25 141 lb 12.8 oz (64.3 kg)   07/13/23 160 lb (72.6 kg)   05/11/23 162 lb 11.2 oz (73.8 kg)         Constitutional: alert and oriented x3 in no acute distress  HEENT- EOMI, PERRL  Nose/Mouth/Throat: pharynx without erythema  Neck/Thyroid: neck supple; no thyromegaly  Cardiovascular: RRR, S1, S2, no S3 or murmur  Respiratory: lungs without crackles or wheezes  Abdomen: normoactive bowel sounds, soft, non-tender and non-distended  Extremities: no clubbing, cyanosis or edema          Meds:   Current Facility-Administered Medications   Medication Dose Route Frequency    digoxin (Lanoxin) tab 125 mcg  125 mcg Oral Daily    ondansetron (Zofran) 4 MG/2ML injection 4 mg  4 mg Intravenous Q6H PRN    rivaroxaban (Xarelto) tab 15 mg  15 mg Oral Daily with food    benzocaine-menthol (Cepacol) lozenge 1 lozenge  1 lozenge Oral Q1H PRN    metoprolol tartrate (Lopressor) tab 25 mg  25 mg Oral 2x Daily(Beta Blocker)    acetaminophen (Tylenol Extra Strength) tab 500 mg  500 mg Oral Q6H PRN    ferrous sulfate DR tab 325 mg  325 mg Oral Daily with breakfast    HYDROcodone-acetaminophen (Norco) 7.5-325 MG per tab 1 tablet  1 tablet Oral Q6H PRN    loperamide (Imodium) cap 2 mg  2 mg Oral QID PRN    melatonin tab 3 mg  3 mg Oral Nightly    predniSONE (Deltasone) tab 2.5 mg  2.5 mg Oral Daily    cholecalciferol (Vitamin D3) tab 1,000  Units  1,000 Units Oral Daily    multivitamin (Tab-A-Curtis/Beta Carotene) tab 1 tablet  1 tablet Oral Daily            Data Review:     Labs:   Lab Results   Component Value Date     01/07/2025     01/07/2025    K 4.2 01/07/2025    CL 99 01/07/2025    CO2 27.0 01/07/2025    BUN 26 01/07/2025    CREATSERUM 0.99 01/07/2025    CA 9.6 01/07/2025       Recent Labs   Lab 01/07/25  0819   WBC 14.9*   HGB 12.1*   HCT 35.4*   MCV 88.5   MCH 30.3   MCHC 34.2   RDW 15.0   NEPRELIM 9.31*   .0*       No results for input(s): \"COLORUR\", \"CLARITY\", \"SPECGRAVITY\", \"GLUUR\", \"BILUR\", \"KETUR\", \"BLOODURINE\", \"PHURINE\", \"PROUR\", \"UROBILINOGEN\", \"NITRITE\", \"LEUUR\", \"WBCUR\", \"RBCUR\", \"BACUR\", \"EPIUR\" in the last 168 hours.    No results for input(s): \"URINE\", \"CULTI\", \"BLDSMR\" in the last 168 hours.      Imaging:  XR CHEST PA + LAT CHEST (CPT=71046)    Result Date: 1/7/2025  CONCLUSION:   Redemonstrated left chest wall single lead pacemaker device.  No pneumothorax.  No significant change in the mild elevation of the right hemidiaphragm with bibasilar and left perihilar atelectasis/scarring.     Dictated by (CST): Tyrone Bah MD on 1/07/2025 at 8:57 AM     Finalized by (CST): Tyrone Bah MD on 1/07/2025 at 8:59 AM          XR CHEST AP PORTABLE  (CPT=71045)    Result Date: 1/6/2025  CONCLUSION:  1. Interval placement of a left chest wall single lead pacemaker.  No pneumothorax. 2. Discoid configuration bibasilar opacities likely relate to atelectasis/scarring.  There are low lung volumes.   elm-remote  Dictated by (CST): Link Rooney MD on 1/06/2025 at 12:10 PM     Finalized by (CST): Link Rooney MD on 1/06/2025 at 12:12 PM                            Mars Camargo MD

## 2025-01-07 NOTE — PLAN OF CARE
Patient is alert and oriented x3-4. On room air. Max assist. PT/OT worked with the pt; see notes. Plan for DONTA.    Problem: Patient Centered Care  Goal: Patient preferences are identified and integrated in the patient's plan of care  Description: Interventions:  - What would you like us to know as we care for you? Lives in an assisted living alone.  - Provide timely, complete, and accurate information to patient/family  - Incorporate patient and family knowledge, values, beliefs, and cultural backgrounds into the planning and delivery of care  - Encourage patient/family to participate in care and decision-making at the level they choose  - Honor patient and family perspectives and choices  Outcome: Progressing     Problem: Patient/Family Goals  Goal: Patient/Family Long Term Goal  Description: Patient's Long Term Goal: go home    Interventions:  - Heparin drip  - Diltiazem drip  - monitor vital signs  - monitor heart rate  - telemetry  -cardiology on consult  - See additional Care Plan goals for specific interventions  Outcome: Progressing  Goal: Patient/Family Short Term Goal  Description: Patient's Short Term Goal: feel better    Interventions:   - cardiology on consult  -telemetry  - monitor heart rhythm  - monitor vital signs  - monitor lab result  - See additional Care Plan goals for specific interventions  Outcome: Progressing     Problem: PAIN - ADULT  Goal: Verbalizes/displays adequate comfort level or patient's stated pain goal  Description: INTERVENTIONS:  - Encourage pt to monitor pain and request assistance  - Assess pain using appropriate pain scale  - Administer analgesics based on type and severity of pain and evaluate response  - Implement non-pharmacological measures as appropriate and evaluate response  - Consider cultural and social influences on pain and pain management  - Manage/alleviate anxiety  - Utilize distraction and/or relaxation techniques  - Monitor for opioid side effects  - Notify  MD/LIP if interventions unsuccessful or patient reports new pain  - Anticipate increased pain with activity and pre-medicate as appropriate  Outcome: Progressing     Problem: RISK FOR INFECTION - ADULT  Goal: Absence of fever/infection during anticipated neutropenic period  Description: INTERVENTIONS  - Monitor WBC  - Administer growth factors as ordered  - Implement neutropenic guidelines  Outcome: Progressing     Problem: SAFETY ADULT - FALL  Goal: Free from fall injury  Description: INTERVENTIONS:  - Assess pt frequently for physical needs  - Identify cognitive and physical deficits and behaviors that affect risk of falls.  - [place] fall precautions as indicated by assessment.  - Educate pt/family on patient safety including physical limitations  - Instruct pt to call for assistance with activity based on assessment  - Modify environment to reduce risk of injury  - Provide assistive devices as appropriate  - Consider OT/PT consult to assist with strengthening/mobility  - Encourage toileting schedule  Outcome: Progressing     Problem: CARDIOVASCULAR - ADULT  Goal: Maintains optimal cardiac output and hemodynamic stability  Description: INTERVENTIONS:  - Monitor vital signs, rhythm, and trends  - Monitor for bleeding, hypotension and signs of decreased cardiac output  - Evaluate effectiveness of vasoactive medications to optimize hemodynamic stability  - Monitor arterial and/or venous puncture sites for bleeding and/or hematoma  - Assess quality of pulses, skin color and temperature  - Assess for signs of decreased coronary artery perfusion - ex. Angina  - Evaluate fluid balance, assess for edema, trend weights  Outcome: Progressing  Goal: Absence of cardiac arrhythmias or at baseline  Description: INTERVENTIONS:  - Continuous cardiac monitoring, monitor vital signs, obtain 12 lead EKG if indicated  - Evaluate effectiveness of antiarrhythmic and heart rate control medications as ordered  - Initiate emergency  measures for life threatening arrhythmias  - Monitor electrolytes and administer replacement therapy as ordered  Outcome: Progressing     Problem: METABOLIC/FLUID AND ELECTROLYTES - ADULT  Goal: Electrolytes maintained within normal limits  Description: INTERVENTIONS:  - Monitor labs and rhythm and assess patient for signs and symptoms of electrolyte imbalances  - Administer electrolyte replacement as ordered  - Monitor response to electrolyte replacements, including rhythm and repeat lab results as appropriate  - Fluid restriction as ordered  - Instruct patient on fluid and nutrition restrictions as appropriate  Outcome: Progressing     Problem: DISCHARGE PLANNING  Goal: Discharge to home or other facility with appropriate resources  Description: INTERVENTIONS:  - Identify barriers to discharge w/pt and caregiver  - Include patient/family/discharge partner in discharge planning  - Arrange for needed discharge resources and transportation as appropriate  - Identify discharge learning needs (meds, wound care, etc)  - Arrange for interpreters to assist at discharge as needed  - Consider post-discharge preferences of patient/family/discharge partner  - Complete POLST form as appropriate  - Assess patient's ability to be responsible for managing their own health  - Refer to Case Management Department for coordinating discharge planning if the patient needs post-hospital services based on physician/LIP order or complex needs related to functional status, cognitive ability or social support system  Outcome: Not Progressing

## 2025-01-07 NOTE — OCCUPATIONAL THERAPY NOTE
OCCUPATIONAL THERAPY EVALUATION - INPATIENT     Room Number: COF15/COF15-A  Evaluation Date: 1/7/2025  Type of Evaluation: Initial  Presenting Problem: near syncope, tachycardia, Afib, s/p pacemaker placement 1/6    Physician Order: IP Consult to Occupational Therapy  Reason for Therapy: ADL/IADL Dysfunction and Discharge Planning    OCCUPATIONAL THERAPY ASSESSMENT   Patient is a 89 year old male admitted 1/2/2025.  Prior to admission, patient's baseline is assist with ADLs, assist with transfers to w/c and short distance ambulation with Tall up walker (~50ft).  Patient is currently functioning below baseline with  self care/ADLs .  Patient is requiring maximum assist and dependent as a result of the following impairments: decreased functional strength, decreased endurance, pain, impaired sitting and standing balance, decreased muscular endurance, and difficulty maintaining precautions. Occupational Therapy will continue to follow for duration of hospitalization.    Patient will benefit from continued skilled OT Services to promote return to prior level of function and safety with continuous assistance and gradual rehabilitative therapy.    PLAN DURING HOSPITALIZATION  OT Device Recommendations: TBD  OT Treatment Plan: Balance activities;Energy conservation/work simplification techniques;ADL training;Functional transfer training;Endurance training;Patient/Family education;Compensatory technique education     OCCUPATIONAL THERAPY MEDICAL/SOCIAL HISTORY   Problem List  Principal Problem:    Atrial fibrillation with RVR (HCC)  Active Problems:    CAD (coronary artery disease)    Anemia    Nonrheumatic aortic valve stenosis    HOME SITUATION  Type of Home: Assisted living facility ([facility])  Home Layout: One level  Lives With: Staff 24 hours  Patient Regularly Uses: Wheelchair; Rolling walker (rollator, Tall UP walker)    Stairs in Home: none  Use of Assistive Device(s): RW, rollator,w/c, Tall up walker    Prior  Level of [place]: Pt lives in [facility]. Assist with ADLs and functional transfers and short distance ambulation    SUBJECTIVE  \"My hands are killing me.\"    OCCUPATIONAL THERAPY EXAMINATION      OBJECTIVE  Precautions: -- (pacemaker precautions)  Fall Risk: High fall risk      PAIN ASSESSMENT  Rating: 10  Location: hands  Management Techniques: Repositioning; Relaxation      ACTIVITY TOLERANCE  Pulse: 103  Heart Rate Source: Monitor     BP: 107/57  BP Location: Right arm  BP Method: Automatic  Patient Position: Lying    O2 SATURATIONS  Oxygen Therapy  SPO2% on Room Air at Rest: 96    COGNITION  Pt is able to follow simple commands, repeated cues to follow pacemaker precautions      Communication: wfl    Behavioral/Emotional/Social: wfl    RANGE OF MOTION   Upper extremity ROM is within functional limits except baseline limited ROM in left shoulder.     STRENGTH ASSESSMENT  Upper extremity strength is within functional limits     COORDINATION  Gross Motor: WFL   Fine Motor: WFL     ACTIVITIES OF DAILY LIVING ASSESSMENT  AM-PAC ‘6-Clicks’ Inpatient Daily Activity Short Form  How much help from another person does the patient currently need…  -   Putting on and taking off regular lower body clothing?: A Lot  -   Bathing (including washing, rinsing, drying)?: A Lot  -   Toileting, which includes using toilet, bedpan or urinal? : Total  -   Putting on and taking off regular upper body clothing?: A Lot  -   Taking care of personal grooming such as brushing teeth?: A Little  -   Eating meals?: A Little    AM-PAC Score:  Score: 13  Approx Degree of Impairment: 63.03%  Standardized Score (AM-PAC Scale): 32.03  CMS Modifier (G-Code): CL    FUNCTIONAL TRANSFER ASSESSMENT  Sit to Stand: Edge of Bed  Edge of Bed: Dependent (Attempted STS from elevated bed with Max A x2- unable to acheive full stand)    BED MOBILITY  Supine to Sit : Moderate Assist (Mod A X2)  Sit to Supine (OT): Moderate Assist (Mod A X2)    BALANCE  ASSESSMENT  Static Sitting: Contact Guard Assist  Static Standing: Not Tested    FUNCTIONAL ADL ASSESSMENT  Eating: Minimal Assist  Grooming Seated: Minimal Assist  Bathing Seated: Maximum Assist  UB Dressing Seated: Maximum Assist  LB Dressing Seated: Maximum Assist  Toileting Standing: Dependent    THERAPEUTIC EXERCISE     Skilled Therapy Provided: RN approved session. Pt received in the bed, agreeable to tx. Pt reports \"10/10\" pain in his hands. RN aware. Pt not due for pain medication. Pt was instructed in his pacemaker precautions but will benefit from further reinforcement. Sling adjusted for fit. Pt required Max A for bed mobility. He sat EOB with fluctuating assist from Min A to close SBA. Max A required to don shoes. Pt insists on donning shoes for STS transfer. He refuses to transfer to the chair at this time but is agreeable later in the day. Pt will need lift for transfer. Pt attempted  STS transfer from bed with Max A X2 however pt unable to achieve upright standing. Pt limited by weakness, fear of falling, and increased pain in his hands. Will follow. Pt assisted back to bed at end of session with all needs in reach. Vitals stable throughout session: /87, HR , 96%.     EDUCATION PROVIDED  Patient Education : Role of Occupational Therapy; Plan of Care; Discharge Recommendations; Functional Transfer Techniques; Fall Prevention; Surgical Precautions  Patient's Response to Education: Verbalized Understanding; Requires Further Education    The patient's Approx Degree of Impairment: 63.03% has been calculated based on documentation in the [facility] '6 clicks' Inpatient Daily Activity Short Form.  Research supports that patients with this level of impairment may benefit from GR.  Final disposition will be made by interdisciplinary medical team.     Patient End of Session: In bed;Needs met;Call light within reach;RN aware of session/findings;All patient questions and concerns addressed;Hospital  anti-slip socks;Alarm set    OT Goals  Patients self stated goal is: unstated     Patient will complete functional transfer with Min A  Comment:     Patient will complete toileting with Min A  Comment:     Patient will tolerate standing for 3 minutes in prep for adls with Min A   Comment:    Patient will complete UB dressing with SBA  Comment:          Goals  on: 25  Frequency: 3x/week    Patient Evaluation Complexity Level:   Occupational Profile/Medical History MODERATE - Expanded review of history including review of medical or therapy record   Specific performance deficits impacting engagement in ADL/IADL MODERATE  3 - 5 performance deficits   Client Assessment/Performance Deficits MODERATE - Comorbidities and min to mod modifications of tasks    Clinical Decision Making MODERATE - Analysis of occupational profile, detailed assessments, several treatment options    Overall Complexity MODERATE     OT Session    Therapeutic Activity: 15 minutes

## 2025-01-07 NOTE — PLAN OF CARE
Problem: Patient Centered Care  Goal: Patient preferences are identified and integrated in the patient's plan of care  Description: Interventions:  - What would you like us to know as we care for you? Lives in an assisted living alone.  - Provide timely, complete, and accurate information to patient/family  - Incorporate patient and family knowledge, values, beliefs, and cultural backgrounds into the planning and delivery of care  - Encourage patient/family to participate in care and decision-making at the level they choose  - Honor patient and family perspectives and choices  Outcome: Progressing     Problem: PAIN - ADULT  Goal: Verbalizes/displays adequate comfort level or patient's stated pain goal  Description: INTERVENTIONS:  - Encourage pt to monitor pain and request assistance  - Assess pain using appropriate pain scale  - Administer analgesics based on type and severity of pain and evaluate response  - Implement non-pharmacological measures as appropriate and evaluate response  - Consider cultural and social influences on pain and pain management  - Manage/alleviate anxiety  - Utilize distraction and/or relaxation techniques  - Monitor for opioid side effects  - Notify MD/LIP if interventions unsuccessful or patient reports new pain  - Anticipate increased pain with activity and pre-medicate as appropriate  Outcome: Progressing     Problem: RISK FOR INFECTION - ADULT  Goal: Absence of fever/infection during anticipated neutropenic period  Description: INTERVENTIONS  - Monitor WBC  - Administer growth factors as ordered  - Implement neutropenic guidelines  Outcome: Progressing     Problem: SAFETY ADULT - FALL  Goal: Free from fall injury  Description: INTERVENTIONS:  - Assess pt frequently for physical needs  - Identify cognitive and physical deficits and behaviors that affect risk of falls.  - [place] fall precautions as indicated by assessment.  - Educate pt/family on patient safety including physical  limitations  - Instruct pt to call for assistance with activity based on assessment  - Modify environment to reduce risk of injury  - Provide assistive devices as appropriate  - Consider OT/PT consult to assist with strengthening/mobility  - Encourage toileting schedule  Outcome: Progressing     Problem: DISCHARGE PLANNING  Goal: Discharge to home or other facility with appropriate resources  Description: INTERVENTIONS:  - Identify barriers to discharge w/pt and caregiver  - Include patient/family/discharge partner in discharge planning  - Arrange for needed discharge resources and transportation as appropriate  - Identify discharge learning needs (meds, wound care, etc)  - Arrange for interpreters to assist at discharge as needed  - Consider post-discharge preferences of patient/family/discharge partner  - Complete POLST form as appropriate  - Assess patient's ability to be responsible for managing their own health  - Refer to Case Management Department for coordinating discharge planning if the patient needs post-hospital services based on physician/LIP order or complex needs related to functional status, cognitive ability or social support system  Outcome: Progressing     Problem: CARDIOVASCULAR - ADULT  Goal: Maintains optimal cardiac output and hemodynamic stability  Description: INTERVENTIONS:  - Monitor vital signs, rhythm, and trends  - Monitor for bleeding, hypotension and signs of decreased cardiac output  - Evaluate effectiveness of vasoactive medications to optimize hemodynamic stability  - Monitor arterial and/or venous puncture sites for bleeding and/or hematoma  - Assess quality of pulses, skin color and temperature  - Assess for signs of decreased coronary artery perfusion - ex. Angina  - Evaluate fluid balance, assess for edema, trend weights  Outcome: Progressing  Goal: Absence of cardiac arrhythmias or at baseline  Description: INTERVENTIONS:  - Continuous cardiac monitoring, monitor vital signs,  obtain 12 lead EKG if indicated  - Evaluate effectiveness of antiarrhythmic and heart rate control medications as ordered  - Initiate emergency measures for life threatening arrhythmias  - Monitor electrolytes and administer replacement therapy as ordered  Outcome: Progressing

## 2025-01-07 NOTE — CM/SW NOTE
Pt discussed during nursing rounds. Pt is stable for dc today. MD dc order entered. Anticipated therapy need: Gradual Rehabilitative Therapy. Pt and son Lalo who is HC POA agreeable to DONTA at [place]. [facility] review pending. DONTA referrals sent in AIDIN. List of accepting facilities will be needed for choice.    PASRR level 1 screen completed and uploaded to aidin referral.    1130: Dc order cancelled due to increased HR.    1355: List of accepting DONTA facilities emailed to daughter Macie at [email or web address] per her request. Macie agreeable to review list with brother Lalo and provide choice on Wednesday.    Plan: DONTA pending facility choice and medical clearance.    / to remain available for support and/or discharge planning.     BI Clifford    [phone]

## 2025-01-07 NOTE — PROGRESS NOTES
Progress Note  Shoaib Alan Patient Status:  Inpatient    10/22/1931 MRN [id number]   Location [facility] 3W/SW Attending Mars Camargo MD   Hosp Day # 4 PCP Mars Camargo MD     Subjective:  He is feeling well this morning without concerns. Denies chest discomfort or shortness of breath. Currently in AFIB rates trending 100 bpm. Discussed activity restrictions after PPM.    Objective:  BP (!) 87/73 (BP Location: Right arm)   Pulse 79   Temp 97.7 °F (36.5 °C) (Oral)   Resp 20   Ht 6' 4\" (1.93 m)   Wt 141 lb 12.8 oz (64.3 kg)   SpO2 95%   BMI 17.26 kg/m²     Intake/Output:    Intake/Output Summary (Last 24 hours) at 2025 0858  Last data filed at 2025 0600  Gross per 24 hour   Intake 180 ml   Output 400 ml   Net -220 ml       Last 3 Weights   25 0607 141 lb 12.8 oz (64.3 kg)   25 0532 143 lb 3.2 oz (65 kg)   25 1349 145 lb 1.6 oz (65.8 kg)   25 0811 161 lb (73 kg)   23 1129 160 lb (72.6 kg)   23 1852 162 lb 11.2 oz (73.8 kg)   23 1531 160 lb (72.6 kg)       Labs:  Recent Labs   Lab 25  0653 25  0629 25  0819   GLU 88 89 100*   BUN 20 22 26*   CREATSERUM 0.90 1.01 0.99   EGFRCR 80 69 71   CA 9.5 9.7 9.6    136 135*   K 4.3 4.0 4.2    98 99   CO2 29.0 29.0 27.0     Recent Labs   Lab 25  0653 25  0629 25  0819   RBC 4.14 4.61 4.00   HGB 12.4* 13.0 12.1*   HCT 36.5* 41.8 35.4*   MCV 88.2 90.7 88.5   MCH 30.0 28.2 30.3   MCHC 34.0 31.1 34.2   RDW 14.8 14.8 15.0   NEPRELIM 6.60 9.11* 9.31*   WBC 10.3 13.8* 14.9*   .0* 162.0 135.0*         Recent Labs   Lab 25  0814   TROPHS 49       Diagnostics:   Telemetry: AFIB rates trending 100    Review of Systems   Cardiovascular:  Negative for chest pain.   Respiratory:  Negative for shortness of breath.        Physical Exam:  General: Alert and oriented in no apparent distress.  HEENT: Pupils equal. Mucous membranes moist.   Neck: No  JVD  Cardiac:  Normal S1 S2, irreg irreg. No murmur  Lungs: Clear without wheezes or crackles    Abdomen: Soft, non-tender, ND  Extremities: Without clubbing, cyanosis or edema.    Neurologic: No focal deficits. Normal affect.  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 Resume home digoxin 125 mcg daily with minimal BP room for further AV lisa titration at this time  S/p single-chamber PPM 1/6, tolerated procedure well  Anticoagulation on Xarelto 15 mg daily has been started 1/7/24  CAD s/p remote PCI  Denies angina. Xarelto monotherapy. Continue BB. Not on statin historically and can consider in outpatient follow-up  Chronic HF with improved EF  TTE with LVEF 50-55% (historically as low as 40-45%)  Compensated, euvolemic. Will resume home PRN lasix  Mild pulmonary HTN  COPD    Plan:    He is s/p single chamber PPM implant yesterday 1/6/24 for tachybrady syndrome. Tolerated procedure well. CXR with good lead placement without PTX. Device interrogation within normal limits functioning as expected. Reviewed activity restrictions.  Currently AFIB rates trending 100 bpm. Continue lopressor 25 mg BID. Limited BP room for further AV lisa titration. Discussed case with Dr Esteban Thomas, plan to resume digoxin 125 mcg daily. Will review telemetry this afternoon and if reasonably rate controlled, ok  for discharge from CV standpoint.   Anticoagulation has been started Xarelto 15 mg daily  Will arrange EP device clinic follow-up in 1 week and follow-up with Dr Thomas in 4 weeks    UPDATE: Telemetry reviewed 1315, AFIB rates after morning medications have been trending 90 bpm. Hypotension has resolved. Ok for discharge this afternoon from cardiac standpoint and will arrange close outpatient follow-up    Plan of care discussed with patient, RN, ANA LUISA Palma  1/7/2025  8:58 AM

## 2025-01-07 NOTE — DISCHARGE INSTRUCTIONS
Pacemaker and Defibrillator Discharge Instructions    Activity  Plan to rest tonight and tomorrow.  Avoid drinking alcohol for next 24 hours.  Do not drive after procedure until 1-week device check appointment, unless otherwise instructed by your cardiologist.   Wear sling for next 24 hours, then only at night as needed for 30 days to help assist with arm restrictions while sleeping.     Arm Restrictions:  For 30 days after implant:  do not lift arm with implanted device above your head or behind your back. Arm is to remain below your shoulder and in front of your body.   do not lift more than 10 lbs with affected arm   do not perform repetitive cycling motion with affected arm (swimming, golfing, bowling, tennis, exercise machines, raking, vacuuming, snow shoveling).     Incision Care/Bathing  Keep incision site completely dry for 5 days after surgery. After day 5, you can remove top dressing and shower, letting clean soapy water run over incision area, patting dry.   The white steri-strips placed directly over the incision will gradually fall off over the next few weeks and can be physically removed after 3 weeks. Do not take them off before this time. (If you have a zipline dressing, this will be removed by device nurse or MD in 4 weeks)   Keep procedure site clean and dry, do not apply any ointments, creams, lotions to the incision.   Look at your incision daily to monitor for signs and symptoms of infection (redness, swelling, drainage, foul odor from procedure site)  Do not cover incision with extra dressings or gauze unless otherwise instructed.   Do not submerge incision in water, take whirlpool baths or swim for 30 days or until site is completely healed.     When to notify your physician:   If you have chest pain, shortness of breath or persistent cough  If you experience any signs of fever (temperature >101), chills, infection (redness, swelling, thick yellow drainage, foul order from procedure  site)  New or worsening swelling of arm with implanted device   If an ICD was inserted and you receive a shock and have no symptoms, call [facility] device clinic. If you have symptoms (fainting, near fainting, dizziness, lightheadedness, chest pain, shortness of breath, palpitations), call 911.    [facility] Direct Line: [phone]. Monday-Friday. 8:30AM-4PM.   [place] Main Office: [phone] Monday- Friday 8AM-5PM   [facility] [place] Main Office: [phone] Monday-Friday 8AM-5PM   [facility] [phone]

## 2025-01-07 NOTE — PLAN OF CARE
C/o arthritis pain in hands; PRN Norco given. Low urine output, bladder scan showed 120cc; urine cloudy w/ sediment. Plan for PT/OT eval and CXR. Call light within reach, safety precautions in place  Problem: Patient Centered Care  Goal: Patient preferences are identified and integrated in the patient's plan of care  Description: Interventions:  - What would you like us to know as we care for you? Lives in an assisted living alone.  - Provide timely, complete, and accurate information to patient/family  - Incorporate patient and family knowledge, values, beliefs, and cultural backgrounds into the planning and delivery of care  - Encourage patient/family to participate in care and decision-making at the level they choose  - Honor patient and family perspectives and choices  Outcome: Progressing     Problem: PAIN - ADULT  Goal: Verbalizes/displays adequate comfort level or patient's stated pain goal  Description: INTERVENTIONS:  - Encourage pt to monitor pain and request assistance  - Assess pain using appropriate pain scale  - Administer analgesics based on type and severity of pain and evaluate response  - Implement non-pharmacological measures as appropriate and evaluate response  - Consider cultural and social influences on pain and pain management  - Manage/alleviate anxiety  - Utilize distraction and/or relaxation techniques  - Monitor for opioid side effects  - Notify MD/LIP if interventions unsuccessful or patient reports new pain  - Anticipate increased pain with activity and pre-medicate as appropriate  Outcome: Progressing     Problem: SAFETY ADULT - FALL  Goal: Free from fall injury  Description: INTERVENTIONS:  - Assess pt frequently for physical needs  - Identify cognitive and physical deficits and behaviors that affect risk of falls.  - [place] fall precautions as indicated by assessment.  - Educate pt/family on patient safety including physical limitations  - Instruct pt to call for assistance with  activity based on assessment  - Modify environment to reduce risk of injury  - Provide assistive devices as appropriate  - Consider OT/PT consult to assist with strengthening/mobility  - Encourage toileting schedule  Outcome: Progressing     Problem: DISCHARGE PLANNING  Goal: Discharge to home or other facility with appropriate resources  Description: INTERVENTIONS:  - Identify barriers to discharge w/pt and caregiver  - Include patient/family/discharge partner in discharge planning  - Arrange for needed discharge resources and transportation as appropriate  - Identify discharge learning needs (meds, wound care, etc)  - Arrange for interpreters to assist at discharge as needed  - Consider post-discharge preferences of patient/family/discharge partner  - Complete POLST form as appropriate  - Assess patient's ability to be responsible for managing their own health  - Refer to Case Management Department for coordinating discharge planning if the patient needs post-hospital services based on physician/LIP order or complex needs related to functional status, cognitive ability or social support system  Outcome: Progressing     Problem: CARDIOVASCULAR - ADULT  Goal: Maintains optimal cardiac output and hemodynamic stability  Description: INTERVENTIONS:  - Monitor vital signs, rhythm, and trends  - Monitor for bleeding, hypotension and signs of decreased cardiac output  - Evaluate effectiveness of vasoactive medications to optimize hemodynamic stability  - Monitor arterial and/or venous puncture sites for bleeding and/or hematoma  - Assess quality of pulses, skin color and temperature  - Assess for signs of decreased coronary artery perfusion - ex. Angina  - Evaluate fluid balance, assess for edema, trend weights  Outcome: Progressing  Goal: Absence of cardiac arrhythmias or at baseline  Description: INTERVENTIONS:  - Continuous cardiac monitoring, monitor vital signs, obtain 12 lead EKG if indicated  - Evaluate  effectiveness of antiarrhythmic and heart rate control medications as ordered  - Initiate emergency measures for life threatening arrhythmias  - Monitor electrolytes and administer replacement therapy as ordered  Outcome: Progressing     Problem: METABOLIC/FLUID AND ELECTROLYTES - ADULT  Goal: Electrolytes maintained within normal limits  Description: INTERVENTIONS:  - Monitor labs and rhythm and assess patient for signs and symptoms of electrolyte imbalances  - Administer electrolyte replacement as ordered  - Monitor response to electrolyte replacements, including rhythm and repeat lab results as appropriate  - Fluid restriction as ordered  - Instruct patient on fluid and nutrition restrictions as appropriate  Outcome: Progressing

## 2025-01-07 NOTE — PHYSICAL THERAPY NOTE
PHYSICAL THERAPY EVALUATION - INPATIENT     Room Number: COF15/COF15-A  Evaluation Date: 1/7/2025  Type of Evaluation: Initial   Physician Order: PT Eval and Treat    Presenting Problem: a-fib with RVR. Pacemaker insertion on 1/6/25  Co-Morbidities : RLE hematoma, arthritis, left toe amputation  Reason for Therapy: Mobility Dysfunction and Discharge Planning    PHYSICAL THERAPY ASSESSMENT   Patient is a 89 year old male admitted 1/2/2025 for a-fib w RVR. Pacemaker insertion on 1/6/25.  Prior to admission, patient's baseline is ambulatory with RW for short distances (~50ft), WC for longer distances. Staff assists for all ADL's.  Patient is currently functioning below baseline with bed mobility, transfers, gait, maintaining seated position, standing prolonged periods, and wheelchair mobility.  Patient is requiring moderate assist and maximum assist as a result of the following impairments: decreased functional strength, decreased endurance/aerobic capacity, pain, impaired sitting and standing balance, decreased muscular endurance, cognitive deficits (decreased insight into deficits), and medical status.  Physical Therapy will continue to follow for duration of hospitalization.    Patient will benefit from continued skilled PT Services to promote return to prior level of function and safety with continuous assistance and gradual rehabilitative therapy .    PLAN DURING HOSPITALIZATION  Nursing Mobility Recommendation : Lift Equipment  PT Device Recommendation: Mechanical lift  PT Treatment Plan: Bed mobility;Body mechanics;Endurance;Energy conservation;Patient education;Family education;Gait training;Range of motion;Strengthening;Transfer training;Balance training  Rehab Potential : Fair  Frequency (Obs): 3-5x/week     PHYSICAL THERAPY MEDICAL/SOCIAL HISTORY     Problem List  Principal Problem:    Atrial fibrillation with RVR ([facility])  Active Problems:    CAD (coronary artery disease)    Anemia    Nonrheumatic aortic valve  stenosis      HOME SITUATION  Type of Home: Assisted living facility ([facility])  Home Layout: One level         Lives With: Staff 24 hours        Patient Regularly Uses: Wheelchair;Rolling walker (rollator, Tall UP walker)     SUBJECTIVE  \"I can't get to the chair, my hands hurt too much\"     PHYSICAL THERAPY EXAMINATION   OBJECTIVE  Precautions:  (pacemaker precautions)  Fall Risk: High fall risk    PAIN ASSESSMENT  Rating: 10  Location: bilateral hands  Management Techniques: Activity promotion;Body mechanics;Repositioning    COGNITION  Overall Cognitive Status:  Impaired  Following Commands:  follows multistep commands with increased time  Safety Judgement:  decreased awareness of need for assistance  Awareness of Errors:  assistance required to identify errors made  Awareness of Deficits:  decreased awareness of deficits    RANGE OF MOTION AND STRENGTH ASSESSMENT  Upper extremity ROM and strength are impaired bilateral Ue's: LUE limited 2* by pacemaker precautions. RUE: <90*  Lower extremity ROM is within functional limits   Lower extremity strength is impaired bilateral LE's: 3/5    BALANCE  Static Sitting: Fair -  Dynamic Sitting: Poor +  Static Standing: Poor -  Dynamic Standing: Not tested    ACTIVITY TOLERANCE  Pulse: 103  Heart Rate Source: Monitor     BP: 107/57 (103/87mmHg sitting EOB)  BP Location: Right arm  BP Method: Automatic  Patient Position: Lying    O2 WALK  Oxygen Therapy  SPO2% on Room Air at Rest: 96    AM-PAC '6-Clicks' INPATIENT SHORT FORM - BASIC MOBILITY  How much difficulty does the patient currently have...  Patient Difficulty: Turning over in bed (including adjusting bedclothes, sheets and blankets)?: A Lot   Patient Difficulty: Sitting down on and standing up from a chair with arms (e.g., wheelchair, bedside commode, etc.): A Lot   Patient Difficulty: Moving from lying on back to sitting on the side of the bed?: A Lot   How much help from another person does the patient  currently need...   Help from Another: Moving to and from a bed to a chair (including a wheelchair)?: A Lot   Help from Another: Need to walk in hospital room?: Total   Help from Another: Climbing 3-5 steps with a railing?: Total     AM-PAC Score:  Raw Score: 10   Approx Degree of Impairment: 76.75%   Standardized Score (AM-PAC Scale): 32.29   CMS Modifier (G-Code): CL    FUNCTIONAL ABILITY STATUS  Functional Mobility/Gait Assessment  Gait Assistance: Not tested  Supine to Sit: moderate assist and assist x 2. Task was labored requiring increased time to complete. Assist with LE's and trunk. Sat EOB for 8 minutes requiring initial Min A to maintain sitting balance improving to CGA.   Sit to Supine: Mod A x 2  Sit to Stand: maximum assist and assist x 2. Attempted to sit<>stand x 2. Patient unable to complete full erect standing position 2* c/o weakness in bilateral LE's. Cues to limit LUE participation during transfer 2* pacemaker precautions. Max A x 2 to attempt to assume standing with tolerance <15 seconds.     Exercise/Education Provided:  Bed mobility  Body mechanics  Functional activity tolerated  Posture  ROM  Strengthening  Lower therapeutic exercise:  Ankle pumps  Transfer training    Skilled Therapy Provided: Patient received supine in bed. RN approved activity. Coordinated session with OT. Therapist educated pt on POC and physiological benefits of mobilization and OOB activity. Reviewed post operative pacemaker precautions. Patient agreeable to participate. Follows commands with increased time and limited attention to education- would benefit from review on precautions. Primary complaint is bilateral hand pain which he rates at 12 out of 10 per pain scale- RN made aware.     The patient's Approx Degree of Impairment: 76.75% has been calculated based on documentation in the [facility] '6 clicks' Inpatient Basic Mobility Short Form.  Research supports that patients with this level of impairment may benefit from  LTAC, however, pt functioning below baseline levels and would benefit from rehab to address deficits.   Final disposition will be made by interdisciplinary medical team.    Patient End of Session: In bed;Needs met;Call light within reach;RN aware of session/findings;Alarm set    CURRENT GOALS  Goals to be met by: 1/21/25  Patient Goal Patient's self-stated goal is: return to PLOF   Goal #1 Patient is able to demonstrate supine - sit EOB @ level: minimum assistance     Goal #1   Current Status    Goal #2 Patient is able to demonstrate transfers Sit to/from Stand at assistance level: moderate assistance with walker - rolling     Goal #2  Current Status    Goal #3 Patient will tolerate 1 minute of static standing with bilateral UE support on RW requiring Min A for balance to improve standing tolerance.    Goal #3   Current Status    Goal #4 Patient is able to ambulate 15 feet with assist device: walker - rolling at assistance level: moderate assistance   Goal #4   Current Status    Goal #5 Patient to demonstrate independence with home activity/exercise instructions provided to patient in preparation for discharge.   Goal #5   Current Status      Patient Evaluation Complexity Level:  History Moderate - 1 or 2 personal factors and/or co-morbidities   Examination of body systems Moderate - addressing a total of 3 or more elements   Clinical Presentation  Moderate - Evolving   Clinical Decision Making  Moderate Complexity     Therapeutic Activity:  15 minutes

## 2025-01-08 PROCEDURE — 99232 SBSQ HOSP IP/OBS MODERATE 35: CPT | Performed by: INTERNAL MEDICINE

## 2025-01-08 RX ORDER — METOPROLOL TARTRATE 50 MG
50 TABLET ORAL
Status: DISCONTINUED | OUTPATIENT
Start: 2025-01-08 | End: 2025-01-09

## 2025-01-08 RX ORDER — METOPROLOL TARTRATE 50 MG
50 TABLET ORAL
Qty: 60 TABLET | Refills: 1 | Status: ON HOLD | OUTPATIENT
Start: 2025-01-08

## 2025-01-08 RX ORDER — HYDROCODONE BITARTRATE AND ACETAMINOPHEN 7.5; 325 MG/1; MG/1
1 TABLET ORAL EVERY 6 HOURS PRN
Qty: 90 TABLET | Refills: 0 | Status: SHIPPED | OUTPATIENT
Start: 2025-01-08 | End: 2025-01-08

## 2025-01-08 RX ORDER — HYDROCODONE BITARTRATE AND ACETAMINOPHEN 7.5; 325 MG/1; MG/1
1 TABLET ORAL EVERY 6 HOURS PRN
Qty: 90 TABLET | Refills: 0 | Status: ON HOLD | OUTPATIENT
Start: 2025-01-08

## 2025-01-08 RX ORDER — METOPROLOL TARTRATE 25 MG/1
25 TABLET, FILM COATED ORAL ONCE
Status: COMPLETED | OUTPATIENT
Start: 2025-01-08 | End: 2025-01-08

## 2025-01-08 NOTE — SLP NOTE
ADULT SWALLOWING EVALUATION    ASSESSMENT    ASSESSMENT/OVERALL IMPRESSION:  PPE REQUIRED. THIS THERAPIST WORE GLOVES, DROPLET MASK, AND GOGGLES FOR DURATION OF EVALUATION. HANDS WASHED UPON ENTRANCE/EXIT.    SLP BSSE orders received and acknowledged. A swallow evaluation warranted as pt with complaints of difficulty swallowing due to sore throat. Pt expressed he has been choosing softer options recently. Pt afebrile with weak vocal quality, on room air, with oxygen saturation at 96. Pt with prior hx of dysphagia at [facility] in which last recommended diet was regular/thin per BSSE on 8/7/19. Pt positioned upright in bed. Pt with complaints of hand pain and sore throat, RN aware. Pt with adequate oral acceptance and bilabial seal across all trials. Pt with intact bite, prolonged mastication of solids, and increased LATIA. Pt's swallow response appears delayed with reduced hyolaryngeal elevation/excursion. No clinical signs of aspiration (e.g., immediate/delayed throat clear, immediate/delayed cough, wet vocal quality, increased O2 effort) observed across all trials. Oxygen status remained >95 t/o the entire evaluation. Pt expressed he does not want modified diet.     At this time, pt presents with mild oral dysphagia and probable pharyngeal dysfunction. Recommend a regular diet (choose soft) and thin liquids (no straws) with strict adherence to safe swallowing compensatory strategies. Results and recommendations reviewed with RN and pt. Pt v/u to all results/recommendations. Recommendations remain written on whiteboard. SLP collaborated with RN for MD diet orders.     PLAN: SLP to f/u x2 meal assessments, monitor CXR, and VFSS if clinically warranted.     RECOMMENDATIONS   Diet Recommendations - Solids: Regular (choose soft)  Diet Recommendations - Liquids: Thin Liquids    Compensatory Strategies Recommended: Alternate consistencies;Multiple swallows  Aspiration Precautions: Upright position;Slow rate;Small bites;Small  sips;No straw  Medication Administration Recommendations: Crushed in puree  Treatment Plan/Recommendations: Aspiration precautions    HISTORY   MEDICAL HISTORY  Reason for Referral: R/O aspiration    Problem List  Principal Problem:    Atrial fibrillation with RVR ([facility])  Active Problems:    CAD (coronary artery disease)    Anemia    Nonrheumatic aortic valve stenosis      Past Medical History  Past Medical History:    AF (atrial fibrillation) ([facility])    Arrhythmia    Cataract    Chronic pulmonary aspiration    ARF 6/16 BiPAP    Congestive heart disease ([facility])    COPD (chronic obstructive pulmonary disease) ([facility])    Coronary atherosclerosis    COVID    Depression    Dysphagia    Esophageal reflux    Fracture, radius    Hip fracture, left ([facility])    s/p IM fixation 1/11/17; Ortho Dr Alcantara    IBS (irritable bowel syndrome)    Lipid screening    Malabsorption ([facility])    Malnutrition ([facility])    severe     Osteoarthrosis, unspecified whether generalized or localized, unspecified site    hands    Pneumonia    Pneumonia due to organism    Pressure ulcer of left heel, stage 2 ([facility])    PUD (peptic ulcer disease)    Pulmonary hypertension ([facility])    Sacral decubitus ulcer    Thrombocytosis    Villous adenoma    Vitamin B12 deficiency    White matter disease       Prior Living Situation: Assisted living  Diet Prior to Admission: Regular;Thin liquids  Precautions: Aspiration    Patient/Family Goals: Pt reports sore throat and pain while swallowing    SWALLOWING HISTORY  Current Diet Consistency: Regular;Thin liquids  Dysphagia History:     VFSS 5/22/15: silent aspiration with pudding and thin  VFSS 4/19/16: laryngeal penetration and silent aspiration of thin  VFSS 6/6/16: no aspiration or penetration  BSE 1/12/17: puree/mildly thick  BSE 3/30/18: regular/mildly thick  BSE 7/1/18: bite sized/mildly thick  VFSS 7/2/18: bite sized/mildly thick - upgrade to regular  BSE 8/7/19: regular/thin    Imaging Results:     CXR 1/7/24:  CONCLUSION:       Redemonstrated left chest wall single lead pacemaker device.  No pneumothorax.      No significant change in the mild elevation of the right hemidiaphragm with bibasilar and left perihilar atelectasis/scarring.       Dictated by (CST): Tyrone Bah MD on 1/07/2025 at 8:57 AM       Finalized by (CST): Tyrone Bah MD on 1/07/2025 at 8:59 AM     OBJECTIVE   ORAL MOTOR EXAMINATION  Dentition: Upper partials;Lower partials  Symmetry: Within Functional Limits  Strength:  (reduced)     Range of Motion: Within Functional Limits  Rate of Motion: Reduced    Voice Quality: Weak  Respiratory Status: Unlabored  Consistencies Trialed: Thin liquids;Nectar thick liquids;Puree;Soft solid;Hard solid  Method of Presentation: Self presentation;Staff/Clinician assistance;Spoon;Cup;Single sips  Patient Positioned: Upright;Midline    Oral Phase of Swallow: Impaired  Bolus Retrieval: Intact  Bilabial Seal: Intact  Bolus Formation: Intact  Bolus Propulsion: Impaired  Mastication: Impaired       Pharyngeal Phase of Swallow: Appears Impaired  Laryngeal Elevation Timing: Appears impaired  Laryngeal Elevation Strength: Appears impaired     (Please note: Silent aspiration cannot be evaluated clinically. Videofluoroscopic Swallow Study is required to rule-out silent aspiration.)    Esophageal Phase of Swallow: No complaints consistent with possible esophageal involvement      GOALS  Goal #1 The patient will tolerate regular consistency (choose soft) and thin liquids without overt signs or symptoms of aspiration with 100 % accuracy over 1-2 session(s).  In Progress   Goal #2 The patient/family/caregiver will demonstrate understanding and implementation of aspiration precautions and swallow strategies independently over 1-2 session(s).    In Progress   Goal #3 The patient will utilize compensatory strategies as outlined by  BSSE (clinical evaluation) including Slow rate, Small bites, Small sips, Multiple swallows, Alternate liquids/solids,  No straws, Upright 90 degrees, Eliminate distractions with minimal assistance 100 % of the time across 2 sessions.  In Progress       FOLLOW UP  Treatment Plan/Recommendations: Aspiration precautions  Duration: 1 week  Follow Up Needed (Documentation Required): Yes  SLP Follow-up Date: 01/09/25    Thank you for your referral.   If you have any questions, please contact ROBINSON Davis M.S. CCC-SLP  Speech Language Pathologist  Phone Number [phone]

## 2025-01-08 NOTE — PLAN OF CARE
Patient alert and oriented. Max assist, resting in bed. Metoprolol dose increased by cardiology. Cleared for discharge to [facility] tomorrow afternoon at [facility]. Resting in bed with fall precautions in place and call light in reach.     Problem: Patient Centered Care  Goal: Patient preferences are identified and integrated in the patient's plan of care  Description: Interventions:  - What would you like us to know as we care for you? Lives in an assisted living alone.  - Provide timely, complete, and accurate information to patient/family  - Incorporate patient and family knowledge, values, beliefs, and cultural backgrounds into the planning and delivery of care  - Encourage patient/family to participate in care and decision-making at the level they choose  - Honor patient and family perspectives and choices  1/8/2025 1338 by Candace Collins RN  Outcome: Progressing  1/8/2025 1335 by Candace Collins RN  Outcome: Progressing     Problem: Patient/Family Goals  Goal: Patient/Family Long Term Goal  Description: Patient's Long Term Goal: go home    Interventions:  - Heparin drip  - Diltiazem drip  - monitor vital signs  - monitor heart rate  - telemetry  -cardiology on consult  - See additional Care Plan goals for specific interventions  1/8/2025 1338 by Candace Collins RN  Outcome: Progressing  1/8/2025 1335 by Candace Collins RN  Outcome: Progressing  Goal: Patient/Family Short Term Goal  Description: Patient's Short Term Goal: feel better    Interventions:   - cardiology on consult  -telemetry  - monitor heart rhythm  - monitor vital signs  - monitor lab result  - See additional Care Plan goals for specific interventions  1/8/2025 1338 by Candace Collins RN  Outcome: Progressing  1/8/2025 1335 by Candace Collins RN  Outcome: Progressing     Problem: PAIN - ADULT  Goal: Verbalizes/displays adequate comfort level or patient's stated pain goal  Description: INTERVENTIONS:  - Encourage pt to monitor pain and request  assistance  - Assess pain using appropriate pain scale  - Administer analgesics based on type and severity of pain and evaluate response  - Implement non-pharmacological measures as appropriate and evaluate response  - Consider cultural and social influences on pain and pain management  - Manage/alleviate anxiety  - Utilize distraction and/or relaxation techniques  - Monitor for opioid side effects  - Notify MD/LIP if interventions unsuccessful or patient reports new pain  - Anticipate increased pain with activity and pre-medicate as appropriate  1/8/2025 1338 by Candace Collins RN  Outcome: Progressing  1/8/2025 1335 by Candace Collins RN  Outcome: Progressing     Problem: RISK FOR INFECTION - ADULT  Goal: Absence of fever/infection during anticipated neutropenic period  Description: INTERVENTIONS  - Monitor WBC  - Administer growth factors as ordered  - Implement neutropenic guidelines  1/8/2025 1338 by Candace Collins RN  Outcome: Progressing  1/8/2025 1335 by Candace Collins RN  Outcome: Progressing     Problem: SAFETY ADULT - FALL  Goal: Free from fall injury  Description: INTERVENTIONS:  - Assess pt frequently for physical needs  - Identify cognitive and physical deficits and behaviors that affect risk of falls.  - [place] fall precautions as indicated by assessment.  - Educate pt/family on patient safety including physical limitations  - Instruct pt to call for assistance with activity based on assessment  - Modify environment to reduce risk of injury  - Provide assistive devices as appropriate  - Consider OT/PT consult to assist with strengthening/mobility  - Encourage toileting schedule  1/8/2025 1338 by Candace Collins RN  Outcome: Progressing  1/8/2025 1335 by Candace Collins RN  Outcome: Progressing     Problem: DISCHARGE PLANNING  Goal: Discharge to home or other facility with appropriate resources  Description: INTERVENTIONS:  - Identify barriers to discharge w/pt and caregiver  - Include  patient/family/discharge partner in discharge planning  - Arrange for needed discharge resources and transportation as appropriate  - Identify discharge learning needs (meds, wound care, etc)  - Arrange for interpreters to assist at discharge as needed  - Consider post-discharge preferences of patient/family/discharge partner  - Complete POLST form as appropriate  - Assess patient's ability to be responsible for managing their own health  - Refer to Case Management Department for coordinating discharge planning if the patient needs post-hospital services based on physician/LIP order or complex needs related to functional status, cognitive ability or social support system  1/8/2025 1338 by Candace Collins RN  Outcome: Progressing  1/8/2025 1335 by Candace Collins RN  Outcome: Progressing     Problem: CARDIOVASCULAR - ADULT  Goal: Maintains optimal cardiac output and hemodynamic stability  Description: INTERVENTIONS:  - Monitor vital signs, rhythm, and trends  - Monitor for bleeding, hypotension and signs of decreased cardiac output  - Evaluate effectiveness of vasoactive medications to optimize hemodynamic stability  - Monitor arterial and/or venous puncture sites for bleeding and/or hematoma  - Assess quality of pulses, skin color and temperature  - Assess for signs of decreased coronary artery perfusion - ex. Angina  - Evaluate fluid balance, assess for edema, trend weights  1/8/2025 1338 by Candace Collins, RN  Outcome: Progressing  1/8/2025 1335 by Candace Collins, RN  Outcome: Progressing  Goal: Absence of cardiac arrhythmias or at baseline  Description: INTERVENTIONS:  - Continuous cardiac monitoring, monitor vital signs, obtain 12 lead EKG if indicated  - Evaluate effectiveness of antiarrhythmic and heart rate control medications as ordered  - Initiate emergency measures for life threatening arrhythmias  - Monitor electrolytes and administer replacement therapy as ordered  1/8/2025 1338 by Candace Collins  RN  Outcome: Progressing  1/8/2025 1335 by Candace Collins RN  Outcome: Progressing     Problem: METABOLIC/FLUID AND ELECTROLYTES - ADULT  Goal: Electrolytes maintained within normal limits  Description: INTERVENTIONS:  - Monitor labs and rhythm and assess patient for signs and symptoms of electrolyte imbalances  - Administer electrolyte replacement as ordered  - Monitor response to electrolyte replacements, including rhythm and repeat lab results as appropriate  - Fluid restriction as ordered  - Instruct patient on fluid and nutrition restrictions as appropriate  1/8/2025 1338 by Candace Collins, RN  Outcome: Progressing  1/8/2025 1335 by Candace Collins, RN  Outcome: Progressing

## 2025-01-08 NOTE — PROGRESS NOTES
Progress Note  Shoaib Alan Patient Status:  Observation    10/22/1931 MRN [id number]   Location [facility] 1W Attending Mars Camargo MD   Hosp Day # 5 PCP Mars Camargo MD     Subjective:  States he is in a lot of pain from arthritis, not moving much, occasional shortness of breath  S/p dual chamber PPM insertion on     Objective:  /83 (BP Location: Right arm)   Pulse 105   Temp 97.6 °F (36.4 °C) (Oral)   Resp 19   Ht 6' 4\" (1.93 m)   Wt 141 lb 4.8 oz (64.1 kg)   SpO2 95%   BMI 17.20 kg/m²     Telemetry: AF-rates mostly controlled 90s-110s, episodes up tp 130s    Intake/Output:    Intake/Output Summary (Last 24 hours) at 2025 1001  Last data filed at 2025 0535  Gross per 24 hour   Intake 260 ml   Output 301 ml   Net -41 ml     Last 3 Weights   25 0535 141 lb 4.8 oz (64.1 kg)   25 0607 141 lb 12.8 oz (64.3 kg)   25 0532 143 lb 3.2 oz (65 kg)   25 1349 145 lb 1.6 oz (65.8 kg)   25 0811 161 lb (73 kg)   23 1129 160 lb (72.6 kg)   23 1852 162 lb 11.2 oz (73.8 kg)   23 1531 160 lb (72.6 kg)     Labs:  Recent Labs   Lab 25  0653 25  0629 25  0819   GLU 88 89 100*   BUN 20 22 26*   CREATSERUM 0.90 1.01 0.99   EGFRCR 80 69 71   CA 9.5 9.7 9.6    136 135*   K 4.3 4.0 4.2    98 99   CO2 29.0 29.0 27.0     Recent Labs   Lab 25  0653 25  0629 25  0819   RBC 4.14 4.61 4.00   HGB 12.4* 13.0 12.1*   HCT 36.5* 41.8 35.4*   MCV 88.2 90.7 88.5   MCH 30.0 28.2 30.3   MCHC 34.0 31.1 34.2   RDW 14.8 14.8 15.0   NEPRELIM 6.60 9.11* 9.31*   WBC 10.3 13.8* 14.9*   .0* 162.0 135.0*     Recent Labs   Lab 25  0814   TROPHS 49     Lab Results   Component Value Date/Time    HDL 33 (L) 2015 05:00 AM    LDL 72 2015 05:00 AM    TRIG 74 2015 05:00 AM     Lab Results   Component Value Date    DDIMER <0.27 2022     Lab Results   Component Value Date    TSH 2.791  01/03/2025     Review of Systems:   Constitutional: No fevers, chills, fatigue or night sweats.  Respiratory: + cough, no wheezing, + shortness of breath.  CV: Denies chest pain, palpitations, orthopnea, PND or dizziness.  GI: No nausea, vomiting or diarrhea. No blood in stools.    Physical Exam:  General: Alert, cooperative, no distress, appears stated age.  Neck: no JVD.  Lungs: bibasilar rales  Chest wall: No tenderness or deformity. Incision c/d/I, no bleeding, bruising, swelling, or hematoma, dressing intact  Heart: Irregularly irregular rate and rhythm, S1, S2 normal, no murmur, click, rub or gallop.  Abdomen: Soft, non-tender. Bowel sounds normal. No masses,  No organomegaly.  Extremities: Extremities normal, atraumatic, no cyanosis or edema.  Pulses: 2+ and symmetric all extremities.  Neurologic: Grossly intact.    Diagnostics:  Echo: 1/2/25  Conclusions:     1. Left ventricle: The cavity size was normal. Wall thickness was mildly      increased. Systolic function was at the lower limits of normal. The      estimated ejection fraction was 50-55%, by visual assessment. Technical      limitations of the study preclude regional wall motion analysis. Unable      to assess LV diastolic function due to heart rhythm.   2. Right ventricle: The cavity size was increased. Systolic function was      reduced.   3. Left atrium: The left atrial volume was markedly increased.   4. Right atrium: The atrium was dilated.   5. Aortic valve: Cusp separation was reduced. The findings were consistent      with mild stenosis. The planimetered valve area was 1.61cm^2. The valve      area index by planimetry was 0.8cm^2/m^2. The peak systolic velocity was      1.72m/sec. The mean systolic gradient was 6mm Hg.   6. Ascending aorta: The ascending aorta was dilated and 3.6cm diameter.   7. Mitral valve: The annulus was mildly to moderately calcified.   8. Pulmonary arteries: Systolic pressure was at the upper limits of normal      to,  at most, mildly increased; in the range of 30 mm Hg to 35 mm Hg,      estimated to be 33mm Hg.   Impressions:  No previous study was available for comparison.   *   Medications:   metoprolol tartrate  50 mg Oral 2x Daily(Beta Blocker)    digoxin  125 mcg Oral Daily    lidocaine-menthol  1 patch Transdermal Daily    docusate sodium  100 mg Oral BID    rivaroxaban  15 mg Oral Daily with food    ferrous sulfate  325 mg Oral Daily with breakfast    melatonin  3 mg Oral Nightly    predniSONE  2.5 mg Oral Daily    cholecalciferol  1,000 Units Oral Daily    multivitamin  1 tablet Oral Daily       Assessment:    89 year old sex with PMH of PAF s/p DCCV, on digoxin and diltiazem, HFpEF, COPD, CAD, GERD, pulmonary HTN who presented with lightheadedness and was found to be in AFRVR.     Atrial fibrillation with RVR  Rates up to 140s on admission, then dropped to slow in 50s-60s with multiple pauses and drop to 20-30s  -diltiazem started on admission, stopped due to bradycardia  -s/p dual chamber PPM ([employer]) 1/6  -transitioned to xarelto this admission for stroke prophylaxis  -PO device check and CXR stable with normal device function and no pneumo on XR  -echo this admission revealed normal LV function 50-55% with biatrial dilation, mild aortic stenosis, moderate mitral stenosis, PASP 33mmHg  -TSH unremarkable 2.791  -[id number]= 5 for age, htn, HF, CAD  -Digoxin resumed post implant, rates still occasionally elevated up to 130s, will increase lopressor    Tachy-Miguel Angel Syndrome  Hx sinus bradycardia, AF with SVR/RVR  -hx episode of presyncope, home diltiazem dose decreased at that time with improvement in symptoms   -currently  with underlying AF  -s/p PPM insertion as above    CAD s/p remote PCI  Not on asa, statin, BB PTA    HFimpEF  LVEF 50-55%, historically 40-45% on 11/2021 echo  -volume overloaded on exam  -takes lasix PRN and digoxin daily PTA  -not on additional GDMT, has not followed with cardiology for  many years    Pulmonary HTN  PASP slightly elevated 30-35mmHg on echo  -slightly volume overloaded on exam with reports of cough, shortness of breath, rales  -will give one time dose IV lasix    COPD    GERD    Plan:  -Give IV lasix 20mg once, monitor strict I/Os, daily weights, daily BMP  -Increase lopressor to 50mg BID, give one time dose 25mg now  -Continue xarelto, digoxin  -Out of bed as tolerated, encourage PT       Plan of care discussed with patient, RN.      Jaci Del Rio, APRN  01/08/25  10:06 AM  [phone] [place]  [phone] iron

## 2025-01-08 NOTE — CM/SW NOTE
01/08/25 0900   Choice of Post-Acute Provider   Informed patient of right to choose their preferred provider Yes   List of appropriate post-acute services provided to patient/family with quality data Yes   Patient/family choice [facility]   Information given to Patient;Daughter;Son;Guardian/HCPOA/Surrogate     CM called patient's son/POA Lalo to follow up on DONTA choice.  Lalo defers DONTA choice to patient's daughter Macie.    CM called patient's daughter Macie and confirmed patient/family preferred DONTA facility is [facility]. CM reserved [facility] via [facility] and notified liaison of choice. Clinical updates sent.    0922 CM left VM with [facility] at [facility] nursing coordinator updating of discharge plan above.    1112 [facility] liaison Cass confirms facility will not have bed available for patient until tomorrow.  [facility] can accept patient tomorrow at 1 PM.    Nursing staff notified CM that patient has been unable to stand, requires lift equipment for transfers - requesting [facility] for transport.  CM arranged [facility] for  1/9/25 at 1 PM. PCS form completed.    Number for report: [phone]  Room number: 123B    HARIS Maria notified of above.  CM sent message via tab ticketbroker to Dr. Camargo at 1053 notifying of above.  CM spoke with patient and patient's son Lalo via phone, and left VM with patient's daughter Macie notifying of above. Patient/family agreeable to plan.    / to remain available for support and/or discharge planning.     Plan: [facility] 1/9 at 1 PM    Linette Ordaz RN, BSN  Nurse   [phone]

## 2025-01-08 NOTE — HOME CARE LIAISON
Patient is current with [facility] with RN/ PT. Will need resumption of care orders at  discharge.

## 2025-01-08 NOTE — PLAN OF CARE
C/o arthritic pain in hands overnight despite PRN Norco and heat packs. Lido patch applied w/ little relief. Max assist. Bathed. Plan for DONTA pending choice. Call light within reach, safety precautions in place  Problem: Patient Centered Care  Goal: Patient preferences are identified and integrated in the patient's plan of care  Description: Interventions:  - What would you like us to know as we care for you? Lives in an assisted living alone.  - Provide timely, complete, and accurate information to patient/family  - Incorporate patient and family knowledge, values, beliefs, and cultural backgrounds into the planning and delivery of care  - Encourage patient/family to participate in care and decision-making at the level they choose  - Honor patient and family perspectives and choices  Outcome: Progressing     Problem: PAIN - ADULT  Goal: Verbalizes/displays adequate comfort level or patient's stated pain goal  Description: INTERVENTIONS:  - Encourage pt to monitor pain and request assistance  - Assess pain using appropriate pain scale  - Administer analgesics based on type and severity of pain and evaluate response  - Implement non-pharmacological measures as appropriate and evaluate response  - Consider cultural and social influences on pain and pain management  - Manage/alleviate anxiety  - Utilize distraction and/or relaxation techniques  - Monitor for opioid side effects  - Notify MD/LIP if interventions unsuccessful or patient reports new pain  - Anticipate increased pain with activity and pre-medicate as appropriate  Outcome: Progressing     Problem: RISK FOR INFECTION - ADULT  Goal: Absence of fever/infection during anticipated neutropenic period  Description: INTERVENTIONS  - Monitor WBC  - Administer growth factors as ordered  - Implement neutropenic guidelines  Outcome: Progressing     Problem: SAFETY ADULT - FALL  Goal: Free from fall injury  Description: INTERVENTIONS:  - Assess pt frequently for  physical needs  - Identify cognitive and physical deficits and behaviors that affect risk of falls.  - [place] fall precautions as indicated by assessment.  - Educate pt/family on patient safety including physical limitations  - Instruct pt to call for assistance with activity based on assessment  - Modify environment to reduce risk of injury  - Provide assistive devices as appropriate  - Consider OT/PT consult to assist with strengthening/mobility  - Encourage toileting schedule  Outcome: Progressing     Problem: DISCHARGE PLANNING  Goal: Discharge to home or other facility with appropriate resources  Description: INTERVENTIONS:  - Identify barriers to discharge w/pt and caregiver  - Include patient/family/discharge partner in discharge planning  - Arrange for needed discharge resources and transportation as appropriate  - Identify discharge learning needs (meds, wound care, etc)  - Arrange for interpreters to assist at discharge as needed  - Consider post-discharge preferences of patient/family/discharge partner  - Complete POLST form as appropriate  - Assess patient's ability to be responsible for managing their own health  - Refer to Case Management Department for coordinating discharge planning if the patient needs post-hospital services based on physician/LIP order or complex needs related to functional status, cognitive ability or social support system  Outcome: Progressing     Problem: CARDIOVASCULAR - ADULT  Goal: Maintains optimal cardiac output and hemodynamic stability  Description: INTERVENTIONS:  - Monitor vital signs, rhythm, and trends  - Monitor for bleeding, hypotension and signs of decreased cardiac output  - Evaluate effectiveness of vasoactive medications to optimize hemodynamic stability  - Monitor arterial and/or venous puncture sites for bleeding and/or hematoma  - Assess quality of pulses, skin color and temperature  - Assess for signs of decreased coronary artery perfusion - ex. Angina  -  Evaluate fluid balance, assess for edema, trend weights  Outcome: Progressing

## 2025-01-09 VITALS
WEIGHT: 141.31 LBS | RESPIRATION RATE: 22 BRPM | OXYGEN SATURATION: 96 % | DIASTOLIC BLOOD PRESSURE: 74 MMHG | SYSTOLIC BLOOD PRESSURE: 105 MMHG | HEART RATE: 86 BPM | BODY MASS INDEX: 17.21 KG/M2 | TEMPERATURE: 98 F | HEIGHT: 76 IN

## 2025-01-09 PROCEDURE — 99239 HOSP IP/OBS DSCHRG MGMT >30: CPT | Performed by: INTERNAL MEDICINE

## 2025-01-09 RX ORDER — PSEUDOEPHEDRINE HCL 30 MG
100 TABLET ORAL 2 TIMES DAILY
Status: ON HOLD | COMMUNITY
Start: 2025-01-09

## 2025-01-09 RX ORDER — FUROSEMIDE 10 MG/ML
20 INJECTION INTRAMUSCULAR; INTRAVENOUS ONCE
Status: COMPLETED | OUTPATIENT
Start: 2025-01-09 | End: 2025-01-09

## 2025-01-09 RX ORDER — POLYETHYLENE GLYCOL 3350 17 G/17G
17 POWDER, FOR SOLUTION ORAL DAILY PRN
Status: ON HOLD | COMMUNITY
Start: 2025-01-09

## 2025-01-09 NOTE — PLAN OF CARE
Problem: Patient Centered Care  Goal: Patient preferences are identified and integrated in the patient's plan of care  Description: Interventions:  - What would you like us to know as we care for you? Lives in an assisted living alone.  - Provide timely, complete, and accurate information to patient/family  - Incorporate patient and family knowledge, values, beliefs, and cultural backgrounds into the planning and delivery of care  - Encourage patient/family to participate in care and decision-making at the level they choose  - Honor patient and family perspectives and choices  Outcome: Progressing     Problem: Patient/Family Goals  Goal: Patient/Family Long Term Goal  Description: Patient's Long Term Goal: go home    Interventions:  - Heparin drip  - Diltiazem drip  - monitor vital signs  - monitor heart rate  - telemetry  -cardiology on consult  - See additional Care Plan goals for specific interventions  Outcome: Progressing  Goal: Patient/Family Short Term Goal  Description: Patient's Short Term Goal: feel better    Interventions:   - cardiology on consult  -telemetry  - monitor heart rhythm  - monitor vital signs  - monitor lab result  - See additional Care Plan goals for specific interventions  Outcome: Progressing     Problem: PAIN - ADULT  Goal: Verbalizes/displays adequate comfort level or patient's stated pain goal  Description: INTERVENTIONS:  - Encourage pt to monitor pain and request assistance  - Assess pain using appropriate pain scale  - Administer analgesics based on type and severity of pain and evaluate response  - Implement non-pharmacological measures as appropriate and evaluate response  - Consider cultural and social influences on pain and pain management  - Manage/alleviate anxiety  - Utilize distraction and/or relaxation techniques  - Monitor for opioid side effects  - Notify MD/LIP if interventions unsuccessful or patient reports new pain  - Anticipate increased pain with activity and  pre-medicate as appropriate  Outcome: Progressing     Problem: RISK FOR INFECTION - ADULT  Goal: Absence of fever/infection during anticipated neutropenic period  Description: INTERVENTIONS  - Monitor WBC  - Administer growth factors as ordered  - Implement neutropenic guidelines  Outcome: Progressing     Problem: SAFETY ADULT - FALL  Goal: Free from fall injury  Description: INTERVENTIONS:  - Assess pt frequently for physical needs  - Identify cognitive and physical deficits and behaviors that affect risk of falls.  - [place] fall precautions as indicated by assessment.  - Educate pt/family on patient safety including physical limitations  - Instruct pt to call for assistance with activity based on assessment  - Modify environment to reduce risk of injury  - Provide assistive devices as appropriate  - Consider OT/PT consult to assist with strengthening/mobility  - Encourage toileting schedule  Outcome: Progressing     Problem: DISCHARGE PLANNING  Goal: Discharge to home or other facility with appropriate resources  Description: INTERVENTIONS:  - Identify barriers to discharge w/pt and caregiver  - Include patient/family/discharge partner in discharge planning  - Arrange for needed discharge resources and transportation as appropriate  - Identify discharge learning needs (meds, wound care, etc)  - Arrange for interpreters to assist at discharge as needed  - Consider post-discharge preferences of patient/family/discharge partner  - Complete POLST form as appropriate  - Assess patient's ability to be responsible for managing their own health  - Refer to Case Management Department for coordinating discharge planning if the patient needs post-hospital services based on physician/LIP order or complex needs related to functional status, cognitive ability or social support system  Outcome: Progressing     Problem: CARDIOVASCULAR - ADULT  Goal: Maintains optimal cardiac output and hemodynamic stability  Description:  INTERVENTIONS:  - Monitor vital signs, rhythm, and trends  - Monitor for bleeding, hypotension and signs of decreased cardiac output  - Evaluate effectiveness of vasoactive medications to optimize hemodynamic stability  - Monitor arterial and/or venous puncture sites for bleeding and/or hematoma  - Assess quality of pulses, skin color and temperature  - Assess for signs of decreased coronary artery perfusion - ex. Angina  - Evaluate fluid balance, assess for edema, trend weights  Outcome: Progressing  Goal: Absence of cardiac arrhythmias or at baseline  Description: INTERVENTIONS:  - Continuous cardiac monitoring, monitor vital signs, obtain 12 lead EKG if indicated  - Evaluate effectiveness of antiarrhythmic and heart rate control medications as ordered  - Initiate emergency measures for life threatening arrhythmias  - Monitor electrolytes and administer replacement therapy as ordered  Outcome: Progressing     Problem: METABOLIC/FLUID AND ELECTROLYTES - ADULT  Goal: Electrolytes maintained within normal limits  Description: INTERVENTIONS:  - Monitor labs and rhythm and assess patient for signs and symptoms of electrolyte imbalances  - Administer electrolyte replacement as ordered  - Monitor response to electrolyte replacements, including rhythm and repeat lab results as appropriate  - Fluid restriction as ordered  - Instruct patient on fluid and nutrition restrictions as appropriate  Outcome: Progressing

## 2025-01-09 NOTE — SLP NOTE
SPEECH DAILY NOTE - INPATIENT    ASSESSMENT & PLAN   ASSESSMENT      Proper PPE worn. Hands sanitized upon entrance/exit Pt room.         Pt alert, afebrile and on room air. Pt seen for swallow analysis per BSE recommendations (after consulting with RN). Pt agreed to participate. Pt's preferred method of learning verbal. Pt upright in bed; observed with current diet of solid/thin liquids (breakfast tray) for monitoring diet tolerance. Swallowing precautions/strategies discussed; mild cues needed for execution. Functional bite reflex/mastication/lingual skills. No significant oral retention. Pharyngeal response appeared to trigger within 1-2 sec per hyolaryngeal elevation to completion (functional rise/strength per palpation). No immediate overt CSA on solid nor thin liquids. Delayed cough S/P thin liquids; cough noted at baseline. Collaborated with RN regarding Pt's swallowing plan of care. Per RN, Pt with tolerance of current diet. No report of difficulty taking meds- Pt prefers pills in pureed. No new CXR completed. Sp02 ~96% during this session. Call light within Pt's reach upon SLP discharge from room.      CXR 1/07/25:  CONCLUSION:    Redemonstrated left chest wall single lead pacemaker device.  No pneumothorax.    No significant change in the mild elevation of the right hemidiaphragm with bibasilar and left perihilar atelectasis/scarring.         PLAN:    To f/u x1 session to ensure safe intake of diet and reinforce swallowing/aspiration precautions. To monitor for new CXR results. Family education as available.          Diet Recommendations - Solids: Regular (choose soft)  Diet Recommendations - Liquids: Thin Liquids    Compensatory Strategies Recommended: Alternate consistencies;Multiple swallows  Aspiration Precautions: Upright position;Slow rate;Small bites;Small sips;No straw  Medication Administration Recommendations: Crushed in puree    Patient Experiencing Pain: No  Pain Rating: 10  Pain Location:  hands and sore throat     Nursing Aware of Pain?: Yes    Treatment Plan  Treatment Plan/Recommendations: Aspiration precautions    Interdisciplinary Communication: Discussed with RN  Plan posted at bedside    GOALS  Goal #1 The patient will tolerate regular consistency (choose soft) and thin liquids without overt signs or symptoms of aspiration with 100 % accuracy over 1-2 session(s).    No immediate CSA on current diet. Delayed cough thin liquids. Cough baseline.         In Progress   Goal #2 The patient/family/caregiver will demonstrate understanding and implementation of aspiration precautions and swallow strategies independently over 1-2 session(s).    Swallowing precautions posted in room.     In Progress   Goal #3 The patient will utilize compensatory strategies as outlined by  BSSE (clinical evaluation) including Slow rate, Small bites, Small sips, Multiple swallows, Alternate liquids/solids, No straws, Upright 90 degrees, Eliminate distractions with minimal assistance 100 % of the time across 2 sessions.    Mild cues for small sips/bites.     In Progress   FOLLOW UP  Follow Up Needed (Documentation Required): Yes  SLP Follow-up Date: 01/10/25  Duration: 1 week    Session: 1    If you have any questions, please contact   Linette Farley M.S. [facility]/SLP  Speech-Language Pathologist  [facility]  #[phone]

## 2025-01-09 NOTE — DISCHARGE SUMMARY
[facility]  part of [facility]    Discharge Summary    Shoaib Alan Patient Status:  Inpatient    10/22/1931 MRN [id number]   Location [facility] 3W/SW Attending Mars Camargo MD   Hosp Day # 6 PCP Mars Camargo MD     Date of Admission: 2025   Date of Discharge:  2025    Admitting Diagnosis: Atrial fibrillation with RVR (HCC) [I48.91]    Disposition: Transfer to Skilled Nursing Facility: [facility]    Discharge Diagnosis: .Principal Problem:    Atrial fibrillation with RVR (HCC)  Active Problems:    CAD (coronary artery disease)    Anemia    Nonrheumatic aortic valve stenosis      Hospital Course:   Reason for Admission:   Rapid AF    Discharge Physical Exam:  Vital Signs:  Blood pressure 113/76, pulse 120, temperature 97.7 °F (36.5 °C), temperature source Oral, resp. rate 22, height 6' 4\" (1.93 m), weight 141 lb 4.8 oz (64.1 kg), SpO2 96%.     General: Pt awake, alert, no acute distress.   Respiratory: Clear to auscultation bilaterally.  No wheezes. No rhonchi.  Cardiovascular: S1, S2. Irreg irreg.  No murmurs.   Abdomen: Soft, nontender, nondistended, NABS  Extremities: No LE edema    Hospital Course:     Atrial fibrillation with RVR  -was on diltiazem  mg qD and digoxin 0.125 mg po every day until 24 when he reported near-syncope with 47, so diltiazem ER was decreased to 120 mg daily  -presents to ED 24 with tachycardia; EKG shows Afib with   and WCT  -HR decreased with Cadizem 10 mg IVP and Cardizem gtt at 10 mg/hour  -started on heparin gtt  -Dr Lozano's consult noted; pt advised to stop diltiazem and stop digoxin; if HR accelerates, then beta-blocker is favored  -advised for 1 week MCT at [facility] office next week  -FCAROLE Thomas as outpatient after completion of ECG monitor  -tachy-sylwia syndrome;   -POD #3 s/p PPM per Dr Thomas; HR 90s-120 on metoprolol 50 mg po BID (increased from 25mg BID)  -off digoxin  -HR's 80's-90's  -started on Xarelto  15 mg po every day  -PT/OT recommends DONTA;   -To Preethi LONGO today     History of RLE hematoma  In May 2023; ultrasound RLE shows two large hematomas to RLE: no evidence for compartment syndrome; no need for surgical intervention  -stopped warfarin in May 2023, though now on xarelto     History of anemia  Hgb stable (12.1 on 1/7/25)  on ferrous sulfate 325 mg po qD     Aortic stenosis  last ECHO in Jan 2025 shows EF 50-55% with mild aortic stenosis; last Lexiscan GXT 11/29/21 negative for ischemia     History of COVID infection  In Dec 2022;   -nares positive COVID 12/1/22  -s/p remdesivir 100 mg IV [id number] x 4d  -s/p Decadron 6 mg daily x 4 days; discontinued 12/4     History of Pneumonia  In Dec 2022; PCXR shows Patchy medial bibasilar opacities, which are new since prior chest radiographs from October, 2021. Findings could relate to pneumonia/aspiration in the appropriate clinical setting.  Also identified is a new 4 cm right perihilar nodular opacity.     -CT chest shows Airspace opacities within the bilateral upper and lower lobes, most pronounced within the right lower lobe suspicious for multifocal pneumonia.  -s/p ceftriaxone 1 gm IV [id number], d#5, and s/p  azithromycin 500 mg po daily x 3d     History of Osteomyelitis left 2nd toe   -MRI left foot 11/27/21 shows underlying acute osteomyelitis involving the entirety of the 2nd digit middle phalanx and the distal half of the 2nd digit proximal phalanx.   -s/p left 2nd toe amputation 12/3/21 per podiatrist, Dr Gutierrez; pathology with acute and chronic osteomyelitis   -has home visits by podiatrist Dr Josefina Fraga [phone] and advised sleeve to right 2nd toe      PVD  -CTA 11/29/21 reveals atherosclerosis of the SFA with 65-70% stenosis just beyond the adductor canal  -s/p angioplasty of tight focal left popliteal stenosis; unable to open distally occluded left post tib artery; left ant tib artery-dorsalis pedis widely patent (procedure on 12/1/21  by Dr. Lynch).      Constipation  On Miralax 17 gm po daily prn, and Dulcolax 10 mg po daily prn; takes prune juice     Osteoarthritis lumbar spine and cervical spine  As seen on CT June 2018; Scoliosis and degenerative changes in spine. Mild chronic anterior wedging of lower thoracic vertebral bodies; XR L-spine in Aug 2019 shows 5 lumbar segments with 20° levoconvex scoliosis, slight flattening of the lordotic curvature.  Endplate compression at L3.  Moderate loss of disc heights throughout the lumbar spine.  No spondylolisthesis.  No destructive lesions.  Mild calcification of the abdominal aorta; has seen physiatrist Dr Reilly Overton  -the patient had been taking Norco 7.5 mg q. 6 hours p.r.n. Pain; pt requests #60 with next refill     Gait abnormality  Pt has high fall risk; using manual wheelchair or electric scooter, though independent use is limited due to bilateral hand osteoarthritis;      Right hand basal cell skin cancer  S/p biopsy in Dec 2018; Pt awaits formal excision by dermatologist in [place]     History of Sepsis due to Klebsiella UTI  In June 2018; Due to urine septicemia; BCx and UCx showing growing Klebsiella pneumoniae, sensitive to pip/ tazo; s/p Zosyn 3.375 gm IV q8hrs, x6d, then Keflex for 8 more days, along with prophylactic po Vanco 125mg qd.        Nephrolithiasis with moderate left hydronephrosis  CT abdomen/ pelvis 6/29 revealed moderate left hydroureteronephrosis related to a 4 x 2 mm left ureterovesical junction calculus; pt seen in consultation with urologist Dr Linder; s/p cystoscopy, left retrograde pyelogram, and insertion of left ureteral stent by Dr Linder.   Follow up with Dr. Linder     Stool incontinence  On Imodium 2 mg po q4hrs prn.  GI panel negative.      Left shoulder pain  XR both shoulders neg for DJD; DDx includes frozen shoulder vs PMR;  s/p home physical therapy for adhesive capsulitis with [facility]; on prednisone to 2.5 mg daily; now with mild right  shoulder pain x 2d--> advise stretching exercises for now; call if sxs persists     Pulmonary HTN  seen on ECHO in Jan 2017 with PA pressure 54 (previous PA pressure 44 in June 2016); thought to be multifactorial     Left hip fracture  s/p IM fixation in Jan 2017; on  Norco 7.5 mg po q4hrs prn     Sacral decubitus ulcer  uses Medihoney wound dressing gel daily prn     Coronary artery disease   Had PTCA in 1990s;   -the patient currently takes aspirin 81 mg daily; EKG shows Afib with old inferior anterior infarcts (seen in 2018);   -ECHO in Jan 2025 shows EF 50-55% with mild aortic stenosis; last Lexiscan GXT 11/29/21 negative for ischemia     History of villous adenoma   the patient reports occasional loose stools.  The patient has been advised to see gastroenterologist, Dr. Hopper, as an outpatient for colonoscopy though he has declined repeat colonoscopy     Vitamin B12 deficiency  the patient had been receiving vitamin B12 1,000 mcg IM q. Month       Consultants         Provider   Role Specialty     Rao Lozano MD      Consulting Physician Cardiac Electrophysiology                Discharge Plan:   Discharge Condition: Stable    Current Discharge Medication List        New Orders    Details   docusate sodium 100 MG Oral Cap Take 100 mg by mouth 2 (two) times daily.      lidocaine-menthol 4-1 % External Patch Place 1 patch onto the skin daily.      polyethylene glycol, PEG 3350, 17 g Oral Powd Pack Take 17 g by mouth daily as needed.      metoprolol tartrate 50 MG Oral Tab Take 1 tablet (50 mg total) by mouth 2x Daily(Beta Blocker).      rivaroxaban 15 MG Oral Tab Take 1 tablet (15 mg total) by mouth daily with food.           Home Meds - Modified    Details   HYDROcodone-acetaminophen (NORCO) 7.5-325 MG Oral Tab Take 1 tablet by mouth every 6 (six) hours as needed for Pain.      digoxin 0.125 MG Oral Tab Take 1 tablet (125 mcg total) by mouth daily.           Home Meds - Unchanged    Details   PREDNISONE 2.5 MG  Oral Tab TAKE 1 TABLET(2.5 MG) BY MOUTH DAILY      ferrous sulfate 325 (65 FE) MG Oral Tab EC Take 1 tablet (325 mg total) by mouth daily with breakfast.      acetaminophen 500 MG Oral Tab Take 1 tablet (500 mg total) by mouth every 6 (six) hours as needed for Pain.      furosemide 20 MG Oral Tab Take 1 tablet (20 mg total) by mouth daily as needed. Prn leg swelling      melatonin 3 MG Oral Tab Take 1 tablet (3 mg total) by mouth nightly.      Cholecalciferol (VITAMIN D) 1000 units Oral Tab Take 1 tablet by mouth daily.      Loperamide HCl 2 MG Oral Cap Take 1 capsule (2 mg total) by mouth 4 (four) times daily as needed for Diarrhea.                 Discharge Diet: As tolerated    Discharge Activity: As tolerated    Follow up:      Follow-up Information       Mars Camargo MD. Schedule an appointment as soon as possible for a visit in 2 week(s).    Specialty: Internal Medicine  Contact information:  [address] [facility] [address] [phone]               Esteban Thomas MD. Schedule an appointment as soon as possible for a visit in 1 week(s).    Specialties: Cardiac Electrophysiology, CARDIOLOGY  Why: Our office will call to schedule appointment  Contact information:  [address] [facility] [address]  [facility] [address] [phone]                                     Other Discharge Instructions:               Pacemaker and Defibrillator Discharge Instructions    Activity  Plan to rest tonight and tomorrow.  Avoid drinking alcohol for next 24 hours.  Do not drive after procedure until 1-week device check appointment, unless otherwise instructed by your cardiologist.   Wear sling for next 24 hours, then only at night as needed for 30 days to help assist with arm restrictions while sleeping.     Arm Restrictions:  For 30 days after implant:  do not lift arm with implanted device above your head or behind your back. Arm is to remain below your shoulder and in front of your body.   do not lift more than 10 lbs  with affected arm   do not perform repetitive cycling motion with affected arm (swimming, golfing, bowling, tennis, exercise machines, raking, vacuuming, snow shoveling).     Incision Care/Bathing  Keep incision site completely dry for 5 days after surgery. After day 5, you can remove top dressing and shower, letting clean soapy water run over incision area, patting dry.   The white steri-strips placed directly over the incision will gradually fall off over the next few weeks and can be physically removed after 3 weeks. Do not take them off before this time. (If you have a zipline dressing, this will be removed by device nurse or MD in 4 weeks)   Keep procedure site clean and dry, do not apply any ointments, creams, lotions to the incision.   Look at your incision daily to monitor for signs and symptoms of infection (redness, swelling, drainage, foul odor from procedure site)  Do not cover incision with extra dressings or gauze unless otherwise instructed.   Do not submerge incision in water, take whirlpool baths or swim for 30 days or until site is completely healed.     When to notify your physician:   If you have chest pain, shortness of breath or persistent cough  If you experience any signs of fever (temperature >101), chills, infection (redness, swelling, thick yellow drainage, foul order from procedure site)  New or worsening swelling of arm with implanted device   If an ICD was inserted and you receive a shock and have no symptoms, call [facility] device clinic. If you have symptoms (fainting, near fainting, dizziness, lightheadedness, chest pain, shortness of breath, palpitations), call 911.    [facility] Device Clinic Direct Line: [phone]. Monday-Friday. 8:30AM-4PM.   [facility] Main Office: [phone] Monday- Friday 8AM-5PM   [facility] [place] Main Office: [phone] Monday-Friday 8AM-5PM   Resume [facility] services [phone]          >30 min were spent counseling patient and coordinating  care    Crys Emery MD  1/9/2025

## 2025-01-09 NOTE — PROGRESS NOTES
Progress Note  Shoaib Alan Patient Status:  Observation    10/22/1931 MRN [id number]   Location [facility] 1W Attending Mars Camargo MD   Hosp Day # 6 PCP Mars Camargo MD     Subjective:  S/p dual chamber PPM insertion on   Lopressor held this morning for hypotension, BP improved, dose given  Supposed to discharge to [facility] today    Objective:  /76 (BP Location: Right arm)   Pulse 120   Temp 97.7 °F (36.5 °C) (Oral)   Resp 22   Ht 6' 4\" (1.93 m)   Wt 141 lb 4.8 oz (64.1 kg)   SpO2 96%   BMI 17.20 kg/m²     Telemetry: AF-rates mostly controlled 90s-110s, episodes up tp 120s    Intake/Output:    Intake/Output Summary (Last 24 hours) at 2025 0927  Last data filed at 2025 0800  Gross per 24 hour   Intake 300 ml   Output 550 ml   Net -250 ml     Last 3 Weights   25 0535 141 lb 4.8 oz (64.1 kg)   25 0607 141 lb 12.8 oz (64.3 kg)   25 0532 143 lb 3.2 oz (65 kg)   25 1349 145 lb 1.6 oz (65.8 kg)   25 0811 161 lb (73 kg)   23 1129 160 lb (72.6 kg)   23 1852 162 lb 11.2 oz (73.8 kg)   23 1531 160 lb (72.6 kg)     Labs:  Recent Labs   Lab 25  0653 25  0629 25  0819   GLU 88 89 100*   BUN 20 22 26*   CREATSERUM 0.90 1.01 0.99   EGFRCR 80 69 71   CA 9.5 9.7 9.6    136 135*   K 4.3 4.0 4.2    98 99   CO2 29.0 29.0 27.0     Recent Labs   Lab 25  0653 25  0629 25  0819   RBC 4.14 4.61 4.00   HGB 12.4* 13.0 12.1*   HCT 36.5* 41.8 35.4*   MCV 88.2 90.7 88.5   MCH 30.0 28.2 30.3   MCHC 34.0 31.1 34.2   RDW 14.8 14.8 15.0   NEPRELIM 6.60 9.11* 9.31*   WBC 10.3 13.8* 14.9*   .0* 162.0 135.0*     No results for input(s): \"TROP\", \"TROPHS\", \"CK\" in the last 168 hours.    Lab Results   Component Value Date/Time    HDL 33 (L) 2015 05:00 AM    LDL 72 2015 05:00 AM    TRIG 74 2015 05:00 AM     Lab Results   Component Value Date    DDIMER <0.27 2022     Lab Results    Component Value Date    TSH 2.791 01/03/2025     Review of Systems:   Constitutional: No fevers, chills, fatigue or night sweats.  Respiratory: + cough, no wheezing, + shortness of breath.  CV: Denies chest pain, palpitations, orthopnea, PND or dizziness.  GI: No nausea, vomiting or diarrhea. No blood in stools.    Physical Exam:  General: Alert, cooperative, no distress, appears stated age.  Neck: no JVD.  Lungs: bibasilar rales  Chest wall: No tenderness or deformity. Incision site c/d/I, no bleeding, bruising, swelling, or hematoma, dressing intact  Heart: Irregularly irregular rate and rhythm, S1, S2 normal, no murmur, click, rub or gallop.  Abdomen: Soft, non-tender. Bowel sounds normal. No masses,  No organomegaly.  Extremities: Extremities normal, atraumatic, no cyanosis or edema.  Pulses: 2+ and symmetric all extremities.  Neurologic: Grossly intact.    Diagnostics:  Echo: 1/2/25  Conclusions:     1. Left ventricle: The cavity size was normal. Wall thickness was mildly      increased. Systolic function was at the lower limits of normal. The      estimated ejection fraction was 50-55%, by visual assessment. Technical      limitations of the study preclude regional wall motion analysis. Unable      to assess LV diastolic function due to heart rhythm.   2. Right ventricle: The cavity size was increased. Systolic function was      reduced.   3. Left atrium: The left atrial volume was markedly increased.   4. Right atrium: The atrium was dilated.   5. Aortic valve: Cusp separation was reduced. The findings were consistent      with mild stenosis. The planimetered valve area was 1.61cm^2. The valve      area index by planimetry was 0.8cm^2/m^2. The peak systolic velocity was      1.72m/sec. The mean systolic gradient was 6mm Hg.   6. Ascending aorta: The ascending aorta was dilated and 3.6cm diameter.   7. Mitral valve: The annulus was mildly to moderately calcified.   8. Pulmonary arteries: Systolic pressure  was at the upper limits of normal      to, at most, mildly increased; in the range of 30 mm Hg to 35 mm Hg,      estimated to be 33mm Hg.   Impressions:  No previous study was available for comparison.   *   Medications:   furosemide  20 mg Intravenous Once    metoprolol tartrate  50 mg Oral 2x Daily(Beta Blocker)    digoxin  125 mcg Oral Daily    lidocaine-menthol  1 patch Transdermal Daily    docusate sodium  100 mg Oral BID    rivaroxaban  15 mg Oral Daily with food    ferrous sulfate  325 mg Oral Daily with breakfast    melatonin  3 mg Oral Nightly    predniSONE  2.5 mg Oral Daily    cholecalciferol  1,000 Units Oral Daily    multivitamin  1 tablet Oral Daily     Assessment:    89 year old sex with PMH of PAF s/p DCCV, on digoxin and diltiazem, HFpEF, COPD, CAD, GERD, pulmonary HTN who presented with lightheadedness and was found to be in AFRVR.     Atrial fibrillation with RVR  Rates up to 140s on admission, then dropped to slow in 50s-60s with multiple pauses and drop to 20-30s  -diltiazem started on admission, stopped due to bradycardia  -s/p dual chamber PPM (Sequent) 1/6  -transitioned to xarelto this admission for stroke prophylaxis  -PO device check and CXR stable with no pneumothorax and normal device function  -Echo this admission revealed normal LV function 50-55% with biatrial dilation, mild aortic stenosis, moderate mitral stenosis, PASP 33mmHg  -TSH unremarkable 2.791  -[id number]= 5 for age, htn, HF, CAD  -Digoxin resumed post implant, rates still occasionally elevated up to 130s, lopressor increased with good response, did not get AM dose due to hypotension    Tachy-Miguel Angel Syndrome  Hx sinus bradycardia, AF with SVR/RVR  -hx episode of presyncope, home diltiazem dose decreased at that time with improvement in symptoms   -currently Ventricular paced with AF underlying with rates 90s-100s, occasionally up to 120s  -s/p PPM insertion as above    CAD s/p remote PCI  Not on asa, statin, BB  PTA    HFimpEF  LVEF 50-55%, historically 40-45% on 11/2021 echo  -volume overloaded on exam  -takes lasix PRN and digoxin daily PTA  -not on additional GDMT, has not followed with cardiology for many years    Pulmonary HTN  PASP slightly elevated 30-35mmHg on echo  -slightly volume overloaded on exam with reports of cough, shortness of breath, rales  -will give one time dose IV lasix    COPD    GERD    Plan:  -Give IV lasix 20mg once, monitor strict I/Os, daily weights, daily BMP  -Continue lopressor 50mg BID  -Continue xarelto, digoxin  -Out of bed as tolerated, encourage PT   -Stable for discharge to [facility] today       Plan of care discussed with patient, RN.      Jaci Del Rio, APRN  01/09/25  9:34 AM  [phone] [place]  [phone] [place]

## 2025-01-09 NOTE — CONGREGATE LIVING REVIEW
Critical access hospital Living Authorization    The [facility] Review Committee has reviewed this case and the patient IS APPROVED for discharge to a facility for Short Term Skilled once the following procedure is followed:     - The physician discharge instructions (contained within the SERGO note for SNF) must inlcude the below appropriate and approved COVID instructions to the facility    For questions regarding CLRC approval process, please contact the CM assigned to the case.  For questions regarding RN discharge workflow, please contact the unit Clinical Leader.

## 2025-01-09 NOTE — DISCHARGE PLANNING
I called and gave report to nurse Monroy at [facility] re[facility] facility. Patient's physical and history were relayed to nursing staff and included past medical history, diagnosis of afib, and pacemaker implantation. Patient will be discharging at 1pm as arranged by social work/case management. Phone number of primary nurse provided for follow up questions.     Code status: ANSLEY Wood RN, Discharge Leader [id number]

## 2025-01-09 NOTE — PROGRESS NOTES
[facility]  part of [facility]    Progress Note    Shoaib Alan Patient Status:  Inpatient    10/22/1931 MRN [id number]   Location [facility] 3W/SW Attending Mars Camargo MD   Hosp Day # 5 PCP Mars Camargo MD         Assessment and Plan:     Atrial fibrillation with RVR  -was on diltiazem  mg qD and digoxin 0.125 mg po every day until 24 when he reported near-syncope with 47, so diltiazem ER was decreased to 120 mg daily  -presents to ED 24 with tachycardia; EKG shows Afib with   and WCT  -HR decreased with Cadizem 10 mg IVP and Cardizem gtt at 10 mg/hour  -was on heparin gtt at 800 units/hour; now held procedure  -Dr Lozano's consult noted; pt advised to stop diltiazem and stop digoxin; if HR accelerates, then beta-blocker is favored  -advised for 1 week MCT at [facility] office next week  -change heparin to Xarelto 15 mg po qD  -F.U Dr. Thomas as outpatient after completion of ECG monitor  -tachy-sylwia syndrome; POD #2 s/p PPM per Dr Thomas; HR 90s-120 on metoprolol 25 mg po BID; resuming digoxin 0.125 mg po every day per Dr Thomas;  metoprolol has been increased to 50 mg po BID; Rx per [facility]  -on Xarelto 15 mg po every day  -PT/OT recommends DONTA; pt agreeable; SW assisting; tentative transfer scheduled for 1PM on 25 if medically stable    History of RLE hematoma  In May 2023; ultrasound RLE shows two large hematomas to RLE: no evidence for compartment syndrome; no need for surgical intervention  -stopped warfarin in May 2023     History of anemia  Hgb 11.9; on ferrous sulfate 325 mg po qD     Aortic stenosis  last ECHO in 2025 shows EF 50-55% with mild aortic stenosis; last Lexiscan GXT 21 negative for ischemia     History of COVID infection  In Dec 2022;   -nares positive COVID 22  -s/p remdesivir 100 mg IV [id number] x 4d  -s/p Decadron 6 mg daily x 4 days; discontinued      History of Pneumonia  In Dec 2022; PCXR shows Patchy  medial bibasilar opacities, which are new since prior chest radiographs from October, 2021. Findings could relate to pneumonia/aspiration in the appropriate clinical setting.  Also identified is a new 4 cm right perihilar nodular opacity.     -CT chest shows Airspace opacities within the bilateral upper and lower lobes, most pronounced within the right lower lobe suspicious for multifocal pneumonia.  -s/p ceftriaxone 1 gm IV [id number], d#5, and s/p  azithromycin 500 mg po daily x 3d     History of Osteomyelitis left 2nd toe   -MRI left foot 11/27/21 shows underlying acute osteomyelitis involving the entirety of the 2nd digit middle phalanx and the distal half of the 2nd digit proximal phalanx.   -s/p left 2nd toe amputation 12/3/21 per podiatrist, Dr Gutierrez; pathology with acute and chronic osteomyelitis   -has home visits by podiatrist Dr Josefina Fraga [phone] and advised sleeve to right 2nd toe      PVD  -CTA 11/29/21 reveals atherosclerosis of the SFA with 65-70% stenosis just beyond the adductor canal  -s/p angioplasty of tight focal left popliteal stenosis; unable to open distally occluded left post tib artery; left ant tib artery-dorsalis pedis widely patent (procedure on 12/1/21 by Dr. Lynch).      Constipation  On Miralax 17 gm po daily prn, and Dulcolax 10 mg po daily prn; takes prune juice     Osteoarthritis lumbar spine and cervical spine  As seen on CT June 2018; Scoliosis and degenerative changes in spine. Mild chronic anterior wedging of lower thoracic vertebral bodies; XR L-spine in Aug 2019 shows 5 lumbar segments with 20° levoconvex scoliosis, slight flattening of the lordotic curvature.  Endplate compression at L3.  Moderate loss of disc heights throughout the lumbar spine.  No spondylolisthesis.  No destructive lesions.  Mild calcification of the abdominal aorta; has seen physiatrist Dr Reilly Overton  -the patient had been taking Norco 7.5 mg q. 6 hours p.r.n. Pain; pt requests #60 with  next refill     Gait abnormality  Pt has high fall risk; using manual wheelchair or electric scooter, though independent use is limited due to bilateral hand osteoarthritis;      Right hand basal cell skin cancer  S/p biopsy in Dec 2018; Pt awaits formal excision by dermatologist in [place]     History of Sepsis due to Klebsiella UTI  In June 2018; Due to urine septicemia; BCx and UCx showing growing Klebsiella pneumoniae, sensitive to pip/ tazo; s/p Zosyn 3.375 gm IV q8hrs, x6d, then Keflex for 8 more days, along with prophylactic po Vanco 125mg qd.        Nephrolithiasis with moderate left hydronephrosis  CT abdomen/ pelvis 6/29 revealed moderate left hydroureteronephrosis related to a 4 x 2 mm left ureterovesical junction calculus; pt seen in consultation with urologist Dr Linder; s/p cystoscopy, left retrograde pyelogram, and insertion of left ureteral stent by Dr Linder.   Follow up with Dr. Linder     Stool incontinence  On Imodium 2 mg po q4hrs prn.  GI panel negative.      Left shoulder pain  XR both shoulders neg for DJD; DDx includes frozen shoulder vs PMR;  s/p home physical therapy for adhesive capsulitis with [facility]; on prednisone to 2.5 mg daily; now with mild right shoulder pain x 2d--> advise stretching exercises for now; call if sxs persists     Pulmonary HTN  seen on ECHO in Jan 2017 with PA pressure 54 (previous PA pressure 44 in June 2016); thought to be multifactorial     Left hip fracture  s/p IM fixation in Jan 2017; on  Norco 7.5 mg po q4hrs prn     Sacral decubitus ulcer  uses Medihoney wound dressing gel daily prn     Coronary artery disease   Had PTCA in 1990s;   -the patient currently takes aspirin 81 mg daily; EKG shows Afib with old inferior anterior infarcts (seen in 2018);   -ECHO in Jan 2025 shows EF 50-55% with mild aortic stenosis; last Lexiscan GXT 11/29/21 negative for ischemia     History of villous adenoma   the patient reports occasional loose stools.  The  patient has been advised to see gastroenterologist, Dr. Hopper, as an outpatient for colonoscopy though he has declined repeat colonoscopy     Vitamin B12 deficiency  the patient had been receiving vitamin B12 1,000 mcg IM q. Month    Severe malnutrition  BMI: BMI of 17.72               SUBJECTIVE:     No CP; no SOB; HR 90s-120           OBJECTIVE:  PHYSICAL EXAM:     Vital signs in last 24 hours:  BP 96/73 (BP Location: Right arm)   Pulse 80   Temp 97.5 °F (36.4 °C) (Oral)   Resp 22   Ht 6' 4\" (1.93 m)   Wt 141 lb 4.8 oz (64.1 kg)   SpO2 94%   BMI 17.20 kg/m²     Intake/Output:    Intake/Output Summary (Last 24 hours) at 1/8/2025 1931  Last data filed at 1/8/2025 1717  Gross per 24 hour   Intake 430 ml   Output 401 ml   Net 29 ml       Wt Readings from Last 3 Encounters:   01/08/25 141 lb 4.8 oz (64.1 kg)   07/13/23 160 lb (72.6 kg)   05/11/23 162 lb 11.2 oz (73.8 kg)         Constitutional: alert and oriented x3 in no acute distress  HEENT- EOMI, PERRL  Nose/Mouth/Throat: pharynx without erythema  Neck/Thyroid: neck supple; no thyromegaly  Cardiovascular: RRR, S1, S2, no S3 or murmur  Respiratory: lungs without crackles or wheezes  Abdomen: normoactive bowel sounds, soft, non-tender and non-distended  Extremities: no clubbing, cyanosis or edema          Meds:   Current Facility-Administered Medications   Medication Dose Route Frequency    metoprolol tartrate (Lopressor) tab 50 mg  50 mg Oral 2x Daily(Beta Blocker)    digoxin (Lanoxin) tab 125 mcg  125 mcg Oral Daily    lidocaine-menthol 4-1 % patch 1 patch  1 patch Transdermal Daily    docusate sodium (Colace) cap 100 mg  100 mg Oral BID    polyethylene glycol (PEG 3350) (Miralax) 17 g oral packet 17 g  17 g Oral Daily PRN    magnesium hydroxide (Milk of Magnesia) 400 MG/5ML oral suspension 30 mL  30 mL Oral Daily PRN    bisacodyl (Dulcolax) 10 MG rectal suppository 10 mg  10 mg Rectal Daily PRN    fleet enema (Fleet) rectal enema 133 mL  1 enema Rectal Once  PRN    ondansetron (Zofran) 4 MG/2ML injection 4 mg  4 mg Intravenous Q6H PRN    rivaroxaban (Xarelto) tab 15 mg  15 mg Oral Daily with food    benzocaine-menthol (Cepacol) lozenge 1 lozenge  1 lozenge Oral Q1H PRN    acetaminophen (Tylenol Extra Strength) tab 500 mg  500 mg Oral Q6H PRN    ferrous sulfate DR tab 325 mg  325 mg Oral Daily with breakfast    HYDROcodone-acetaminophen (Norco) 7.5-325 MG per tab 1 tablet  1 tablet Oral Q6H PRN    loperamide (Imodium) cap 2 mg  2 mg Oral QID PRN    melatonin tab 3 mg  3 mg Oral Nightly    predniSONE (Deltasone) tab 2.5 mg  2.5 mg Oral Daily    cholecalciferol (Vitamin D3) tab 1,000 Units  1,000 Units Oral Daily    multivitamin (Tab-A-Curtis/Beta Carotene) tab 1 tablet  1 tablet Oral Daily            Data Review:     Labs:        Recent Labs   Lab 01/07/25  0819   WBC 14.9*   HGB 12.1*   HCT 35.4*   MCV 88.5   MCH 30.3   MCHC 34.2   RDW 15.0   NEPRELIM 9.31*   .0*       No results for input(s): \"COLORUR\", \"CLARITY\", \"SPECGRAVITY\", \"GLUUR\", \"BILUR\", \"KETUR\", \"BLOODURINE\", \"PHURINE\", \"PROUR\", \"UROBILINOGEN\", \"NITRITE\", \"LEUUR\", \"WBCUR\", \"RBCUR\", \"BACUR\", \"EPIUR\" in the last 168 hours.    No results for input(s): \"URINE\", \"CULTI\", \"BLDSMR\" in the last 168 hours.      Imaging:  XR CHEST PA + LAT CHEST (CPT=71046)    Result Date: 1/7/2025  CONCLUSION:   Redemonstrated left chest wall single lead pacemaker device.  No pneumothorax.  No significant change in the mild elevation of the right hemidiaphragm with bibasilar and left perihilar atelectasis/scarring.     Dictated by (CST): Tyrone Bah MD on 1/07/2025 at 8:57 AM     Finalized by (CST): Tyrone Bah MD on 1/07/2025 at 8:59 AM          XR CHEST AP PORTABLE  (CPT=71045)    Result Date: 1/6/2025  CONCLUSION:  1. Interval placement of a left chest wall single lead pacemaker.  No pneumothorax. 2. Discoid configuration bibasilar opacities likely relate to atelectasis/scarring.  There are low lung volumes.    elm-[email or web address]  Dictated by (CST): Link Rooney MD on 1/06/2025 at 12:10 PM     Finalized by (CST): Link Rooney MD on 1/06/2025 at 12:12 PM                            Mars Camargo MD

## 2025-01-09 NOTE — PHYSICAL THERAPY NOTE
PHYSICAL THERAPY TREATMENT NOTE - INPATIENT    Room Number: COF15/COF15-A     Session: 1          Presenting Problem: a-fib with RVR. Pacemaker insertion on 1/6/25  Co-Morbidities : RLE hematoma, arthritis, left toe amputation    HOME SITUATION  Type of Home: Assisted living facility ([facility])  Home Layout: One level         Lives With: Staff 24 hours        Patient Regularly Uses: Wheelchair;Rolling walker (rollator, Tall UP walker)   PHYSICAL THERAPY ASSESSMENT   Patient demonstrates limited progress this session, goals  remain in progress.      Patient is requiring maximum assist and dependent as a result of the following impairments: decreased functional strength, decreased endurance/aerobic capacity, pain, and decreased muscular endurance.     Patient continues to function below baseline with bed mobility, transfers, and gait.  Next session anticipate patient to progress bed mobility, transfers, and gait.  Physical Therapy will continue to follow patient for duration of hospitalization.    Patient continues to benefit from continued skilled PT services: to promote return to prior level of function and safety with continuous assistance and gradual rehabilitative therapy .    PLAN DURING HOSPITALIZATION  Nursing Mobility Recommendation : Lift Equipment  PT Device Recommendation: Mechanical lift  PT Treatment Plan: Bed mobility;Body mechanics;Endurance;Energy conservation;Patient education;Family education;Gait training;Range of motion;Strengthening;Transfer training;Balance training  Frequency (Obs): 3-5x/week     CURRENT GOALS     Goals to be met by: 1/21/25  Patient Goal Patient's self-stated goal is: return to PLOF   Goal #1 Patient is able to demonstrate supine - sit EOB @ level: minimum assistance     Goal #1   Current Status    Goal #2 Patient is able to demonstrate transfers Sit to/from Stand at assistance level: moderate assistance with walker - rolling     Goal #2  Current Status    Goal #3  Patient will tolerate 1 minute of static standing with bilateral UE support on RW requiring Min A for balance to improve standing tolerance.    Goal #3   Current Status    Goal #4 Patient is able to ambulate 15 feet with assist device: walker - rolling at assistance level: moderate assistance   Goal #4   Current Status    Goal #5 Patient to demonstrate independence with home activity/exercise instructions provided to patient in preparation for discharge.   Goal #5   Current Status      2025 all goals ongoing    SUBJECTIVE  \"Im not getting out of bed today\"    OBJECTIVE  Precautions:  (pacemaker precautions)    WEIGHT BEARING RESTRICTION     PAIN ASSESSMENT   Ratin  Location: \"My neck, can't you see that I am hurting\"  Management Techniques: Activity promotion;Repositioning;Body mechanics    BALANCE                                                                                                                       Static Sitting: Fair -  Dynamic Sitting: Poor +           Static Standing: Poor -  Dynamic Standing: Not tested    ACTIVITY TOLERANCE  Pulse: 85  Heart Rate Source: Monitor                   O2 WALK  Oxygen Therapy  SPO2% on Room Air at Rest: 97    AM-PAC '6-Clicks' INPATIENT SHORT FORM - BASIC MOBILITY  How much difficulty does the patient currently have...  Patient Difficulty: Turning over in bed (including adjusting bedclothes, sheets and blankets)?: A Lot   Patient Difficulty: Sitting down on and standing up from a chair with arms (e.g., wheelchair, bedside commode, etc.): A Lot   Patient Difficulty: Moving from lying on back to sitting on the side of the bed?: A Lot   How much help from another person does the patient currently need...   Help from Another: Moving to and from a bed to a chair (including a wheelchair)?: A Lot   Help from Another: Need to walk in hospital room?: Total   Help from Another: Climbing 3-5 steps with a railing?: Total     AM-PAC Score:  Raw Score: 10   Approx Degree  of Impairment: 76.75%   Standardized Score (AM-PAC Scale): 32.29   CMS Modifier (G-Code): CL    FUNCTIONAL ABILITY STATUS  Gait Assessment   Functional Mobility/Gait Assessment  Gait Assistance: Not tested    Skilled Therapy Provided    Bed Mobility:  Rolling: Mod A B   Supine<>Sit: NT   Sit<>Supine: NT     Transfer Mobility:  Sit<>Stand: NT   Stand<>Sit: NT   Gait: NT    Therapist's Comments: Chart reviewed and RN approved PT session. Pt lying in bed, reports of significant pain, and declined bed mobility or OOB. Pt educated in the importance of mobility, repositioning to avoid further deconditioning and pressure sores. In semi reclined position, pt presents with L cervical sidebending and rotation due to R side neck pain. Instructed pt in cervical spine ROM, gentle for rotation and flexion. Pt with reports of discomfort. Educated and instructed pt in better positioning in bed to avoid overstretching the muscles. Also placed bed in a more supine position and instructed pt in repositioning, modified rolling, more difficulty with R side rolling. Pt instructed in UE and LE exercises, required assistance with L UE and also maintained precautions. Pt encouraged to perform exercises 3 times per day to avoid deconditioning and stiffness. Pt left in bed, all needs in reach, alarm on. RN notified of session.       THERAPEUTIC EXERCISES  Lower Extremity Ankle pumps  Glut sets  Heel slides     Upper Extremity Elbow flex/ext,  - open/close, Shoulder flex/ext, and L shoulder modifications due to pacemaker precautions. Cervical spine rotation, flexion and repositioning exercises for cervical spine. Ended session with heat pack with barrier on the R side of neck      Position Supine and semi reclined, upright     Repetitions   12   Sets   2     Patient End of Session: In bed;Needs met;Call light within reach;RN aware of session/findings;All patient questions and concerns addressed;Hospital anti-slip socks;Alarm set    PT  Session Time: 30 minutes  Gait Trainin minutes  Therapeutic Activity: 5 minutes  Therapeutic Exercise: 25 minutes   Neuromuscular Re-education: 0 minutes

## 2025-01-09 NOTE — PLAN OF CARE
Patient alert and oriented. Max assist. Cleared for discharge to [facility] [place]. Report called by RN Nina. Miralax given prior to discharge. Norco for pain management. IV removed and tele discontinued. Discharge paperwork given to transport, script for Norco in packet. [facility] to [facility] patient.     Problem: Patient Centered Care  Goal: Patient preferences are identified and integrated in the patient's plan of care  Description: Interventions:  - What would you like us to know as we care for you? Lives in an assisted living alone.  - Provide timely, complete, and accurate information to patient/family  - Incorporate patient and family knowledge, values, beliefs, and cultural backgrounds into the planning and delivery of care  - Encourage patient/family to participate in care and decision-making at the level they choose  - Honor patient and family perspectives and choices  Outcome: Completed     Problem: Patient/Family Goals  Goal: Patient/Family Long Term Goal  Description: Patient's Long Term Goal: go home    Interventions:  - Heparin drip  - Diltiazem drip  - monitor vital signs  - monitor heart rate  - telemetry  -cardiology on consult  - See additional Care Plan goals for specific interventions  Outcome: Completed  Goal: Patient/Family Short Term Goal  Description: Patient's Short Term Goal: feel better    Interventions:   - cardiology on consult  -telemetry  - monitor heart rhythm  - monitor vital signs  - monitor lab result  - See additional Care Plan goals for specific interventions  Outcome: Completed     Problem: PAIN - ADULT  Goal: Verbalizes/displays adequate comfort level or patient's stated pain goal  Description: INTERVENTIONS:  - Encourage pt to monitor pain and request assistance  - Assess pain using appropriate pain scale  - Administer analgesics based on type and severity of pain and evaluate response  - Implement non-pharmacological measures as appropriate and evaluate  response  - Consider cultural and social influences on pain and pain management  - Manage/alleviate anxiety  - Utilize distraction and/or relaxation techniques  - Monitor for opioid side effects  - Notify MD/LIP if interventions unsuccessful or patient reports new pain  - Anticipate increased pain with activity and pre-medicate as appropriate  Outcome: Completed     Problem: RISK FOR INFECTION - ADULT  Goal: Absence of fever/infection during anticipated neutropenic period  Description: INTERVENTIONS  - Monitor WBC  - Administer growth factors as ordered  - Implement neutropenic guidelines  Outcome: Completed     Problem: SAFETY ADULT - FALL  Goal: Free from fall injury  Description: INTERVENTIONS:  - Assess pt frequently for physical needs  - Identify cognitive and physical deficits and behaviors that affect risk of falls.  - [place] fall precautions as indicated by assessment.  - Educate pt/family on patient safety including physical limitations  - Instruct pt to call for assistance with activity based on assessment  - Modify environment to reduce risk of injury  - Provide assistive devices as appropriate  - Consider OT/PT consult to assist with strengthening/mobility  - Encourage toileting schedule  Outcome: Completed     Problem: DISCHARGE PLANNING  Goal: Discharge to home or other facility with appropriate resources  Description: INTERVENTIONS:  - Identify barriers to discharge w/pt and caregiver  - Include patient/family/discharge partner in discharge planning  - Arrange for needed discharge resources and transportation as appropriate  - Identify discharge learning needs (meds, wound care, etc)  - Arrange for interpreters to assist at discharge as needed  - Consider post-discharge preferences of patient/family/discharge partner  - Complete POLST form as appropriate  - Assess patient's ability to be responsible for managing their own health  - Refer to Case Management Department for coordinating discharge  planning if the patient needs post-hospital services based on physician/LIP order or complex needs related to functional status, cognitive ability or social support system  Outcome: Completed     Problem: CARDIOVASCULAR - ADULT  Goal: Maintains optimal cardiac output and hemodynamic stability  Description: INTERVENTIONS:  - Monitor vital signs, rhythm, and trends  - Monitor for bleeding, hypotension and signs of decreased cardiac output  - Evaluate effectiveness of vasoactive medications to optimize hemodynamic stability  - Monitor arterial and/or venous puncture sites for bleeding and/or hematoma  - Assess quality of pulses, skin color and temperature  - Assess for signs of decreased coronary artery perfusion - ex. Angina  - Evaluate fluid balance, assess for edema, trend weights  Outcome: Completed  Goal: Absence of cardiac arrhythmias or at baseline  Description: INTERVENTIONS:  - Continuous cardiac monitoring, monitor vital signs, obtain 12 lead EKG if indicated  - Evaluate effectiveness of antiarrhythmic and heart rate control medications as ordered  - Initiate emergency measures for life threatening arrhythmias  - Monitor electrolytes and administer replacement therapy as ordered  Outcome: Completed     Problem: METABOLIC/FLUID AND ELECTROLYTES - ADULT  Goal: Electrolytes maintained within normal limits  Description: INTERVENTIONS:  - Monitor labs and rhythm and assess patient for signs and symptoms of electrolyte imbalances  - Administer electrolyte replacement as ordered  - Monitor response to electrolyte replacements, including rhythm and repeat lab results as appropriate  - Fluid restriction as ordered  - Instruct patient on fluid and nutrition restrictions as appropriate  Outcome: Completed

## 2025-01-10 ENCOUNTER — INITIAL APN SNF VISIT (OUTPATIENT)
Facility: SKILLED NURSING FACILITY | Age: OVER 89
End: 2025-01-10

## 2025-01-10 DIAGNOSIS — D64.9 ANEMIA, UNSPECIFIED TYPE: Primary | ICD-10-CM

## 2025-01-10 DIAGNOSIS — I48.91 ATRIAL FIBRILLATION WITH RVR (HCC): ICD-10-CM

## 2025-01-10 DIAGNOSIS — Z95.0 PACEMAKER: ICD-10-CM

## 2025-01-10 DIAGNOSIS — I73.9 PVD (PERIPHERAL VASCULAR DISEASE) (HCC): ICD-10-CM

## 2025-01-10 DIAGNOSIS — R05.1 ACUTE COUGH: ICD-10-CM

## 2025-01-10 NOTE — PROGRESS NOTES
Shoaib Alan  : 10/22/1931  Age 89 year old  male patient is admitted to [facility] for rehabilitation and strengthening.      [facility] Admission: 25 - 25    Chief complaint: Atrial Fibrillation wit RVR, Anemia, left shoulder pain,     HPI  89 year old male with PMHx Atrial fibrillation who had been on diltiazem  mg qD and digoxin 0.125 mg po every day until 24 when he reported near-syncope with 47.   Diltiazem ER was decreased to 120 mg daily at that time.  He presents to ED 24 with tachycardia; EKG shows Afib with   and WCT.  HR decreased with Cadizem 10 mg IVP and Cardizem gtt at 10 mg/hour, and he was admitted for further care;  PCXR neg infiltrate; no CP; no SOB; no headaches; no palpitation. Pacemaker inserted on 1/3/25, stabilized and sent to [facility] for rehabilitation.     Patient seen as initial aprn visit, resting in bed eating breakfast. Rates his pain at the incision sit at 4/10 and is asking for another pain pill. No other complaints. No shortness of breath or chest pain. No nausea or vomiting. VSS    Past Medical History:    AF (atrial fibrillation) ([facility])    Arrhythmia    Cataract    Chronic pulmonary aspiration    ARF  BiPAP    Congestive heart disease (HCC)    COPD (chronic obstructive pulmonary disease) (HCC)    Coronary atherosclerosis    COVID    Depression    Dysphagia    Esophageal reflux    Fracture, radius    Hip fracture, left (HCC)    s/p IM fixation 17; Ortho Dr Alcantara    IBS (irritable bowel syndrome)    Lipid screening    Malabsorption ([facility])    Malnutrition ([facility])    severe     Osteoarthrosis, unspecified whether generalized or localized, unspecified site    hands    Pneumonia    Pneumonia due to organism    Pressure ulcer of left heel, stage 2 (HCC)    PUD (peptic ulcer disease)    Pulmonary hypertension ([facility])    Sacral decubitus ulcer    Thrombocytosis    Villous adenoma    Vitamin B12 deficiency    White matter disease     Past  Surgical History:   Procedure Laterality Date    Cardioversion  2007    Cataract  2006    Cath percutaneous  transluminal coronary angioplasty      Cholecystectomy      Colon surgery Right 10/11    Fat, fecal qualitative  12/10    Fracture surgery      Left femur 2016    Hip surgery  2017    INTERMEDULLARY FIXATION OF LEFT HIP FRACTURE    Hla b 27 disease association  2006    Intraocular lens implant, secondary  2006    Lexiscan nuc stress tst, cardio (dmg)      Other surgical history      Billroth anastomosis    Other surgical history      dental implants    Skin surgery  2019    Mohs/R Dorsal hand/SCC/done by MM     Family History   Problem Relation Age of Onset    Other (old age) Father 89        cause of death    Other (Other) Mother 96        cause of death    Cancer Paternal Aunt     Diabetes Paternal Aunt      Social History     Socioeconomic History    Marital status:    Tobacco Use    Smoking status: Former     Current packs/day: 0.00     Average packs/day: 3.0 packs/day for 20.0 years (60.0 ttl pk-yrs)     Types: Cigarettes     Start date: 1973     Quit date: 1993     Years since quittin.5    Smokeless tobacco: Former     Types: Chew     Quit date: 2015   Vaping Use    Vaping status: Never Used   Substance and Sexual Activity    Alcohol use: Yes     Comment: occassional beer    Drug use: No   Other Topics Concern    Caffeine Concern Yes     Comment: coffee: 4 cups/daily, soda: 3 cup/daily   Social History Narrative    Work as print  in past    Lives with wife    Wife has dementia     Social Drivers of Health     Financial Resource Strain: Low Risk  (2021)    [facility]    Overall Financial Resource Strain (CARDIA)     Difficulty of Paying Living Expenses: Not hard at all   Food Insecurity: No Food Insecurity (2025)    Food Insecurity     Food Insecurity: Never true   Transportation Needs: No  Transportation Needs (1/2/2025)    Transportation Needs     Lack of Transportation: No   Housing Stability: Low Risk  (1/2/2025)    Housing Stability     Housing Instability: No       IMMUNIZATIONS  Immunization History   Administered Date(s) Administered    Covid-19 Vaccine Pfizer 30 mcg/0.3 ml 01/28/2021, 02/18/2021, 10/14/2021    Covid-19 Vaccine Pfizer Bivalent 30mcg/0.3mL 11/18/2022    Covid-19 Vaccine Pfizer Yoan-Sucrose 30 mcg/0.3 ml 04/27/2022    FLU VAC High Dose 65 YRS & Older PRSV Free (79351) 10/02/2015, 09/12/2019, 12/15/2020, 10/14/2021, 11/07/2021, 11/18/2022    FLUAD High Dose 65 yr and older (50846) 10/12/2017    FLUZONE Quad Multidose 6-35 Mos (03930) 10/31/2011    Fluzone Vaccine Medicare () 10/02/2018, 09/19/2019    High Dose Fluzone Influenza Vaccine, 65yr+ PF 0.5mL (32362) 11/28/2016    Influenza 10/03/2013, 09/27/2014    Pneumococcal (Prevnar 13) 11/09/2017    Pneumovax 23 04/26/2014        ALLERGIES:  Allergies[1]    CODE STATUS:  Full Code    ADVANCED CARE PLANNING TEAM: Will need family care plan       CURRENT MEDICATIONS - reviewed and updated on [facility]       SUBJECTIVE    Patient seen as initial aprn visit, resting in bed eating breakfast. Rates his pain at the incision sit at 4/10 and is asking for another pain pill. No other complaints. No shortness of breath or chest pain. No nausea or vomiting. Has a productive cough. VSS     PHYSICAL EXAM:  VITALS /73  RR 18 SPO2 98%  GENERAL HEALTH:thin, frail   LINES, TUBES, DRAINS:  none  SKIN: no rashes, no suspicious lesions, warm, dry  WOUND: see wound assessment,   EYES: PERRLA, EOMI, sclera anicteric, conjunctiva normal; there is no nystagmus, no drainage from eyes  HENT: normocephalic; normal nose, no nasal drainage, mucous membranes pink, moist, pharynx no exudate, no visible cerumen.   NECK:supple, FROM; no JVD, no TMG, no carotid bruits   BREAST: deferred exam   RESPIRATORY:clear to percussion and  auscultation  CARDIOVASCULAR: S1, S2 normal, RRR; no S3, no S4  ABDOMEN:  normal active BS+, soft, nondistended; no organomegaly, no masses; no bruits; nontender, no guarding, no rebound tenderness.  :Deferred  LYMPHATIC:no lymphedema  MUSCULOSKELETAL: no acute synovitis upper or lower extremity   EXTREMITIES/VASCULAR:no cyanosis, clubbing or edema  NEUROLOGIC:intact; no sensorimotor deficit and follows commands  PSYCHIATRIC: alert and oriented x 3; affect appropriate       LABS/DIAGNOSTICS REVIEWED AT THIS VISIT:  LABS 1/10/25  WBC 11.73 HGB 11.6 CR 0.85 BUN 44  K 4.7    SEE PLAN BELOW  Atrial fibrillation with RVR  Pacemaker   - was on diltiazem  mg qD and digoxin 0.125 mg po every day until 12/30/24 when he reported near-syncope with 47, so diltiazem ER was decreased to 120 mg daily  - presents to ED 12/2/24 with tachycardia; EKG shows Afib with   and WCT  - HR decreased with Cadizem 10 mg IVP and Cardizem gtt at 10 mg/hour  - tachy-sylwia syndrome  - s/p PPM per Dr Thomas; HR 90s-120  - continue metoprolol 50 mg po BID  - continue Xarelto 15mg daily   - continue Digoxin 125mcg daily   - continue tylenol 500mg Q6PNR  - continue Norco 7.5-325 Q6PRN  - narcan   - cardiology to follow in rehab   - PT/OT  - monitor    Cough  - continue  GuaiFENesin 600mg BID, to be completed 1/15/25  - monitor      History of anemia  Hgb stable (12.1 on 1/7/25)  on ferrous sulfate 325 mg po qD      PVD  -CTA 11/29/21 reveals atherosclerosis of the SFA with 65-70% stenosis just beyond the adductor canal  -s/p angioplasty of tight focal left popliteal stenosis; unable to open distally occluded left post tib artery; left ant tib artery-dorsalis pedis widely patent (procedure on 12/1/21 by Dr. Lynch).      Constipation  - continue Miralax 17 gm po daily prn  - continue Dulcolax 10 mg po daily prn  - monitor      Osteoarthritis lumbar spine and cervical spine  As seen on CT June 2018; Scoliosis and degenerative changes in  spine. Mild chronic anterior wedging of lower thoracic vertebral bodies; XR L-spine in Aug 2019 shows 5 lumbar segments with 20° levoconvex scoliosis, slight flattening of the lordotic curvature.  Endplate compression at L3.  Moderate loss of disc heights throughout the lumbar spine.  No spondylolisthesis.  No destructive lesions.  Mild calcification of the abdominal aorta; has seen physiatrist Dr Reilly Overton  - continue Norco 7.5 mg q. 6 hours p.r.n.  - continue lidocain patch daily  - monitor      Stool incontinence  - continue Imodium 2 mg po q4hrs prn  - monitor      Left shoulder pain  XR both shoulders neg for DJD; DDx includes frozen shoulder vs PMR  - continue prednisone to 2.5 mg daily  - PT/OT     Sacral decubitus ulcer  uses Medihoney wound dressing gel daily prn      History of villous adenoma   the patient reports occasional loose stools.  The patient has been advised to see gastroenterologist, Dr. Hopper, as an outpatient for colonoscopy though he has declined repeat colonoscopy    LABS  - CBC and BMP weekly and PRN    Therapy  - baseline  - current: therapy to evaluate    Discharge Planning  - SW following         FOLLOW UP APPOINTMENTS  Future Appointments   Date Time Provider Department Center   2/3/2025  3:00 PM Mars Camargo MD [facility]        This is a 60 minute visit and greater than 50% of the time was spent counseling the patient and/or coordinating care. Patient is a full code    Note to patient: The 21st Century Cures Act makes medical notes like these available to patients in the interest of transparency. However, this is a medical document intended as peer to peer communication. It is written in medical language and may contain abbreviations or verbiage that are unfamiliar. It may appear blunt or direct. Medical documents are intended to carry relevant information, facts as evident, and the clinical opinion of the practitioner who signs the document.     Jenna Hooker,  APRN  01/10/25         [1]   Allergies  Allergen Reactions    Dronedarone      Other reaction(s): extreme dizzinss

## 2025-01-12 ENCOUNTER — APPOINTMENT (OUTPATIENT)
Dept: GENERAL RADIOLOGY | Facility: HOSPITAL | Age: OVER 89
End: 2025-01-12
Attending: EMERGENCY MEDICINE
Payer: MEDICARE

## 2025-01-12 ENCOUNTER — HOSPITAL ENCOUNTER (INPATIENT)
Facility: HOSPITAL | Age: OVER 89
LOS: 2 days | Discharge: NURSING, REHAB OR LONG-TERM CARE | End: 2025-01-14
Attending: EMERGENCY MEDICINE | Admitting: INTERNAL MEDICINE
Payer: MEDICARE

## 2025-01-12 DIAGNOSIS — R00.0 TACHYCARDIA: Primary | ICD-10-CM

## 2025-01-12 PROBLEM — T14.8XXA SKIN AVULSION: Status: ACTIVE | Noted: 2025-01-12

## 2025-01-12 PROBLEM — T82.837A PACEMAKER POCKET HEMATOMA: Status: ACTIVE | Noted: 2025-01-12

## 2025-01-12 LAB
ANION GAP SERPL CALC-SCNC: 11 MMOL/L (ref 0–18)
BASOPHILS # BLD: 0 X10(3) UL (ref 0–0.2)
BASOPHILS NFR BLD: 0 %
BUN BLD-MCNC: 44 MG/DL (ref 9–23)
BUN/CREAT SERPL: 44.4 (ref 10–20)
CALCIUM BLD-MCNC: 10.1 MG/DL (ref 8.7–10.4)
CHLORIDE SERPL-SCNC: 101 MMOL/L (ref 98–112)
CO2 SERPL-SCNC: 29 MMOL/L (ref 21–32)
CREAT BLD-MCNC: 0.99 MG/DL
DEPRECATED RDW RBC AUTO: 50.3 FL (ref 35.1–46.3)
EGFRCR SERPLBLD CKD-EPI 2021: 71 ML/MIN/1.73M2 (ref 60–?)
EOSINOPHIL # BLD: 0 X10(3) UL (ref 0–0.7)
EOSINOPHIL NFR BLD: 0 %
ERYTHROCYTE [DISTWIDTH] IN BLOOD BY AUTOMATED COUNT: 15.1 % (ref 11–15)
GLUCOSE BLD-MCNC: 102 MG/DL (ref 70–99)
HCT VFR BLD AUTO: 38.8 %
HGB BLD-MCNC: 12.7 G/DL
LYMPHOCYTES NFR BLD: 1.26 X10(3) UL (ref 1–4)
LYMPHOCYTES NFR BLD: 8 %
MAGNESIUM SERPL-MCNC: 2.3 MG/DL (ref 1.6–2.6)
MCH RBC QN AUTO: 29.8 PG (ref 26–34)
MCHC RBC AUTO-ENTMCNC: 32.7 G/DL (ref 31–37)
MCV RBC AUTO: 91.1 FL
MONOCYTES # BLD: 1.12 X10(3) UL (ref 0.1–1)
MONOCYTES NFR BLD: 8 %
MRSA DNA SPEC QL NAA+PROBE: NEGATIVE
MYELOCYTES # BLD: 0.14 X10(3) UL
MYELOCYTES NFR BLD: 1 %
NEUTROPHILS # BLD AUTO: 9.77 X10 (3) UL (ref 1.5–7.7)
NEUTROPHILS NFR BLD: 82 %
NEUTS HYPERSEG # BLD: 11.48 X10(3) UL (ref 1.5–7.7)
OSMOLALITY SERPL CALC.SUM OF ELEC: 303 MOSM/KG (ref 275–295)
PLATELET # BLD AUTO: 235 10(3)UL (ref 150–450)
PLATELET MORPHOLOGY: NORMAL
POTASSIUM SERPL-SCNC: 5 MMOL/L (ref 3.5–5.1)
Q-T INTERVAL: 334 MS
QRS DURATION: 138 MS
QTC CALCULATION (BEZET): 518 MS
R AXIS: -53 DEGREES
RBC # BLD AUTO: 4.26 X10(6)UL
SODIUM SERPL-SCNC: 141 MMOL/L (ref 136–145)
T AXIS: 144 DEGREES
TOTAL CELLS COUNTED BLD: 100
VARIANT LYMPHS NFR BLD MANUAL: 1 %
VENTRICULAR RATE: 145 BPM
WBC # BLD AUTO: 14 X10(3) UL (ref 4–11)

## 2025-01-12 PROCEDURE — 99223 1ST HOSP IP/OBS HIGH 75: CPT | Performed by: INTERNAL MEDICINE

## 2025-01-12 PROCEDURE — 71045 X-RAY EXAM CHEST 1 VIEW: CPT | Performed by: EMERGENCY MEDICINE

## 2025-01-12 RX ORDER — POLYETHYLENE GLYCOL 3350 17 G/17G
17 POWDER, FOR SOLUTION ORAL DAILY PRN
Status: DISCONTINUED | OUTPATIENT
Start: 2025-01-12 | End: 2025-01-14

## 2025-01-12 RX ORDER — ACETAMINOPHEN 500 MG
500 TABLET ORAL EVERY 6 HOURS PRN
Status: DISCONTINUED | OUTPATIENT
Start: 2025-01-12 | End: 2025-01-14

## 2025-01-12 RX ORDER — DILTIAZEM HYDROCHLORIDE 5 MG/ML
10 INJECTION INTRAVENOUS ONCE
Status: DISCONTINUED | OUTPATIENT
Start: 2025-01-12 | End: 2025-01-12

## 2025-01-12 RX ORDER — HYDROCODONE BITARTRATE AND ACETAMINOPHEN 7.5; 325 MG/1; MG/1
1 TABLET ORAL EVERY 6 HOURS PRN
Status: DISCONTINUED | OUTPATIENT
Start: 2025-01-12 | End: 2025-01-14

## 2025-01-12 RX ORDER — SILVER SULFADIAZINE 10 MG/G
CREAM TOPICAL 2 TIMES DAILY
Status: DISCONTINUED | OUTPATIENT
Start: 2025-01-12 | End: 2025-01-13

## 2025-01-12 RX ORDER — DOCUSATE SODIUM 100 MG/1
100 CAPSULE, LIQUID FILLED ORAL 2 TIMES DAILY
Status: DISCONTINUED | OUTPATIENT
Start: 2025-01-12 | End: 2025-01-14

## 2025-01-12 RX ORDER — FUROSEMIDE 20 MG/1
20 TABLET ORAL DAILY PRN
Status: DISCONTINUED | OUTPATIENT
Start: 2025-01-12 | End: 2025-01-14

## 2025-01-12 RX ORDER — PREDNISONE 2.5 MG/1
2.5 TABLET ORAL DAILY
Status: DISCONTINUED | OUTPATIENT
Start: 2025-01-13 | End: 2025-01-14

## 2025-01-12 RX ORDER — DIGOXIN 125 MCG
125 TABLET ORAL DAILY
Status: DISCONTINUED | OUTPATIENT
Start: 2025-01-13 | End: 2025-01-14

## 2025-01-12 RX ORDER — FERROUS SULFATE 325(65) MG
325 TABLET, DELAYED RELEASE (ENTERIC COATED) ORAL
Status: DISCONTINUED | OUTPATIENT
Start: 2025-01-13 | End: 2025-01-14

## 2025-01-12 RX ORDER — CHOLECALCIFEROL (VITAMIN D3) 25 MCG
1000 TABLET ORAL DAILY
Status: DISCONTINUED | OUTPATIENT
Start: 2025-01-13 | End: 2025-01-14

## 2025-01-12 RX ORDER — METOPROLOL TARTRATE 50 MG
50 TABLET ORAL
Status: DISCONTINUED | OUTPATIENT
Start: 2025-01-12 | End: 2025-01-14

## 2025-01-12 RX ORDER — LOPERAMIDE HYDROCHLORIDE 2 MG/1
2 CAPSULE ORAL 4 TIMES DAILY PRN
Status: DISCONTINUED | OUTPATIENT
Start: 2025-01-12 | End: 2025-01-14
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
## 2025-01-12 NOTE — ED QUICK NOTES
Orders for admission, patient is aware of plan and ready to go upstairs. Any questions, please call ED RN JURATE at extension [phone].     Patient Covid vaccination status: Fully vaccinated     COVID Test Ordered in ED: None    COVID Suspicion at Admission: N/A    Running Infusions:    amiodarone 1 mg/min (01/12/25 0953)    Followed by    amiodarone          Mental Status/LOC at time of transport: AXOX4    Other pertinent information:   CIWA score: N/A   NIH score:  N/A
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
## 2025-01-12 NOTE — CM/SW NOTE
01/12/25 1400   CM/SUSANNE Referral Data   Referral Source Physician   Reason for Referral   (SDOH)   Informant EMR   Readmission Assessment   Factors that patient feels contributed to this readmission Acute/Chronic Clinical Presentation   Pt's living situation prior to admission? Subacute rehab  ([facility] [place])   Was full assessment completed by SUSANNE/SACHI on prior admission? No (comment)   Was the recommended discharge plan achieved? Yes   Was pt. discharged w/out services? No   Medical Hx   Does patient have an established PCP? Yes  (Mars Camargo)   Patient Info   Patient's Home Environment Assisted Living   Post Acute Care Provider Upon Admission   ([facility])   Patient lives with Alone   Patient Status Prior to Admission   Independent with ADLs and Mobility No   Pt. requires assistance with Housework;Driving;Meals;Ambulating;Medications   Services in place prior to admission DME/Supplies at home   Type of DME/Supplies Rollator Walker;Wheelchair;Scooter;Hospital Bed   Discharge Needs   Anticipated D/C needs Subacute rehab;To be determined   Services Requested   Submitted to [facility] Yes   PASRR Level 1 Submitted No - Current within 90 days     Received MDO for SDOH: Domestic Safety. Per order, concerns w/ pt's care at [facility] [place].     Per chart, ER CM made APS report to [place] Dept [facility] Public [facility]. Please see her note for more details.    Above assessment completed based on chart review.  Pt is typically from [facility] in [place].  Pt has above DME listed.     Pt has hx w/ [facility] in the past. Most recently, pt was Dc'd to [facility] [place] on 1/8/25.  Pt has returned due to concerns w/ care at [facility] [place] and tachycardia.    ER MD documented wound on pt's left arm - please see picture under Media tab in Epic.    SUSANNE sent [facility] referrals in [place]. PASRR is valid. CLRC sent.    SUSANNE/SACHI team will f/up w/ pt's family when/as appropriate for any further DC Planning. If pt's family  wishes [facility] to [facility], SW/CM will need to confirm any additional services to assist w/ that plan.        PLAN: TBD, poss new [facility] vs [facility] w/ HH & additional CG's - pending clinical course/needs, family wishes & med clear      SW/CM to remain available for support and/or discharge planning.       SANDRA Martin, LSW [id number]

## 2025-01-12 NOTE — PLAN OF CARE
Pt alert and oriented x4. X1 S&P. Amio gtt. LA precautions. Pt and family updated on plan of care. Safety precautions in place.  Call light within reach.    Problem: Patient Centered Care  Goal: Patient preferences are identified and integrated in the patient's plan of care  Description: Interventions:  - What would you like us to know as we care for you?  - Provide timely, complete, and accurate information to patient/family  - Incorporate patient and family knowledge, values, beliefs, and cultural backgrounds into the planning and delivery of care  - Encourage patient/family to participate in care and decision-making at the level they choose  - Honor patient and family perspectives and choices  Outcome: Progressing     Problem: Patient/Family Goals  Goal: Patient/Family Long Term Goal  Description: Patient's Long Term Goal: discharge    Interventions:  - Monitor vital signs  - Amiodarone gtt  - Wound care  - See additional Care Plan goals for specific interventions  Outcome: Progressing  Goal: Patient/Family Short Term Goal  Description: Patient's Short Term Goal: feel better    Interventions:   - Continue ADLs  - Pain management  - See additional Care Plan goals for specific interventions  Outcome: Progressing     Problem: CARDIOVASCULAR - ADULT  Goal: Maintains optimal cardiac output and hemodynamic stability  Description: INTERVENTIONS:  - Monitor vital signs, rhythm, and trends  - Monitor for bleeding, hypotension and signs of decreased cardiac output  - Evaluate effectiveness of vasoactive medications to optimize hemodynamic stability  - Monitor arterial and/or venous puncture sites for bleeding and/or hematoma  - Assess quality of pulses, skin color and temperature  - Assess for signs of decreased coronary artery perfusion - ex. Angina  - Evaluate fluid balance, assess for edema, trend weights  Outcome: Progressing  Goal: Absence of cardiac arrhythmias or at baseline  Description: INTERVENTIONS:  -  Continuous cardiac monitoring, monitor vital signs, obtain 12 lead EKG if indicated  - Evaluate effectiveness of antiarrhythmic and heart rate control medications as ordered  - Initiate emergency measures for life threatening arrhythmias  - Monitor electrolytes and administer replacement therapy as ordered  Outcome: Progressing     Problem: SKIN/TISSUE INTEGRITY - ADULT  Goal: Skin integrity remains intact  Description: INTERVENTIONS  - Assess and document risk factors for pressure ulcer development  - Assess and document skin integrity  - Monitor for areas of redness and/or skin breakdown  - Initiate interventions, skin care algorithm/standards of care as needed  Outcome: Progressing  Goal: Incision(s), wounds(s) or drain site(s) healing without S/S of infection  Description: INTERVENTIONS:  - Assess and document risk factors for pressure ulcer development  - Assess and document skin integrity  - Assess and document dressing/incision, wound bed, drain sites and surrounding tissue  - Implement wound care per orders  - Initiate isolation precautions as appropriate  - Initiate Pressure Ulcer prevention bundle as indicated  Outcome: Progressing

## 2025-01-12 NOTE — CONSULTS
[facility]  part of [facility]    Cardiology Consultation    Shoaib Alan Patient Status:  Emergency    10/22/1931 MRN [id number]   Location [facility] EMERGENCY DEPARTMENT Attending Jaime Baird MD   Hosp Day # 0 PCP Mars Camargo MD     Date of Admission:  2025  Date of Consult:  2025  Reason for Consultation:     History of Present Illness:   Patient is a 89 year old male with significant past medical history of persistent atrial fibrillation, tachy/sylwia syndrome s/p PPM, heart failure with improved EF, COPD who presents with elevated HR at his SNF.  Patient denies any chest pain, shortness of breath or palpitations.  Of note patient had recent single-lead PPM implanted due to A-fib with tachy-sylwia syndrome on 25.   In the ED, ECG and rhythm strips with A-fib with RVR.  Patient had labile BP on arrival, was started on Amio bolus/drip in the ED.  Rates during encounter have improved down to 120-130s  Assessment and Plan:   HFimpEF  COPD    Persistent AF  Tachy/sylwia syndrome s/p PPM  -Recent PPM implantation on 2025 for A-fib and tachy/bradycardia syndrome  -Presents with elevated heart rates, ECG and rhythm strips with A-fib with RVR  -Associated with hemodynamic instability, labile BP  -started on amnio bolus/drip with improvement in rates and blood pressures  -Resume beta-blockade with metoprolol tartrate 50 mg twice daily and digoxin 0.125mg daily   -will reassess and uptitrate BB or add CCB per BP/HR tolerance  -Interrogate PPM  -Elevated [id number], switch rivaroxaban to age/weight-adjusted apixaban 2.5 mg twice daily    Past Medical History  Past Medical History:    AF (atrial fibrillation) (HCC)    Arrhythmia    Cataract    Chronic pulmonary aspiration    ARF  BiPAP    Congestive heart disease (HCC)    COPD (chronic obstructive pulmonary disease) (HCC)    Coronary atherosclerosis    COVID    Depression    Dysphagia    Esophageal reflux     Fracture, radius    Hip fracture, left (HCC)    s/p IM fixation 1/11/17; Ortho Dr Alcantara    IBS (irritable bowel syndrome)    Lipid screening    Malabsorption (HCC)    Malnutrition (HCC)    severe     Osteoarthrosis, unspecified whether generalized or localized, unspecified site    hands    Pneumonia    Pneumonia due to organism    Pressure ulcer of left heel, stage 2 (HCC)    PUD (peptic ulcer disease)    Pulmonary hypertension (HCC)    Sacral decubitus ulcer    Thrombocytosis    Villous adenoma    Vitamin B12 deficiency    White matter disease       Past Surgical History  Past Surgical History:   Procedure Laterality Date    Cardioversion  2007    Cataract  2006    Cath percutaneous  transluminal coronary angioplasty  1990    Cholecystectomy      Colon surgery Right 10/11    Fat, fecal qualitative  12/10    Fracture surgery      Left femur 2016    Hip surgery  01/11/2017    INTERMEDULLARY FIXATION OF LEFT HIP FRACTURE    Hla b 27 disease association  2006    Intraocular lens implant, secondary  2006    Lexiscan nuc stress tst, cardio (dmg)  9/11    Other surgical history  1970    Billroth anastomosis    Other surgical history  2011    dental implants    Skin surgery  02/11/2019    Mohs/R Dorsal hand/SCC/done by MM       Family History  Family History   Problem Relation Age of Onset    Other (old age) Father 89        cause of death    Other (Other) Mother 96        cause of death    Cancer Paternal Aunt     Diabetes Paternal Aunt        Social History  Pediatric History   Patient Parents    Not on file     Other Topics Concern     Service Not Asked    Blood Transfusions Not Asked    Caffeine Concern Yes     Comment: coffee: 4 cups/daily, soda: 3 cup/daily    Occupational Exposure Not Asked    Hobby Hazards Not Asked    Sleep Concern Not Asked    Stress Concern Not Asked    Weight Concern Not Asked    Special Diet Not Asked    Back Care Not Asked    Exercise Not Asked    Bike Helmet Not Asked    Seat Belt  Not Asked    Self-Exams Not Asked   Social History Narrative    Work as [employer]  in past    Lives with wife    Wife has dementia           Current Medications:  Current Facility-Administered Medications   Medication Dose Route Frequency    amiodarone (Cordarone) 150 mg in dextrose 5% 100 mL IV bolus  150 mg Intravenous Once    Followed by    amiodarone in dextrose 4.14% (Cordarone) 360 mg/200mL infusion premix  1 mg/min Intravenous Continuous    Followed by    amiodarone in dextrose 4.14% (Cordarone) 360 mg/200mL infusion premix  0.5 mg/min Intravenous Continuous     (Not in a hospital admission)      Allergies  Allergies[1]    Review of Systems:   ROS  10 point review of systems completed and negative except as noted.    Physical Exam:   Patient Vitals for the past 24 hrs:   BP Temp Temp src Pulse Resp SpO2 Height Weight   01/12/25 0845 109/80 -- -- (!) 146 26 94 % -- --   01/12/25 0830 104/83 -- -- (!) 147 19 98 % -- --   01/12/25 0814 124/75 98.4 °F (36.9 °C) Temporal (!) 127 18 94 % 6' 4\" (1.93 m) 121 lb (54.9 kg)       Intake/Output:   Last 3 shifts:   Vent Settings:    Hemodynamic parameters (last 24 hours):    Scheduled Meds:    amiodarone  150 mg Intravenous Once     Continuous Infusions:    amiodarone      Followed by    amiodarone         Physical Exam:  General: ANO x 3, cachectic appearing  HEENT: Normocephalic, anicteric sclera, neck supple, no thyromegaly or adenopathy.  Neck: No JVD, carotids 2+, no bruits.  Cardiac: Irregularly irregular  Lungs: Diminished breath sounds  Abdomen: Soft, non-tender. No organosplenomegally, mass or rebound, BS-present.  Extremities: Without clubbing or cyanosis.  Trace BLE edema.    Neurologic: Alert and oriented, normal affect. No focal defects  Skin: Warm and dry.     Results:   Laboratory Data:  Lab Results   Component Value Date    WBC 14.0 (H) 01/12/2025    HGB 12.7 (L) 01/12/2025    HCT 38.8 (L) 01/12/2025    .0 01/12/2025    CREATSERUM 0.99  01/12/2025    BUN 44 (H) 01/12/2025     01/12/2025    K 5.0 01/12/2025     01/12/2025    CO2 29.0 01/12/2025     (H) 01/12/2025    CA 10.1 01/12/2025    ALB 3.7 01/03/2025    ALKPHO 59 01/03/2025    TP 6.4 01/03/2025    AST 15 01/03/2025    ALT <7 (L) 01/03/2025    PTT 33.8 01/06/2025    INR 1.01 01/03/2025    PTP 13.9 01/03/2025    TSH 2.791 01/03/2025    LIP 18 (L) 06/29/2018    DDIMER <0.27 12/01/2022    ESRML 23 11/26/2021    MG 2.3 01/12/2025    PHOS 2.9 12/04/2022    TROP <0.045 08/06/2019    B12 681 09/28/2020         Recent Labs   Lab 01/06/25  0629 01/07/25  0819 01/12/25  0821   GLU 89 100* 102*   BUN 22 26* 44*   CREATSERUM 1.01 0.99 0.99   CA 9.7 9.6 10.1    135* 141   K 4.0 4.2 5.0   CL 98 99 101   CO2 29.0 27.0 29.0     Recent Labs   Lab 01/06/25  0629 01/07/25  0819 01/12/25  0821   RBC 4.61 4.00 4.26   HGB 13.0 12.1* 12.7*   HCT 41.8 35.4* 38.8*   MCV 90.7 88.5 91.1   MCH 28.2 30.3 29.8   MCHC 31.1 34.2 32.7   RDW 14.8 15.0 15.1*   NEPRELIM 9.11* 9.31* 9.77*   WBC 13.8* 14.9* 14.0*   .0 135.0* 235.0       No results for input(s): \"BNPML\" in the last 168 hours.    No results for input(s): \"TROP\", \"CK\" in the last 168 hours.    Imaging:  XR CHEST AP PORTABLE  (CPT=71045)    Result Date: 1/12/2025  CONCLUSION: No acute cardiopulmonary abnormality.   Dictated by (CST): Jesse Wang MD on 1/12/2025 at 9:17 AM     Finalized by (CST): Jesse Wang MD on 1/12/2025 at 9:19 AM           Thank you for allowing me to participate in the care of your patient.    Prasad Akins, [facility] [place]         [1]   Allergies  Allergen Reactions    Dronedarone      Other reaction(s): extreme dizzinss

## 2025-01-12 NOTE — ED PROVIDER NOTES
Patient Seen in: [facility] Emergency Department    History     Chief Complaint   Patient presents with    Arrythmia/Palpitations       HPI    History is provided by patient/independent historian: patient  89 year old male with recent history of pacemaker placement for atrial fibrillation with tachybradycardia, here with elevated heart rate.  On EMS arrival, they report patient's heart rate was 148 with having some pulses.  Patient denies any chest pain or shortness of breath.  He has no symptoms.  Patient is stating that he felt like he was being abused  At the facility he came from.    History reviewed.   Past Medical History:    AF (atrial fibrillation) ([facility])    Arrhythmia    Cataract    Chronic pulmonary aspiration    ARF 6/16 BiPAP    Congestive heart disease (HCC)    COPD (chronic obstructive pulmonary disease) ([facility])    Coronary atherosclerosis    COVID    Depression    Dysphagia    Esophageal reflux    Fracture, radius    Hip fracture, left (HCC)    s/p IM fixation 1/11/17; Ortho Dr Alcantara    IBS (irritable bowel syndrome)    Lipid screening    Malabsorption ([facility])    Malnutrition (HCC)    severe     Osteoarthrosis, unspecified whether generalized or localized, unspecified site    hands    Pneumonia    Pneumonia due to organism    Pressure ulcer of left heel, stage 2 (HCC)    PUD (peptic ulcer disease)    Pulmonary hypertension (HCC)    Sacral decubitus ulcer    Thrombocytosis    Villous adenoma    Vitamin B12 deficiency    White matter disease         History reviewed.   Past Surgical History:   Procedure Laterality Date    Cardioversion  2007    Cataract  2006    Cath percutaneous  transluminal coronary angioplasty  1990    Cholecystectomy      Colon surgery Right 10/11    Fat, fecal qualitative  12/10    Fracture surgery      Left femur 2016    Hip surgery  01/11/2017    INTERMEDULLARY FIXATION OF LEFT HIP FRACTURE    Hla b 27 disease association  2006    Intraocular lens implant, secondary  2006     Lexiscan nuc stress tst, cardio (dmg)      Other surgical history  1970    Billroth anastomosis    Other surgical history      dental implants    Skin surgery  2019    Mohs/R Dorsal hand/SCC/done by MM         Home Medications reviewed :  Prescriptions Prior to Admission[1]      History reviewed.   Social History     Socioeconomic History    Marital status:    Tobacco Use    Smoking status: Former     Current packs/day: 0.00     Average packs/day: 3.0 packs/day for 20.0 years (60.0 ttl pk-yrs)     Types: Cigarettes     Start date: 1973     Quit date: 1993     Years since quittin.5    Smokeless tobacco: Former     Types: Chew     Quit date: 2015   Vaping Use    Vaping status: Never Used   Substance and Sexual Activity    Alcohol use: Yes     Comment: occassional beer    Drug use: No   Other Topics Concern    Caffeine Concern Yes     Comment: coffee: 4 cups/daily, soda: 3 cup/daily         ROS  Review of Systems   Respiratory:  Negative for shortness of breath.    Cardiovascular:  Negative for chest pain.   All other systems reviewed and are negative.     All other pertinent organ systems are reviewed and are negative.      Physical Exam     ED Triage Vitals [25 0814]   /75   Pulse (!) 127   Resp 18   Temp 98.4 °F (36.9 °C)   Temp src Temporal   SpO2 94 %   O2 Device None (Room air)     Vital signs reviewed.      Physical Exam  Vitals and nursing note reviewed.   Cardiovascular:      Rate and Rhythm: Tachycardia present. Rhythm irregular.      Pulses: Normal pulses.      Comments: Pacemaker site dressing clean dry and intact, some surrounding ecchymosis, no cellulitis  Pulmonary:      Effort: No respiratory distress.   Abdominal:      General: There is no distension.   Neurological:      Mental Status: He is alert.         ED Course       Labs:     Labs Reviewed   CBC WITH DIFFERENTIAL WITH PLATELET - Abnormal; Notable for the following components:       Result Value     WBC 14.0 (*)     HGB 12.7 (*)     HCT 38.8 (*)     RDW-SD 50.3 (*)     RDW 15.1 (*)     Neutrophil Absolute Prelim 9.77 (*)     All other components within normal limits   BASIC METABOLIC PANEL (8) - Abnormal; Notable for the following components:    Glucose 102 (*)     BUN 44 (*)     BUN/CREA Ratio 44.4 (*)     Calculated Osmolality 303 (*)     All other components within normal limits   MANUAL DIFFERENTIAL - Abnormal; Notable for the following components:    Neutrophil Absolute Manual 11.48 (*)     Monocyte Absolute Manual 1.12 (*)     Myelocyte Absolute Manual 0.14 (*)     RBC Morphology See morphology below (*)     All other components within normal limits   MAGNESIUM - Normal   ED/MRSA SCREEN BY PCR-CC - Normal   SCAN SLIDE   RAINBOW DRAW LAVENDER   RAINBOW DRAW LIGHT GREEN   RAINBOW DRAW BLUE   RAINBOW DRAW GOLD         My EKG Interpretation: EKG    Rate, intervals and axes as noted on EKG Report.  Rate: 145  Rhythm: atrial flutter  Reading: abnormal for rate, abnormal for rhythm, ST depression laterally           As reviewed and Interpreted by me      Imaging Results Available and Reviewed while in ED:   XR CHEST AP PORTABLE  (CPT=71045)    Result Date: 1/12/2025  CONCLUSION: No acute cardiopulmonary abnormality.   Dictated by (CST): Jesse Wang MD on 1/12/2025 at 9:17 AM     Finalized by (CST): Jesse Wang MD on 1/12/2025 at 9:19 AM         My review and independent interpretation of XR images: no infiltrate. Radiology report corroborates this in addition to other details as reported by them.      Decision rules/scores evaluated: none      Diagnostic labs/tests considered but not ordered: none    ED Medications Administered:   Medications   amiodarone (Cordarone) 150 mg in dextrose 5% 100 mL IV bolus (0 mg Intravenous Stopped 1/12/25 0397)     Followed by   amiodarone in dextrose 4.14% (Cordarone) 360 mg/200mL infusion premix (1 mg/min Intravenous Handoff 1/12/25 1500)     Followed by   amiodarone in  dextrose 4.14% (Cordarone) 360 mg/200mL infusion premix (has no administration in time range)   sodium chloride 0.9 % IV bolus 500 mL (500 mL Intravenous Handoff 1/12/25 1101)                MDM       Medical Decision Making      Differential Diagnosis: After obtaining the patient's history, performing the physical exam and reviewing the diagnostics, multiple initial diagnoses were considered based on the presenting problem including palpitations, pacemaker defect, lead dislodgement    External document review: I personally reviewed available external medical records for any recent pertinent discharge summaries, testing, and procedures - the findings are as follows: 1/6/25 admission for near syncope    Complicating Factors: The patient already  has a past medical history of AF (atrial fibrillation) ([facility]), Arrhythmia, Cataract, Chronic pulmonary aspiration, Congestive heart disease (HCC), COPD (chronic obstructive pulmonary disease) ([facility]), Coronary atherosclerosis, COVID, Depression, Dysphagia, Esophageal reflux, Fracture, radius (2014), Hip fracture, left ([facility]) (01/2017), IBS (irritable bowel syndrome), Lipid screening (10/04/2010), Malabsorption ([facility]), Malnutrition ([facility]), Osteoarthrosis, unspecified whether generalized or localized, unspecified site, Pneumonia, Pneumonia due to organism (2016), Pressure ulcer of left heel, stage 2 ([facility]) (07/13/2023), PUD (peptic ulcer disease), Pulmonary hypertension ([facility]), Sacral decubitus ulcer (07/20/2016), Thrombocytosis (2013), Villous adenoma, Vitamin B12 deficiency, and White matter disease. to contribute to the complexity of this ED evaluation.    Procedures performed: none    Discussed management with physician/appropriate source: APRN for [facility] -  recommending amio gtt, Dr. Camargo    Considered admission/deescalation of care for: irregular heart rate    Social determinants of health affecting patient care: none    Prescription medications considered: discussed continuing  current medication regimen    The patient requires continuous monitoring for: irregular heart rate    Shared decision making: discussed possible admission          Disposition and Plan     Clinical Impression:  1. Tachycardia        Disposition:  Admit    Follow-up:  No follow-up provider specified.    Medications Prescribed:  Current Discharge Medication List          Hospital Problems       Present on Admission  Date Reviewed: 12/31/2024            ICD-10-CM Noted POA    * (Principal) Tachycardia R00.0 1/12/2025 Unknown                     [1]   Medications Prior to Admission   Medication Sig Dispense Refill Last Dose/Taking    docusate sodium 100 MG Oral Cap Take 100 mg by mouth 2 (two) times daily.   Unknown    lidocaine-menthol 4-1 % External Patch Place 1 patch onto the skin daily.   1/11/2025 at 10:00 PM    polyethylene glycol, PEG 3350, 17 g Oral Powd Pack Take 17 g by mouth daily as needed.   Unknown    HYDROcodone-acetaminophen (NORCO) 7.5-325 MG Oral Tab Take 1 tablet by mouth every 6 (six) hours as needed for Pain. 90 tablet 0 1/12/2025 at  6:00 AM    metoprolol tartrate 50 MG Oral Tab Take 1 tablet (50 mg total) by mouth 2x Daily(Beta Blocker). 60 tablet 1 1/12/2025 at  6:00 AM    rivaroxaban 15 MG Oral Tab Take 1 tablet (15 mg total) by mouth daily with food. 30 tablet 5 1/11/2025    digoxin 0.125 MG Oral Tab Take 1 tablet (125 mcg total) by mouth daily. 90 tablet 3 1/12/2025 at  6:00 AM    PREDNISONE 2.5 MG Oral Tab TAKE 1 TABLET(2.5 MG) BY MOUTH DAILY 90 tablet 3 1/12/2025 at  6:00 AM    ferrous sulfate 325 (65 FE) MG Oral Tab EC Take 1 tablet (325 mg total) by mouth daily with breakfast. 30 tablet 5 1/12/2025 at  6:00 AM    acetaminophen 500 MG Oral Tab Take 1 tablet (500 mg total) by mouth every 6 (six) hours as needed for Pain.   1/11/2025    furosemide 20 MG Oral Tab Take 1 tablet (20 mg total) by mouth daily as needed. Prn leg swelling 90 tablet 3 More than a month    melatonin 3 MG Oral Tab  Take 1 tablet (3 mg total) by mouth nightly.       Cholecalciferol (VITAMIN D) 1000 units Oral Tab Take 1 tablet by mouth daily.   1/12/2025 at  6:00 AM    Loperamide HCl 2 MG Oral Cap Take 1 capsule (2 mg total) by mouth 4 (four) times daily as needed for Diarrhea.

## 2025-01-12 NOTE — H&P
[facility]  part of [facility]    History & Physical    Shoaib Alan Patient Status:  Inpatient    10/22/1931 MRN [id number]   Location [facility] 3W/SW Attending Mars Camargo MD   Hosp Day # 0 PCP Mars Camargo MD     Date:  2025  Date of Admission:  2025    History of Present Illness:  Shoaib Alan is a(n) 89 year old male with PMHx Atrial fibrillation who had been on diltiazem  mg qD and digoxin 0.125 mg po every day until 24 when he reported near-syncope with 47.   Diltiazem ER was decreased to 120 mg daily at that time.  He presented to ED 24 with tachycardia; EKG shows Afib with   and WCT.  HR decreased with Cadizem 10 mg IVP and Cardizem gtt at 10 mg/hour, and he was admitted for further care;  PCXR neg infiltrate; sent to [facility] on metoprolol 50 mg po BID, digoxin 0.125 mg po every day, and Xarelto 15 mg po every day; readmitted today with Afib with RVR HR 140s; has pocket hematoma to pacemaker pocket in left upper chest; also has skin avulsion to posterior aspect of LUE; avulsion measures 8.5 cm long x 1cm width x 0.5cm depth; pt states avulsion occurred after inflation of blood pressure cuff;  no CP; no SOB; no headaches; no palpitation          History:  Past Medical History:    AF (atrial fibrillation) ([facility])    Arrhythmia    Cataract    Chronic pulmonary aspiration    ARF  BiPAP    Congestive heart disease ([facility])    COPD (chronic obstructive pulmonary disease) ([facility])    Coronary atherosclerosis    COVID    Depression    Dysphagia    Esophageal reflux    Fracture, radius    Hip fracture, left ([facility])    s/p IM fixation 17; Ortho Dr Alcantara    IBS (irritable bowel syndrome)    Lipid screening    Malabsorption ([facility])    Malnutrition ([facility])    severe     Osteoarthrosis, unspecified whether generalized or localized, unspecified site    hands    Pneumonia    Pneumonia due to organism    Pressure ulcer of left heel, stage 2 ([facility])    PUD  (peptic ulcer disease)    Pulmonary hypertension ([facility])    Sacral decubitus ulcer    Thrombocytosis    Villous adenoma    Vitamin B12 deficiency    White matter disease     Past Surgical History:   Procedure Laterality Date    Cardioversion  2007    Cataract  2006    Cath percutaneous  transluminal coronary angioplasty  1990    Cholecystectomy      Colon surgery Right 10/11    Fat, fecal qualitative  12/10    Fracture surgery      Left femur 2016    Hip surgery  01/11/2017    INTERMEDULLARY FIXATION OF LEFT HIP FRACTURE    Hla b 27 disease association  2006    Intraocular lens implant, secondary  2006    Lexiscan nuc stress tst, cardio (dmg)  9/11    Other surgical history  1970    Billroth anastomosis    Other surgical history  2011    dental implants    Skin surgery  02/11/2019    Mohs/R Dorsal hand/SCC/done by MM       Allergies:  Allergies[1]    Home Medications:  Current Medications as of January 12, 2025  Generic Name Brand Name Strength Route/ Site Freq.   Acetaminophen (Tab) Tylenol Extra Strength 500 MG Oral Take 1 tablet (500 mg total) by mouth every 6 (six) hours as needed for Pain.      Cholecalciferol (Tab) Vitamin D 1000 units Oral Take 1 tablet by mouth daily.      Digoxin (Tab) Lanoxin 0.125 MG Oral Take 1 tablet (125 mcg total) by mouth daily.      Docusate Sodium (Cap) COLACE 100 MG Oral Take 100 mg by mouth 2 (two) times daily.      Ferrous Sulfate (Tab EC) ferrous sulfate 325 (65 FE) MG Oral Take 1 tablet (325 mg total) by mouth daily with breakfast.      Furosemide (Tab) Lasix 20 MG Oral Take 1 tablet (20 mg total) by mouth daily as needed. Prn leg swelling      HYDROcodone-Acetaminophen (Tab) Norco 7.5-325 MG Oral Take 1 tablet by mouth every 6 (six) hours as needed for Pain.      Lidocaine-Menthol (Patch) lidocaine-menthol 4-1 % Transdermal Place 1 patch onto the skin daily.      Loperamide HCl (Cap) Imodium 2 MG Oral Take 1 capsule (2 mg total) by mouth 4 (four) times daily as needed for  Diarrhea.      Melatonin (Tab) melatonin 3 MG Oral Take 1 tablet (3 mg total) by mouth nightly.      Metoprolol Tartrate (Tab) Lopressor 50 MG Oral Take 1 tablet (50 mg total) by mouth 2x Daily(Beta Blocker).      Polyethylene Glycol 3350 (Powd Pack) MIRALAX 17 g Oral Take 17 g by mouth daily as needed.      predniSONE (Tab) Deltasone 2.5 MG  TAKE 1 TABLET(2.5 MG) BY MOUTH DAILY      Rivaroxaban (Tab) Xarelto 15 MG Oral Take 1 tablet (15 mg total) by mouth daily with food.       SOCIAL HISTORY   reports that he quit smoking about 31 years ago. His smoking use included cigarettes. He started smoking about 51 years ago. He has a 60 pack-year smoking history. He quit smokeless tobacco use about 9 years ago.  His smokeless tobacco use included chew. He reports current alcohol use. He reports that he does not use drugs.    Family History   Problem Relation Age of Onset    Other (old age) Father 89        cause of death    Other (Other) Mother 96        cause of death    Cancer Paternal Aunt     Diabetes Paternal Aunt              Review of Systems:    ROS:   Constitutional: no weight loss; no fatigue  ENMT:  Negative for ear drainage, hearing loss and nasal drainage  Eyes:  Negative for eye discharge and vision loss  Cardiovascular:  Negative for chest pain; negative palpitations  Respiratory:  Negative for cough, dyspnea and wheezing  Endocrine:  Negative for abnormal sleep patterns, increased activity, polydipsia and polyphagia  Gastrointestinal:  Negative for abdominal pain, constipation, decreased appetite, diarrhea and vomiting; no melena or hematochezia  Musculoskeletal:  Negative for arthralgias or myalgias  Genitourinary:  Negative for dysuria or polyuria  Hema/Lymph:  Negative for easy bleeding and easy bruising  Integumentary:  Negative for pruritus and rash  Neurological:  Negative for gait disturbance; negative for paresthesias   All other review of systems are negative.          Physical Exam:  PHYSICAL  EXAM:   Blood pressure 115/67, pulse 69, temperature 97.7 °F (36.5 °C), temperature source Oral, resp. rate 18, height 6' 4\" (1.93 m), weight 146 lb 9.6 oz (66.5 kg), SpO2 96%.  Constitutional: alert and oriented x3 in no acute distress  HEENT- EOMI, PERRL  Nose/Mouth/Throat: pharynx without erythema  Neck/Thyroid: neck supple; no thyromegaly  Lymphatics: no lymphadenopathy of neck or groin  Cardiovascular: Irr, Irr, S1, S2, no S3 or murmur  Respiratory: lungs without crackles or wheezes  Abdomen: normoactive bowel sounds, soft, non-tender and non-distended  Extremities: no clubbing, cyanosis or edema  Vascular: no carotid bruits; DP/PT 2/2  Musculoskeletal: Motor 5/5 upper and lower extremities  Neurological: cranial nerves II-XII intact; light touch and proprioception intact  Skin: Skin avulsion to posterior aspect of LUE; avulsion measures 8.5 cm long x 1cm width x 0.5cm depth; (see picture in media tab)  Chest: +hematoma to pacemaker pocket left upper chest        Laboratory Data:     Lab Results   Component Value Date     01/12/2025     01/12/2025    K 5.0 01/12/2025     01/12/2025    CO2 29.0 01/12/2025    BUN 44 01/12/2025    CREATSERUM 0.99 01/12/2025    CA 10.1 01/12/2025    MG 2.3 01/12/2025       Recent Labs   Lab 01/12/25  0821   WBC 14.0*   HGB 12.7*   HCT 38.8*   MCV 91.1   MCH 29.8   MCHC 32.7   RDW 15.1*   NEPRELIM 9.77*   .0   NEUT 82   LYMPH 8   MON 8   EOS 0         Imaging:  XR CHEST AP PORTABLE  (CPT=71045)    Result Date: 1/12/2025  CONCLUSION: No acute cardiopulmonary abnormality.   Dictated by (CST): Jesse Wang MD on 1/12/2025 at 9:17 AM     Finalized by (CST): Jesse Wang MD on 1/12/2025 at 9:19 AM           EKG 1/12/25  EKG 12 Lead    Result Date: 1/12/2025  Poor data quality, interpretation may be adversely affected Wide QRS tachycardia Left axis deviation Right bundle branch block Possible Lateral infarct , age undetermined Abnormal ECG When compared with ECG  of 05-JAN-2025 14:49, Wide QRS tachycardia has replaced Atrial fibrillation     Assessment and Plan:    Atrial fibrillation with RVR  -was on diltiazem  mg qD and digoxin 0.125 mg po every day until 12/30/24 when he reported near-syncope with 47, so diltiazem ER was decreased to 120 mg daily  -presents to ED 1/2/25 with tachycardia; EKG shows Afib with   and WCT  -HR decreased with Cadizem 10 mg IVP and Cardizem gtt at 10 mg/hour  -seen by EP Dr Lozano  -due to tachy-sylwia syndrome, underwent placement of PPM on 1/6/25 per Dr Thomas; HR 90s-120 on metoprolol 50 mg po BID (increased from 25mg BID), and maintained digoxin 0.125 mg po qD  -was started on Xarelto 15 mg po every day  -PT/OT recommends DONTA; transferred to [facility] for DONTA 1/9/25  -readmitted today with Afib with RVR HR 140s;   -cardiology consult requested for rate control Rx; has tolerated diltiazem  mg daily in the past    Pocket hematoma  -has hematoma to pacemaker pocket in left upper chest  -stop Xarelto    Skin avulsion  -new problem; to posterior aspect of LUE; avulsion measures 8.5 cm long x 1cm width x 0.5cm depth; pt states avulsion occurred after inflation of blood pressure cuff  -Silvadene 1% cream ATAA BID  -wound consult     History of RLE hematoma  In May 2023; ultrasound RLE shows two large hematomas to RLE: no evidence for compartment syndrome; no need for surgical intervention  -stopped warfarin in May 2023, though now on xarelto     History of anemia  Hgb stable (12.1 on 1/7/25)  on ferrous sulfate 325 mg po qD     Aortic stenosis  last ECHO in Jan 2025 shows EF 50-55% with mild aortic stenosis; last Lexiscan GXT 11/29/21 negative for ischemia     History of COVID infection  In Dec 2022;   -nares positive COVID 12/1/22  -s/p remdesivir 100 mg IV [id number] x 4d  -s/p Decadron 6 mg daily x 4 days; discontinued 12/4     History of Pneumonia  In Dec 2022; PCXR shows Patchy medial bibasilar opacities, which are new since  prior chest radiographs from October, 2021. Findings could relate to pneumonia/aspiration in the appropriate clinical setting.  Also identified is a new 4 cm right perihilar nodular opacity.     -CT chest shows Airspace opacities within the bilateral upper and lower lobes, most pronounced within the right lower lobe suspicious for multifocal pneumonia.  -s/p ceftriaxone 1 gm IV [id number], d#5, and s/p  azithromycin 500 mg po daily x 3d     History of Osteomyelitis left 2nd toe   -MRI left foot 11/27/21 shows underlying acute osteomyelitis involving the entirety of the 2nd digit middle phalanx and the distal half of the 2nd digit proximal phalanx.   -s/p left 2nd toe amputation 12/3/21 per podiatrist, Dr Gutierrez; pathology with acute and chronic osteomyelitis   -has home visits by podiatrist Dr Josefina Fraga [phone] and advised sleeve to right 2nd toe      PVD  -CTA 11/29/21 reveals atherosclerosis of the SFA with 65-70% stenosis just beyond the adductor canal  -s/p angioplasty of tight focal left popliteal stenosis; unable to open distally occluded left post tib artery; left ant tib artery-dorsalis pedis widely patent (procedure on 12/1/21 by Dr. Lynch).      Constipation  On Miralax 17 gm po daily prn, and Dulcolax 10 mg po daily prn; takes prune juice     Osteoarthritis lumbar spine and cervical spine  As seen on CT June 2018; Scoliosis and degenerative changes in spine. Mild chronic anterior wedging of lower thoracic vertebral bodies; XR L-spine in Aug 2019 shows 5 lumbar segments with 20° levoconvex scoliosis, slight flattening of the lordotic curvature.  Endplate compression at L3.  Moderate loss of disc heights throughout the lumbar spine.  No spondylolisthesis.  No destructive lesions.  Mild calcification of the abdominal aorta; has seen physiatrist Dr Reilly Overton  -the patient had been taking Norco 7.5 mg q. 6 hours p.r.n. Pain; pt requests #60 with next refill     Gait abnormality  Pt has high fall  risk; using manual wheelchair or electric scooter, though independent use is limited due to bilateral hand osteoarthritis;      Right hand basal cell skin cancer  S/p biopsy in Dec 2018; Pt awaits formal excision by dermatologist in [place]     History of Sepsis due to Klebsiella UTI  In June 2018; Due to urine septicemia; BCx and UCx showing growing Klebsiella pneumoniae, sensitive to pip/ tazo; s/p Zosyn 3.375 gm IV q8hrs, x6d, then Keflex for 8 more days, along with prophylactic po Vanco 125mg qd.        Nephrolithiasis with moderate left hydronephrosis  CT abdomen/ pelvis 6/29 revealed moderate left hydroureteronephrosis related to a 4 x 2 mm left ureterovesical junction calculus; pt seen in consultation with urologist Dr Linder; s/p cystoscopy, left retrograde pyelogram, and insertion of left ureteral stent by Dr Linder.   Follow up with Dr. Linder     Stool incontinence  On Imodium 2 mg po q4hrs prn.  GI panel negative.      Left shoulder pain  XR both shoulders neg for DJD; DDx includes frozen shoulder vs PMR;  s/p home physical therapy for adhesive capsulitis with [facility]; on prednisone to 2.5 mg daily; now with mild right shoulder pain x 2d--> advise stretching exercises for now; call if sxs persists     Pulmonary HTN  seen on ECHO in Jan 2017 with PA pressure 54 (previous PA pressure 44 in June 2016); thought to be multifactorial     Left hip fracture  s/p IM fixation in Jan 2017; on  Norco 7.5 mg po q4hrs prn     Sacral decubitus ulcer  uses Medihoney wound dressing gel daily prn     Coronary artery disease   Had PTCA in 1990s;   -the patient currently takes aspirin 81 mg daily; EKG shows Afib with old inferior anterior infarcts (seen in 2018);   -ECHO in Jan 2025 shows EF 50-55% with mild aortic stenosis; last Lexiscan GXT 11/29/21 negative for ischemia     History of villous adenoma   the patient reports occasional loose stools.  The patient has been advised to see gastroenterologist,  Dr. Hopper, as an outpatient for colonoscopy though he has declined repeat colonoscopy     Vitamin B12 deficiency  the patient had been receiving vitamin B12 1,000 mcg IM q. Month            Mars Camargo MD  1/12/2025  12:45 PM         [1]   Allergies  Allergen Reactions    Dronedarone      Other reaction(s): extreme dizzinss

## 2025-01-12 NOTE — DIETARY NOTE
ADULT NUTRITION INITIAL ASSESSMENT    Pt is at high nutrition risk.  Pt meets severe malnutrition criteria.      CRITERIA FOR MALNUTRITION DIAGNOSIS:  Criteria for severe malnutrition diagnosis: chronic illness related to energy intake less than 75% for greater than 1 month, body fat severe depletion, and muscle mass severe depletion.    RECOMMENDATIONS TO MD:  Added Oral Nutritional Supplements per patient preference.     ADMITTING DIAGNOSIS:  Tachycardia [R00.0]  PERTINENT PAST MEDICAL HISTORY:   Past Medical History:    AF (atrial fibrillation) ([facility])    Arrhythmia    Cataract    Chronic pulmonary aspiration    ARF 6/16 BiPAP    Congestive heart disease ([facility])    COPD (chronic obstructive pulmonary disease) ([facility])    Coronary atherosclerosis    COVID    Depression    Dysphagia    Esophageal reflux    Fracture, radius    Hip fracture, left ([facility])    s/p IM fixation 1/11/17; Ortho Dr Alcantara    IBS (irritable bowel syndrome)    Lipid screening    Malabsorption ([facility])    Malnutrition ([facility])    severe     Osteoarthrosis, unspecified whether generalized or localized, unspecified site    hands    Pneumonia    Pneumonia due to organism    Pressure ulcer of left heel, stage 2 (HCC)    PUD (peptic ulcer disease)    Pulmonary hypertension ([facility])    Sacral decubitus ulcer    Thrombocytosis    Villous adenoma    Vitamin B12 deficiency    White matter disease     PATIENT STATUS: Initial 01/12/25: Pt admit for A-Fib. Recent pacemaker placement. Skin Avulsion (New Problem) to LUE. Wound Care Consult. PMH sig for See Above. Pt assessed due to screening at risk for decreased appetite. MD consult for ONS.   Met with patient and daughter this afternoon while patient was eating lunch meal. Tolerating Cardiac Diet. No issues re: Cardiac Diet at this time as he is receiving food preferences. RD to monitor.   Added Strawberry ONS to meals per patient request/preference.   Reviewed recent RD assessment on 1/6/25.   [facility] is from [facility] in [place]. He works well with the Staff per discussion to get foods he enjoys. He eats regularly and is focused on adequate nutrition but admits oral intake has been sub-optimal for the past few months.     FOOD/NUTRITION RELATED HISTORY:  Appetite: Poor, but improving  Intake:  Eating turkey/cheese sandwich at lunch meal  Intake Meeting Needs: No, but oral nutrition supplements (ONS) to maximize  Percent Meals Eaten (last 3 days)       None           Food Allergies: No Known Food Allergies (NKFA)  Cultural/Ethnic/[place]atsdin Preferences: None    GASTROINTESTINAL: +BM 1/10.  Noted Colace.   Reviewed recent Speech Pathologist documentation on 1/9/25. Rec: Regular Solids (choose soft) + Thin Liquids. No straw.     MEDICATIONS: reviewed   [START ON 1/13/2025] cholecalciferol  1,000 Units Oral Daily    [START ON 1/13/2025] digoxin  125 mcg Oral Daily    docusate sodium  100 mg Oral BID    [START ON 1/13/2025] ferrous sulfate  325 mg Oral Daily with breakfast    metoprolol tartrate  50 mg Oral 2x Daily(Beta Blocker)    [START ON 1/13/2025] predniSONE  2.5 mg Oral Daily    silver sulfADIAZINE   Topical BID    apixaban  2.5 mg Oral BID     LABS: reviewed - Noted K+ 5.0 (Monitor)   Recent Labs     01/12/25  0821   *   BUN 44*   CREATSERUM 0.99   CA 10.1   MG 2.3      K 5.0      CO2 29.0   OSMOCALC 303*     NUTRITION RELATED PHYSICAL FINDINGS:  - Nutrition Focused Physical Exam (NFPE): severe depletion body fat orbital region, triceps region, and fat overlying rib cage region and severe depletion muscle mass temple region, clavicle region, scapular region, shoulder region, and dorsal calderon region. Unable to assess legs at this time.   - Fluid Accumulation: none  see RN documentation for details  - Skin Integrity: at risk and LUE Skin Tear/Wound. Wound Consult.  See RN documentation for details. Sacrum Red.     ANTHROPOMETRICS:  HT: 193 cm (6' 4\")  WT: 66.5 kg (146 lb 9.6 oz)   BMI: Body mass  index is 17.84 kg/m².  BMI CLASSIFICATION: <22 considered underweight for advanced age (>65)  IBW: 202 lbs        72% IBW  Usual Body Wt: ~ 200 lbs highest/healthiest weight in the past per patient report (Recent RD discussion with patient) - Represents 73% UBW    WEIGHT HISTORY:  Patient Weight(s) for the past 336 hrs:   Weight   01/12/25 1230 66.5 kg (146 lb 9.6 oz)   01/12/25 0814 54.9 kg (121 lb)     Wt Readings from Last 10 Encounters:   01/12/25 66.5 kg (146 lb 9.6 oz)   01/08/25 64.1 kg (141 lb 4.8 oz)   07/13/23 72.6 kg (160 lb)   05/11/23 73.8 kg (162 lb 11.2 oz)   12/01/22 69 kg (152 lb 1.6 oz)   11/27/21 70.8 kg (156 lb)   10/29/21 74.4 kg (164 lb)   09/30/20 78 kg (171 lb 15.3 oz)   09/28/20 78 kg (172 lb)   01/27/20 74.8 kg (164 lb 12.8 oz)     NUTRITION DIAGNOSIS/PROBLEM:    Severe Malnutrition related to Chronic - Physiological causes resulting in anorexia or diminished intake as evidenced by decline in weight, reported poor oral intake + body fat and muscle mass severe depletion.     NUTRITION INTERVENTION:     NUTRITION PRESCRIPTION:   Estimated Nutrition needs: --dosing wt of 66 kg - wt taken on 1/12/25  Calories: 2300 calories/day (35 calories per kg Dosing wt) - ONS to maximize   Protein: 100 g protein/day (1.5 g protein/kg Dosing wt)    - Diet:       Procedures    Cardiac diet Cardiac; Is Patient on Accuchecks? No    Added No Straw to Diet Order.     - RD Malnutrition Care Plan: Modify diet if indicated, Encouraged increased PO intake, and Initiated ONS (oral nutritional supplements)  - Medical Food Supplements-RD added Ensure Enlive (350 calories/ 20 g protein each) BID Strawberry and Mighty Shake (300 calories/ 9 g protein each) Daily Strawberry. Rational/use of oral supplements discussed. Patient has consumed in the past.   - Vitamin and mineral supplements: iron and vitamin D  - Feeding assistance: meal set up  - Nutrition education:  Noted multiple discussions in the past regarding  importance of adequate energy and protein intake. Review of good sources of high khari/protein foods + benefits of consuming oral nutritional supplements regularly.    - Coordination of nutrition care: collaboration with other providers  - Discharge and transfer of nutrition care to new setting or provider: monitor plans    MONITOR AND EVALUATE/NUTRITION GOALS:  - Food and Nutrient Intake:      Monitor: adequacy of PO intake and adequacy of supplement intake  - Food and Nutrient Administration:      Monitor: N/A  - Anthropometric Measurement:    Monitor weight  - Nutrition Goals:      halt wt loss, regain wt as able, PO and supplement greater than 75% of needs, and promote healing + minimize further lean body mass loss.     DIETITIAN FOLLOW UP: RD to follow and monitor nutrition status    Kriss Ac RDN, LDN, CDE   Clinical Nutrition  Ext [phone]

## 2025-01-13 ENCOUNTER — APPOINTMENT (OUTPATIENT)
Dept: PICC SERVICES | Facility: HOSPITAL | Age: OVER 89
End: 2025-01-13
Attending: INTERNAL MEDICINE
Payer: MEDICARE

## 2025-01-13 LAB
ANION GAP SERPL CALC-SCNC: 10 MMOL/L (ref 0–18)
BASOPHILS # BLD AUTO: 0.02 X10(3) UL (ref 0–0.2)
BASOPHILS NFR BLD AUTO: 0.2 %
BUN BLD-MCNC: 38 MG/DL (ref 9–23)
BUN/CREAT SERPL: 45.8 (ref 10–20)
CALCIUM BLD-MCNC: 9.6 MG/DL (ref 8.7–10.4)
CHLORIDE SERPL-SCNC: 103 MMOL/L (ref 98–112)
CO2 SERPL-SCNC: 28 MMOL/L (ref 21–32)
CREAT BLD-MCNC: 0.83 MG/DL
DEPRECATED RDW RBC AUTO: 51.4 FL (ref 35.1–46.3)
EGFRCR SERPLBLD CKD-EPI 2021: 82 ML/MIN/1.73M2 (ref 60–?)
EOSINOPHIL # BLD AUTO: 0.05 X10(3) UL (ref 0–0.7)
EOSINOPHIL NFR BLD AUTO: 0.5 %
ERYTHROCYTE [DISTWIDTH] IN BLOOD BY AUTOMATED COUNT: 15.3 % (ref 11–15)
GLUCOSE BLD-MCNC: 102 MG/DL (ref 70–99)
HCT VFR BLD AUTO: 36 %
HGB BLD-MCNC: 11.2 G/DL
IMM GRANULOCYTES # BLD AUTO: 0.05 X10(3) UL (ref 0–1)
IMM GRANULOCYTES NFR BLD: 0.5 %
LYMPHOCYTES # BLD AUTO: 1.12 X10(3) UL (ref 1–4)
LYMPHOCYTES NFR BLD AUTO: 11 %
MCH RBC QN AUTO: 28.7 PG (ref 26–34)
MCHC RBC AUTO-ENTMCNC: 31.1 G/DL (ref 31–37)
MCV RBC AUTO: 92.3 FL
MONOCYTES # BLD AUTO: 2 X10(3) UL (ref 0.1–1)
MONOCYTES NFR BLD AUTO: 19.6 %
NEUTROPHILS # BLD AUTO: 6.95 X10 (3) UL (ref 1.5–7.7)
NEUTROPHILS # BLD AUTO: 6.95 X10(3) UL (ref 1.5–7.7)
NEUTROPHILS NFR BLD AUTO: 68.2 %
OSMOLALITY SERPL CALC.SUM OF ELEC: 301 MOSM/KG (ref 275–295)
PLATELET # BLD AUTO: 225 10(3)UL (ref 150–450)
POTASSIUM SERPL-SCNC: 4 MMOL/L (ref 3.5–5.1)
RBC # BLD AUTO: 3.9 X10(6)UL
SODIUM SERPL-SCNC: 141 MMOL/L (ref 136–145)
WBC # BLD AUTO: 10.2 X10(3) UL (ref 4–11)

## 2025-01-13 PROCEDURE — 99232 SBSQ HOSP IP/OBS MODERATE 35: CPT | Performed by: INTERNAL MEDICINE

## 2025-01-13 RX ORDER — AMIODARONE HYDROCHLORIDE 200 MG/1
200 TABLET ORAL 2 TIMES DAILY WITH MEALS
Status: DISCONTINUED | OUTPATIENT
Start: 2025-01-13 | End: 2025-01-14

## 2025-01-13 RX ORDER — AMIODARONE HYDROCHLORIDE 200 MG/1
200 TABLET ORAL 2 TIMES DAILY WITH MEALS
Qty: 60 TABLET | Refills: 0 | Status: SHIPPED | OUTPATIENT
Start: 2025-01-13 | End: 2025-01-22

## 2025-01-13 NOTE — PROGRESS NOTES
01/13/25 1133   Wound 01/12/25 Arm Anterior;Left;Upper   Date First Assessed/Time First Assessed: 01/12/25 1145   Present on Original Admission: Yes  Primary Wound Type: Skin Tear  Location: Arm  Wound Location Orientation: Anterior;Left;Upper   Wound Image     Site Assessment Fragile;Moist;Yellow;Purple   Closure Not approximated   Drainage Amount Small   Drainage Description Yellow;Serosanguineous   Treatments Cleansed;Saline;Honey Gel   Dressing 4x4s;Kerlix roll;Xeroform   Dressing Changed Changed   Dressing Status Clean;Dry;Intact   Wound Length (cm) 7.9 cm   Wound Width (cm) 8 cm   Wound Surface Area (cm^2) 63.2 cm^2   Wound Depth (cm) 0.1 cm   Wound Volume (cm^3) 6.32 cm^3   Margins Attached edges   Non-staged Wound Description Full thickness   Ilnda-wound Assessment Fragile;Dry;Hyperpigmented   Wound Granulation Tissue Pink   Wound Bed Granulation (%) 5 %   Wound Bed Slough (%) 95 %   State of Healing Non-healing   Wound Odor None   Wound Follow Up   Follow up needed Yes     Patient seen at bedside for left lateral/ anterior arm wound. Traumatic full thickness wound, patient states he got by inflation of a blood pressure cuff. Slough mostly covering wound so until santyl obtained, this writer applied medihoney gel smeared on xeroform gauze to help autolytic debride the wound. Daily dressing change of santyl, moist saline gauze, dry gauze and wrapped with kerlix moving forward. No tape on the skin please. May even use spandage to help keep the dressing in place if patient prefers. Patient has heel foam in place and heels elevated on pillow.

## 2025-01-13 NOTE — PHYSICAL THERAPY NOTE
PHYSICAL THERAPY EVALUATION - INPATIENT     Room Number: 321/321-A  Evaluation Date: 1/13/2025  Type of Evaluation: Initial   Physician Order: PT Eval and Treat    Presenting Problem: a fib with RVR     Reason for Therapy: Mobility Dysfunction and Discharge Planning    PHYSICAL THERAPY ASSESSMENT   Patient is a 89 year old male admitted 1/12/2025 for a fib with RVR.  Prior to admission, patient's baseline is assist with ADL's and ambulation short distances with rolling walker.  Patient is currently functioning below baseline with bed mobility, transfers, and gait.  Patient is requiring supervision as a result of the following impairments: decreased functional strength and medical status.  Physical Therapy will continue to follow for duration of hospitalization.    Patient will benefit from continued skilled PT Services to promote return to prior level of function and safety with continuous assistance and gradual rehabilitative therapy  and at discharge to promote prior level of function and safety with additional support and return home with home health PT. Pending progress, patient did not have this shoes in the room and his family was bringing them later today. Will plan to see patient tomorrow for physical therapy session to assess if patient can return to assisted living with assist or back to gradual rehab.     PLAN DURING HOSPITALIZATION  Nursing Mobility Recommendation : 1 Assist  PT Device Recommendation: None  PT Treatment Plan: Bed mobility;Body mechanics;Patient education;Gait training;Balance training;Transfer training  Rehab Potential : Fair  Frequency (Obs): 5x/week     PHYSICAL THERAPY MEDICAL/SOCIAL HISTORY   Problem List  Principal Problem:    Atrial fibrillation with RVR (HCC)  Active Problems:    Anemia    Tachycardia    Pacemaker pocket hematoma    Skin avulsion    HOME SITUATION  Type of Home: Skilled nursing facility (normally lives at assisted living)  Home Layout: One level  Stairs to Enter :  0   Railing: No    Stairs to Bedroom: 0    Railing: No    Lives With: Staff 24 hours    Drives: No   Patient Regularly Uses: Glasses;Reading glasses     SUBJECTIVE  \"I don't need the sling on right now\"    PHYSICAL THERAPY EXAMINATION   OBJECTIVE  Precautions: None  Fall Risk: High fall risk    PAIN ASSESSMENT  Rating: Unable to rate          COGNITION  Overall Cognitive Status:  WFL - within functional limits    RANGE OF MOTION AND STRENGTH ASSESSMENT  Lower extremity ROM is within functional limits  BLE WNL  Lower extremity strength is within functional limits  BLE WNL    BALANCE  Static Sitting: Good  Dynamic Sitting: Good  Static Standing: Not tested  Dynamic Standing: Not tested    AM-PAC '6-Clicks' INPATIENT SHORT FORM - BASIC MOBILITY  How much difficulty does the patient currently have...  Patient Difficulty: Turning over in bed (including adjusting bedclothes, sheets and blankets)?: A Little   Patient Difficulty: Sitting down on and standing up from a chair with arms (e.g., wheelchair, bedside commode, etc.): A Little   Patient Difficulty: Moving from lying on back to sitting on the side of the bed?: A Little   How much help from another person does the patient currently need...   Help from Another: Moving to and from a bed to a chair (including a wheelchair)?: A Little   Help from Another: Need to walk in hospital room?: A Lot   Help from Another: Climbing 3-5 steps with a railing?: A Lot     AM-PAC Score:  Raw Score: 16   Approx Degree of Impairment: 54.16%   Standardized Score (AM-PAC Scale): 40.78   CMS Modifier (G-Code): CK    FUNCTIONAL ABILITY STATUS  Functional Mobility/Gait Assessment  Gait Assistance: Not tested  Rolling:  not tested   Supine to Sit: supervision  Sit to Supine:  not tested   Sit to Stand:  not tested     Exercise/Education Provided:  Education Provided To: Patient  Patient Education: Role of Physical Therapy;Plan of Care;Discharge Recommendations;Functional Transfer  Techniques  Patient's Response to Education: Verbalized Understanding           Skilled Therapy Provided: Patient on room air. Patient currently with pacemaker precautions in place from pacemaker placement earlier this month. Patient currently with SBA for mobility during the session. Patient declined standing due to not having shoes in room. Patient's family is going to bring his shoes. Patient able to sit edge of bed independently for about 15 minutes. Patient would like to return home, plan to assess patient's readiness to return home tomorrow. The patient's Approx Degree of Impairment: 54.16% has been calculated based on documentation in the [facility] '6 clicks' Inpatient Basic Mobility Short Form.  Research supports that patients with this level of impairment may benefit from rehab facility, however may benefit from home health pending progress. Patient received semi-fowlers in bed, agreeable to physical therapy evaluation. Next session anticipate to progress bed mobility, transfers, and gait. Patient able to adhere to pacemaker precautions throughout the session.    Patient history and/or personal factors that may impact the plan of care include prior surgeries (pacemaker) and has history of recent hospitalization. Based on the physical therapy examination of the noted systems and functional activity/participation limitations, the patient presentation is evolving given the patient presents with surgical precautions/limitations, demonstrates worsening of previously stable condition, and demonstrates worsening of co-morbidities.     Final disposition will be made by interdisciplinary medical team.    Patient End of Session: Needs met;Call light within reach;RN aware of session/findings;All patient questions and concerns addressed;In bed;Alarm set    CURRENT GOALS  Goals to be met by: 1/31/25  Patient Goal Patient's self-stated goal is: to go home   Goal #1 Patient is able to demonstrate supine - sit EOB @ level:  independent     Goal #1   Current Status    Goal #2 Patient is able to demonstrate transfers Sit to/from Stand at assistance level: supervision with walker - rolling     Goal #2  Current Status    Goal #3 Patient is able to ambulate 100 feet with assist device: walker - rolling at assistance level: supervision   Goal #3   Current Status    Goal #4    Goal #4   Current Status    Goal #5 Patient to demonstrate independence with home activity/exercise instructions provided to patient in preparation for discharge.   Goal #5   Current Status    Goal #6    Goal #6  Current Status      Patient Evaluation Complexity Level:  History Moderate - 1 or 2 personal factors and/or co-morbidities   Examination of body systems Moderate - addressing a total of 3 or more elements   Clinical Presentation  Moderate - Evolving   Clinical Decision Making  Moderate Complexity     Therapeutic Activity:  25 minutes

## 2025-01-13 NOTE — OCCUPATIONAL THERAPY NOTE
OCCUPATIONAL THERAPY EVALUATION - INPATIENT     Room Number: 321/321-A  Evaluation Date: 1/13/2025  Type of Evaluation: Initial   Presenting Problem: tachycardia; Afib with RVR   Co-Morbidities: pacemaker placement 1/6/25    Physician Order: IP Consult to Occupational Therapy  Reason for Therapy: ADL/IADL Dysfunction and Discharge Planning    OCCUPATIONAL THERAPY ASSESSMENT   Patient is a 89 year old male admitted 1/12/2025 for tachycardia; Afib with RVR.  Prior to admission, patient's baseline is from [facility] where he was discharged to following pacemaker placement on 1/6/25. Patient was receiving assist for all ADLs.  Patient is currently functioning below baseline with toileting, bathing, lower body dressing, bed mobility, transfers, maintaining seated position, and functional standing tolerance.  Patient is requiring  SBA for bed mobility and static sitting EOB to complete ADLs  as a result of the following impairments: decreased functional strength, decreased endurance, impaired sitting/standing balance, decreased muscular endurance, and medical status. Occupational Therapy will continue to follow for duration of hospitalization.    Patient will benefit from continued skilled OT Services to promote return to prior level of function and safety with continuous assistance and gradual rehabilitative therapy vs [facility] pending medical and IP OT progress. Limited eval this session due to patient not wanting to get OOB without shoes present. Will plan to see pt tomorrow to assess if patient can return to [facility] or back to gradual rehab.     PLAN DURING HOSPITALIZATION  OT Device Recommendations: TBD  OT Treatment Plan: Balance activities;ADL training;Functional transfer training;Patient/Family education;Patient/Family training;Equipment eval/education;Compensatory technique education;Endurance training     OCCUPATIONAL THERAPY MEDICAL/SOCIAL HISTORY   Problem List  Principal Problem:    Atrial fibrillation  with RVR ([facility])  Active Problems:    Anemia    Tachycardia    Pacemaker pocket hematoma    Skin avulsion    HOME SITUATION  Type of Home: Skilled nursing facility (normally lives at assisted living)  Home Layout: One level  Lives With: Staff 24 hours  Drives: No  Patient Regularly Uses: Glasses; Reading glasses    Prior Level of [place]: Prior to pacemaker placement on 25 pt was from assist living where he received assist for ADLs. Pt reports being able to independently dress himself but would require assist to bathe and complete toilet transfers.     SUBJECTIVE  I am not getting up without my shoes.     OCCUPATIONAL THERAPY EXAMINATION      OBJECTIVE  Precautions: None  Fall Risk: High fall risk      PAIN ASSESSMENT  Ratin      COGNITION  Overall Cognitive Status:  WFL - within functional limits    RANGE OF MOTION   Upper extremity ROM is within functional limits     STRENGTH ASSESSMENT  Upper extremity strength is within functional limits     COORDINATION  Gross Motor: WFL   Fine Motor: WFL     ACTIVITIES OF DAILY LIVING ASSESSMENT  AM-PAC ‘6-Clicks’ Inpatient Daily Activity Short Form  How much help from another person does the patient currently need…  -   Putting on and taking off regular lower body clothing?: A Lot  -   Bathing (including washing, rinsing, drying)?: A Lot  -   Toileting, which includes using toilet, bedpan or urinal? : A Lot  -   Putting on and taking off regular upper body clothing?: A Little  -   Taking care of personal grooming such as brushing teeth?: A Little  -   Eating meals?: A Little    AM-PAC Score:  Score: 15  Approx Degree of Impairment: 56.46%  Standardized Score (AM-PAC Scale): 34.69  CMS Modifier (G-Code): CK       BED MOBILITY  Supine to Sit : Stand-by Assist    BALANCE ASSESSMENT  Static Sitting: Stand-by Assist  Dynamic Sitting: SBA     FUNCTIONAL ADL ASSESSMENT  Eating: Stand-by Assist (sitting EOB)    Skilled Therapy Provided:   RN cleared pt for participation.  Session coordinated with PT. Pt received in bed with no visitors present. Pt agreeable to bed level therapy only at this time. Pt refusing to get OOB without his shoes present. Pt not willing to don anti-slip socks to participate in OOB activity. Pt demo good carry-over of pacemaker precautions throughout session requiring no additional cues from therapists to maintain. Pt able to maintain static/dynamic sitting EOB for ~10 min without any LOB or SOB.       EDUCATION PROVIDED  Patient Education : Role of Occupational Therapy; Plan of Care; Functional Transfer Techniques; Fall Prevention; Surgical Precautions; Posture/Positioning; Proper Body Mechanics  Patient's Response to Education: Verbalized Understanding; Returned Demonstration    The patient's Approx Degree of Impairment: 56.46% has been calculated based on documentation in the [facility] '6 clicks' Inpatient Daily Activity Short Form.  Research supports that patients with this level of impairment may benefit from rehab.  Final disposition will be made by interdisciplinary medical team.     Patient End of Session: In bed;Needs met;Call light within reach;RN aware of session/findings;All patient questions and concerns addressed;Hospital anti-slip socks    OT Goals  Patients self stated goal is: none stated      Patient will complete functional transfer with CGA  Comment:     Patient will complete toileting with CGA  Comment:     Patient will complete ADL task seated EOB mod I    Comment:      Comment:          Goals  on: 25  Frequency: 3x/week    Patient Evaluation Complexity Level:   Occupational Profile/Medical History MODERATE - Expanded review of history including review of medical or therapy record   Specific performance deficits impacting engagement in ADL/IADL MODERATE  3 - 5 performance deficits   Client Assessment/Performance Deficits MODERATE - Comorbidities and min to mod modifications of tasks    Clinical Decision Making MODERATE - Analysis of  occupational profile, detailed assessments, several treatment options    Overall Complexity MODERATE     OT Session Time: 15 minutes  Self-Care Home Management: 15 minutes

## 2025-01-13 NOTE — PROGRESS NOTES
[facility] Cardiology Progress Note    Shoaib Alan Patient Status:  Inpatient    10/22/1931 MRN [id number]   Location [facility] 3W/SW Attending Mars Camargo MD   Hosp Day # 1 PCP Mars Camargo MD     Subjective:  Denies cp, sob, orthopnea, palpitations     Objective:  /69 (BP Location: Right arm)   Pulse 68   Temp 97.5 °F (36.4 °C) (Oral)   Resp 18   Ht 193 cm (6' 4\")   Wt 146 lb 9.6 oz (66.5 kg)   SpO2 96%   BMI 17.84 kg/m²     Telemetry: V paced, 68 bpm       Intake/Output:    Intake/Output Summary (Last 24 hours) at 2025 1031  Last data filed at 2025 0534  Gross per 24 hour   Intake 1584.12 ml   Output 250 ml   Net 1334.12 ml       Last 3 Weights   25 1230 146 lb 9.6 oz (66.5 kg)   25 0814 121 lb (54.9 kg)   25 0535 141 lb 4.8 oz (64.1 kg)   25 0607 141 lb 12.8 oz (64.3 kg)   25 0532 143 lb 3.2 oz (65 kg)   25 1349 145 lb 1.6 oz (65.8 kg)   25 0811 161 lb (73 kg)   23 1129 160 lb (72.6 kg)       Labs:  Recent Labs   Lab 25  0819 25  0821 25  0747   * 102* 102*   BUN 26* 44* 38*   CREATSERUM 0.99 0.99 0.83   EGFRCR 71 71 82   CA 9.6 10.1 9.6   * 141 141   K 4.2 5.0 4.0   CL 99 101 103   CO2 27.0 29.0 28.0     Recent Labs   Lab 25  0819 25  0821 25  0747   RBC 4.00 4.26 3.90   HGB 12.1* 12.7* 11.2*   HCT 35.4* 38.8* 36.0*   MCV 88.5 91.1 92.3   MCH 30.3 29.8 28.7   MCHC 34.2 32.7 31.1   RDW 15.0 15.1* 15.3*   NEPRELIM 9.31* 9.77* 6.95   WBC 14.9* 14.0* 10.2   .0* 235.0 225.0         No results for input(s): \"TROP\", \"TROPHS\", \"CK\" in the last 168 hours.    Diagnostics:         Review of Systems   Respiratory:  Negative for cough and shortness of breath.    Cardiovascular:  Negative for chest pain, palpitations, orthopnea and leg swelling.     Physical Exam:    General: Alert and oriented x 3. No apparent distress.   HEENT: Normocephalic, anicteric sclera,  neck supple, no thyromegaly or adenopathy.  Neck: No JVD, carotids 2+, no bruits.  Cardiac: Irregularly irregular rate & rhythm. S1, S2 normal. No murmur, pericardial rub, S3, or extra cardiac sounds.  Lungs: Clear without wheezes, rales, rhonchi or dullness.  Normal excursions and effort.  Abdomen: Soft, non-tender. No organosplenomegally, mass or rebound, BS-present.  Extremities: Without clubbing or cyanosis.  No left lower extremity edema, no right lower extremity edema.  Neurologic: Alert and oriented, normal affect. No focal defects  Skin: Warm and dry.   Left upper chest PPM site: C/D/I. Soft. Non-tender. No signs of active bleeding or infection noted . +left upper chest bruising extending to axillary region . Mod sized hematoma     Medications:   cholecalciferol  1,000 Units Oral Daily    digoxin  125 mcg Oral Daily    docusate sodium  100 mg Oral BID    ferrous sulfate  325 mg Oral Daily with breakfast    metoprolol tartrate  50 mg Oral 2x Daily(Beta Blocker)    predniSONE  2.5 mg Oral Daily    silver sulfADIAZINE   Topical BID    apixaban  2.5 mg Oral BID      amiodarone 0.5 mg/min (01/13/25 5498)       Assessment:       Persistent AF  Tachy/sylwia syndrome s/p PPM   -S/p single chamber [employer] PPM insertion 1/6/2025 for A-fib and tachy/bradycardia syndrome  -Presents with elevated heart rates, ECG and rhythm strips with A-fib with RVR  -Associated with hemodynamic instability, labile BP & started on amio bolus/gtt with improvement in rates & blood pressures  -On lopressor & digoxin  . Rate control meds limited by soft bp   -Elevated [id number], rivaroxaban switched to age/weight-adjusted apixaban 2.5 mg twice daily    Chronic HFimpEF  - compensated on exam   - continue lopressor . Prn home lasix     COPD    Plan:    Device interrogation w/ normal function. Episode of afib rvr noted on admission.   Rates controlled w/ amio gtt , digoxin & lopressor. Unable to up-titrate due to soft bp   Continue  eliquis for stroke prevention   Appreciate CANDY ward & recs     DOM Alegria  1/13/2025  10:31 AM  Ph [phone] (Mario)  Ph [phone] (Kaycee)

## 2025-01-13 NOTE — SLP NOTE
ADULT SWALLOWING EVALUATION    ASSESSMENT    ASSESSMENT/OVERALL IMPRESSION:  PPE REQUIRED. THIS THERAPIST WORE GLOVES, DROPLET MASK, AND GOGGLES FOR DURATION OF EVALUATION. HANDS WASHED UPON ENTRANCE/EXIT.     SLP BSSE orders received and acknowledged. A swallow evaluation warranted as pt with complaints of difficulty swallowing due to sore throat. Pt expressed he has been choosing softer options recently. Pt afebrile with weak vocal quality, on room air, with oxygen saturation at 95. Pt with prior hx of dysphagia at [facility] in which last recommended diet was regular/thin per BSSE on 1/8/25. Pt positioned upright in bed. Pt with complaints of sore throat, RN aware. Pt with adequate oral acceptance and bilabial seal across all trials. Pt with intact bite, prolonged mastication of solids, and increased LATIA. Pt's swallow response appears delayed with reduced hyolaryngeal elevation/excursion. No clinical signs of aspiration (e.g., immediate/delayed throat clear, immediate/delayed cough, wet vocal quality, increased O2 effort) observed across all trials. Oxygen status remained >94 t/o the entire evaluation. Pt expressed he does not want modified diet.      At this time, pt presents with mild oral dysphagia and probable pharyngeal dysfunction. Recommend a regular diet (choose soft) and thin liquids (no straws) with strict adherence to safe swallowing compensatory strategies. Results and recommendations reviewed with RN and pt. Pt v/u to all results/recommendations. Recommendations remain written on whiteboard. SLP collaborated with RN for MD diet orders.      PLAN: SLP to f/u x2 meal assessments, monitor CXR, and VFSS if clinically warranted.     RECOMMENDATIONS   Diet Recommendations - Solids: Regular (choose soft)  Diet Recommendations - Liquids: Thin Liquids      Compensatory Strategies Recommended: Alternate consistencies;Multiple swallows  Aspiration Precautions: Upright position;Slow rate;Small bites;Small sips;No  straw  Medication Administration Recommendations: Crushed in puree (per preference)  Treatment Plan/Recommendations: Aspiration precautions    HISTORY   MEDICAL HISTORY  Reason for Referral:  (sore/dry throat)    Problem List  Principal Problem:    Atrial fibrillation with RVR (HCC)  Active Problems:    Anemia    Tachycardia    Pacemaker pocket hematoma    Skin avulsion      Past Medical History  Past Medical History:    AF (atrial fibrillation) ([facility])    Arrhythmia    Cataract    Chronic pulmonary aspiration    ARF 6/16 BiPAP    Congestive heart disease ([facility])    COPD (chronic obstructive pulmonary disease) ([facility])    Coronary atherosclerosis    COVID    Depression    Dysphagia    Esophageal reflux    Fracture, radius    Hip fracture, left ([facility])    s/p IM fixation 1/11/17; Ortho Dr Alcantara    IBS (irritable bowel syndrome)    Lipid screening    Malabsorption ([facility])    Malnutrition ([facility])    severe     Osteoarthrosis, unspecified whether generalized or localized, unspecified site    hands    Pneumonia    Pneumonia due to organism    Pressure ulcer of left heel, stage 2 ([facility])    PUD (peptic ulcer disease)    Pulmonary hypertension ([facility])    Sacral decubitus ulcer    Thrombocytosis    Villous adenoma    Vitamin B12 deficiency    White matter disease       Prior Living Situation: Assisted living  Diet Prior to Admission: Regular;Thin liquids  Precautions: Aspiration    Patient/Family Goals: Pt reports sore throat and pain while swallowing     SWALLOWING HISTORY  Current Diet Consistency: Regular;Thin liquids  Dysphagia History:     VFSS 5/22/15: silent aspiration with pudding and thin  VFSS 4/19/16: laryngeal penetration and silent aspiration of thin  VFSS 6/6/16: no aspiration or penetration  BSE 1/12/17: puree/mildly thick  BSE 3/30/18: regular/mildly thick  BSE 7/1/18: bite sized/mildly thick  VFSS 7/2/18: bite sized/mildly thick - upgrade to regular  BSE 8/7/19: regular/thin    Imaging Results:     CXR 1/12/25:  CONCLUSION:  No acute cardiopulmonary abnormality.         Dictated by (CST): Jesse Wang MD on 1/12/2025 at 9:17 AM       Finalized by (CST): Jesse Wang MD on 1/12/2025 at 9:19 AM         OBJECTIVE   ORAL MOTOR EXAMINATION  Dentition: Upper dentures;Lower dentures  Symmetry: Within Functional Limits  Strength: Overall reduced     Range of Motion: Within Functional Limits  Rate of Motion: Reduced    Voice Quality: Weak  Respiratory Status: Unlabored  Consistencies Trialed: Thin liquids;Puree;Soft solid;Hard solid  Method of Presentation: Self presentation;Spoon;Cup;Single sips  Patient Positioned: Upright;Midline    Oral Phase of Swallow: Impaired  Bolus Retrieval: Intact  Bilabial Seal: Intact  Bolus Formation: Intact  Bolus Propulsion: Impaired  Mastication: Impaired       Pharyngeal Phase of Swallow: Appears Impaired  Laryngeal Elevation Timing: Appears impaired  Laryngeal Elevation Strength: Appears impaired  Laryngeal Elevation Coordination: Appears impaired  (Please note: Silent aspiration cannot be evaluated clinically. Videofluoroscopic Swallow Study is required to rule-out silent aspiration.)      GOALS  Goal #1 The patient will tolerate regular consistency (choose soft) and thin liquids without overt signs or symptoms of aspiration with 100 % accuracy over 1-2 session(s).  In Progress   Goal #2 The patient/family/caregiver will demonstrate understanding and implementation of aspiration precautions and swallow strategies independently over 1-2 session(s).     In Progress   Goal #3 The patient will utilize compensatory strategies as outlined by  BSSE (clinical evaluation) including Slow rate, Small bites, Small sips, Multiple swallows, Alternate liquids/solids, No straws, Upright 90 degrees, Eliminate distractions with minimal assistance 100 % of the time across 2 sessions.  In Progress        FOLLOW UP  Treatment Plan/Recommendations: Aspiration precautions  Duration: 1 week  Follow Up Needed (Documentation  Required): Yes  SLP Follow-up Date: 01/14/25    Thank you for your referral.   If you have any questions, please contact ROBINSON Davis M.S. CCC-SLP  Speech Language Pathologist  Phone Number [phone]

## 2025-01-13 NOTE — CM/SW NOTE
Met with Shoaib at bedside- discussed list of SNF choices and he states, \"I don't know\" but he reported he will, \"take a look\" and let me know.     team to continue to follow. Kathleen KIRK, 01/13/25, 4:10 PM      Per RN Ambar    \"Hi, so I spoke with Susan the nursing supervisor at [facility]. She wanted to me to pass on that he shouldn't return to [facility] or [facility]. In case her number is [phone]\"    Kathleen KIRK, 01/13/25, 4:18 PM

## 2025-01-13 NOTE — CONSULTS
[facility]                                                CARDIAC EP CONSULT NOTE      Patient Name: Shoaib Alan MRN: [id number]   : 10/22/1931 CSN: [id number]     CARDIAC EP CONSULT NOTE    Reason for consultation:   Asked by general cardiology Dr. Akins and Mars Linares MD to provide evaluation and management of AF.    Assessment and Recommendations:     Atrial fibrillation with tachycardia-bradycardia syndrome  -S/p single-chamber pacemaker  with Dr Thomas  -Now presents with RVR  -Better after amiodarone started yesterday  -Rate control meds limited by relatively low BPs    Pacemaker site hematoma  -Soft, no active bleeding  -No action required, other than usual postop care      RECS:  -Continue metoprolol 50 BID  -Continue digoxin 0.125 mg daily  -Start amiodarone 200 mg PO BID  -Continue Eliquis 2.5 mg BID  -Okay to d/c later today if ambulating and feeling well  -F/u with Dr Thomas in 1-2 weeks in office      Thank you for the EP consultation.    Rao Lozano MD  Cardiac Electrophysiology  [facility]  2025  C5-EP      History of present illness:   The patient is a 89 year old with AF and tachy-sylwia who was admitted due to rapid rates.  He was started on amiodarone, which controlled his rates well.  He feels normal now and has no cardiac complaints.    ROS:   Constitutional: No fevers, chills, night sweats, fatigue  ENT: No mouth pain, neck pain, running nose, headaches or swollen glands.  Skin: No rashes, pruritus or skin changes,  Respiratory: No cough, wheezing  CV: No chest pain or claudication.  Musculoskeletal: No joint pain, stiffness or swelling.  : No dysuria or hematuria.  GI: + Some dysphagia, no nausea, vomiting or diarrhea. No blood in stools.  Neurologic: No seizures, tremors, weakness or numbness.  Psych: Mood is good, not sad or anxious    Past Medical,  Surgical, Family, and Social Histories:     Past Medical History:    AF (atrial fibrillation) (HCC)    Arrhythmia    Cataract    Chronic pulmonary aspiration    ARF 6/16 BiPAP    Congestive heart disease (HCC)    COPD (chronic obstructive pulmonary disease) (HCC)    Coronary atherosclerosis    COVID    Depression    Dysphagia    Esophageal reflux    Fracture, radius    Hip fracture, left (HCC)    s/p IM fixation 1/11/17; Ortho Dr Alcantara    IBS (irritable bowel syndrome)    Lipid screening    Malabsorption (HCC)    Malnutrition (HCC)    severe     Osteoarthrosis, unspecified whether generalized or localized, unspecified site    hands    Pneumonia    Pneumonia due to organism    Pressure ulcer of left heel, stage 2 (HCC)    PUD (peptic ulcer disease)    Pulmonary hypertension (HCC)    Sacral decubitus ulcer    Thrombocytosis    Villous adenoma    Vitamin B12 deficiency    White matter disease     Past Surgical History:   Procedure Laterality Date    Cardioversion  2007    Cataract  2006    Cath percutaneous  transluminal coronary angioplasty  1990    Cholecystectomy      Colon surgery Right 10/11    Fat, fecal qualitative  12/10    Fracture surgery      Left femur 2016    Hip surgery  01/11/2017    INTERMEDULLARY FIXATION OF LEFT HIP FRACTURE    Hla b 27 disease association  2006    Intraocular lens implant, secondary  2006    Lexiscan nuc stress tst, cardio (dmg)  9/11    Other surgical history  1970    Billroth anastomosis    Other surgical history  2011    dental implants    Skin surgery  02/11/2019    Mohs/R Dorsal hand/SCC/done by MM     Family History   Problem Relation Age of Onset    Other (old age) Father 89        cause of death    Other (Other) Mother 96        cause of death    Cancer Paternal Aunt     Diabetes Paternal Aunt        SHX:  The patient reports that he quit smoking about 31 years ago. His smoking use included cigarettes. He started smoking about 51 years ago. He has a 60 pack-year smoking  history. He quit smokeless tobacco use about 9 years ago.  His smokeless tobacco use included chew. He reports current alcohol use. He reports that he does not use drugs.    Allergies:     Allergies   Allergen Reactions    Dronedarone      Other reaction(s): extreme dizzinss       Medications:      [START ON 1/14/2025] collagenase   Topical Daily    lidocaine-menthol  1 patch Transdermal Daily    cholecalciferol  1,000 Units Oral Daily    digoxin  125 mcg Oral Daily    docusate sodium  100 mg Oral BID    ferrous sulfate  325 mg Oral Daily with breakfast    metoprolol tartrate  50 mg Oral 2x Daily(Beta Blocker)    predniSONE  2.5 mg Oral Daily    apixaban  2.5 mg Oral BID      amiodarone 0.5 mg/min (01/13/25 0509)       PHYSICAL EXAM:   /69 (BP Location: Right arm)   Pulse 68   Temp 97.5 °F (36.4 °C) (Oral)   Resp 18   Ht 6' 4\" (1.93 m)   Wt 146 lb 9.6 oz (66.5 kg)   SpO2 96%   BMI 17.84 kg/m²   GEN - no distress  HEENT - atraumatic  Neck - no masses  Lungs - nonlabored resps, normal symmetric excursion  CV - no precordial heave, no edema  Abd - nondistended  Ext - no cyanosis  Skin - no rash, pacer site with soft layered hematoma, skin wound/abrasions  Neuro - alert, oriented    LABS AND DATA:     Lab Results   Component Value Date    WBC 10.2 01/13/2025    HGB 11.2 (L) 01/13/2025    HCT 36.0 (L) 01/13/2025    .0 01/13/2025    CREATSERUM 0.83 01/13/2025    BUN 38 (H) 01/13/2025     01/13/2025    K 4.0 01/13/2025     01/13/2025    CO2 28.0 01/13/2025     (H) 01/13/2025    CA 9.6 01/13/2025    ALB 3.7 01/03/2025    ALKPHO 59 01/03/2025    BILT 0.6 01/03/2025    TP 6.4 01/03/2025    AST 15 01/03/2025    ALT <7 (L) 01/03/2025    PTT 33.8 01/06/2025    INR 1.01 01/03/2025    PT 41.8 (H) 06/01/2016    TSH 2.791 01/03/2025    LIP 18 (L) 06/29/2018    DDIMER <0.27 12/01/2022    ESRML 23 11/26/2021    MG 2.3 01/12/2025    PHOS 2.9 12/04/2022    TROP <0.045 08/06/2019    B12 681  09/28/2020       Imaging: I independently visualized all relevant chest imaging in PACS, agree with radiology interpretation except where noted.    XR CHEST AP PORTABLE  (CPT=71045)    Result Date: 1/12/2025  CONCLUSION: No acute cardiopulmonary abnormality.   Dictated by (CST): Jesse Wang MD on 1/12/2025 at 9:17 AM     Finalized by (CST): Jesse Wang MD on 1/12/2025 at 9:19 AM             ------------------------------------------------------------------------------------------------------------------------------

## 2025-01-13 NOTE — CONGREGATE LIVING REVIEW
[facility] Living Authorization    The [facility] Review Committee has reviewed this case and the patient IS APPROVED for discharge to a facility for Short Term Skilled once the following procedure is followed:     - The physician discharge instructions (contained within the SERGO note for SNF) must inlcude the below appropriate and approved COVID instructions to the facility    For questions regarding CLRC approval process, please contact the CM assigned to the case.  For questions regarding RN discharge workflow, please contact the unit Clinical Leader.

## 2025-01-13 NOTE — PLAN OF CARE
Pt alert and oriented x4. Max assist. Pacemaker in place. PO Amio. Pt and family updated on plan of care. PRN Norco given. PRN Tylenol given. Plan for PT/OT follow up for placement. Safety precautions in place. Call light within reach.    Problem: Patient Centered Care  Goal: Patient preferences are identified and integrated in the patient's plan of care  Description: Interventions:  - What would you like us to know as we care for you?  - Provide timely, complete, and accurate information to patient/family  - Incorporate patient and family knowledge, values, beliefs, and cultural backgrounds into the planning and delivery of care  - Encourage patient/family to participate in care and decision-making at the level they choose  - Honor patient and family perspectives and choices  Outcome: Progressing     Problem: Patient/Family Goals  Goal: Patient/Family Long Term Goal  Description: Patient's Long Term Goal: discharge    Interventions:  - Monitor vital signs  - Amiodarone gtt  - Wound care  - See additional Care Plan goals for specific interventions  Outcome: Progressing  Goal: Patient/Family Short Term Goal  Description: Patient's Short Term Goal: feel better    Interventions:   - Continue ADLs  - Pain management  - See additional Care Plan goals for specific interventions  Outcome: Progressing     Problem: CARDIOVASCULAR - ADULT  Goal: Maintains optimal cardiac output and hemodynamic stability  Description: INTERVENTIONS:  - Monitor vital signs, rhythm, and trends  - Monitor for bleeding, hypotension and signs of decreased cardiac output  - Evaluate effectiveness of vasoactive medications to optimize hemodynamic stability  - Monitor arterial and/or venous puncture sites for bleeding and/or hematoma  - Assess quality of pulses, skin color and temperature  - Assess for signs of decreased coronary artery perfusion - ex. Angina  - Evaluate fluid balance, assess for edema, trend weights  Outcome: Progressing  Goal:  Absence of cardiac arrhythmias or at baseline  Description: INTERVENTIONS:  - Continuous cardiac monitoring, monitor vital signs, obtain 12 lead EKG if indicated  - Evaluate effectiveness of antiarrhythmic and heart rate control medications as ordered  - Initiate emergency measures for life threatening arrhythmias  - Monitor electrolytes and administer replacement therapy as ordered  Outcome: Progressing     Problem: SKIN/TISSUE INTEGRITY - ADULT  Goal: Skin integrity remains intact  Description: INTERVENTIONS  - Assess and document risk factors for pressure ulcer development  - Assess and document skin integrity  - Monitor for areas of redness and/or skin breakdown  - Initiate interventions, skin care algorithm/standards of care as needed  Outcome: Progressing  Goal: Incision(s), wounds(s) or drain site(s) healing without S/S of infection  Description: INTERVENTIONS:  - Assess and document risk factors for pressure ulcer development  - Assess and document skin integrity  - Assess and document dressing/incision, wound bed, drain sites and surrounding tissue  - Implement wound care per orders  - Initiate isolation precautions as appropriate  - Initiate Pressure Ulcer prevention bundle as indicated  Outcome: Progressing

## 2025-01-13 NOTE — PROGRESS NOTES
[facility]  part of [facility]    Progress Note    Shoaib Alan Patient Status:  Inpatient    10/22/1931 MRN [id number]   Location [facility] 3W/SW Attending Mars Camargo MD   Hosp Day # 1 PCP Mars Camargo MD       SUBJECTIVE:  Reports difficulty swallowing this morning        OBJECTIVE:  /78 (BP Location: Right arm)   Pulse 81   Temp 97.1 °F (36.2 °C) (Oral)   Resp 18   Ht 6' 4\" (1.93 m)   Wt 146 lb 9.6 oz (66.5 kg)   SpO2 95%   BMI 17.84 kg/m²     Intake/Output:  I/O last 3 completed shifts:  In: 1861 [P.O.:940; I.V.:921]  Out: -     Wt Readings from Last 3 Encounters:   25 146 lb 9.6 oz (66.5 kg)   25 141 lb 4.8 oz (64.1 kg)   23 160 lb (72.6 kg)       Exam  Gen: No acute distress, alert and oriented x3  Pulm: Lungs CTAB, no rhonchi or wheezing  CV: Heart RRR, no murmurs  PM site with ecchymoses   Abd: Abdomen soft, nontender, nondistended, no organomegaly, bowel sounds present  MSK: Full range of motion in extremities, no clubbing, no cyanosis, no edema        Labs:   Recent Labs   Lab 25  0629 25  0819 25  0821   RBC 4.61 4.00 4.26   HGB 13.0 12.1* 12.7*   HCT 41.8 35.4* 38.8*   MCV 90.7 88.5 91.1   MCH 28.2 30.3 29.8   MCHC 31.1 34.2 32.7   RDW 14.8 15.0 15.1*   NEPRELIM 9.11* 9.31* 9.77*   WBC 13.8* 14.9* 14.0*   .0 135.0* 235.0         Recent Labs   Lab 25  0629 25  0819 25  0821   GLU 89 100* 102*   BUN 22 26* 44*   CREATSERUM 1.01 0.99 0.99   EGFRCR 69 71 71   CA 9.7 9.6 10.1    135* 141   K 4.0 4.2 5.0   CL 98 99 101   CO2 29.0 27.0 29.0         Imaging:  XR CHEST AP PORTABLE  (CPT=71045)    Result Date: 2025  CONCLUSION: No acute cardiopulmonary abnormality.   Dictated by (CST): Jesse Wang MD on 2025 at 9:17 AM     Finalized by (CST): Jesse Wang MD on 2025 at 9:19 AM                 Assessment and Plan:     Atrial fibrillation with RVR  -was on diltiazem   mg qD and digoxin 0.125 mg po every day until 12/30/24 when he reported near-syncope with 47, so diltiazem ER was decreased to 120 mg daily  -presents to ED 1/2/25 with tachycardia; EKG shows Afib with   and WCT  -HR decreased with Cadizem 10 mg IVP and Cardizem gtt at 10 mg/hour  -seen by EP Dr Lozano  -due to tachy-sylwia syndrome, underwent placement of PPM on 1/6/25 per Dr Thomas; HR 90s-120 on metoprolol 50 mg po BID (increased from 25mg BID), and maintained digoxin 0.125 mg po qD  -was started on Xarelto 15 mg po every day  -PT/OT recommends DONTA; transferred to [facility] for DONTA 1/9/25  -readmitted 1/12/2025with Afib with RVR HR 140s;   -cardiology consult requested for rate control Rx; has tolerated diltiazem  mg daily in the past  -on amiodarone drip; rates improved; paced rhythm     Pocket hematoma  -has hematoma to pacemaker pocket in left upper chest  -stop Xarelto; started on eliquis 2.5mg bid     Skin avulsion  -new problem; to posterior aspect of LUE; avulsion measures 8.5 cm long x 1cm width x 0.5cm depth; pt states avulsion occurred after inflation of blood pressure cuff  -Silvadene 1% cream ATAA BID  -wound consult     Dysphagia  Speech therapy to evaluate     History of RLE hematoma  In May 2023; ultrasound RLE shows two large hematomas to RLE: no evidence for compartment syndrome; no need for surgical intervention  -stopped warfarin in May 2023, though now on xarelto     History of anemia  Hgb stable (12.1 on 1/7/25)  on ferrous sulfate 325 mg po qD     Aortic stenosis  last ECHO in Jan 2025 shows EF 50-55% with mild aortic stenosis; last Lexiscan GXT 11/29/21 negative for ischemia     History of COVID infection  In Dec 2022;   -nares positive COVID 12/1/22  -s/p remdesivir 100 mg IV [id number] x 4d  -s/p Decadron 6 mg daily x 4 days; discontinued 12/4     History of Pneumonia  In Dec 2022; PCXR shows Patchy medial bibasilar opacities, which are new since prior chest radiographs from  October, 2021. Findings could relate to pneumonia/aspiration in the appropriate clinical setting.  Also identified is a new 4 cm right perihilar nodular opacity.     -CT chest shows Airspace opacities within the bilateral upper and lower lobes, most pronounced within the right lower lobe suspicious for multifocal pneumonia.  -s/p ceftriaxone 1 gm IV [id number], d#5, and s/p  azithromycin 500 mg po daily x 3d     History of Osteomyelitis left 2nd toe   -MRI left foot 11/27/21 shows underlying acute osteomyelitis involving the entirety of the 2nd digit middle phalanx and the distal half of the 2nd digit proximal phalanx.   -s/p left 2nd toe amputation 12/3/21 per podiatrist, Dr Gutierrez; pathology with acute and chronic osteomyelitis   -has home visits by podiatrist Dr Josefina Fraga [phone] and advised sleeve to right 2nd toe      PVD  -CTA 11/29/21 reveals atherosclerosis of the SFA with 65-70% stenosis just beyond the adductor canal  -s/p angioplasty of tight focal left popliteal stenosis; unable to open distally occluded left post tib artery; left ant tib artery-dorsalis pedis widely patent (procedure on 12/1/21 by Dr. Lynch).      Constipation  On Miralax 17 gm po daily prn, and Dulcolax 10 mg po daily prn; takes prune juice     Osteoarthritis lumbar spine and cervical spine  As seen on CT June 2018; Scoliosis and degenerative changes in spine. Mild chronic anterior wedging of lower thoracic vertebral bodies; XR L-spine in Aug 2019 shows 5 lumbar segments with 20° levoconvex scoliosis, slight flattening of the lordotic curvature.  Endplate compression at L3.  Moderate loss of disc heights throughout the lumbar spine.  No spondylolisthesis.  No destructive lesions.  Mild calcification of the abdominal aorta; has seen physiatrist Dr Reilly Overton  -the patient had been taking Norco 7.5 mg q. 6 hours p.r.n. Pain; pt requests #60 with next refill     Gait abnormality  Pt has high fall risk; using manual  wheelchair or electric scooter, though independent use is limited due to bilateral hand osteoarthritis;      Right hand basal cell skin cancer  S/p biopsy in Dec 2018; Pt awaits formal excision by dermatologist in [place]     History of Sepsis due to Klebsiella UTI  In June 2018; Due to urine septicemia; BCx and UCx showing growing Klebsiella pneumoniae, sensitive to pip/ tazo; s/p Zosyn 3.375 gm IV q8hrs, x6d, then Keflex for 8 more days, along with prophylactic po Vanco 125mg qd.        Nephrolithiasis with moderate left hydronephrosis  CT abdomen/ pelvis 6/29 revealed moderate left hydroureteronephrosis related to a 4 x 2 mm left ureterovesical junction calculus; pt seen in consultation with urologist Dr Linder; s/p cystoscopy, left retrograde pyelogram, and insertion of left ureteral stent by Dr Linder.   Follow up with Dr. Linder     Stool incontinence  On Imodium 2 mg po q4hrs prn.  GI panel negative.      Left shoulder pain  XR both shoulders neg for DJD; DDx includes frozen shoulder vs PMR;  s/p home physical therapy for adhesive capsulitis with [facility]; on prednisone to 2.5 mg daily; now with mild right shoulder pain x 2d--> advise stretching exercises for now; call if sxs persists     Pulmonary HTN  seen on ECHO in Jan 2017 with PA pressure 54 (previous PA pressure 44 in June 2016); thought to be multifactorial     Left hip fracture  s/p IM fixation in Jan 2017; on  Norco 7.5 mg po q4hrs prn     Sacral decubitus ulcer  uses Medihoney wound dressing gel daily prn     Coronary artery disease   Had PTCA in 1990s;   -the patient currently takes aspirin 81 mg daily; EKG shows Afib with old inferior anterior infarcts (seen in 2018);   -ECHO in Jan 2025 shows EF 50-55% with mild aortic stenosis; last Lexiscan GXT 11/29/21 negative for ischemia     History of villous adenoma   the patient reports occasional loose stools.  The patient has been advised to see gastroenterologist, Dr. Hopper, as an  outpatient for colonoscopy though he has declined repeat colonoscopy     Vitamin B12 deficiency  the patient had been receiving vitamin B12 1,000 mcg IM q. Month      Protein calorie malnutrition, moderate  -appreciate dietician consult             Chiara Potts DO  1/13/2025  6:27 AM

## 2025-01-14 VITALS
OXYGEN SATURATION: 90 % | HEIGHT: 76 IN | RESPIRATION RATE: 20 BRPM | WEIGHT: 146.63 LBS | SYSTOLIC BLOOD PRESSURE: 106 MMHG | HEART RATE: 72 BPM | BODY MASS INDEX: 17.86 KG/M2 | DIASTOLIC BLOOD PRESSURE: 64 MMHG | TEMPERATURE: 98 F

## 2025-01-14 LAB
ALBUMIN SERPL-MCNC: 3.6 G/DL (ref 3.2–4.8)
ALBUMIN/GLOB SERPL: 1.1 {RATIO} (ref 1–2)
ALP LIVER SERPL-CCNC: 64 U/L
ALT SERPL-CCNC: <7 U/L
ANION GAP SERPL CALC-SCNC: 11 MMOL/L (ref 0–18)
AST SERPL-CCNC: 16 U/L (ref ?–34)
BASOPHILS # BLD AUTO: 0.03 X10(3) UL (ref 0–0.2)
BASOPHILS NFR BLD AUTO: 0.3 %
BILIRUB SERPL-MCNC: 1 MG/DL (ref 0.2–0.9)
BUN BLD-MCNC: 34 MG/DL (ref 9–23)
BUN/CREAT SERPL: 43.6 (ref 10–20)
CALCIUM BLD-MCNC: 9.4 MG/DL (ref 8.7–10.4)
CHLORIDE SERPL-SCNC: 104 MMOL/L (ref 98–112)
CO2 SERPL-SCNC: 28 MMOL/L (ref 21–32)
CREAT BLD-MCNC: 0.78 MG/DL
DEPRECATED RDW RBC AUTO: 49.1 FL (ref 35.1–46.3)
EGFRCR SERPLBLD CKD-EPI 2021: 83 ML/MIN/1.73M2 (ref 60–?)
EOSINOPHIL # BLD AUTO: 0.07 X10(3) UL (ref 0–0.7)
EOSINOPHIL NFR BLD AUTO: 0.7 %
ERYTHROCYTE [DISTWIDTH] IN BLOOD BY AUTOMATED COUNT: 15.3 % (ref 11–15)
GLOBULIN PLAS-MCNC: 3.2 G/DL (ref 2–3.5)
GLUCOSE BLD-MCNC: 87 MG/DL (ref 70–99)
HCT VFR BLD AUTO: 34.5 %
HGB BLD-MCNC: 11.1 G/DL
IMM GRANULOCYTES # BLD AUTO: 0.06 X10(3) UL (ref 0–1)
IMM GRANULOCYTES NFR BLD: 0.6 %
LYMPHOCYTES # BLD AUTO: 1.1 X10(3) UL (ref 1–4)
LYMPHOCYTES NFR BLD AUTO: 10.6 %
MCH RBC QN AUTO: 28.8 PG (ref 26–34)
MCHC RBC AUTO-ENTMCNC: 32.2 G/DL (ref 31–37)
MCV RBC AUTO: 89.4 FL
MONOCYTES # BLD AUTO: 2.27 X10(3) UL (ref 0.1–1)
MONOCYTES NFR BLD AUTO: 21.8 %
NEUTROPHILS # BLD AUTO: 6.86 X10 (3) UL (ref 1.5–7.7)
NEUTROPHILS # BLD AUTO: 6.86 X10(3) UL (ref 1.5–7.7)
NEUTROPHILS NFR BLD AUTO: 66 %
OSMOLALITY SERPL CALC.SUM OF ELEC: 303 MOSM/KG (ref 275–295)
PLATELET # BLD AUTO: 242 10(3)UL (ref 150–450)
POTASSIUM SERPL-SCNC: 4.1 MMOL/L (ref 3.5–5.1)
PROT SERPL-MCNC: 6.8 G/DL (ref 5.7–8.2)
RBC # BLD AUTO: 3.86 X10(6)UL
SODIUM SERPL-SCNC: 143 MMOL/L (ref 136–145)
WBC # BLD AUTO: 10.4 X10(3) UL (ref 4–11)

## 2025-01-14 PROCEDURE — 99238 HOSP IP/OBS DSCHRG MGMT 30/<: CPT | Performed by: INTERNAL MEDICINE

## 2025-01-14 NOTE — PHYSICAL THERAPY NOTE
Chart reviewed, patient declined participation in physical therapy at this time. States he was up in the chair last night and now is too tired and having pain in his neck. Will attempt to see patient for physical therapy session as time permits at later date. RN aware.

## 2025-01-14 NOTE — PLAN OF CARE
Problem: Patient Centered Care  Goal: Patient preferences are identified and integrated in the patient's plan of care  Description: Interventions:  - What would you like us to know as we care for you? \"I started painting a few years ago.\"  - Provide timely, complete, and accurate information to patient/family  - Incorporate patient and family knowledge, values, beliefs, and cultural backgrounds into the planning and delivery of care  - Encourage patient/family to participate in care and decision-making at the level they choose  - Honor patient and family perspectives and choices  Outcome: Adequate for Discharge     Problem: Patient/Family Goals  Goal: Patient/Family Long Term Goal  Description: Patient's Long Term Goal: discharge    Interventions:  - Monitor vital signs  - Amiodarone gtt  - Wound care  - See additional Care Plan goals for specific interventions  Outcome: Adequate for Discharge  Goal: Patient/Family Short Term Goal  Description: Patient's Short Term Goal: feel better    Interventions:   - Continue ADLs  - Pain management  - See additional Care Plan goals for specific interventions  Outcome: Adequate for Discharge     Problem: CARDIOVASCULAR - ADULT  Goal: Maintains optimal cardiac output and hemodynamic stability  Description: INTERVENTIONS:  - Monitor vital signs, rhythm, and trends  - Monitor for bleeding, hypotension and signs of decreased cardiac output  - Evaluate effectiveness of vasoactive medications to optimize hemodynamic stability  - Monitor arterial and/or venous puncture sites for bleeding and/or hematoma  - Assess quality of pulses, skin color and temperature  - Assess for signs of decreased coronary artery perfusion - ex. Angina  - Evaluate fluid balance, assess for edema, trend weights  Outcome: Adequate for Discharge  Goal: Absence of cardiac arrhythmias or at baseline  Description: INTERVENTIONS:  - Continuous cardiac monitoring, monitor vital signs, obtain 12 lead EKG if  indicated  - Evaluate effectiveness of antiarrhythmic and heart rate control medications as ordered  - Initiate emergency measures for life threatening arrhythmias  - Monitor electrolytes and administer replacement therapy as ordered  Outcome: Adequate for Discharge     Problem: SKIN/TISSUE INTEGRITY - ADULT  Goal: Skin integrity remains intact  Description: INTERVENTIONS  - Assess and document risk factors for pressure ulcer development  - Assess and document skin integrity  - Monitor for areas of redness and/or skin breakdown  - Initiate interventions, skin care algorithm/standards of care as needed  Outcome: Adequate for Discharge  Goal: Incision(s), wounds(s) or drain site(s) healing without S/S of infection  Description: INTERVENTIONS:  - Assess and document risk factors for pressure ulcer development  - Assess and document skin integrity  - Assess and document dressing/incision, wound bed, drain sites and surrounding tissue  - Implement wound care per orders  - Initiate isolation precautions as appropriate  - Initiate Pressure Ulcer prevention bundle as indicated  Outcome: Adequate for Discharge   Patient will be discharged to [facility]. Report was given to HARIS Slaughter at [facility].

## 2025-01-14 NOTE — DISCHARGE SUMMARY
Discharge Summary     Shoaib Alan Patient Status:  Inpatient    10/22/1931 MRN [id number]   Location [facility] 3W/SW Attending Mars Camargo MD   Hosp Day # 2 PCP Mars Camargo MD     Date of Admission: 2025  Date of Discharge: 2025  Discharge Disposition: SNF Subacute Rehab    Discharge Diagnosis: afib with rvr, pacemaker pocket hematoma    History of Present Illness:       Shoaib Alan is a(n) 89 year old male with PMHx Atrial fibrillation who had been on diltiazem  mg qD and digoxin 0.125 mg po every day until 24 when he reported near-syncope with 47.   Diltiazem ER was decreased to 120 mg daily at that time.  He presented to ED 24 with tachycardia; EKG shows Afib with   and WCT.  HR decreased with Cadizem 10 mg IVP and Cardizem gtt at 10 mg/hour, and he was admitted for further care;  PCXR neg infiltrate; sent to [facility] on metoprolol 50 mg po BID, digoxin 0.125 mg po every day, and Xarelto 15 mg po every day; readmitted today with Afib with RVR HR 140s; has pocket hematoma to pacemaker pocket in left upper chest; also has skin avulsion to posterior aspect of LUE; avulsion measures 8.5 cm long x 1cm width x 0.5cm depth; pt states avulsion occurred after inflation of blood pressure cuff;  no CP; no SOB; no headaches; no palpitation       Brief Synopsis:     Atrial fibrillation with RVR  -was on diltiazem  mg qD and digoxin 0.125 mg po every day until 24 when he reported near-syncope with 47, so diltiazem ER was decreased to 120 mg daily  -presents to ED 25 with tachycardia; EKG shows Afib with   and WCT  -HR decreased with Cadizem 10 mg IVP and Cardizem gtt at 10 mg/hour  -seen by EP Dr Lozano  -due to tachy-sylwia syndrome, underwent placement of PPM on 25 per Dr Thomas; HR 90s-120 on metoprolol 50 mg po BID (increased from 25mg BID), and maintained digoxin 0.125 mg po qD  -was started on Xarelto 15 mg po every day  -PT/OT recommends  DONTA; transferred to [facility] for DONTA 1/9/25  -readmitted 1/12/2025with Afib with RVR HR 140s;   -cardiology consult requested for rate control Rx; has tolerated diltiazem  mg daily in the past  -on amiodarone drip; rates improved; paced rhythm     Pocket hematoma  -has hematoma to pacemaker pocket in left upper chest  -stop Xarelto; started on eliquis 2.5mg bid     Skin avulsion  -new problem; to posterior aspect of LUE; avulsion measures 8.5 cm long x 1cm width x 0.5cm depth; pt states avulsion occurred after inflation of blood pressure cuff  -Silvadene 1% cream ATAA BID  -wound consult      Dysphagia  Speech therapy rec regular diet, thin liquids     History of RLE hematoma  In May 2023; ultrasound RLE shows two large hematomas to RLE: no evidence for compartment syndrome; no need for surgical intervention  -stopped warfarin in May 2023, though now on xarelto     History of anemia  Hgb stable (~12.1)  on ferrous sulfate 325 mg po qD     Aortic stenosis  last ECHO in Jan 2025 shows EF 50-55% with mild aortic stenosis; last Lexiscan GXT 11/29/21 negative for ischemia     History of COVID infection  In Dec 2022;   -nares positive COVID 12/1/22  -s/p remdesivir 100 mg IV [id number] x 4d  -s/p Decadron 6 mg daily x 4 days; discontinued 12/4     History of Pneumonia  In Dec 2022; PCXR shows Patchy medial bibasilar opacities, which are new since prior chest radiographs from October, 2021. Findings could relate to pneumonia/aspiration in the appropriate clinical setting.  Also identified is a new 4 cm right perihilar nodular opacity.     -CT chest shows Airspace opacities within the bilateral upper and lower lobes, most pronounced within the right lower lobe suspicious for multifocal pneumonia.  -s/p ceftriaxone 1 gm IV [id number], d#5, and s/p  azithromycin 500 mg po daily x 3d     History of Osteomyelitis left 2nd toe   -MRI left foot 11/27/21 shows underlying acute osteomyelitis involving the entirety of the 2nd  digit middle phalanx and the distal half of the 2nd digit proximal phalanx.   -s/p left 2nd toe amputation 12/3/21 per podiatrist, Dr Gutierrez; pathology with acute and chronic osteomyelitis   -has home visits by podiatrist Dr Josefina Fraga [phone] and advised sleeve to right 2nd toe      PVD  -CTA 11/29/21 reveals atherosclerosis of the SFA with 65-70% stenosis just beyond the adductor canal  -s/p angioplasty of tight focal left popliteal stenosis; unable to open distally occluded left post tib artery; left ant tib artery-dorsalis pedis widely patent (procedure on 12/1/21 by Dr. Lynch).      Constipation  On Miralax 17 gm po daily prn, and Dulcolax 10 mg po daily prn; takes prune juice     Osteoarthritis lumbar spine and cervical spine  As seen on CT June 2018; Scoliosis and degenerative changes in spine. Mild chronic anterior wedging of lower thoracic vertebral bodies; XR L-spine in Aug 2019 shows 5 lumbar segments with 20° levoconvex scoliosis, slight flattening of the lordotic curvature.  Endplate compression at L3.  Moderate loss of disc heights throughout the lumbar spine.  No spondylolisthesis.  No destructive lesions.  Mild calcification of the abdominal aorta; has seen physiatrist Dr Reilly Overton  -the patient had been taking Norco 7.5 mg q. 6 hours p.r.n. Pain; pt requests #60 with next refill     Gait abnormality  Pt has high fall risk; using manual wheelchair or electric scooter, though independent use is limited due to bilateral hand osteoarthritis;      Right hand basal cell skin cancer  S/p biopsy in Dec 2018; Pt awaits formal excision by dermatologist in [place]     History of Sepsis due to Klebsiella UTI  In June 2018; Due to urine septicemia; BCx and UCx showing growing Klebsiella pneumoniae, sensitive to pip/ tazo; s/p Zosyn 3.375 gm IV q8hrs, x6d, then Keflex for 8 more days, along with prophylactic po Vanco 125mg qd.        Nephrolithiasis with moderate left hydronephrosis  CT  abdomen/ pelvis 6/29 revealed moderate left hydroureteronephrosis related to a 4 x 2 mm left ureterovesical junction calculus; pt seen in consultation with urologist Dr Linder; s/p cystoscopy, left retrograde pyelogram, and insertion of left ureteral stent by Dr Linder.   Follow up with Dr. Linder     Stool incontinence  On Imodium 2 mg po q4hrs prn.  GI panel negative.      Left shoulder pain  XR both shoulders neg for DJD; DDx includes frozen shoulder vs PMR;  s/p home physical therapy for adhesive capsulitis with [facility]; on prednisone to 2.5 mg daily; now with mild right shoulder pain x 2d--> advise stretching exercises for now; call if sxs persists     Pulmonary HTN  seen on ECHO in Jan 2017 with PA pressure 54 (previous PA pressure 44 in June 2016); thought to be multifactorial     Left hip fracture  s/p IM fixation in Jan 2017; on  Norco 7.5 mg po q4hrs prn     Sacral decubitus ulcer  uses Medihoney wound dressing gel daily prn     Coronary artery disease   Had PTCA in 1990s;   -the patient currently takes aspirin 81 mg daily; EKG shows Afib with old inferior anterior infarcts (seen in 2018);   -ECHO in Jan 2025 shows EF 50-55% with mild aortic stenosis; last Lexiscan GXT 11/29/21 negative for ischemia     History of villous adenoma   the patient reports occasional loose stools.  The patient has been advised to see gastroenterologist, Dr. Hopper, as an outpatient for colonoscopy though he has declined repeat colonoscopy     Vitamin B12 deficiency  the patient had been receiving vitamin B12 1,000 mcg IM q. Month      Protein calorie malnutrition, moderate  -appreciate dietician consult             Lace+ Score: 79  59-90 High Risk  29-58 Medium Risk  0-28   Low Risk       TCM Follow-Up Recommendation:  LACE > 58: High Risk of readmission after discharge from the hospital.    Procedures during hospitalization:   none    Incidental or significant findings and recommendations (brief  descriptions):  none    Lab/Test results pending at Discharge:   none    Consultants:  Cardiology, wound care    Discharge Medication List:     Discharge Medications        START taking these medications        Instructions Prescription details   amiodarone 200 MG Tabs  Commonly known as: Pacerone      Take 1 tablet (200 mg total) by mouth 2 (two) times daily with meals.   Quantity: 60 tablet  Refills: 0     apixaban 2.5 MG Tabs  Commonly known as: Eliquis      Take 1 tablet (2.5 mg total) by mouth 2 (two) times daily.   Quantity: 60 tablet  Refills: 1     collagenase 250 UNIT/GM Oint  Commonly known as: Santyl      Apply 1 Application topically daily.   Quantity: 30 g  Refills: 0            CONTINUE taking these medications        Instructions Prescription details   acetaminophen 500 MG Tabs  Commonly known as: Tylenol Extra Strength      Take 1 tablet (500 mg total) by mouth every 6 (six) hours as needed for Pain.   Refills: 0     digoxin 0.125 MG Tabs  Commonly known as: Lanoxin      Take 1 tablet (125 mcg total) by mouth daily.   Quantity: 90 tablet  Refills: 3     docusate sodium 100 MG Caps  Commonly known as: COLACE      Take 100 mg by mouth 2 (two) times daily.   Refills: 0     ferrous sulfate 325 (65 FE) MG Tbec      Take 1 tablet (325 mg total) by mouth daily with breakfast.   Quantity: 30 tablet  Refills: 5     furosemide 20 MG Tabs  Commonly known as: Lasix      Take 1 tablet (20 mg total) by mouth daily as needed. Prn leg swelling   Quantity: 90 tablet  Refills: 3     HYDROcodone-acetaminophen 7.5-325 MG Tabs  Commonly known as: Norco      Take 1 tablet by mouth every 6 (six) hours as needed for Pain.   Quantity: 90 tablet  Refills: 0     lidocaine-menthol 4-1 % Ptch      Place 1 patch onto the skin daily.   Refills: 0     loperamide 2 MG Caps  Commonly known as: Imodium      Take 1 capsule (2 mg total) by mouth 4 (four) times daily as needed for Diarrhea.   Refills: 0     melatonin 3 MG Tabs      Take  1 tablet (3 mg total) by mouth nightly.   Refills: 0     metoprolol tartrate 50 MG Tabs  Commonly known as: Lopressor      Take 1 tablet (50 mg total) by mouth 2x Daily(Beta Blocker).   Quantity: 60 tablet  Refills: 1     Polyethylene Glycol 3350 17 g Pack  Commonly known as: MIRALAX      Take 17 g by mouth daily as needed.   Refills: 0     predniSONE 2.5 MG Tabs  Commonly known as: Deltasone      TAKE 1 TABLET(2.5 MG) BY MOUTH DAILY   Quantity: 90 tablet  Refills: 3     Vitamin D 1000 units Tabs      Take 1 tablet by mouth daily.   Refills: 0            STOP taking these medications      rivaroxaban 15 MG Tabs  Commonly known as: Xarelto                  Where to Get Your Medications        These medications were sent to [id number] - [place], [place] - [address] [facility] [address] [address], [phone], [phone] [address], [place] [address]      Phone: [phone]   amiodarone 200 MG Tabs  apixaban 2.5 MG Tabs       Please  your prescriptions at the location directed by your doctor or nurse    Bring a paper prescription for each of these medications  collagenase 250 UNIT/GM Oint         Follow-up appointment:   Esteban Thomas MD  [address]  [place] [address] [phone]    Follow up in 1 week(s)  Office will call to schedule    Mars Camargo MD  [address] [facility] [address] [phone]    Follow up in 2 week(s)      Appointments for Next 30 Days 1/14/2025 - 2/13/2025        Date Arrival Time Visit Type Length Department Provider     2/3/2025  3:00 PM  [facility] AMB VIRTUAL PHONE VISIT [[id number]] 15 min [facility] Mars Camargo MD    Patient Instructions:     You have been scheduled for a Virtual Telephone visit with your provider. Please be available at your phone so that your physician can contact you, and be prepared with any questions or concerns. As always, your health is our priority.      We suggest confirming with your insurance regarding coverage prior to your appointment as some payors may no longer cover telephone visits. Depending on your insurance you may also be billed a copay at a later time.        Location Instructions:     Masks are optional for all patients and visitors, unless otherwise indicated.                      Supplementary Documentation:   [place] reviewed[place]    Vital signs:  Temp:  [97.3 °F (36.3 °C)-98 °F (36.7 °C)] 97.3 °F (36.3 °C)  Pulse:  [62-77] 63  Resp:  [20] 20  BP: (100-116)/(63-76) 100/63  SpO2:  [90 %-95 %] 90 %    Physical Exam:    General:  NAD  Cardiovascular:  S1, S2    -----------------------------------------------------------------------------------------------  PATIENT DISCHARGE INSTRUCTIONS: See electronic chart    Tip: Documentation requirements: For split shared discharge, BOTH providers need to document specific floor, unit, and time spent on the discharge.  The note needs to be signed by the provider with > 50% of time and bill under their NPI.   Time spent:  >30 min         Chiara Potts DO

## 2025-01-14 NOTE — PLAN OF CARE
Shoaib is resting well. This RN continues to remind him of the importance of moving as much as he can and to be repositioned.     Problem: Patient Centered Care  Goal: Patient preferences are identified and integrated in the patient's plan of care  Description: Interventions:  - What would you like us to know as we care for you? \"I started painting a few years ago.\"  - Provide timely, complete, and accurate information to patient/family  - Incorporate patient and family knowledge, values, beliefs, and cultural backgrounds into the planning and delivery of care  - Encourage patient/family to participate in care and decision-making at the level they choose  - Honor patient and family perspectives and choices  Outcome: Progressing     Problem: Patient/Family Goals  Goal: Patient/Family Long Term Goal  Description: Patient's Long Term Goal: discharge    Interventions:  - Monitor vital signs  - Amiodarone gtt  - Wound care  - See additional Care Plan goals for specific interventions  Outcome: Progressing  Goal: Patient/Family Short Term Goal  Description: Patient's Short Term Goal: feel better    Interventions:   - Continue ADLs  - Pain management  - See additional Care Plan goals for specific interventions  Outcome: Progressing     Problem: CARDIOVASCULAR - ADULT  Goal: Maintains optimal cardiac output and hemodynamic stability  Description: INTERVENTIONS:  - Monitor vital signs, rhythm, and trends  - Monitor for bleeding, hypotension and signs of decreased cardiac output  - Evaluate effectiveness of vasoactive medications to optimize hemodynamic stability  - Monitor arterial and/or venous puncture sites for bleeding and/or hematoma  - Assess quality of pulses, skin color and temperature  - Assess for signs of decreased coronary artery perfusion - ex. Angina  - Evaluate fluid balance, assess for edema, trend weights  Outcome: Progressing  Goal: Absence of cardiac arrhythmias or at baseline  Description: INTERVENTIONS:  -  Continuous cardiac monitoring, monitor vital signs, obtain 12 lead EKG if indicated  - Evaluate effectiveness of antiarrhythmic and heart rate control medications as ordered  - Initiate emergency measures for life threatening arrhythmias  - Monitor electrolytes and administer replacement therapy as ordered  Outcome: Progressing     Problem: SKIN/TISSUE INTEGRITY - ADULT  Goal: Skin integrity remains intact  Description: INTERVENTIONS  - Assess and document risk factors for pressure ulcer development  - Assess and document skin integrity  - Monitor for areas of redness and/or skin breakdown  - Initiate interventions, skin care algorithm/standards of care as needed  Outcome: Progressing  Goal: Incision(s), wounds(s) or drain site(s) healing without S/S of infection  Description: INTERVENTIONS:  - Assess and document risk factors for pressure ulcer development  - Assess and document skin integrity  - Assess and document dressing/incision, wound bed, drain sites and surrounding tissue  - Implement wound care per orders  - Initiate isolation precautions as appropriate  - Initiate Pressure Ulcer prevention bundle as indicated  Outcome: Progressing

## 2025-01-14 NOTE — CM/SW NOTE
01/14/25 2618   Choice of Post-Acute Provider   Informed patient of right to choose their preferred provider Yes   List of appropriate post-acute services provided to patient/family with quality data Yes   Patient/family choice [facility]   Information given to Patient;Daughter     SW received notice from Dr. Potts - possible medical clearance today.    SW spoke to pt's dtr Macie via phone. Confirmed they reviewed DONTA list and have selected [facility]. Per Macie, pt wasn't \"thrilled\" w/ it last time but they are willing to try again and it is close to her for visiting.   Pt and dtr are requesting a private room.    Pt's dtr is aware of potential DC later today.    [facility] reserved in [place]. Requested private room today or tomorrow - pending availability and med clearance.    PLAN: [facility] - pending confirmed bed availability & med clear      SW/CM to remain available for support and/or discharge planning.         Veronica, MSW, LSW [phone]

## 2025-01-14 NOTE — CM/SW NOTE
01/14/25 1355   Discharge disposition   Expected discharge disposition subacute   Post Acute Care Provider [facility]  ([facility] [place])   Discharge transportation [facility]     Received confirmation that [facility] has a private room available for pt today. They can accept around 4PM.    MD, RN Enriqueta, and DC RN Anjali updated.    Attempted pt's dtr Macie via phone - no answer. Left Vmail w/ update for DC today and time.    SW spoke to [facility] Ambulance (max assist) set for ETA 4PM. PCS completed in Epic - pt's RN to print w/ pt's AVS.    Report phone #: [phone]    SW then received call from pt's dtr Macie stating pt's MARCIAL told her that [facility] was not a good facility for pt.    Pt's dtr is inquiring about [facility].    SW left [facility] for liaison at [facility] - pending response.    MD and RN updated. OB also updated on change of plans.      Will need to change PCS destination and Ambulance destination w/ [facility] when [facility] is confirmed.      PLAN: Poss [facility] DONTA - pending accept & bed today            SANDRA Martin, LSW [phone]

## 2025-01-14 NOTE — CM/SW NOTE
01/14/25 1355   Discharge disposition   Expected discharge disposition subacute   Post Acute Care Provider [facility]   Discharge transportation [facility]       SUSANNE spoke to liaison Rosy w/ [facility] - confirmed they can accept pt today around 430PM.    SUSANNE provided info on pt's hx w/ DONTA and some nerves. Rosy confirmed they will have Admin meet w/ him and help get him settled tonight.    Pt's dtr Macie updated via phone. Informed her bed is semi-private room w/ window bed at this time and that they will meet w/ pt today to help get him settled. She confirmed agreeable to DC plans and time of 430PM.     MD, RN Enriqueta, and DC RN Anjali updated on new plans. Requested RN update pt at bedside and to make sure pt's pacemaker transmitter/charge goes w/ him in transport.    SUSANNE spoke to Frances ruiz/ [place] - Ambulance destination changed to [facility] and transport time pushed to 4:30PM. PCS updated w/ correct destination - RN to print w/ pt's AVS.      Room #: 14-2  Report phone #: [phone]      PLAN: [facility], Ambulance ETA 430PM, PCS done        SANDRA Martin, LSW [phone]

## 2025-01-17 RX ORDER — PREDNISONE 2.5 MG/1
TABLET ORAL
Qty: 90 TABLET | Refills: 3 | Status: SHIPPED | OUTPATIENT
Start: 2025-01-17

## 2025-01-17 NOTE — TELEPHONE ENCOUNTER
Jordon Peterson per protocol, per 1/14/24 hospital discharge note:    \"Left shoulder pain  XR both shoulders neg for DJD; DDx includes frozen shoulder vs PMR;  s/p home physical therapy for adhesive capsulitis with [facility]; on prednisone to 2.5 mg daily\"

## 2025-01-17 NOTE — TELEPHONE ENCOUNTER
Refill request is for a maintenance medication and has met the criteria specified in the Ambulatory Medication Refill Standing Order for eligibility, visits, laboratory, alerts and was sent to the requested pharmacy.    Requested Prescriptions     Signed Prescriptions Disp Refills    PREDNISONE 2.5 MG Oral Tab 90 tablet 3     Sig: TAKE 1 TABLET(2.5 MG) BY MOUTH DAILY     Authorizing Provider: MOISES WANG     Ordering User: GINGER JAIN

## 2025-01-21 ENCOUNTER — TELEPHONE (OUTPATIENT)
Dept: INTERNAL MEDICINE CLINIC | Facility: CLINIC | Age: OVER 89
End: 2025-01-21

## 2025-01-23 NOTE — PROGRESS NOTES
Provider Clarification     Please clarify patient's nutritional status.    Severe malnutrition    This note is part of the patient's medical record.

## 2025-06-24 NOTE — TELEPHONE ENCOUNTER
HHRN called - See AC note    BP today 108/48  HR 72  Dizzy yesterday; no dizziness today ; Wt 161#   Pt does have cough (finished Keflex) but he is not drinking thicken liquids either;   No orders at this time  Next visit 2/56 Monday  Recert ok;  Discussed [FreeTextEntry1] : 79 year old, para 2-0-0-2, LMP at age 50 with c/o an episode of vaginal bleeding in May. She states she was evaluated by her PCP who performed urine culture that resulted negative. She denies pain and urinary frequency at this time. She has a history of HTN. Patient states her last GYN evaluation was 2 years ago and reports negative pap and has not had any recent imaging.

## (undated) DIAGNOSIS — I48.20 CHRONIC ATRIAL FIBRILLATION (HCC): ICD-10-CM

## (undated) DEVICE — STERILE LATEX POWDER-FREE SURGICAL GLOVESWITH NITRILE COATING: Brand: PROTEXIS

## (undated) DEVICE — SUCTION CANISTER, 3000CC,SAFELINER: Brand: DEROYAL

## (undated) DEVICE — NON-ADHERENT STRIPS,OIL EMULSION: Brand: CURITY

## (undated) DEVICE — GUIDEWIRE ORTH 100CM 3MM TIB

## (undated) DEVICE — TIGERTAIL 6F FLXTIP 70CM

## (undated) DEVICE — LOWER EXTREMITY: Brand: MEDLINE INDUSTRIES, INC.

## (undated) DEVICE — BATTERY

## (undated) DEVICE — SUTURE VICRYL 0 CP-1

## (undated) DEVICE — DRESSING PETRO 18X3IN ABS NADH

## (undated) DEVICE — CYSTO PACK: Brand: MEDLINE INDUSTRIES, INC.

## (undated) DEVICE — ENCORE® LATEX ACCLAIM SIZE 7, STERILE LATEX POWDER-FREE SURGICAL GLOVE: Brand: ENCORE

## (undated) DEVICE — BANDAGE ROLL,100% COTTON, 6 PLY, LARGE: Brand: KERLIX

## (undated) DEVICE — NEEDLE 18G 1-1/2 BLUNT FILL

## (undated) DEVICE — 3M™ MICROFOAM™ TAPE 1528-4: Brand: 3M™ MICROFOAM™

## (undated) DEVICE — REM POLYHESIVE ADULT PATIENT RETURN ELECTRODE: Brand: VALLEYLAB

## (undated) DEVICE — ENCORE® LATEX ACCLAIM SIZE 6.5, STERILE LATEX POWDER-FREE SURGICAL GLOVE: Brand: ENCORE

## (undated) DEVICE — SOL  .9 1000ML BTL

## (undated) DEVICE — TRAY FOLEY BDX 16F STATLOCK

## (undated) DEVICE — SOL  .9 3000ML

## (undated) DEVICE — ABDOMINAL PAD: Brand: CURITY

## (undated) DEVICE — WEBRIL COTTON UNDERCAST PADDING: Brand: WEBRIL

## (undated) DEVICE — Device

## (undated) DEVICE — SOLO FLEX HYBRID GUIDEWIRE .03

## (undated) DEVICE — UROLOGY DRAIN BAG

## (undated) DEVICE — SUPER SPONGES,MEDIUM: Brand: KERLIX

## (undated) DEVICE — KIT COLLECTION COPAN ESWAB 1ML

## (undated) DEVICE — HIP PINNING: Brand: MEDLINE INDUSTRIES, INC.

## (undated) DEVICE — SUTURE VICRYL 2-0 FS-1

## (undated) DEVICE — BIT DRL 4.3MM NTR NL LNG CLBRT

## (undated) DEVICE — PIN FX 3MM NTR NL SM THRD TIB

## (undated) DEVICE — STANDARD HYPODERMIC NEEDLE,POLYPROPYLENE HUB: Brand: MONOJECT

## (undated) DEVICE — 12 ML SYRINGE LUER-LOCK TIP: Brand: MONOJECT

## (undated) DEVICE — SUTURE ETHILON 4-0 1667G

## (undated) DEVICE — CAST PADDING SYNTH 4

## (undated) DEVICE — COVER SGL STRL LGHT HNDL BLU

## (undated) DEVICE — SUTURE VICRYL 1 CT-1

## (undated) DEVICE — TOWEL OR BLU 16X26 STRL

## (undated) DEVICE — SPONGE: LAP 18X18 PW 200/CS: Brand: NOVAPLUS®

## (undated) DEVICE — SUTURE ETHILON 3-0 669H

## (undated) DEVICE — UNDYED BRAIDED (POLYGLACTIN 910), SYNTHETIC ABSORBABLE SUTURE: Brand: COATED VICRYL

## (undated) DEVICE — INTENDED FOR TISSUE SEPARATION, AND OTHER PROCEDURES THAT REQUIRE A SHARP SURGICAL BLADE TO PUNCTURE OR CUT.: Brand: BARD-PARKER ® STAINLESS STEEL BLADES

## (undated) DEVICE — SOLUTION SURG DURA PREP HAZMAT

## (undated) DEVICE — TRAY SRGPRP PVP IOD WT SCRB SM

## (undated) DEVICE — TRAY SKIN PREP PVP-1

## (undated) DEVICE — MEDI-VAC NON-CONDUCTIVE SUCTION TUBING: Brand: CARDINAL HEALTH

## (undated) DEVICE — PROXIMATE SKIN STAPLERS (35 WIDE) CONTAINS 35 STAINLESS STEEL STAPLES (FIXED HEAD): Brand: PROXIMATE

## (undated) DEVICE — 6617 IOBAN II PATIENT ISOLATION DRAPE 5/BX,4BX/CS: Brand: STERI-DRAPE™ IOBAN™ 2

## (undated) DEVICE — GAUZE SPONGES,12 PLY: Brand: CURITY

## (undated) NOTE — LETTER
Date: 10/20/2023  Patient name: Estephania Owens  YOB: 1931  Medical Record Number: [id number]  Primary Coverage: Payor: MEDICARE / Plan: MEDICARE PART A&B / Product Type: *No Product type* /   Secondary Coverage: Flor Avilez ID: [id number]  Patient Address: [facility] [address] [place] [address]  Telephone Information:   Home Phone [phone]   Mobile [phone]       Encounter Date: 10/20/2023  Provider: VALERIE Dunlap  Diagnosis: (F85.593) Pressure injury of left foot, stage 3 ([address]  (primary encounter diagnosis)  (L97.521) Non-healing ulcer of left foot, limited to breakdown of skin ([address]  (L97.521) Neuropathic ulcer of left foot, limited to breakdown of skin ([address]  (M86.9) Osteomyelitis of left foot, unspecified type ([address]      Wound 07/13/23 1 Pressure Injury Foot Lateral;Left;Plantar (Active)   Date First Assessed: 07/13/23    Wound Number (Wound Clinic Only): 1  Primary Wound Type: Pressure Injury  Location: Foot  Wound Location Orientation: Lateral;Left;Plantar      Assessments 7/13/2023 11:38 AM 10/20/2023  1:15 PM   Wound Image       Site Assessment Granulation tissue; Moist;Pink;Yellow Granulation tissue; Moist;Yellow;Pink   Closure -- Not approximated   Drainage Amount Moderate Small   Drainage Description Serous;Tan;Yellow Serosanguineous; Yellow   Treatments Cleansed; Saline Cleansed; Saline   Shoe Type Peg liner;Felt --   Dressing Hydrofera ready; Tape Hydrofera ready; Tape   Dressing Changed New Changed   Dressing Status Dressing Changed;Removed; Old drainage Dressing Changed;Removed; Old drainage   Wound Length (cm) 1.2 cm 0.3 cm   Wound Width (cm) 1.3 cm 0.3 cm   Wound Surface Area (cm^2) 1.56 cm^2 0.09 cm^2   Wound Depth (cm) 0.2 cm 0.3 cm   Wound Volume (cm^3) 0.312 cm^3 0.027 cm^3   Wound Healing % -- 91   Margins Attached edges; Well-defined edges Not attached   Non-staged Wound Description Full thickness --   Linda-wound Assessment Clean;Dry; Intact Callous   Wound Granulation Tissue Pink Red;Pink   Wound Bed Granulation (%) 80 % 90 %   Wound Bed Epithelium (%) 0 % 0 %   Wound Bed Slough (%) 20 % 10 %   Wound Bed Eschar (%) 0 % 0 %   State of Healing Non-healing Fully granulated   Wound Odor None None   Undermining -- 12-12 0'clock=0.2cm   Pressure Injury Stage Stage 3 Stage 3       Active Orders   Date Order Priority Status Authorizing Provider   07/26/23 1456 OP Wound Dressing Routine Active VALERIE Valle     - Cleansing:    Cleanse with normal saline or wound cleanser     - Dressing:    Collagen     - Dressing:    Hydrofera ready     - Dressing:    Medipore tape (no substitution)     - Additional Wound Dressing Information:    2 offloading pads, (hydrofera blue just on great toe wound)       Inactive Orders   Date Order Priority Status Authorizing Provider   10/20/23 1338 OP Wound Dressing Routine Completed VALERIE Valle     - Cleansing:    Cleanse with normal saline or wound cleanser     - Dressing:    Collagen     - Dressing:    Hydrofera transfer     - Dressing:    Dry gauze     - Additional Wound Dressing Information:    tape.  2 layers felt offloading pads     - Frequency:    Change dressing weekly   09/21/23 1423 Debridement Pressure Injury Lateral;Left;Plantar Foot Routine Completed Renée Torres, APRN   07/13/23 1556 OP Wound Dressing Routine Completed VALERIE Vargas     - Cleansing:    Cleanse with normal saline or wound cleanser     - Dressing:    Hydrofera ready     - Dressing:    Medipore tape (no substitution)     - Additional Wound Dressing Information:    2 offloading felt pads, peg shoe     - Frequency:    Change dressing every other day and PRN     Wound Number: Left foot lateral/ plantar wound      Wound Cleaning and Dressings:  Showering directions: May shower with protection  Wound cleansing:  Cleanse with normal saline or wound cleanser  Wound cleaning frequency:  Cleanse wound with every dressing change. Wound product: Collagen, Hydrofera ready and Medipore tape (no substitution), 2 offloading felt pad  Change dressing weekly. Miscellaneous/Additional Orders:  Offloading: Keep weight off left foot with offloading pads      Follow Up:  Return in about 4 weeks (around 11/17/2023) for APN visit x 30 mins.          Angie Pepper, CASTILLO, NPI [id number]

## (undated) NOTE — LETTER
[address], [place]  Authorization for Invasive Procedures  1.  I hereby authorize Dr. Kamron Harris , my physician and whomever may be designated as the doctor's assistant, to perform the following operation and/or procedure:  Lower extre the potential risks that can occur: fever and allergic reactions, hemolytic reactions, transmission of disease such as hepatitis, AIDS, cytomegalovirus (CMV), and flluid overload.  In the event that I wish to have autologous transfusions of my own blood, or the judgment of my physician.      Signature of Patient:  ________________________________________________ Date: _________Time: _________    Responsible person in case of minor or unconscious: _____________________________Relationship: ____________     [place]

## (undated) NOTE — MR AVS SNAPSHOT
[place]  [facility] [address] [place] [address]  [phone]               Thank you for choosing us for your health care visit with Santo Bahena MD.  We are glad to serve you and happy to provide you with this summary of your visit.   Ple hydrocodone-acetaminophen 7.5-325 MG Tabs   Take 1 tablet by mouth every 4 (four) hours as needed.    Commonly known as:  [address]   Start taking on:  5/13/2017           MINERIN Rony   ATAA BID           Selenium Sulfide 2.25 % Sham   Apply 1 Application top medical emergencies, dial 911. Visit [email or web address] to learn more. Educational Information     TOP FALL PREVENTION TIPS    INSIDE YOUR HOME   KITCHEN:  ? Use non skid mats only. ? Clean up spills as soon as they happen. ?  Keep o Don’t eat while when you’re bored.      EAT THESE FOODS MORE OFTEN: EAT THESE FOODS LESS OFTEN:   Make half your plate fruits and vegetables Highly refined, white starches including white bread, rice and pasta   Eat plenty of protein, keep the fat content l

## (undated) NOTE — LETTER
[address]  INFORMED CONSENT FOR TRANSFUSION OF BLOOD OR BLOOD PRODUCTS   My physician has informed me of the nature, purpose, benefits and risks of transfusion for blood and blood components that he/she may deem nece (Signature of Patient)                                       (Responsible party in case of Minor,                                                                            Incompetent, or unconscious Patient)  __________________________________    _______

## (undated) NOTE — LETTER
[facility]  Administration of Anesthesia  1. I, Efren Anderson, or _________________________________ acting on his behalf, (Patient) (Dependent/Representative) request to receive anesthesia for my pending procedure/operation/treatment.   A bleeding, seizure, cardiac arrest and death. 7. AWARENESS: I understand that it is possible (but unlikely) to have explicit memory of events from the operating room while under general anesthesia.   8. ELECTROCONVULSIVE THERAPY PATIENTS: This consent serve below affirms that prior to the time of the procedure, I have explained to the patient and/or his/her guardian, the risks and benefits of undergoing anesthesia, as well as any reasonable alternatives.     ___________________________________________________

## (undated) NOTE — LETTER
Date: 8/9/2023  Patient name: Venkat Fernandez  YOB: 1931  Medical Record Number: [id number]  Primary Coverage: Payor: MEDICARE / Plan: MEDICARE PART A&B / Product Type: *No Product type* /   Secondary Coverage: Flor Avilze ID: [id number]  Patient Address: [facility] [address] [place] [address]  Telephone Information:   Home Phone [phone]   Mobile [phone]       Encounter Date: 8/9/2023  Provider: Charlene Sandifer, APRN  Diagnosis: (Q24.952) Non-healing ulcer of left foot, limited to breakdown of skin ([address]  (primary encounter diagnosis)  (L97.521) Neuropathic ulcer of left foot, limited to breakdown of skin ([address] [K35.216 (ICD-10-CM)]  (L97.529) Non-healing ulcer of left foot, unspecified ulcer stage ([facility])  (U33.313) Pressure ulcer of left heel, stage 2 ([facility])  (I73.9) PVD (peripheral vascular disease) ([facility])  (L89.893) Pressure injury of left foot, stage 3 ([address]      Wound 07/13/23 1 Pressure Injury Foot Lateral;Left;Plantar (Active)   Date First Assessed: 07/13/23    Wound Number (Wound Clinic Only): 1  Primary Wound Type: Pressure Injury  Location: Foot  Wound Location Orientation: Lateral;Left;Plantar      Assessments 7/13/2023 11:38 AM 8/9/2023  2:29 PM   Wound Image        Site Assessment Granulation tissue; Moist;Pink;Yellow Granulation tissue; Moist;Yellow; Red   Drainage Amount Moderate Moderate   Drainage Description Serous;Tan;Yellow Serosanguineous; Yellow   Treatments Cleansed; Saline Cleansed; Saline   Dressing Hydrofera ready; Tape Hydrofera ready; Tape   Dressing Changed New Changed   Dressing Status Dressing Changed;Removed; Old drainage Dressing Changed;Removed; Old drainage   Wound Length (cm) 1.2 cm 0.8 cm   Wound Width (cm) 1.3 cm 0.4 cm   Wound Surface Area (cm^2) 1.56 cm^2 0.32 cm^2   Wound Depth (cm) 0.2 cm 0.1 cm   Wound Volume (cm^3) 0.312 cm^3 0.032 cm^3   Wound Healing % -- 90   Margins Attached edges; Well-defined edges Attached edges; Well-defined edges   Non-staged Wound Description Full thickness --   Linda-wound Assessment Clean;Dry; Intact Clean;Dry; Intact   Wound Granulation Tissue Pink Pink;Red   Wound Bed Granulation (%) 80 % 90 %   Wound Bed Epithelium (%) 0 % 0 %   Wound Bed Slough (%) 20 % 10 %   Wound Bed Eschar (%) 0 % 0 %   Wound Odor None None   Undermining -- 9 o'clock- 0.1 cm   Pressure Injury Stage Stage 3 Stage 3   Shoe Type Peg liner;Felt Peg liner;Felt   State of Healing Non-healing Fully granulated       Active Orders   Date Order Priority Status Authorizing Provider   07/26/23 1456 OP WOUND DRESSING Routine Active VALERIE Rosario     - Cleansing:    Cleanse with normal saline or wound cleanser     - Dressing:    Collagen     - Dressing:    Hydrofera ready     - Dressing:    Medipore tape (no substitution)     - Additional Wound Dressing Information:    2 offloading pads, (hydrofera blue just on great toe wound)       Inactive Orders   Date Order Priority Status Authorizing Provider   07/13/23 1556 OP WOUND DRESSING Routine Completed VALERIE Peck     - Cleansing:    Cleanse with normal saline or wound cleanser     - Dressing:    Hydrofera ready     - Dressing:    Medipore tape (no substitution)     - Additional Wound Dressing Information:    2 offloading felt pads, peg shoe     - Frequency:    Change dressing every other day and PRN       Wound 07/13/23 2 Pressure Injury Heel Left;Posterior (Active)   Date First Assessed: 07/13/23    Wound Number (Wound Clinic Only): 2  Primary Wound Type: Pressure Injury  Location: Heel  Wound Location Orientation: Left;Posterior      Assessments 7/13/2023 11:38 AM 8/9/2023  2:29 PM   Wound Image       Site Assessment Moist;Granulation tissue;Red;Pink Moist;Granulation tissue;Red;Pink   Drainage Amount Moderate Moderate   Drainage Description Serosanguineous Serosanguineous   Treatments Cleansed; Saline Cleansed; Saline   Dressing Hydrofera ready; Tape Hydrofera ready; Tape Dressing Changed New Changed   Dressing Status Dressing Changed;Removed; Old drainage Dressing Changed;Removed; Old drainage   Wound Length (cm) 1 cm 0.2 cm   Wound Width (cm) 1.5 cm 0.3 cm   Wound Surface Area (cm^2) 1.5 cm^2 0.06 cm^2   Wound Depth (cm) 0.1 cm 0.1 cm   Wound Volume (cm^3) 0.15 cm^3 0. 006 cm^3   Wound Healing % -- 96   Margins Well-defined edges; Attached edges Attached edges   Non-staged Wound Description Full thickness --   Linda-wound Assessment Clean;Dry; Intact Clean;Dry; Intact   Wound Granulation Tissue Red;Pink --   Wound Bed Granulation (%) 90 % --   Wound Bed Epithelium (%) 0 % --   Wound Bed Slough (%) 10 % --   Wound Bed Eschar (%) 0 % --   Wound Odor None --   Pressure Injury Stage Stage 3 Stage 3   Shoe Type Peg liner;Felt Peg liner;Felt   State of Healing Non-healing Fully granulated       Active Orders   Date Order Priority Status Authorizing Provider   07/26/23 1456 OP WOUND DRESSING Routine Active VALERIE Maldonado     - Cleansing:    Cleanse with normal saline or wound cleanser     - Dressing:    Collagen     - Dressing:    Hydrofera ready     - Dressing:    Medipore tape (no substitution)     - Additional Wound Dressing Information:    2 offloading pads, (hydrofera blue just on great toe wound)       Inactive Orders   Date Order Priority Status Authorizing Provider   07/13/23 1556 OP WOUND DRESSING Routine Completed VALERIE Gaming     - Cleansing:    Cleanse with normal saline or wound cleanser     - Dressing:    Hydrofera ready     - Dressing:    Medipore tape (no substitution)     - Additional Wound Dressing Information:    2 offloading felt pads, peg shoe     - Frequency:    Change dressing every other day and PRN       Wound 07/26/23 3 Pressure Injury Toe (Comment which one) Left (Active)   Date First Assessed: 07/26/23    Wound Number (Wound Clinic Only): 3  Primary Wound Type: Pressure Injury  Location: (c) Toe (Comment which one)  Wound Location Orientation: Left Assessments 7/26/2023  2:03 PM 8/9/2023  2:29 PM   Wound Image        Site Assessment Dry;Yellow Dry;Yellow;Brown   Drainage Amount Small Small   Drainage Description Scabbed Scabbed   Treatments Cleansed; Wound ;Site Care Cleansed; Wound ;Site Care   Dressing Hydrofera ready; Tape Hydrofera ready; Tape   Dressing Changed New Changed   Dressing Status Dry; Intact; Clean Dry; Intact; Clean   Wound Length (cm) 0.2 cm 0.1 cm   Wound Width (cm) 0.2 cm 0.1 cm   Wound Surface Area (cm^2) 0.04 cm^2 0.01 cm^2   Wound Depth (cm) 0.1 cm 0.1 cm   Wound Volume (cm^3) 0.004 cm^3 0. 001 cm^3   Wound Healing % -- 75   Margins Attached edges Attached edges   Linda-wound Assessment Edema; Non-blanchable erythema Pink;Dry;Callous   Wound Bed Granulation (%) 0 % 0 %   Wound Bed Epithelium (%) 0 % 99 %   Wound Bed Slough (%) 100 % 0 %   Wound Bed Eschar (%) 0 % 0 %   Wound Odor None None   Pressure Injury Stage Unstageable Unstageable   State of Healing Hyperkeratosis Hyperkeratosis       Active Orders   Date Order Priority Status Authorizing Provider   07/26/23 1456 OP WOUND DRESSING Routine Active VALERIE Carroll     - Cleansing:    Cleanse with normal saline or wound cleanser     - Dressing:    Collagen     - Dressing:    Hydrofera ready     - Dressing:    Medipore tape (no substitution)     - Additional Wound Dressing Information:    2 offloading pads, (hydrofera blue just on great toe wound)     Wound Number: Left foot lateral/ plantar wound and left posterior heel wound. Wound Cleaning and Dressings:  Showering directions: May shower with protection  Wound cleansing:  Cleanse with normal saline or wound cleanser  Wound cleaning frequency:  Cleanse wound with every dressing change. Wound product: Collagen, Hydrofera ready and Medipore tape (no substitution), 2 offloading felt pad and offloading ring for posterior heel wound.   Dressing change frequency:  Change dressing 3x per week     Wound Number: Left foot great toe wound     Wound Cleaning and Dressings:   Showering directions: May shower with protection  Wound cleansing:  Cleanse with normal saline or wound cleanser  Wound cleaning frequency: Cleanse wound with every dressing change. Wound product: Hydrofera ready and Medipore tape (no substitution),   Dressing change frequency:  Change dressing 3x per week  Miscellaneous/Additional Orders:  Offloading: Keep weight off left foot with offloading pads and peg liner surgical shoe. Follow Up:  2 weeks APN follow up (around 8/23/23).        CASTILLO Lloyd, NPI [id number]

## (undated) NOTE — LETTER
[place] [address]  Authorization for Invasive Procedures  Date: 1/5/25            Time: 1900    I hereby authorize Dr. Lozano, my physician and his/her assistants (if applicable), which may include medical students, residents, and/or fellows, to perform the following surgical operation/ procedure and administer such anesthesia as may be determined necessary by my physician: insertion of permanent pacemaker  on Shoaib ERVIN Alan  2.   I recognize that during the surgical operation/procedure, unforeseen conditions may necessitate additional or different procedures than those listed above.  I, therefore, further authorize and request that the above-named surgeon, assistants, or designees perform such procedures as are, in their judgment, necessary and desirable.    3.   My surgeon/physician has discussed prior to my surgery the potential benefits, risks and side effects of this procedure; the likelihood of achieving goals; and potential problems that might occur during recuperation.  They also discussed reasonable alternatives to the procedure, including risks, benefits, and side effects related to the alternatives and risks related to not receiving this procedure.  I have had all my questions answered and I acknowledge that no guarantee has been made as to the result that may be obtained.    4.   Should the need arise during my operation/procedure, which includes change of level of care prior to discharge, I also consent to the administration of blood and/or blood products.  Further, I understand that despite careful testing and screening of blood or blood products by collecting agencies, I may still be subject to ill effects as a result of receiving a blood transfusion and/or blood products.  The following are some, but not all, of the potential risks that can occur: fever and allergic reactions, hemolytic reactions, transmission of diseases such as Hepatitis, AIDS and Cytomegalovirus (CMV)  and fluid overload.  In the event that I wish to have an autologous transfusion of my own blood, or a directed donor transfusion, I will discuss this with my physician.   Check only if Refusing Blood or Blood Products  I understand refusal of blood or blood products as deemed necessary by my physician may have serious consequences to my condition to include possible death. I hereby assume responsibility for my refusal and release the hospital, its personnel, and my physicians from any responsibility for the consequences of my refusal.         o  Refuse         5.   I authorize the use of any specimen, organs, tissues, body parts or foreign objects that may be removed from my body during the operation/procedure for diagnosis, research or teaching purposes and their subsequent disposal by hospital authorities.  I also authorize the release of specimen test results and/or written reports to my treating physician on the hospital medical staff or other referring or consulting physicians involved in my care, at the discretion of the Pathologist or my treating physician.    6.   I consent to the photographing or videotaping of the operations or procedures to be performed, including appropriate portions of my body for medical, scientific, or educational purposes, provided my identity is not revealed by the pictures or by descriptive texts accompanying them.  If the procedure has been photographed/videotaped, the surgeon will obtain the original picture, image, videotape or CD.  The hospital will not be responsible for storage, release or maintenance of the picture, image, tape or CD.    7.   I consent to the presence of a  or observers in the operating room as deemed necessary by my physician or their designees.    8.   I recognize that in the event my procedure results in extended X-Ray/fluoroscopy time, I may develop a skin reaction.    9. If I have a Do Not Attempt Resuscitation (DNAR) order in place,  that status will be suspended while in the operating room, procedural suite, and during the recovery period unless otherwise explicitly stated by me (or a person authorized to consent on my behalf). The surgeon or my attending physician will determine when the applicable recovery period ends for purposes of reinstating the DNAR order.  10. Patients having a sterilization procedure: I understand that if the procedure is successful the results will be permanent and it will therefore be impossible for me to inseminate, conceive, or bear children.  I also understand that the procedure is intended to result in sterility, although the result has not been guaranteed.   11. I acknowledge that my physician has explained sedation/analgesia administration to me including the risk and benefits I consent to the administration of sedation/analgesia as may be necessary or desirable in the judgment of my physician.    I CERTIFY THAT I HAVE READ AND FULLY UNDERSTAND THE ABOVE CONSENT TO OPERATION and/or OTHER PROCEDURE.        ____________________________________       _________________________________      ______________________________  Signature of Patient         Signature of Responsible Person        Printed Name of Responsible Person        ____________________________________      _________________________________      ______________________________       Signature of Witness          Relationship to Patient                       Date                                       Time  Patient Name: Shoaib Alan  : 10/22/1931    Reviewed: 2024   Printed: 2025  Medical Record #: [id number] Page 1 of 2             STATEMENT OF PHYSICIAN My signature below affirms that prior to the time of the procedure; I have explained to the patient and/or his/her legal representative, the risks and benefits involved in the proposed treatment and any reasonable alternative to the proposed treatment. I have also explained the  risks and benefits involved in refusal of the proposed treatment and alternatives to the proposed treatment and have answered the patient's questions. If I have a significant financial interest in a co-management agreement or a significant financial interest in any product or implant, or other significant relationship used in this procedure/surgery, I have disclosed this and had a discussion with my patient.     _______________________________________________________________ _____________________________  (Signature of Physician)                                                                                         (Date)                                   (Time)  Patient Name: Shoaib Alan  : 10/22/1931    Reviewed: 2024   Printed: 2025  Medical Record #: [id number] Page 2 of 2

## (undated) NOTE — LETTER
[address]  [address] [place], [place]      Authorization for Surgical Operation and Procedure     Date:___________                                                                                                         Time:_______ risks that can occur: fever and allergic reactions, hemolytic reactions, transmission of diseases such as Hepatitis, AIDS and Cytomegalovirus (CMV) and fluid overload.   In the event that I wish to have an autologous transfusion of my own blood, or a direct determine when the applicable recovery period ends for purposes of reinstating the DNAR order.   10. Patients having a sterilization procedure: I understand that if the procedure is successful the results will be permanent and it will therefore be impossibl _______________________________________________________________ _____________________________  Efren Stairs of Physician)                                                                                         (Date)                                   (Ti

## (undated) NOTE — LETTER
Date: 11/13/2023  Patient name: Viry Zaidi  YOB: 1931  Medical Record Number: [id number]  Primary Coverage: Payor: MEDICARE / Plan: MEDICARE PART A&B / Product Type: *No Product type* /   Secondary Coverage: Flor Avilez ID: [id number]  Patient Address: [facility] [address] Yumiko Cunningham [address] [place] [address]  Telephone Information:   Home Phone [phone]   Mobile [phone]       Encounter Date: 11/13/2023  Provider: VALERIE Johnson  Diagnosis:     ICD-10-CM   1. Cellulitis of left toe  L03.032   2. Pressure injury of left foot, stage 3 ([facility])  L89.893   3. Non-healing ulcer of left foot, limited to breakdown of skin ([address]  L97.521   4. Neuropathic ulcer of left foot, limited to breakdown of skin ([address]  L97.521         Wound 07/13/23 1 Pressure Injury Foot Lateral;Left;Plantar (Active)   Date First Assessed: 07/13/23    Wound Number (Wound Clinic Only): 1  Primary Wound Type: Pressure Injury  Location: Foot  Wound Location Orientation: Lateral;Left;Plantar      Assessments 7/13/2023 11:38 AM 11/13/2023  1:39 PM   Wound Image       Site Assessment Granulation tissue; Moist;Pink;Yellow Clean;Dry; Intact; Epithelialization   Closure -- Approximated   Drainage Amount Moderate None   Drainage Description Serous;Tan;Yellow --   Treatments Cleansed; Saline Cleansed; Wound    Shoe Type Peg liner;Felt Felt;Peg liner   Dressing Hydrofera ready; Tape Open to air   Dressing Changed New --   Dressing Status Dressing Changed;Removed; Old drainage Clean   Wound Length (cm) 1.2 cm 0 cm   Wound Width (cm) 1.3 cm 0 cm   Wound Surface Area (cm^2) 1.56 cm^2 0 cm^2   Wound Depth (cm) 0.2 cm 0 cm   Wound Volume (cm^3) 0.312 cm^3 0 cm^3   Wound Healing % -- 100   Margins Attached edges; Well-defined edges Flat and Intact   Non-staged Wound Description Full thickness --   Linda-wound Assessment Clean;Dry; Intact Callous   Wound Granulation Tissue Pink --   Wound Bed Granulation (%) 80 % 0 %   Wound Bed Epithelium (%) 0 % 100 %   Wound Bed Slough (%) 20 % 0 %   Wound Bed Eschar (%) 0 % 0 %   State of Healing Non-healing Closed wound edges; Epithelialized; Hyperkeratosis   Wound Odor None --   Pressure Injury Stage Stage 3 --       Active Orders   Date Order Priority Status Authorizing Provider   07/26/23 1456 OP Wound Dressing Routine Active VALERIE Tran     - Cleansing:    Cleanse with normal saline or wound cleanser     - Dressing:    Collagen     - Dressing:    Hydrofera ready     - Dressing:    Medipore tape (no substitution)     - Additional Wound Dressing Information:    2 offloading pads, (hydrofera blue just on great toe wound)       Inactive Orders   Date Order Priority Status Authorizing Provider   10/20/23 1338 OP Wound Dressing Routine Completed VALERIE Tran     - Cleansing:    Cleanse with normal saline or wound cleanser     - Dressing:    Collagen     - Dressing:    Hydrofera transfer     - Dressing:    Dry gauze     - Additional Wound Dressing Information:    tape.  2 layers felt offloading pads     - Frequency:    Change dressing weekly   09/21/23 1423 Debridement Pressure Injury Lateral;Left;Plantar Foot Routine Completed VALERIE Tran   07/13/23 1556 OP Wound Dressing Routine Completed VALERIE Martinez     - Cleansing:    Cleanse with normal saline or wound cleanser     - Dressing:    Hydrofera ready     - Dressing:    Medipore tape (no substitution)     - Additional Wound Dressing Information:    2 offloading felt pads, peg shoe     - Frequency:    Change dressing every other day and PRN       Wound 11/13/23 2 Toe (Comment which one) Dorsal;Left (Active)   Date First Assessed/Time First Assessed: 11/13/23 1338    Wound Number (Wound Clinic Only): 2  Primary Wound Type: Pressure Injury  Location: Toe (Comment which one)  Wound Location Orientation: Dorsal;Left  Wound Description (Comments): 4th toe      Assessments 11/13/2023  1:39 PM   Wound Image     Site Assessment Edema;Dry;Pink;Yellow   Closure Not approximated   Drainage Amount Small   Drainage Description Serosanguineous   Treatments Cleansed; Wound    Dressing Hydrofera ready;Bandaid   Dressing Changed New   Dressing Status Clean;Dry; Intact   Wound Length (cm) 0.3 cm   Wound Width (cm) 0.7 cm   Wound Surface Area (cm^2) 0.21 cm^2   Wound Depth (cm) 0.1 cm   Wound Volume (cm^3) 0.021 cm^3   Margins Attached edges   Non-staged Wound Description Full thickness   Linda-wound Assessment Edema;Blanchable erythema   Wound Bed Granulation (%) 0 %   Wound Bed Epithelium (%) 0 %   Wound Bed Slough (%) 100 %   Wound Bed Eschar (%) 0 %   State of Healing Non-healing   Wound Odor None       No associated orders. Wound Number: Left foot lateral/ plantar wound      Wound Cleaning and Dressings:  Showering directions: May shower with protection   Continue 2 offloading felt pads. Change out weekly    Miscellaneous/Additional Orders:  Offloading: Keep weight off left foot with offloading pads      Wound Number: Left toe dorsal wound     Wound Cleaning and Dressings:  Showering directions: May shower with protection  Wound cleansing:  Cleanse with normal saline or wound cleanser  Wound cleaning frequency:  Cleanse wound with every dressing change. Wound product: Hydrofera ready and Medipore tape (no substitution), Change dressing every other day. Offloading as needed to ensure toe is not rubbing against top of shoe. Cefadroxil 500mg twice daily for 7 days, Oral antibiotic to start today, call with worsening condition. Follow Up:  Return in about 4 weeks (around 12/11/2023) for APN visit for 30 min.      CASTILLO Solis, NPI [id number]

## (undated) NOTE — LETTER
[address]  Agustina    CONSENT FOR EXAMINATION AND DISPOSITION OF AMPUTATED MEMBERS      I have already consented to the amputation of my _______________________________ (Witness Signature/Date)     Patient Name: Agatha Duong     : 10/22/1931                 Printed: 2021      Medical Record #: [id number]

## (undated) NOTE — LETTER
[address], [place]  Authorization for Invasive Procedures  1.  I hereby authorize Dr. Maureen Dumont , my physician and whomever may be designated as the doctor's assistant, to perform the following operation and/or procedure:INTRAMEDULLA performed for the purposes of advancing medicine, science, and/or education, provided my identity is not revealed. If the procedure has been videotaped, the physician/surgeon will obtain the original videotape.  The hospital will not be responsible for stor My signature below affirms that prior to the time of the procedure, I have explained to the patient and/or his legal representative, the risks and benefits involved in the proposed treatment and any reasonable alternative to the proposed treatment.  I have

## (undated) NOTE — Clinical Note
NCM spoke to [facility] staff for a Rx and condition update. A TE was sent to the office for an appointment request. The staff report patient is doing well since discharge. Thank you.

## (undated) NOTE — Clinical Note
TCM call completed. A TE was sent to the office for an appointment request. The patient declined a full medication review with the [facility]. Thank you.

## (undated) NOTE — LETTER
Date: 7/26/2023  Patient name: Danyelle Carney  YOB: 1931  Medical Record Number: [id number]  Primary Coverage: Payor: MEDICARE / Plan: MEDICARE PART A&B / Product Type: *No Product type* /   Secondary Coverage: Flor Avilez ID: [id number]  Patient Address: [facility] [address] Modesto Pittman [address] [place] [address]  Telephone Information:   Home Phone [phone]   Mobile [phone]       Encounter Date: 7/26/2023  Provider: VALERIE Moscoso  Diagnosis: No diagnosis found. Wound 07/13/23 1 Pressure Injury Foot Lateral;Left;Plantar (Active)   Date First Assessed: 07/13/23    Wound Number (Wound Clinic Only): 1  Primary Wound Type: Pressure Injury  Location: Foot  Wound Location Orientation: Lateral;Left;Plantar      Assessments 7/13/2023 11:38 AM 7/26/2023  2:03 PM   Wound Image       Site Assessment Granulation tissue; Moist;Pink;Yellow Granulation tissue; Moist;Yellow; Red   Drainage Amount Moderate Moderate   Drainage Description Serous;Tan;Yellow Serosanguineous; Yellow   Treatments Cleansed; Saline Cleansed; Saline   Dressing Hydrofera ready; Tape Hydrofera ready; Tape   Dressing Changed New Changed   Dressing Status Dressing Changed;Removed; Old drainage Dressing Changed;Removed; Old drainage   Wound Length (cm) 1.2 cm 0.8 cm   Wound Width (cm) 1.3 cm 0.7 cm   Wound Surface Area (cm^2) 1.56 cm^2 0.56 cm^2   Wound Depth (cm) 0.2 cm 0.1 cm   Wound Volume (cm^3) 0.312 cm^3 0. 056 cm^3   Wound Healing % -- 82   Margins Attached edges; Well-defined edges Attached edges; Well-defined edges   Non-staged Wound Description Full thickness Full thickness   Linda-wound Assessment Clean;Dry; Intact Clean;Dry; Intact   Wound Granulation Tissue Pink Red   Wound Bed Granulation (%) 80 % 85 %   Wound Bed Epithelium (%) 0 % 0 %   Wound Bed Slough (%) 20 % 15 %   Wound Bed Eschar (%) 0 % 0 %   Wound Odor None None   Pressure Injury Stage Stage 3 Stage 3   Shoe Type Peg liner;Felt Peg liner;[place]   State of Healing Non-healing Fully granulated       Inactive Orders   Date Order Priority Status Authorizing Provider   07/13/23 1556 OP WOUND DRESSING Routine Completed VALERIE Matson     - Cleansing:    Cleanse with normal saline or wound cleanser     - Dressing:    Hydrofera ready     - Dressing:    Medipore tape (no substitution)     - Additional Wound Dressing Information:    2 offloading felt pads, peg shoe     - Frequency:    Change dressing every other day and PRN       Wound 07/13/23 2 Pressure Injury Heel Left;Posterior (Active)   Date First Assessed: 07/13/23    Wound Number (Wound Clinic Only): 2  Primary Wound Type: Pressure Injury  Location: Heel  Wound Location Orientation: Left;Posterior      Assessments 7/13/2023 11:38 AM 7/26/2023  2:03 PM   Wound Image       Site Assessment Moist;Granulation tissue;Red;Pink Moist;Granulation tissue;Red;Pink   Drainage Amount Moderate Moderate   Drainage Description Serosanguineous Serosanguineous   Treatments Cleansed; Saline Cleansed; Saline   Dressing Hydrofera ready; Tape Hydrofera ready; Tape   Dressing Changed New Changed   Dressing Status Dressing Changed;Removed; Old drainage Dressing Changed;Removed; Old drainage   Wound Length (cm) 1 cm 0.5 cm   Wound Width (cm) 1.5 cm 1.1 cm   Wound Surface Area (cm^2) 1.5 cm^2 0. 55 cm^2   Wound Depth (cm) 0.1 cm 0.1 cm   Wound Volume (cm^3) 0.15 cm^3 0.055 cm^3   Wound Healing % -- 63   Margins Well-defined edges; Attached edges Well-defined edges; Attached edges   Non-staged Wound Description Full thickness Full thickness   Linda-wound Assessment Clean;Dry; Intact Clean;Dry; Intact   Wound Granulation Tissue Red;Pink Red   Wound Bed Granulation (%) 90 % 95 %   Wound Bed Epithelium (%) 0 % 0 %   Wound Bed Slough (%) 10 % 5 %   Wound Bed Eschar (%) 0 % 0 %   Wound Odor None None   Pressure Injury Stage Stage 3 Stage 3   Shoe Type Peg liner;Felt Peg liner;Felt   State of Healing Non-healing Fully granulated Inactive Orders   Date Order Priority Status Authorizing Provider   07/13/23 1556 OP WOUND DRESSING Routine Completed Tomás , APRN     - Cleansing:    Cleanse with normal saline or wound cleanser     - Dressing:    Hydrofera ready     - Dressing:    Medipore tape (no substitution)     - Additional Wound Dressing Information:    2 offloading felt pads, peg shoe     - Frequency:    Change dressing every other day and PRN       Wound 07/26/23 3 Pressure Injury Toe (Comment which one) Right (Active)   Date First Assessed: 07/26/23    Wound Number (Wound Clinic Only): 3  Primary Wound Type: Pressure Injury  Location: (c) Toe (Comment which one)  Wound Location Orientation: Right      Assessments 7/26/2023  2:03 PM   Wound Image     Site Assessment Dry;Yellow   Drainage Amount Small   Drainage Description Scabbed   Treatments Cleansed; Wound ;Site Care   Dressing Hydrofera ready; Tape   Dressing Changed New   Dressing Status Dry; Intact; Clean   Wound Length (cm) 0.2 cm   Wound Width (cm) 0.2 cm   Wound Surface Area (cm^2) 0.04 cm^2   Wound Depth (cm) 0.1 cm   Wound Volume (cm^3) 0.004 cm^3   Margins Attached edges   Linda-wound Assessment Edema; Non-blanchable erythema   Wound Bed Granulation (%) 0 %   Wound Bed Epithelium (%) 0 %   Wound Bed Slough (%) 100 %   Wound Bed Eschar (%) 0 %   Wound Odor None   State of Healing Hyperkeratosis       No associated orders. Wound Number: Left foot lateral/ plantar wound and left posterior heel wound. Wound Cleaning and Dressings:  Showering directions: May shower with protection  Wound cleansing:  Cleanse with normal saline or wound cleanser  Wound cleaning frequency:  Cleanse wound with every dressing change.   Wound product: Collagen, Hydrofera ready and Medipore tape (no substitution), 2 offloading felt pad  Dressing change frequency:  Change dressing 3x per week    Wound Number: Left foot great toe wound     Wound Cleaning and Dressings:   Showering directions: May shower with protection  Wound cleansing:  Cleanse with normal saline or wound cleanser  Wound cleaning frequency: Cleanse wound with every dressing change. Wound product: Hydrofera ready and Medipore tape (no substitution), 2 offloading felt pad  Dressing change frequency:  Change dressing 3x per week  Miscellaneous/Additional Orders:  Offloading: Keep weight off left foot with offloading pads and peg liner surgical shoe. Follow Up:  Return in about 2 weeks (around 8/9/2023) for APN visit x 30 mins.            CASTILLO Zapata, NPI [id number]

## (undated) NOTE — LETTER
12/14/18        [place]to Haddad [id number]      Dear Unknown Folds,    [address] records indicate that you have outstanding lab work and or testing that was ordered for you and has not yet been completed:  Orders Placed This

## (undated) NOTE — IP AVS SNAPSHOT
[address]  [address] [facility] [phone]                Discharge Summary   1/10/2017    Dmitriy Germain           Admission Information        Provider Department    1/10/2017 Darrick Escobar MD [facility] 4w/S docusate sodium 100 MG Caps   Last time this was given:  100 mg on 1/16/2017  8:32 AM   Commonly known as:  COLACE   Next dose due:  START THIS EVENING        Take 100 mg by mouth 2 (two) times daily.     Viri Andrew        9 AM           9 PM Commonly known as:  ZOLOFT   Next dose due:  START IN THE MORNING        Take 1 tablet (25 mg total) by mouth daily.     Arlene Vargas        9 AM                   Sotalol HCl 80 MG Tabs   Last time this was given:  80 mg on 1/15/2017  9:09 PM   Common with his office the week of 2/20/17--call office for appointment. Follow-up Information     Follow up with Mikayla Estrada MD In 1 month.     Specialty:  Internal Medicine    Contact information:    [facility] [address] [phone] 0.2 (01/16/17)  0.0    (01/15/17)  72 (01/15/17)  17 (01/15/17)  9 (01/15/17)  3 (01/15/17)  1  (01/15/17)  4.6 (01/15/17)  1.0 (01/15/17)  0.6 (01/15/17)  0.2 (01/15/17)  0.0    (01/14/17)  76 (01/14/17)  13 (01/14/17)  9 (01/14/17)  2 (01/14/17)  0  (01/ and ask to get set up for an insurance coverage that is in-network with Viridiana Tran.         [employer]     Call the [facility] for assistance with your inactive [employer] account    If you have questions, you can call [phone] to talk to our [facility] Use: Prevent the development or progression of blood clots   Most common side effects: Abnormal bleeding   What to report to your healthcare team: Bruising, blood in urine or stool, or nosebleeds             Blood Producing Medications     ferrous sulfate Use: Treat conditions such as depression and thought disorders   Most common side effects: Dizziness, drowsiness, problems with movement   What to report to your healthcare team: Changes in thinking, talking or movement, drowsiness, shaking           All

## (undated) NOTE — LETTER
Date: 7/13/2023  Patient name: Estephania Owens  YOB: 1931  Medical Record Number: [id number]  Primary Coverage: Payor: MEDICARE / Plan: MEDICARE PART A&B / Product Type: *No Product type* /   Secondary Coverage: Toritoyamile Raghav ID: [id number]  Patient Address: [facility]  [place] [address]  Telephone Information:   Home Phone [phone]   Mobile [phone]       Encounter Date: 7/13/2023  Provider: VALERIE Dunlap  Diagnosis: (C66.340) Non-healing ulcer of left foot, unspecified ulcer stage ([address]  (primary encounter diagnosis)  (A15.510) Pressure ulcer of left heel, stage 2 (HCC)  (I73.9) PVD (peripheral vascular disease) ([address]  (L89.893) Pressure injury of left foot, stage 3 ([address]  (L97.521) Neuropathic ulcer of left foot, limited to breakdown of skin ([address]      Wound 07/13/23 1 Pressure Injury Foot Lateral;Left;Plantar (Active)   Date First Assessed: 07/13/23    Wound Number (Wound Clinic Only): 1  Primary Wound Type: Pressure Injury  Location: Foot  Wound Location Orientation: Lateral;Left;Plantar      Assessments 7/13/2023 11:38 AM   Wound Image     Site Assessment Granulation tissue; Moist;Pink;Yellow   Drainage Amount Moderate   Drainage Description Serous;Tan;Yellow   Treatments Cleansed; Saline   Dressing Hydrofera ready; Tape   Dressing Changed New   Dressing Status Dressing Changed;Removed; Old drainage   Wound Length (cm) 1.2 cm   Wound Width (cm) 1.3 cm   Wound Surface Area (cm^2) 1.56 cm^2   Wound Depth (cm) 0.2 cm   Wound Volume (cm^3) 0.312 cm^3   Margins Attached edges; Well-defined edges   Non-staged Wound Description Full thickness   Linda-wound Assessment Clean;Dry; Intact   Wound Granulation Tissue Pink   Wound Bed Granulation (%) 80 %   Wound Bed Epithelium (%) 0 %   Wound Bed Slough (%) 20 %   Wound Bed Eschar (%) 0 %   Wound Odor None   Pressure Injury Stage Stage 3   Shoe Type Peg liner;Felt   State of Healing Non-healing Inactive Orders   Date Order Priority Status Authorizing Provider   07/13/23 1556 OP WOUND DRESSING Routine Completed Zonia Hank, APRN     - Cleansing:    Cleanse with normal saline or wound cleanser     - Dressing:    Hydrofera ready     - Dressing:    Medipore tape (no substitution)     - Additional Wound Dressing Information:    2 offloading felt pads, peg shoe     - Frequency:    Change dressing every other day and PRN       Wound 07/13/23 2 Pressure Injury Heel Left;Posterior (Active)   Date First Assessed: 07/13/23    Wound Number (Wound Clinic Only): 2  Primary Wound Type: Pressure Injury  Location: Heel  Wound Location Orientation: Left;Posterior      Assessments 7/13/2023 11:38 AM   Wound Image     Site Assessment Moist;Granulation tissue;Red;Pink   Drainage Amount Moderate   Drainage Description Serosanguineous   Treatments Cleansed; Saline   Dressing Hydrofera ready; Tape   Dressing Changed New   Dressing Status Dressing Changed;Removed; Old drainage   Wound Length (cm) 1 cm   Wound Width (cm) 1.5 cm   Wound Surface Area (cm^2) 1.5 cm^2   Wound Depth (cm) 0.1 cm   Wound Volume (cm^3) 0.15 cm^3   Margins Well-defined edges; Attached edges   Non-staged Wound Description Full thickness   Linda-wound Assessment Clean;Dry; Intact   Wound Granulation Tissue Red;Pink   Wound Bed Granulation (%) 90 %   Wound Bed Epithelium (%) 0 %   Wound Bed Slough (%) 10 %   Wound Bed Eschar (%) 0 %   Wound Odor None   Pressure Injury Stage Stage 3   Shoe Type Peg liner;Felt   State of Healing Non-healing       Inactive Orders   Date Order Priority Status Authorizing Provider   07/13/23 1556 OP WOUND DRESSING Routine Completed Zonia Mckinney APRMARIBEL     - Cleansing:    Cleanse with normal saline or wound cleanser     - Dressing:    Hydrofera ready     - Dressing:    Medipore tape (no substitution)     - Additional Wound Dressing Information:    2 offloading felt pads, peg shoe     - Frequency:    Change dressing every other day and PRN   Wound Number: Left foot lateral plantar wound    Wound Cleaning and Dressings:  Showering directions: May shower with protection  Wound cleansing:  Cleanse with normal saline or wound cleanser  Wound cleaning frequency:  Cleanse wound with every dressing change. Wound product: Hydrofera ready and Medipore tape (no substitution), 2 offloading felt pad  Dressing change frequency:  Change dressing 3x per week  Wound Number: Left foot proximal lateral wound    Wound Cleaning and Dressings:  Showering directions: May shower with protection  Wound cleansing:  Cleanse with normal saline or wound cleanser  Wound cleaning frequency: Cleanse wound with every dressing change. Wound product: Hydrofera ready and Medipore tape (no substitution), 2 offloading felt pad  Dressing change frequency:  Change dressing 3x per week  Miscellaneous/Additional Orders:  Offloading: Keep weight off left foot    Follow Up:   Follow up in 2 weeks (around 7/27/23) with [facility], [address], [facility] [phone]

## (undated) NOTE — LETTER
Date: 9/21/2023  Patient name: Estephania Owens  YOB: 1931  Medical Record Number: [id number]  Primary Coverage: Payor: MEDICARE / Plan: MEDICARE PART A&B / Product Type: *No Product type* /   Secondary Coverage: Flor Avilez ID: [id number]  Patient Address: [facility] [address] [place] [address]  Telephone Information:   Home Phone [phone]   Mobile [phone]       Encounter Date: 9/21/2023  Provider: VALERIE Dunlap  Diagnosis: (C01.848) Pressure injury of left foot, stage 3 ([facility] [L89.893]  (primary encounter diagnosis)      Wound 07/13/23 1 Pressure Injury Foot Lateral;Left;Plantar (Active)   Date First Assessed: 07/13/23    Wound Number (Wound Clinic Only): 1  Primary Wound Type: Pressure Injury  Location: Foot  Wound Location Orientation: Lateral;Left;Plantar      Assessments 7/13/2023 11:38 AM 9/21/2023  1:36 PM   Wound Image      Site Assessment Granulation tissue; Moist;Pink;Yellow --   Drainage Amount Moderate --   Drainage Description Serous;Tan;Yellow --   Treatments Cleansed; Saline --   Dressing Hydrofera ready; Tape --   Dressing Changed New --   Dressing Status Dressing Changed;Removed; Old drainage --   Wound Length (cm) 1.2 cm 0.5 cm   Wound Width (cm) 1.3 cm 0.5 cm   Wound Surface Area (cm^2) 1.56 cm^2 0. 25 cm^2   Wound Depth (cm) 0.2 cm 0.2 cm   Wound Volume (cm^3) 0.312 cm^3 0.05 cm^3   Wound Healing % -- 84   Margins Attached edges; Well-defined edges --   Non-staged Wound Description Full thickness --   Linda-wound Assessment Clean;Dry; Intact --   Wound Granulation Tissue Pink --   Wound Bed Granulation (%) 80 % --   Wound Bed Epithelium (%) 0 % --   Wound Bed Slough (%) 20 % --   Wound Bed Eschar (%) 0 % --   Wound Odor None --   Pressure Injury Stage Stage 3 --   Shoe Type Peg liner;Felt --   State of Healing Non-healing --       Active Orders   Date Order Priority Status Authorizing Provider   07/26/23 1031 OP Wound Dressing Routine Active VALERIE Vance     - Cleansing:    Cleanse with normal saline or wound cleanser     - Dressing:    Collagen     - Dressing:    Hydrofera ready     - Dressing:    Medipore tape (no substitution)     - Additional Wound Dressing Information:    2 offloading pads, (hydrofera blue just on great toe wound)       Inactive Orders   Date Order Priority Status Authorizing Provider   09/21/23 1423 Debridement Pressure Injury Lateral;Left;Plantar Foot Routine Completed VALERIE Vance   07/13/23 1556 OP Wound Dressing Routine Completed VALERIE Balderas     - Cleansing:    Cleanse with normal saline or wound cleanser     - Dressing:    Hydrofera ready     - Dressing:    Medipore tape (no substitution)     - Additional Wound Dressing Information:    2 offloading felt pads, peg shoe     - Frequency:    Change dressing every other day and PRN       Wound 07/13/23 2 Pressure Injury Heel Left;Posterior (Active)   Date First Assessed: 07/13/23    Wound Number (Wound Clinic Only): 2  Primary Wound Type: Pressure Injury  Location: Heel  Wound Location Orientation: Left;Posterior      Assessments 7/13/2023 11:38 AM 9/21/2023  1:35 PM   Wound Image       Site Assessment Moist;Granulation tissue;Red;Pink Dry;Yellow   Drainage Amount Moderate None   Drainage Description Serosanguineous Scabbed   Treatments Cleansed; Saline Cleansed; Wound    Dressing Hydrofera ready; Tape --   Dressing Changed New --   Dressing Status Dressing Changed;Removed; Old drainage --   Wound Length (cm) 1 cm 0 cm   Wound Width (cm) 1.5 cm 0 cm   Wound Surface Area (cm^2) 1.5 cm^2 0 cm^2   Wound Depth (cm) 0.1 cm 0 cm   Wound Volume (cm^3) 0.15 cm^3 0 cm^3   Wound Healing % -- 100   Margins Well-defined edges; Attached edges Attached edges; Flat and Intact   Non-staged Wound Description Full thickness --   Linda-wound Assessment Clean;Dry; Intact Clean;Dry; Intact   Wound Granulation Tissue Red;Pink --   Wound Bed Granulation (%) 90 % 0 % Wound Bed Epithelium (%) 0 % 100 %   Wound Bed Slough (%) 10 % 0 %   Wound Bed Eschar (%) 0 % 0 %   Wound Odor None None   Pressure Injury Stage Stage 3 --   Shoe Type Peg liner;Felt Peg liner;Felt   State of Healing Non-healing Epithelialized       Active Orders   Date Order Priority Status Authorizing Provider   07/26/23 1456 OP Wound Dressing Routine Active Mya Woody, APRN     - Cleansing:    Cleanse with normal saline or wound cleanser     - Dressing:    Collagen     - Dressing:    Hydrofera ready     - Dressing:    Medipore tape (no substitution)     - Additional Wound Dressing Information:    2 offloading pads, (hydrofera blue just on great toe wound)       Inactive Orders   Date Order Priority Status Authorizing Provider   07/13/23 1556 OP Wound Dressing Routine Completed Zonia Mckinney, APRN     - Cleansing:    Cleanse with normal saline or wound cleanser     - Dressing:    Hydrofera ready     - Dressing:    Medipore tape (no substitution)     - Additional Wound Dressing Information:    2 offloading felt pads, peg shoe     - Frequency:    Change dressing every other day and PRN       Wound 07/26/23 3 Pressure Injury Toe (Comment which one) Left (Active)   Date First Assessed: 07/26/23    Wound Number (Wound Clinic Only): 3  Primary Wound Type: Pressure Injury  Location: (c) Toe (Comment which one)  Wound Location Orientation: Left      Assessments 7/26/2023  2:03 PM 9/21/2023  1:35 PM   Wound Image       Site Assessment Dry;Yellow Dry;Yellow;Brown   Drainage Amount Small None   Drainage Description Scabbed --   Treatments Cleansed; Wound ;Site Care Cleansed; Wound ;Site Care   Dressing Hydrofera ready; Tape --   Dressing Changed New --   Dressing Status Dry; Intact; Clean --   Wound Length (cm) 0.2 cm 0 cm   Wound Width (cm) 0.2 cm 0 cm   Wound Surface Area (cm^2) 0.04 cm^2 0 cm^2   Wound Depth (cm) 0.1 cm 0 cm   Wound Volume (cm^3) 0.004 cm^3 0 cm^3   Wound Healing % -- 100   Margins Attached edges Flat and Intact   Linda-wound Assessment Edema; Non-blanchable erythema Pink;Dry;Callous   Wound Bed Granulation (%) 0 % 0 %   Wound Bed Epithelium (%) 0 % 100 %   Wound Bed Slough (%) 100 % 0 %   Wound Bed Eschar (%) 0 % 0 %   Wound Odor None None   Pressure Injury Stage Unstageable --   State of Healing Hyperkeratosis Epithelialized;Closed wound edges       Active Orders   Date Order Priority Status Authorizing Provider   07/26/23 1456 OP Wound Dressing Routine Active VALERIE Son     - Cleansing:    Cleanse with normal saline or wound cleanser     - Dressing:    Collagen     - Dressing:    Hydrofera ready     - Dressing:    Medipore tape (no substitution)     - Additional Wound Dressing Information:    2 offloading pads, (hydrofera blue just on great toe wound)         Wound Number: Left foot lateral/ plantar wound      Wound Cleaning and Dressings:  Showering directions: May shower with protection  Wound cleansing:  Cleanse with normal saline or wound cleanser  Wound cleaning frequency:  Cleanse wound with every dressing change. Wound product: Collagen, Hydrofera ready and Medipore tape (no substitution), 2 offloading felt pad and offloading ring for posterior heel wound. Miscellaneous/Additional Orders:  Offloading: Keep weight off left foot with offloading pads and peg liner surgical shoe. Follow Up:  Return in about 4 weeks (around 10/19/2023) for APN visit x 30 mins.      CASTILLO Stafford, NPI [id number]

## (undated) NOTE — LETTER
[facility]  Administration of Anesthesia  1. I, Dhaval Valadez, or _________________________________ acting on his behalf, (Patient) (Dependent/Representative) request to receive anesthesia for my pending procedure/operation/treatment.   A infections, high spinal block, spinal bleeding, seizure, cardiac arrest and death. 7. AWARENESS: I understand that it is possible (but unlikely) to have explicit memory of events from the operating room while under general anesthesia.   8. ELECTROCONVULSIV unconscious pt /Relationship    My signature below affirms that prior to the time of the procedure, I have explained to the patient and/or his/her guardian, the risks and benefits of undergoing anesthesia, as well as any reasonable alternatives.     _______

## (undated) NOTE — IP AVS SNAPSHOT
Patient Demographics     Address  [address] [place] [address] Phone  [phone] [facility])  [phone] (Mobile) *Preferred*      Emergency Contact(s)     Name Relation Home Work [address] [facility] Daughter [phone]  Commonly known as: DULCOLAX  Next dose due: As needed for constipation      Take 2 tablets (10 mg total) by mouth daily as needed for constipation.    Arik Santamaria MD         cyanocobalamin 1000 MCG/ML Soln  Commonly known as: VITAMIN B12  Next d [address] [facility]  Start taking on: December 7, 2021  Next dose due: Tomorrow morning      Take 17 g by mouth daily. Nico Gurrola MD         Potassium Chloride ER 10 MEQ Tbcr  Commonly known as: K-DUR  Next dose due:  Take only when he takes lasix      Take 1 HYDROcodone-acetaminophen (NORCO) 7.5-325 MG per tab 1 tablet 12/05/21 2300 Given      [id number] HYDROcodone-acetaminophen (NORCO) 7.5-325 MG per tab 1 tablet 12/06/21 1512 Given      [id number] digoxin (LANOXIN) tab 125 mcg 12/06/21 0825 Given      [id number] Most Recent Value   Patient Weight 70.8 kg (156 lb)         Lab Results Last 24 Hours      Basic Metabolic Panel (8) [[id number]] (Abnormal)  Resulted: 12/06/21 0732, Result status: Final result   Ordering provider: Valentní Rene MD  12/05/21 2300 Resu valves.               CBC With Differential With Platelet [[id number]] (Abnormal)  Resulted: 12/06/21 0642, Result status: Final result   Ordering provider: Khloe Zendejas MD  12/05/21 2300 Resulting lab: [facility]) 12/03/21 0727    Specimen: Other from Nares      Rapid SARS-CoV-2 by PCR Not Detected    Rapid SARS-CoV-2 by PCR [[id number]]  (Normal) Collected: 11/27/21 1111    Order Status: Completed Lab Status: Final result Updated: 11/27/21 1146    Specimen: Other fr for further evaluation/management.     History     Past Medical History:   Diagnosis Date   • AF (atrial fibrillation) (HCC)    • Arrhythmia    • Cataract    • Chronic pulmonary aspiration     ARF 6/16 BiPAP   • Congestive heart disease (HCC)    • COPD (chr 60        Types: Cigarettes        Quit date: 1993        Years since quittin.4      Smokeless tobacco: Former User        Types: Chew        Quit date: 2015    Vaping Use      Vaping Use: Never used    Alcohol use: Yes    Drug use: No      A furosemide))  Multiple Vitamins-Minerals (ICAPS AREDS FORMULA) Oral Tab, Take 1 tablet by mouth daily. Cholecalciferol (VITAMIN D) 1000 units Oral Tab, Take 1 tablet by mouth daily.   Loperamide HCl 2 MG Oral Cap, Take 2 mg by mouth 4 (four) times daily as no abdominal tenderness. There is no right CVA tenderness or left CVA tenderness. Musculoskeletal:      Cervical back: Neck supple. No rigidity. Right lower leg: No edema. Left lower leg: No edema.       Comments: Hammertoe deformities noted lef the 2nd digit proximal phalanx. Mild osteoarthritis of the 1st digit and midfoot. Fatty atrophy of the visualized intrinsic foot musculature.     Dictated by (CST): Jackson Mendes MD on 11/27/2021 at 10:18 AM     Finalized by (CST): Jackson Mendes MD on 11/ Mirlande Carmona MD Service: Infectious Disease Author Type: Physician    Filed: 11/30/2021  3:01 PM Date of Service: 11/30/2021  2:48 PM Status: Signed    : Romeo Moreira MD (Physician)         [facility] [address] [facility] (irritable bowel syndrome)    • Lipid screening 10/04/2010   • Malabsorption    • Malnutrition (HCC)     severe    • Osteoarthrosis, unspecified whether generalized or localized, unspecified site     hands   • Pneumonia    • Pneumonia due to organism 2016 potassium chloride (K-DUR M20) CR tab 40 mEq, 40 mEq, Oral, Q4H  •  vancomycin IVPB premix 1.25g in 0.9% NaCl 250 mL, 1,250 mg, Intravenous, Q24H  •  cefTRIAXone Sodium (ROCEPHIN) 2 g in sodium chloride 0.9% 100 mL IVPB-ADDV, 2 g, Intravenous, Q24H  •  al extremities. MUSCULOSKELETAL:  No muscle, back pain, joint pain or stiffness. Physical Exam:  Vital signs: Blood pressure 104/47, pulse 70, temperature 97.7 °F (36.5 °C), temperature source Oral, resp.  rate 18, height 6' 3\" (1.905 m), weight 156 lb (7 osteomyelitis of his second digit. Patient is agreeable to amputation. He is currently undergoing vascular work-up prior to the anticipated surgery. He is currently on IV vancomycin and ceftriaxone.   ID consulted for antibiotics management      # Left fo disease)     Monoclonal gammopathy     Leg weakness, bilateral     Atrial flutter (HCC)     Degenerative arthritis of pelvis     Anemia     Thumb pain     TIA (transient ischemic attack)     Knee pain, chronic     Partial hamstring tear     Bilateral lower No carotid bruit. Cardiovascular:      Rate and Rhythm: Normal rate. Rhythm irregular. Heart sounds: Normal heart sounds. Pulmonary:      Effort: Pulmonary effort is normal. No respiratory distress. Breath sounds: Normal breath sounds.  No whe emergency room to rule out osteomyelitis. MRI left foot showed diffuse dorsal foot edema worse at the second digit with underlying acute osteomyelitis changes. Patient started on vancomycin and Rocephin and admitted for further evaluation/management. 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 Qty: 90 tablet Refills: 0    digoxin (DIGOX) 0.125 MG Oral Tab  Take 1 tablet (125 mcg total) by mouth once daily.   Qty: 90 tablet Ref Physical Therapy Notes (last 72 hours)      Physical Therapy Note signed by Alex Jacobson PT at 12/5/2021  3:54 PM  Version 1 of 1    Author: Alex Jacobson PT Service: Physical Medicine and Rehabilitation Author Type: Physical Therapist    F leverages post legs against bed and has posterior lean. Cues provided for upright posture, use of walker and ant wt shift while also maintaining heel WB of LLE. Pt needs Mod A x2 for few steps from bed > chair with RW as well.    Gait: Few steps from bed > • Hip fracture, left [facility]) Jan 2017    s/p IM fixation 1/11/17; Ortho Dr Alcantara   • IBS (irritable bowel syndrome)    • Lipid screening 10/04/2010   • Malabsorption    • Malnutrition (HCC)     severe    • Osteoarthrosis, unspecified whether generalized or Activity promotion; Body mechanics;Repositioning    COGNITION  · Overall Cognitive Status:  WFL - within functional limits  · Safety Judgement:  decreased awareness of need for assistance and decreased awareness of need for safety    RANGE OF MOTION AND STR re-educate  Posture  Transfer training  WB precautions, safety precautions    Patient End of Session: Up in chair;Needs met;Call light within reach;RN aware of session/findings; All patient questions and concerns addressed    CURRENT GOALS    Goals to be me impairments: strength, balance, activity tolerance/endurance. These deficits manifest functionally while performing I/ADLs and fx mobility/transfers.   The patient is below baseline and would benefit from skilled inpatient OT to address the above deficits, this level of impairment may benefit from DONTA. At baseline, patient reports he receives assist for LB dressing, bathing, and toileting; independent with UB dressing.  Primarily uses w/c for fx mobility, but able to amb short distances using up-walker wi • Fracture, radius 2014   • Hip fracture, left [facility]) Jan 2017    s/p IM fixation 1/11/17; Ortho Dr Alcantara   • IBS (irritable bowel syndrome)    • Lipid screening 10/04/2010   • Malabsorption    • Malnutrition (HCC)     severe    • Osteoarthrosis, unspeci EXAMINATION     OBJECTIVE  Precautions: Limb alert - left  Fall Risk: High fall risk    WEIGHT BEARING RESTRICTION           L Lower Extremity: Other (Comment) (Heel weight-bearing with surgical shoe on)    PAIN ASSESSMENT  Ratin          COGNITION  Ov chair;Needs met;Call light within reach;RN aware of session/findings; All patient questions and concerns addressed    OT Goals  Patient self-stated goal is: return home     Patient will complete toilet transfer with CGA  Comment:     Patient will complete t

## (undated) NOTE — ED AVS SNAPSHOT
[facility] Emergency Department    [address]     [place] [address]    Phone:  [phone]    Fax:  [phone]           Agatha Rhoda   MRN: [id number]    Department:  [facility] Emergency Department   Date of Visit:  3/31/ If you have difficulty scheduling your follow-up appointment as directed, please call our  at [phone]. Si tiene problemas para programar ella conor de seguimiento según lo indicado, llame al encargado de ann marie al [phone].     It i continue to take your medications as instructed by your Primary Care doctor until you can check with your doctor. Please bring the medication list to your next doctor's appointment.     Any imaging studies and labs completed today can be reviewed in your [place] Medicaid plans. To get signed up and covered, please call [phone] and ask to get set up for an insurance coverage that is in-network with Viridiana Tran.         Alek     Call the [employer] for assistance with your inactive [employer] account

## (undated) NOTE — LETTER
[address], [place], [place]  [address]  INFORMED CONSENT FOR TRANSFUSION OF BLOOD OR BLOOD PRODUCTS  My physician has informed me of the nature, purpose, benefits and risks of transfusion for blood and blood components that ______________________________________________  (Signature of Patient)                                                            (Responsible party in case of Minor,

## (undated) NOTE — IP AVS SNAPSHOT
[facility]            (For Outpatient Use Only) Initial Admit Date: 11/27/2021   Inpt/Obs Admit Date: Inpt: 11/27/21 / Obs: N/A   Discharge Date:    Aline Copier:  [de-identified]   MRN: [de-identified]   CSN: [id number]   CEID: [id number]        E Subscriber: SELF   TERTIARY INSURANCE   Payor:  Plan:    Group Number:  Insurance Type:    Subscriber Name:  Subscriber :    Subscriber ID:  Pt Rel to Subscriber:    Hospital Account Financial Class: Medicare    2021

## (undated) NOTE — IP AVS SNAPSHOT
[facility]            (For Outpatient Use Only) Initial Admit Date: 2022   Inpt/Obs Admit Date: Inpt: 22 / Obs: N/A   Discharge Date:    Ada Smart:  [de-identified]   MRN: [de-identified]   CSN: [id number]   CEID: [id number]        ENCOUNTER  Patient Class: Inpatient Admitting Provider: Jesica Mehta MD Unit: [address] [place] [address]/SE   Hospital Service: Medical Attending Provider: Jesica Mehta MD   Bed: 557-A   Visit Type:   Referring Physician: No ref. provider found Billing Flag:    Admit Diagnosis: Lung nodule [R91.1]      PATIENT  Legal Name:   Julio Cesar Casey   Legal Sex: Male  Gender ID:              Pref Name:    PCP:  Meagan Wilson MD Home: [phone]   Address:  [facility] REYNA * : 10/22/1931 (89 yrs) Mobile: [phone]         City/State/Zip: [place] [address] Marital:  Language: [place]: Helen SSN4: [id number] Yazidi: Lida Angulo Not Gisel Gleason*     Race: White Ethnicity: Non  Or [address] [place] [address] Street   Name Relationship Legal Guardian? Home Phone Work Phone Mobile Phone   1. Macie Hernandez  2.  Yanelis Mccartney Daughter  Son    [phone]       GUARANTOR  Guarantor: Leif Carey : 10/22/1931 Home Phone: [phone]   Address: [address]  Sex: Male Work Phone:    City/State/Zip: [address]   Rel. to Patient: Self Guarantor ID: [id number]   [address]   Employer:  Status: RETIRED     COVERAGE  PRIMARY INSURANCE   Payor: MEDICARE Plan: MEDICARE PART A&B   Group Number:  Insurance Type: INDEMNITY   Subscriber Name: Coleen Ankush : 10/22/1931   Subscriber ID: [id number] Pt Rel to Subscriber: Self   SECONDARY INSURANCE   Payor: [address] [place] [address] Drive: [address] [place] [address]   Group Number: [id number] Insurance Type: [id number] Name: Coleen Lechuga : 10/22/1931   Subscriber ID: [id number] Pt Rel to Subscriber: [address] [place] [address] Payor:  Plan:    Group Number:  Insurance Type:    Subscriber Name:  Subscriber :    Subscriber ID:  Pt Rel to Subscriber:    Hospital Account Financial Class: Medicare    2022

## (undated) NOTE — MR AVS SNAPSHOT
[place]  [address] [place] [address]  [phone]               Thank you for choosing us for your health care visit with Sumaya Hermosillo MD.  We are glad to serve you and happy to provide you with this summary of your visit.   Ple have any questions related to insurance coverage.          Reason for Today's Visit     Follow - Up     Medication Request           Medical Issues Discussed Today     Protein calorie malnutrition    Seborrheic dermatitis    Venous insufficiency    Vitamin taking this medication, and follow the directions you see here. Commonly known as:  COUMADIN           * Notice: This list has 2 medication(s) that are the same as other medications prescribed for you.  Read the directions carefully, and ask your doctor

## (undated) NOTE — IP AVS SNAPSHOT
Patient Demographics     Address Phone E-mail Address    [address] [place] [address] Dr Lorean Quintero [place] [phone] [facility]) *Preferred*  [phone] (Work)  [phone] [facility]) [email or web address]      Emergency Contact(s)     Name Relation Home Work Cornelio bisacodyl 10 MG Supp   Commonly known as:  DULCOLAX   Next dose due:  AS NEEDED        Place 1 suppository (10 mg total) rectally daily as needed.     Maddy Bai                    TAKE ONE SUPPOSITORY DAILY AS NEEDED FOR CONSTIPATION       c TAKE 30 ML DAILY AS NEEDED FOR CONSTIPATION       MINERIN Crea   Last time this was given:  1/16/2017  8:34 AM   Next dose due:  START THIS EVENING        ATAA BID    Meryle Raveling        9 AM           9 PM           PEG 3350 Pack   Commonly known as: - MINERIN Crea  - PEG 3350 Pack  - Sotalol HCl 80 MG Tabs  - Warfarin Sodium 3 MG Tabs  - Warfarin Sodium 4 MG Tabs            432-432-A - MAR ACTION REPORT  (last 24 hrs)    ** SITE UNKNOWN **     Order ID Medication Name Action Time Action Reason Comment Resp 18 Filed at 01/16/2017 0839    Temp 99.2 °F (37.3 °C) Filed at 01/16/2017 0407    SpO2 96 % Filed at 01/16/2017 0939      Patient's Most Recent Weight       Most Recent Value    Patient Weight 58.333 kg (128 lb 9.6 oz)         Lab Results Last 24 [facility] Procedure                               Abnormality         Status                     ---------                               -----------         ------                     CBC W/ DIFFERENTIAL[[id number]]          Abnormal            Final result History of Present Illness:  Iker Frazier is a(n) 80year old male. Who states he felt dizzy and light-headed yesterday morning after he had been walking. He suffered a fall with subsequent left hip pain.   He denies LOC, denies CP, palpitations or dyspn 7. Cataract, status post lens implant. 8. History of venous insufficiency for which he underwent vein    surgery in  2002.   9.  History of mild thrombocytopenia for the past few years.  The    patient  had undergone CT scan of the abdomen and pelvis in [place] • Depression          Past Surgical History    COLON SURGERY Right 10/11    CATH PERCUTANEOUS  TRANSLUMINAL CORONARY ANGIOPLASTY  1990    FAT, FECAL QUALITATIV  12/10    LEXISCAN NUC STRESS TST, CARDIO ([facility])  9/11    CARDIOVERSION  2007    OTHER SURGICAL H Problem Relation Age of Onset   • old age[other] [OTHER] Father 80     cause of death   • Other[other] [OTHER] Mother 80     cause of death   • Cancer Paternal Aunt    • Diabetes Paternal Aunt              Review of Systems:    ROS:   Constitutional: no we Skin: no rash or ulcerations            Laboratory Data:     Lab Results  Component Value Date   WBC 9.4 01/11/2017   HGB 11.6 01/11/2017   HCT 36.2 01/11/2017    01/11/2017   CREATSERUM 0.72 01/11/2017   BUN 8 01/11/2017    01/11/2017   K 3.7 1/10/2017  CONCLUSION:  1. Intertrochanteric fracture left hip. 2. Demineralization. 3. Osteoarthritis lower lumbar spine. 4. Postop changes in the abdomen and pelvis. 5. Atherosclerosis.            Ekg 12-lead    1/10/2017  ECG Report  Interpretation  ---- with thin liquids; he has been non-compliant with using thickener when drinking coffee; start Zosyn 3.375 gm IV q8hrs after surgery; O2 Sat 93% RA; will need repeat Speech therapy consult and dietary restriction (mechanical soft diet with nectar thick liqu Electronically signed by Ra Souza MD on 1/11/2017 11:11 AM      H&P by Ra Souza MD at 1/11/2017  9:19 AM  Version 1 of 2    Author:  Ra Souza MD Service:  (none) Author Type:  Physician    Filed:  1/11/2017  9:56 AM Note Time electrophysiology. He was started on Xarelto but was then transitioned to   Coumadin  due to the cost of the Xarelto. All anti-arrhythmics stopped in Dec 2016. ECHO in June 2016 showed EF 70%. 2.  Coronary artery disease, status post PTCA in the 1990s. ALLERGIES:  The patient has no known drug allergies.      SOCIAL HISTORY:  The patient is .  He lives with his wife. Harleen Stevenson  smokes cigars.  His wife is a heavy cigarette smoker.  He drinks    wine  occasionally.     History:  Past Medical History coumadin clinic Disp: 75 tablet Rfl: 5 Taking   furosemide 20 MG Oral Tab Take 1 tablet (20 mg total) by mouth daily. Disp: 30 tablet Rfl: 5 Taking   Sertraline HCl 25 MG Oral Tab Take 1 tablet (25 mg total) by mouth daily.  Disp: 30 tablet Rfl: 5 Taking Blood pressure 91/51, pulse 82, temperature 97.5 °F (36.4 °C), temperature source Oral, resp. rate 20, height 6' 4\" (1.93 m), weight 128 lb 9.6 oz (58.333 kg), SpO2 93 %.   Constitutional: alert and oriented x3 in no acute distress  HEENT- EOMI, PERRL  Nos basal left lower lobe pneumonia. 3. Secretions in left lower lobe bronchus suspicious for aspiration.  4. Chronic inspissated secretions and posterior basal right lower lobe with atelectasis pneumonia and/or scar suspicious for recurrent aspiration pneumoni received Vitamin K 2.5 mg po; INR 2.6 this AM; will give 2 Units FFP now; check post-transfusion INR; goal INR <1.5 for planned surgery  3.    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   Aspiration pneumonia- CT chest on 1 August of 2015; exacerbated by hypoalbuminemia; pt has LE edema; had been on Lasix 20 mg po qD, and also using Tubigrips BLE; due to hypotension and risk of volume depletion, Lasix will need to be stopped  11.  Osteoarthritis of cervical spine and lumbar s medications have been discontinued, he has been having bouts of dizziness. He states that his heart rate has been higher.   He cannot recall which of the 2 medications were discontinued, but both were discontinued at the direction of Dr. Angella Cardoso who is his c cataracts for extraction and lens implant, cholecystectomy, history of dental implants.     MEDICATIONS:  Home medications:  Warfarin 6.25 mg/7.5 mg daily, sertraline 25 mg daily, amiodarone 200 mg daily, budesonide 0.5 mg/2 mL with 0.5 mg by nebulization b fingers is noted bilaterally. There is some prominence of the CMC joints of both thumbs. Radial pulses are palpable bilaterally. No snuff box tenderness of either wrist noted.   There is a posterior dressing over the left elbow which is intact and has ve his warfarin therapy once surgery is completed and the patient is 12 hours post surgery. Dictated By Jose Alcantara MD  d: 01/10/2017 20:11:21  t: 01/10/2017 25:42:97  [facility] [id number]  V/     Electronically signed by Nathalie Thompson MD on • Lipid screening 10/04/2010   • Pneumonia      2016   • Coronary atherosclerosis    • Arrhythmia    • Cataract    • Esophageal reflux    • IBS (irritable bowel syndrome)    • Depression    • White matter disease    • CVA (cerebral vascular accident) ([facility] -   Sitting down on and standing up from a chair with arms (e.g., wheelchair, bedside commode, etc.): A Lot   -   Moving from lying on back to sitting on the side of the bed?: A Lot   How much help from another person does the patient currently need. ..    -    Patient is able to ambulate 10 feet with assistive device at assistance level: min A x 1   - NOT MET                  Physical Therapy Note by Alcira Mann PT at 1/14/2017  2:09 PM  Version 1 of 1    Author:  Alcira Mann PT Service:  (none) A LEXISCAN NUC STRESS TST, CARDIO ([facility])  9/11    CARDIOVERSION  2007    OTHER SURGICAL HISTORY  1970    Comment Billroth anastomosis    HLA B 27 DISEASE ASSOCIATION  2006    CATARACT  2006    INTRAOCULAR LENS IMPLANT, SECONDARY  2006    OTHER SURGICAL HISTO Comment : pt at EOB for 15 minutes; attempted sit - stand with RW and max A x 2 however pt unable to tolerate standing posture due to poor LE strength; pt requested to be put in bed.     Skilled Therapy Provided: Therapeutic Activities    THERAPEUTIC EXERCI PHYSICAL THERAPY TREATMENT NOTE - INPATIENT    Room Number: 432/432-A       Presenting Problem: IM nailing L hip    Problem List  Principal Problem:    Hip fracture, left, closed, initial encounter  Active Problems:    CAD (coronary artery disease)    Atr blankets)?: A Lot   -   Sitting down on and standing up from a chair with arms (e.g., wheelchair, bedside commode, etc.): A Lot   -   Moving from lying on back to sitting on the side of the bed?: A Lot   How much help from another person does the patient c :  Lynn Owen OT (Occupational Therapist)           OCCUPATIONAL THERAPY TREATMENT NOTE - INPATIENT     Room Number: 432/432-A         Presenting Problem:  (s/p Intramedullary fixation of intertrochanteric fx)    Problem List  Principal Problem How much help from another person does the patient currently need…  -   Putting on and taking off regular lower body clothing?: A Lot  -   Bathing (including washing, rinsing, drying)?: A Lot  -   Toileting, which includes using toilet, bedpan or urinal? : Patient is grossly tolerating current diet. Recommend upgrade to mechanical soft. Diet Recommendations- solids:  Mechanical soft  Diet Recommendations - Liquid: Nectar thick    Compensatory Strategies Recommended: No straws; Slow rate; Liv Lee Number of Visits to Meet Established Goals: 3  Session: 1    If you have any questions, please contact Verena Pedersen MA/[facility]-SLP  Speech Language Pathologist  Jami. [phone]       Electronically signed by Nikkie Murguia

## (undated) NOTE — LETTER
[facility]  Administration of Anesthesia  1.    Margaret Duong, or _________________________________ acting on his/her behalf, (Patient) (Dependent/Representative) request to receive anesthesia for my pending procedure/operation/treatment pressure, difficulty urinating, slowing of the baby's heart rate and headache. Rare risks include infections, high spinal         block, spinal bleeding, seizure, cardiac arrest and death.   7.   AWARENESS: I understand that it is possible (but unlike ________________________________________________  (Date) (Time)                                                                                                  (Responsible person in case of minor/ unconscious pt) /Relationship    My signature below affir

## (undated) NOTE — LETTER
[address]  [facility] [place], [place]  [id number]  INFORMED CONSENT FOR TRANSFUSION OF BLOOD OR BLOOD PRODUCTS  My physician has informed me of the nature, purpose, benefits and risks of transfusion for blood and blood components that ______________________________________________  (Signature of Patient)                                                            (Responsible party in case of Minor,

## (undated) NOTE — ED AVS SNAPSHOT
[facility] Emergency Department    [address]     [place] [address]    Phone:  [phone]    Fax:  [phone]           Anna Fang   MRN: [id number]    Department:  [facility] Emergency Department   Date of Visit:  3/31/ and Class Registration line at [phone] or find a doctor online by visiting [email or web address].    IF THERE IS ANY CHANGE OR WORSENING OF YOUR CONDITION, CALL YOUR PRIMARY CARE PHYSICIAN AT ONCE OR RETURN IMMEDIATELY TO [address] [place] [address].     If